# Patient Record
Sex: FEMALE | NOT HISPANIC OR LATINO | URBAN - METROPOLITAN AREA
[De-identification: names, ages, dates, MRNs, and addresses within clinical notes are randomized per-mention and may not be internally consistent; named-entity substitution may affect disease eponyms.]

---

## 2024-12-26 ENCOUNTER — TELEPHONE (OUTPATIENT)
Dept: CARDIOLOGY | Facility: HOSPITAL | Age: 79
End: 2024-12-26
Payer: MEDICARE

## 2024-12-26 NOTE — TELEPHONE ENCOUNTER
Dee Dee called from Dr. Platt's ofice reporting that Ms. Jansen in fact does have a trialysis catheter and needs Tunneled catheter placement. (The dialysis Nurse told me today that it was a tunneled catheter). Anyways, this is a patient of Dr. Pacheco's and Dee Dee will call their office for patient care. She will call us back if we need to schedule this patient.

## 2024-12-26 NOTE — TELEPHONE ENCOUNTER
Referral received from Lc's office requesting Catheter placement. S/p AVG Excision 12/4/24, so unsure of current, access for HD. Unable to reach Lc's nurse on 12/23 for more information. [facility] have not seen patient for about a month so unable to give more information. Contacted nursing home, spoke to nurse states she has catheter but unsure if its temp or tunneled catheter. Called back 12/26 and was able to reach dialysis nurse who confirms Ms. Jansen has a tunneled HD cath. There was some dysfunction last week (AP/ issues) but since starting Heparin and reversing lines she is running at 350 BFR today. They do not feel like a Catheter Exchange is needed at this time. Dr. Martinez office made aware.

## 2025-01-05 ENCOUNTER — HOSPITAL ENCOUNTER (INPATIENT)
Facility: HOSPITAL | Age: 80
LOS: 29 days | Discharge: OTHER INSTITUTION | DRG: 004 | End: 2025-02-03
Attending: EMERGENCY MEDICINE | Admitting: INTERNAL MEDICINE
Payer: MEDICARE

## 2025-01-05 DIAGNOSIS — G40.901 STATUS EPILEPTICUS: ICD-10-CM

## 2025-01-05 DIAGNOSIS — N18.6 END STAGE RENAL DISEASE: ICD-10-CM

## 2025-01-05 DIAGNOSIS — I63.9 CVA (CEREBRAL VASCULAR ACCIDENT): ICD-10-CM

## 2025-01-05 DIAGNOSIS — I50.9 CHF (CONGESTIVE HEART FAILURE): ICD-10-CM

## 2025-01-05 DIAGNOSIS — R56.9 SEIZURE-LIKE ACTIVITY: ICD-10-CM

## 2025-01-05 DIAGNOSIS — R41.82 AMS (ALTERED MENTAL STATUS): ICD-10-CM

## 2025-01-05 DIAGNOSIS — N39.0 URINARY TRACT INFECTION WITHOUT HEMATURIA, SITE UNSPECIFIED: ICD-10-CM

## 2025-01-05 DIAGNOSIS — R29.818 ACUTE FOCAL NEUROLOGICAL DEFICIT: ICD-10-CM

## 2025-01-05 DIAGNOSIS — R41.82 ALTERED MENTAL STATUS, UNSPECIFIED ALTERED MENTAL STATUS TYPE: Primary | ICD-10-CM

## 2025-01-05 DIAGNOSIS — I49.9 CARDIAC RHYTHM DISORDER OR DISTURBANCE OR CHANGE: ICD-10-CM

## 2025-01-05 DIAGNOSIS — D63.8 ANEMIA OF CHRONIC DISEASE: ICD-10-CM

## 2025-01-05 LAB
ACCEPTIBLE SP GR UR QL: 1.02 (ref 1–1.03)
ALBUMIN SERPL-MCNC: 2.3 G/DL (ref 3.4–4.8)
ALBUMIN/GLOB SERPL: 0.4 RATIO (ref 1.1–2)
ALLENS TEST BLOOD GAS (OHS): ABNORMAL
ALP SERPL-CCNC: 112 UNIT/L (ref 40–150)
ALT SERPL-CCNC: 23 UNIT/L (ref 0–55)
AMPHET UR QL SCN: NEGATIVE
ANION GAP SERPL CALC-SCNC: 11 MEQ/L
AST SERPL-CCNC: 25 UNIT/L (ref 5–34)
BACTERIA #/AREA URNS AUTO: ABNORMAL /HPF
BARBITURATE SCN PRESENT UR: NEGATIVE
BASE EXCESS BLD CALC-SCNC: 0.7 MMOL/L
BASOPHILS # BLD AUTO: 0.1 X10(3)/MCL
BASOPHILS NFR BLD AUTO: 1.1 %
BENZODIAZ UR QL SCN: NEGATIVE
BILIRUB SERPL-MCNC: 0.3 MG/DL
BILIRUB UR QL STRIP.AUTO: ABNORMAL
BLOOD GAS SAMPLE TYPE (OHS): ABNORMAL
BUN SERPL-MCNC: 16 MG/DL (ref 9.8–20.1)
CA-I BLD-SCNC: 0.93 MMOL/L (ref 1.12–1.23)
CALCIUM SERPL-MCNC: 8.4 MG/DL (ref 8.4–10.2)
CANNABINOIDS UR QL SCN: NEGATIVE
CHLORIDE SERPL-SCNC: 96 MMOL/L (ref 98–107)
CHOLEST SERPL-MCNC: 102 MG/DL
CHOLEST/HDLC SERPL: 3 {RATIO} (ref 0–5)
CLARITY UR: ABNORMAL
CO2 BLDA-SCNC: 25 MMOL/L
CO2 SERPL-SCNC: 22 MMOL/L (ref 23–31)
COCAINE UR QL SCN: NEGATIVE
COHGB MFR BLDA: 2.9 %
COLOR UR AUTO: ABNORMAL
CREAT SERPL-MCNC: 4.13 MG/DL (ref 0.55–1.02)
CREAT/UREA NIT SERPL: 4
DRAWN BY BLOOD GAS (OHS): ABNORMAL
EOSINOPHIL # BLD AUTO: 0.1 X10(3)/MCL (ref 0–0.9)
EOSINOPHIL NFR BLD AUTO: 1.1 %
ERYTHROCYTE [DISTWIDTH] IN BLOOD BY AUTOMATED COUNT: 16.2 % (ref 11.5–17)
FENTANYL UR QL SCN: NEGATIVE
GFR SERPLBLD CREATININE-BSD FMLA CKD-EPI: 10 ML/MIN/1.73/M2
GLOBULIN SER-MCNC: 5.4 GM/DL (ref 2.4–3.5)
GLUCOSE SERPL-MCNC: 209 MG/DL (ref 82–115)
GLUCOSE UR QL STRIP: ABNORMAL
HCO3 BLDA-SCNC: 24 MMOL/L
HCT VFR BLD AUTO: 29.6 % (ref 37–47)
HDLC SERPL-MCNC: 38 MG/DL (ref 35–60)
HGB BLD-MCNC: 8.9 G/DL (ref 12–16)
HGB UR QL STRIP: ABNORMAL
IMM GRANULOCYTES # BLD AUTO: 0.03 X10(3)/MCL (ref 0–0.04)
IMM GRANULOCYTES NFR BLD AUTO: 0.3 %
INHALED O2 CONCENTRATION: 21 %
INR PPP: 1.1
KETONES UR QL STRIP: ABNORMAL
LDLC SERPL CALC-MCNC: 49 MG/DL (ref 50–140)
LEUKOCYTE ESTERASE UR QL STRIP: ABNORMAL
LYMPHOCYTES # BLD AUTO: 0.8 X10(3)/MCL (ref 0.6–4.6)
LYMPHOCYTES NFR BLD AUTO: 9.2 %
MCH RBC QN AUTO: 30.7 PG (ref 27–31)
MCHC RBC AUTO-ENTMCNC: 30.1 G/DL (ref 33–36)
MCV RBC AUTO: 102.1 FL (ref 80–94)
MDMA UR QL SCN: NEGATIVE
METHGB MFR BLDA: 1 %
MONOCYTES # BLD AUTO: 0.68 X10(3)/MCL (ref 0.1–1.3)
MONOCYTES NFR BLD AUTO: 7.8 %
NEUTROPHILS # BLD AUTO: 7.01 X10(3)/MCL (ref 2.1–9.2)
NEUTROPHILS NFR BLD AUTO: 80.5 %
NITRITE UR QL STRIP: ABNORMAL
NRBC BLD AUTO-RTO: 0 %
O2 HB BLOOD GAS (OHS): 94.5 %
OPIATES UR QL SCN: NEGATIVE
OXYHGB MFR BLDA: 10.8 G/DL
PCO2 BLDA: 33 MMHG (ref 20–50)
PCP UR QL: NEGATIVE
PH BLDA: 7.47 [PH] (ref 7.3–7.6)
PH UR STRIP: 6.5 [PH]
PH UR: 6.5 [PH] (ref 3–11)
PHOSPHATE SERPL-MCNC: 2.9 MG/DL (ref 2.3–4.7)
PLATELET # BLD AUTO: 369 X10(3)/MCL (ref 130–400)
PMV BLD AUTO: 8.5 FL (ref 7.4–10.4)
PO2 BLDA: 58 MMHG
POCT GLUCOSE: 137 MG/DL (ref 70–110)
POCT GLUCOSE: 149 MG/DL (ref 70–110)
POCT GLUCOSE: 217 MG/DL (ref 70–110)
POTASSIUM BLOOD GAS (OHS): 3.2 MMOL/L (ref 3.5–5)
POTASSIUM SERPL-SCNC: 3.3 MMOL/L (ref 3.5–5.1)
PROT SERPL-MCNC: 7.7 GM/DL (ref 5.8–7.6)
PROT UR QL STRIP: ABNORMAL
PROTHROMBIN TIME: 14.4 SECONDS (ref 12.5–14.5)
RBC # BLD AUTO: 2.9 X10(6)/MCL (ref 4.2–5.4)
RBC #/AREA URNS AUTO: ABNORMAL /HPF
SAO2 % BLDA: 91.5 %
SODIUM BLOOD GAS (OHS): 125 MMOL/L (ref 137–145)
SODIUM SERPL-SCNC: 129 MMOL/L (ref 136–145)
SP GR UR STRIP.AUTO: 1.02 (ref 1–1.03)
SQUAMOUS #/AREA URNS LPF: ABNORMAL /HPF
TRIGL SERPL-MCNC: 76 MG/DL (ref 37–140)
TROPONIN I SERPL-MCNC: 0.02 NG/ML (ref 0–0.04)
TSH SERPL-ACNC: 2.03 UIU/ML (ref 0.35–4.94)
UROBILINOGEN UR STRIP-ACNC: ABNORMAL
VLDLC SERPL CALC-MCNC: 15 MG/DL
WBC # BLD AUTO: 8.72 X10(3)/MCL (ref 4.5–11.5)
WBC #/AREA URNS AUTO: >100 /HPF
WBC CLUMPS UR QL AUTO: ABNORMAL

## 2025-01-05 PROCEDURE — 93005 ELECTROCARDIOGRAM TRACING: CPT

## 2025-01-05 PROCEDURE — 63600175 PHARM REV CODE 636 W HCPCS: Performed by: EMERGENCY MEDICINE

## 2025-01-05 PROCEDURE — 87040 BLOOD CULTURE FOR BACTERIA: CPT | Performed by: EMERGENCY MEDICINE

## 2025-01-05 PROCEDURE — 81001 URINALYSIS AUTO W/SCOPE: CPT | Mod: XB | Performed by: EMERGENCY MEDICINE

## 2025-01-05 PROCEDURE — 25500020 PHARM REV CODE 255: Performed by: EMERGENCY MEDICINE

## 2025-01-05 PROCEDURE — 82803 BLOOD GASES ANY COMBINATION: CPT

## 2025-01-05 PROCEDURE — 99285 EMERGENCY DEPT VISIT HI MDM: CPT | Mod: 25

## 2025-01-05 PROCEDURE — 99900035 HC TECH TIME PER 15 MIN (STAT)

## 2025-01-05 PROCEDURE — 87086 URINE CULTURE/COLONY COUNT: CPT | Performed by: EMERGENCY MEDICINE

## 2025-01-05 PROCEDURE — 80061 LIPID PANEL: CPT | Performed by: EMERGENCY MEDICINE

## 2025-01-05 PROCEDURE — 80053 COMPREHEN METABOLIC PANEL: CPT | Performed by: EMERGENCY MEDICINE

## 2025-01-05 PROCEDURE — 84484 ASSAY OF TROPONIN QUANT: CPT | Performed by: EMERGENCY MEDICINE

## 2025-01-05 PROCEDURE — 84100 ASSAY OF PHOSPHORUS: CPT | Performed by: NURSE PRACTITIONER

## 2025-01-05 PROCEDURE — 25000003 PHARM REV CODE 250: Performed by: INTERNAL MEDICINE

## 2025-01-05 PROCEDURE — 84443 ASSAY THYROID STIM HORMONE: CPT | Performed by: EMERGENCY MEDICINE

## 2025-01-05 PROCEDURE — 85025 COMPLETE CBC W/AUTO DIFF WBC: CPT | Performed by: EMERGENCY MEDICINE

## 2025-01-05 PROCEDURE — 85610 PROTHROMBIN TIME: CPT | Performed by: EMERGENCY MEDICINE

## 2025-01-05 PROCEDURE — 80307 DRUG TEST PRSMV CHEM ANLYZR: CPT | Performed by: EMERGENCY MEDICINE

## 2025-01-05 PROCEDURE — 82962 GLUCOSE BLOOD TEST: CPT

## 2025-01-05 PROCEDURE — 93010 ELECTROCARDIOGRAM REPORT: CPT | Mod: ,,, | Performed by: STUDENT IN AN ORGANIZED HEALTH CARE EDUCATION/TRAINING PROGRAM

## 2025-01-05 PROCEDURE — 5A1D70Z PERFORMANCE OF URINARY FILTRATION, INTERMITTENT, LESS THAN 6 HOURS PER DAY: ICD-10-PCS | Performed by: STUDENT IN AN ORGANIZED HEALTH CARE EDUCATION/TRAINING PROGRAM

## 2025-01-05 PROCEDURE — 11000001 HC ACUTE MED/SURG PRIVATE ROOM

## 2025-01-05 PROCEDURE — 96374 THER/PROPH/DIAG INJ IV PUSH: CPT

## 2025-01-05 RX ORDER — CEFTRIAXONE 1 G/1
1 INJECTION, POWDER, FOR SOLUTION INTRAMUSCULAR; INTRAVENOUS
Status: DISCONTINUED | OUTPATIENT
Start: 2025-01-06 | End: 2025-01-07

## 2025-01-05 RX ORDER — SODIUM CHLORIDE 9 MG/ML
INJECTION, SOLUTION INTRAVENOUS ONCE
Status: CANCELLED | OUTPATIENT
Start: 2025-01-05 | End: 2025-01-05

## 2025-01-05 RX ORDER — INSULIN GLARGINE 100 [IU]/ML
30 INJECTION, SOLUTION SUBCUTANEOUS NIGHTLY
Status: ON HOLD | COMMUNITY
Start: 2024-12-19 | End: 2025-02-03 | Stop reason: HOSPADM

## 2025-01-05 RX ORDER — SODIUM CHLORIDE 9 MG/ML
INJECTION, SOLUTION INTRAVENOUS
Status: CANCELLED | OUTPATIENT
Start: 2025-01-05

## 2025-01-05 RX ORDER — NALOXONE HCL 0.4 MG/ML
0.8 VIAL (ML) INJECTION
Status: COMPLETED | OUTPATIENT
Start: 2025-01-05 | End: 2025-01-05

## 2025-01-05 RX ORDER — ASPIRIN 300 MG/1
300 SUPPOSITORY RECTAL DAILY
Status: DISCONTINUED | OUTPATIENT
Start: 2025-01-06 | End: 2025-01-07

## 2025-01-05 RX ORDER — VANCOMYCIN HYDROCHLORIDE 500 MG/10ML
500 INJECTION, POWDER, LYOPHILIZED, FOR SOLUTION INTRAVENOUS
Status: ON HOLD | COMMUNITY
Start: 2024-12-20 | End: 2025-02-03 | Stop reason: HOSPADM

## 2025-01-05 RX ORDER — VALSARTAN AND HYDROCHLOROTHIAZIDE 160; 12.5 MG/1; MG/1
1 TABLET, FILM COATED ORAL 2 TIMES DAILY
COMMUNITY
End: 2025-01-05 | Stop reason: CLARIF

## 2025-01-05 RX ORDER — CEFTRIAXONE 2 G/1
2 INJECTION, POWDER, FOR SOLUTION INTRAMUSCULAR; INTRAVENOUS
Status: COMPLETED | OUTPATIENT
Start: 2025-01-05 | End: 2025-01-05

## 2025-01-05 RX ORDER — CLOPIDOGREL BISULFATE 75 MG/1
1 TABLET, FILM COATED ORAL DAILY
Status: ON HOLD | COMMUNITY
Start: 2024-12-19

## 2025-01-05 RX ORDER — BISACODYL 10 MG/1
10 SUPPOSITORY RECTAL DAILY PRN
Status: DISCONTINUED | OUTPATIENT
Start: 2025-01-05 | End: 2025-02-03 | Stop reason: HOSPADM

## 2025-01-05 RX ORDER — INSULIN ASPART 100 [IU]/ML
0-5 INJECTION, SOLUTION INTRAVENOUS; SUBCUTANEOUS EVERY 6 HOURS PRN
Status: DISCONTINUED | OUTPATIENT
Start: 2025-01-05 | End: 2025-01-11

## 2025-01-05 RX ORDER — DILTIAZEM HYDROCHLORIDE 360 MG/1
1 CAPSULE, EXTENDED RELEASE ORAL EVERY MORNING
COMMUNITY
End: 2025-01-05 | Stop reason: CLARIF

## 2025-01-05 RX ORDER — HYDRALAZINE HYDROCHLORIDE 100 MG/1
100 TABLET, FILM COATED ORAL 3 TIMES DAILY
COMMUNITY
Start: 2024-07-23 | End: 2025-01-05 | Stop reason: CLARIF

## 2025-01-05 RX ORDER — SODIUM CHLORIDE 0.9 % (FLUSH) 0.9 %
10 SYRINGE (ML) INJECTION
Status: DISCONTINUED | OUTPATIENT
Start: 2025-01-05 | End: 2025-02-03 | Stop reason: HOSPADM

## 2025-01-05 RX ORDER — LABETALOL HYDROCHLORIDE 5 MG/ML
10 INJECTION, SOLUTION INTRAVENOUS
Status: DISCONTINUED | OUTPATIENT
Start: 2025-01-05 | End: 2025-01-28

## 2025-01-05 RX ORDER — TRAMADOL HYDROCHLORIDE 50 MG/1
1 TABLET ORAL EVERY 6 HOURS PRN
Status: ON HOLD | COMMUNITY
Start: 2024-12-18 | End: 2025-02-03 | Stop reason: HOSPADM

## 2025-01-05 RX ORDER — CITALOPRAM 10 MG/1
10 TABLET, FILM COATED ORAL DAILY
Status: ON HOLD | COMMUNITY
Start: 2025-01-01

## 2025-01-05 RX ORDER — MUPIROCIN 20 MG/G
OINTMENT TOPICAL 2 TIMES DAILY
Status: COMPLETED | OUTPATIENT
Start: 2025-01-05 | End: 2025-01-10

## 2025-01-05 RX ORDER — NAPROXEN SODIUM 220 MG/1
1 TABLET, FILM COATED ORAL DAILY
Status: ON HOLD | COMMUNITY
Start: 2024-12-19

## 2025-01-05 RX ORDER — MUPIROCIN 20 MG/G
OINTMENT TOPICAL 2 TIMES DAILY
Status: CANCELLED | OUTPATIENT
Start: 2025-01-05 | End: 2025-01-10

## 2025-01-05 RX ORDER — ROSUVASTATIN CALCIUM 40 MG/1
1 TABLET, COATED ORAL NIGHTLY
Status: ON HOLD | COMMUNITY
Start: 2024-12-18

## 2025-01-05 RX ORDER — ENOXAPARIN SODIUM 100 MG/ML
30 INJECTION SUBCUTANEOUS EVERY 24 HOURS
Status: DISCONTINUED | OUTPATIENT
Start: 2025-01-06 | End: 2025-02-03 | Stop reason: HOSPADM

## 2025-01-05 RX ORDER — SACUBITRIL AND VALSARTAN 24; 26 MG/1; MG/1
1 TABLET, FILM COATED ORAL 2 TIMES DAILY
Status: ON HOLD | COMMUNITY
Start: 2024-12-18

## 2025-01-05 RX ORDER — GLUCAGON 1 MG
1 KIT INJECTION
Status: DISCONTINUED | OUTPATIENT
Start: 2025-01-05 | End: 2025-01-11

## 2025-01-05 RX ADMIN — NALOXONE HYDROCHLORIDE 0.8 MG: 0.4 INJECTION, SOLUTION INTRAMUSCULAR; INTRAVENOUS; SUBCUTANEOUS at 05:01

## 2025-01-05 RX ADMIN — NALOXONE HYDROCHLORIDE 0.8 MG: 0.4 INJECTION, SOLUTION INTRAMUSCULAR; INTRAVENOUS; SUBCUTANEOUS at 03:01

## 2025-01-05 RX ADMIN — CEFTRIAXONE SODIUM 2 G: 2 INJECTION, POWDER, FOR SOLUTION INTRAMUSCULAR; INTRAVENOUS at 08:01

## 2025-01-05 RX ADMIN — MUPIROCIN: 20 OINTMENT TOPICAL at 10:01

## 2025-01-05 RX ADMIN — IOHEXOL 100 ML: 350 INJECTION, SOLUTION INTRAVENOUS at 03:01

## 2025-01-05 NOTE — CONSULTS
[facility] - Emergency Dept  Neurology  Consult Note      Krysta Jansen is a 79 y.o. female who presented to [facility] on 1/5/2025 with reports of  lethargy, less responsive . Onset of symptoms was sudden, not related to any specific activity and last known normal 13:30, at this time it is unclear what the patient's baseline is . Stroke risk factors include  HTN, DM, CHF, ESRD, CAD . 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     Per EMS, she was last seen normal by son at nursing home at 13:30.  He went back to check on her at 14:15 and found she was lethargic and not talking.  EMS reports she did have surgery recently on her left arm for her HD access, unsure exactly when, she does have a dressing to the left arm.       No past medical history on file.  No past surgical history on file.  No family history on file.      Review of patient's allergies indicates:  No Known Allergies      STROKE DOCUMENTATION   Acute Stroke Times   Last Known Normal Date: 01/05/25  Last Known Normal Time: 1330  Symptom Onset Date: 01/05/25  Symptom Onset Time: 1415  Stroke Team Called Date: 01/05/25  Stroke Team Called Time: 1515  Stroke Team Arrival Date: 01/05/25  Stroke Team Arrival Time: 1518  Thrombolytic Therapy Recommended: No (recent surgery, nonfocal exam)    NIH Scale:  1a. Level of Consciousness: 2-->Not alert, requires repeated stimulation to attend, or is obtunded and requires strong or painful stimulation to make movements (not stereotyped)  1b. LOC Questions: 2-->Answers neither question correctly  1c. LOC Commands: 2-->Performs neither task correctly  2. Best Gaze: 2-->Forced deviation, or total gaze paresis not overcome by the oculocephalic maneuver  3. Visual: 0-->No visual loss  4. Facial Palsy: 0-->Normal symmetrical movements  5a. Motor Arm, Left: 2-->Some effort against gravity, limb  cannot get to or maintain (if cued) 90 (or 45) degrees, drifts down to bed, but has some effort against gravity  5b. Motor Arm, Right: 2-->Some effort against gravity, limb cannot get to or maintain (if cued) 90 (or 45) degrees, drifts down to bed, but has some effort against gravity  6a. Motor Leg, Left: 3-->No effort against gravity, leg falls to bed immediately  6b. Motor Leg, Right: 3-->No effort against gravity, leg falls to bed immediately  7. Limb Ataxia: 0-->Absent  8. Sensory: 0-->Normal, no sensory loss  9. Best Language: 2-->Severe aphasia, all communication is through fragmentary expression, great need for inference, questioning, and guessing by the listener. Range of information that can be exchanged is limited, listener carries burden of. . . (see row details)  10. Dysarthria: 2-->Severe dysarthria, patients speech is so slurred as to be unintelligible in the absence of or out of proportion to any dysphasia, or is mute/anarthric  11. Extinction and Inattention (formerly Neglect): 0-->No abnormality  Total (NIH Stroke Scale): 22     Modified Ruth          Neurological Exam:   LOC: does not follow requests and obtunded  Language: Global aphasia, unsure if it is true aphasia or 2/2 obtundation, does not really participate in exam, she is able to state her name is Vera but it is very dysarthric  Speech: Dysarthria, Aphasic   Visual Fields (CN II): per chart chronic blindness  Facial Movement (CN VII): symmetric facial expression  Motor*: Arm Left:  Paretic:  2/5, Leg Left:   Paretic:  0/5, Arm Right:   Paretic:  2/5, Leg Right:   Paretic:  0/5, increased tone to RUE, RLE  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 along with some focal myoclonus of her left arm and left quad    Laboratory:  BMP:   Lab Results   Component Value Date    GLUCOSE 209 (H) 01/05/2025     (L) 01/05/2025     05/08/2024    K 3.3 (L) 01/05/2025    K 3.8 05/08/2024    CL 96 (L) 01/05/2025      "05/08/2024    CO2 22 (L) 01/05/2025    CO2 22 05/08/2024    BUN 16.0 01/05/2025    BUN 55 (H) 10/02/2024    CREATININE 4.13 (H) 01/05/2025    CREATININE 3.37 (H) 05/08/2024    CALCIUM 8.4 01/05/2025    CALCIUM 9 05/08/2024     CBC:   Lab Results   Component Value Date    WBC 8.72 01/05/2025    RBC 2.90 (L) 01/05/2025    HGB 8.9 (L) 01/05/2025    HGB 10.3 (L) 11/24/2024    HCT 29.6 (L) 01/05/2025     01/05/2025    .1 (H) 01/05/2025    MCH 30.7 01/05/2025    MCHC 30.1 (L) 01/05/2025     Lipid Panel:   Lab Results   Component Value Date    CHOL 102 01/05/2025    HDL 38 01/05/2025    TRIG 76 01/05/2025     Coagulation:   Lab Results   Component Value Date    INR 1.1 01/05/2025     Hgb A1C:   Lab Results   Component Value Date    HGBA1C 5.9 12/19/2024     TSH: No results found for: "TSH"    Diagnostic Results  Brain Imaging:   -CTh: negative for hemorrhage  Cerebrovascular Imaging:   -CTA h/n: no LVO, severe atherosclerotic calcification of the carotid siphons with severe narrowing of the supraclinoid ICAs bilaterally         Discussed with neurologist on call:   Discussed case with Dr. Justin, non-focal exam   Thrombolysis Candidate? No, Recent surgery/trauma (< 14 days) , non-focal exam, large area of encephalomalacia in the left MCA territory, surgery recently on left arm for HD access  -Delays to Thrombolysis?  Not Applicable    Interventional Revascularization Candidate? No; significant pre-stroke disability   -Delays to Thrombectomy? Not Applicable        PLAN:    Presented with AMS??, non-focal exam  Stroke RF: HTN, CAD, HLD, DM    -no recommendations for TNK 2/2 recent surgery, again, exam non-focal    - Admit for stroke workup  - Allow permissive HTN ... prn hydralazine and labetalol for SBP > 220 or DBP > 120     - after 24 hours from symptom onset, ok to normalize blood pressure  - Neuro checks q4hr ... stat CTh if any neuro change   - Begin ASA 325mg daily .... if failed Dominick, then ASA 300mg " MD daily  - DVT ppx with SCD or lovenox 40 s/c daily  - Continuous telemetry monitoring  - NPO until passes Tan or cleared by SLP  - PT/OT/SLP to evaluate   - EEG tomorrow am  - seizure precautions           Further recommendations to follow from MD Kasia Medley, NP  Neurology  [facility] - Emergency Dept

## 2025-01-05 NOTE — ED PROVIDER NOTES
Encounter Date: 1/5/2025    SCRIBE #1 NOTE: I, Bailey Simon, am scribing for, and in the presence of,  Feliciano Pickard MD. I have scribed the following portions of the note - the EKG reading. Other sections scribed: HPI, ROS, PE.       History     Chief Complaint   Patient presents with    Altered Mental Status     Pt began having altered mental status 1 hour pta. Localizes to pain. Having trouble getting words out. Eyes twitching intermittently.     Patient is a 79 year old female that presents to the ED via EMS for altered mental status onset one hour ago. EMS reports the patient had a surgery 2 weeks ago. She was at a rehabilitation center today with a GCS of 15. Approximately one hour ago, the patient became lethargic. EMS reports mumbling speech and difficulty following commands. They report she is favoring the right side but family reports that is her baseline. . No known falls or injuries.     The history is provided by medical records and the EMS personnel.     Review of patient's allergies indicates:  No Known Allergies  History reviewed. No pertinent past medical history.  History reviewed. No pertinent surgical history.  No family history on file.  Social History     Tobacco Use    Smoking status: Unknown   Substance Use Topics    Alcohol use: Not Currently     Review of Systems   Unable to perform ROS: Mental status change       Physical Exam     Initial Vitals [01/05/25 1518]   BP Pulse Resp Temp SpO2   114/65 78 16 98.1 °F (36.7 °C) 95 %      MAP       --         Physical Exam    Nursing note and vitals reviewed.  Constitutional: No distress.   HENT:   Head: Normocephalic and atraumatic.   Right Ear: Tympanic membrane normal.   Left Ear: Tympanic membrane normal. Mouth/Throat: Mucous membranes are dry.   Eyes: Conjunctivae and EOM are normal. Pupils are equal, round, and reactive to light.   Neck: Trachea normal. Neck supple. Carotid bruit is not present. No JVD present.   Normal range of  motion.  Cardiovascular:  Normal rate and regular rhythm.           No murmur heard.  Pulmonary/Chest: Breath sounds normal. No respiratory distress.   Dialysis catheter to left upper chest.   Abdominal: Abdomen is soft. Bowel sounds are normal. She exhibits no distension.   Musculoskeletal:      Cervical back: Normal range of motion and neck supple.      Lumbar back: Normal. Normal range of motion.     Neurological: GCS eye subscore is 3. GCS verbal subscore is 2. GCS motor subscore is 5.   Diffusely weak. Somnolent. Arousable to verbal stimuli. Occasional twitching of left hand.          ED Course   Procedures  Labs Reviewed   COMPREHENSIVE METABOLIC PANEL - Abnormal       Result Value    Sodium 129 (*)     Potassium 3.3 (*)     Chloride 96 (*)     CO2 22 (*)     Glucose 209 (*)     Blood Urea Nitrogen 16.0      Creatinine 4.13 (*)     Calcium 8.4      Protein Total 7.7 (*)     Albumin 2.3 (*)     Globulin 5.4 (*)     Albumin/Globulin Ratio 0.4 (*)     Bilirubin Total 0.3            ALT 23      AST 25      eGFR 10      Anion Gap 11.0      BUN/Creatinine Ratio 4     LIPID PANEL - Abnormal    Cholesterol Total 102      HDL Cholesterol 38      Triglyceride 76      Cholesterol/HDL Ratio 3      Very Low Density Lipoprotein 15      LDL Cholesterol 49.00 (*)    CBC WITH DIFFERENTIAL - Abnormal    WBC 8.72      RBC 2.90 (*)     Hgb 8.9 (*)     Hct 29.6 (*)     .1 (*)     MCH 30.7      MCHC 30.1 (*)     RDW 16.2      Platelet 369      MPV 8.5      Neut % 80.5      Lymph % 9.2      Mono % 7.8      Eos % 1.1      Basophil % 1.1      Lymph # 0.80      Neut # 7.01      Mono # 0.68      Eos # 0.10      Baso # 0.10      IG# 0.03      IG% 0.3      NRBC% 0.0     BLOOD GAS - Abnormal    Sample Type Venous Blood      Drawn by mm,rrt      pH, Blood gas 7.470      pCO2, Blood gas 33.0      pO2, Blood gas 58.0      Sodium, Blood Gas 125 (*)     Potassium, Blood Gas 3.2 (*)     Calcium Level Ionized 0.93 (*)     TOC2,  Blood gas 25.0      Base Excess, Blood gas 0.70      sO2, Blood gas 91.5      HCO3, Blood gas 24.0      THb, Blood gas 10.8      O2 Hb, Blood Gas 94.5      CO Hgb 2.9      Met Hgb 1.0      Allens Test N/A      FIO2, Blood gas 21     URINALYSIS, REFLEX TO URINE CULTURE - Abnormal    Color, UA Other (*)     Appearance, UA Turbid (*)     Specific Gravity, UA 1.025      pH, UA 6.5      Protein, UA Color may interfere with results      Glucose, UA Color may interfere with results      Ketones, UA Color may interfere with results      Blood, UA Color may interfere with results      Bilirubin, UA Color may interfere with results      Urobilinogen, UA Color may interfere with results      Nitrites, UA Color may interfere with results      Leukocyte Esterase, UA Color may interfere with results      RBC, UA 0-5      WBC, UA >100 (*)     WBC Clumps, UA Many (*)     Bacteria, UA Few (*)     Squamous Epithelial Cells, UA Trace     POCT GLUCOSE - Abnormal    POCT Glucose 217 (*)    POCT GLUCOSE - Abnormal    POCT Glucose 149 (*)    PROTIME-INR - Normal    PT 14.4      INR 1.1     TSH - Normal    TSH 2.031     TROPONIN I - Normal    Troponin-I 0.019     DRUG SCREEN, URINE (BEAKER) - Normal    Amphetamines, Urine Negative      Barbiturates, Urine Negative      Benzodiazepine, Urine Negative      Cannabinoids, Urine Negative      Cocaine, Urine Negative      Fentanyl, Urine Negative      MDMA, Urine Negative      Opiates, Urine Negative      Phencyclidine, Urine Negative      pH, Urine 6.5      Specific Gravity, Urine Auto 1.025      Narrative:     Cut off concentrations:    Amphetamines - 1000 ng/ml  Barbiturates - 200 ng/ml  Benzodiazepine - 200 ng/ml  Cannabinoids (THC) - 50 ng/ml  Cocaine - 300 ng/ml  Fentanyl - 1.0 ng/ml  MDMA - 500 ng/ml  Opiates - 300 ng/ml   Phencyclidine (PCP) - 25 ng/ml    Specimen submitted for drug analysis and tested for pH and specific gravity in order to evaluate sample integrity. Suspect tampering  if specific gravity is <1.003 and/or pH is not within the range of 4.5 - 8.0  False negatives may result form substances such as bleach added to urine.  False positives may result for the presence of a substance with similar chemical structure to the drug or its metabolite.    This test provides only a PRELIMINARY analytical test result. A more specific alternate chemical method must be used in order to obtain a confirmed analytical result. Gas chromatography/mass spectrometry (GC/MS) is the preferred confirmatory method. Other chemical confirmation methods are available. Clinical consideration and professional judgement should be applied to any drug of abuse test result, particularly when preliminary positive results are used.    Positive results will be confirmed only at the physicians request. Unconfirmed screening results are to be used only for medical purposes (treatment).        CULTURE, URINE   BLOOD CULTURE [facility]   BLOOD CULTURE [facility]   CBC W/ AUTO DIFFERENTIAL    Narrative:     The following orders were created for panel order CBC W/ AUTO DIFFERENTIAL.  Procedure                               Abnormality         Status                     ---------                               -----------         ------                     CBC with Differential[[id number]]       Abnormal            Final result                 Please view results for these tests on the individual orders.   POCT GLUCOSE, HAND-HELD DEVICE     EKG Readings: (Independently Interpreted)   Initial Reading: No STEMI. Rhythm: Normal Sinus Rhythm. Heart Rate: 64. Ectopy: No Ectopy. Conduction: Normal. ST Segments: Normal ST Segments. T Waves: Normal. Axis: Normal.   1548.       Imaging Results              CTA Head and Neck (xpd) (Final result)  Result time 01/05/25 15:40:27      Final result by Severo Perez MD (01/05/25 15:40:27)                   Impression:      Severe atherosclerotic calcification of the carotid siphons with severe narrowing  of the supraclinoid ICAs bilaterally.      Electronically signed by: Santos Perez MD  Date:    01/05/2025  Time:    15:40               Narrative:    EXAMINATION:  CTA HEAD AND NECK (XPD)    CLINICAL HISTORY:  Neuro deficit, acute, stroke suspected;    TECHNIQUE:  Non contrast low dose axial images were obtained through the head.  CT angiogram was performed from the level of the selene to the top of the head following the IV administration of 100mL of Omnipaque 350.   Sagittal and coronal reconstructions and maximum intensity projection reconstructions were performed. Arterial stenosis percentages are based on NASCET measurement criteria.  DLP 1460.  Automated exposure control used.    COMPARISON:  None    FINDINGS:  Neck: The common carotid arteries, carotid bulbs, internal carotid arteries, and vertebral arteries are patent without significant stenosis or occlusion.  Trace atherosclerotic calcification noted.  No soft tissue mass, fluid collection, hematoma, lymphadenopathy.    Head: Internal carotid artery siphons demonstrate moderate severe atherosclerotic calcification with severe narrowing of the supraclinoid segments of the ICAs.  Anterior cerebral arteries, middle cerebral arteries and branches, posterior cerebral arteries, and vertebrobasilar system are patent without significant stenosis, occlusion, or aneurysm.                                       CT HEAD FOR STROKE (Final result)  Result time 01/05/25 15:45:27      Final result by Alfredo Alejo MD (01/05/25 15:45:27)                   Impression:      No acute intracranial process identified.    Report called to Dr. Pickard at 15:43 hours on 01/05/2025.      Electronically signed by: Alfredo Alejo  Date:    01/05/2025  Time:    15:45               Narrative:    EXAMINATION:  CT HEAD FOR STROKE    CLINICAL HISTORY:  Neuro deficit, acute, stroke suspected;    TECHNIQUE:  CT images of the head without IV contrast. Axial, coronal and sagittal images  reviewed. Dose length product 1029 mGycm. Automatic exposure control, adjustment of mA/kV or iterative reconstruction technique used to limit radiation dose.    COMPARISON:  No relevant comparison studies available at the time of dictation.    FINDINGS:  Extra-axial spaces/ventricular system: Moderate cerebral atrophy.  Ventricular size proportional to the degree of atrophy.    Intracranial hemorrhage: None identified.    Cerebral parenchyma: Encephalomalacia in both occipital lobes.  Moderate chronic small vessel ischemic changes in the supratentorial white matter.  Several scattered small chronic appearing infarcts.  No definitive acute infarct identified.    Vascular system: No hyperdense vessel appreciated.    Cerebellum: Normal.    Sella: Normal.    Included paranasal sinuses and mastoid air cells: Trace bilateral mastoid fluid.    Visualized orbits: Normal.    Scalp/Calvarium: No depressed skull fracture.                                       Medications   cefTRIAXone injection 2 g (has no administration in time range)   iohexoL (OMNIPAQUE 350) injection 100 mL (100 mLs Intravenous Given 1/5/25 1536)   naloxone 0.4 mg/mL injection 0.8 mg (0.8 mg Intravenous Given 1/5/25 1549)   naloxone 0.4 mg/mL injection 0.8 mg (0.8 mg Intravenous Given 1/5/25 1714)     Medical Decision Making  Differential diagnosis includes, but is not limited to, CVA, hemorrhagic CVA, medication reaction, metabolic disorder, and sepsis.     Amount and/or Complexity of Data Reviewed  Independent Historian: EMS     Details: EMS provides the collateral information secondary to patient's altered mental status.   Labs: ordered. Decision-making details documented in ED Course.  Radiology: ordered and independent interpretation performed.  ECG/medicine tests: ordered and independent interpretation performed.     Details: 1548. Initial Reading: No STEMI. Rhythm: Normal Sinus Rhythm. Heart Rate: 64. Ectopy: No Ectopy. Conduction: Normal. ST  Segments: Normal ST Segments. T Waves: Normal. Axis: Normal.   Discussion of management or test interpretation with external provider(s): Discussed with hospitalist who accepts for admission.  We discussed that the patient is still may have had a CVA and will likely need further workup and follow up as well for this.    Nephrology paged to follow as well in light of the patient isn't end-stage renal disease patient on dialysis and received IV contrast for CT as well    Risk  Decision regarding hospitalization.            Scribe Attestation:   Scribe #1: I performed the above scribed service and the documentation accurately describes the services I performed. I attest to the accuracy of the note.    Attending Attestation:           Physician Attestation for Scribe:  Physician Attestation Statement for Scribe #1: I, Feliciano Pickard MD, reviewed documentation, as scribed by Bailey Montes in my presence, and it is both accurate and complete.             ED Course as of 01/05/25 1905   Sun Jan 05, 2025   1544 Discussed with radiologist. No acute findings. Multiple chronic changes.  [ED]   1559 Stroke team evaluated the patient, states with recent surgery, along with her more global altered mental status no focal neurologic findings not a candidate for thrombolytics. [MP]   1559 On arrival back from CT, patient seems to be more arousable, attempting to answer questions, but uses 1 word responses repetitively.  Given Narcan with no significant change [MP]   1600 Stroke team in radiology reports CT of the head shows significant encephalomalacia [MP]   1600 Hemoglobin 8.9 and hematocrit 29.6 which is consistent with labs from 2 weeks ago  Creatinine is 4.13 but patient has a known dialysis patient [MP]   1713 Patient re-evaluated, and seems to be less responsive than prior.  Son arrives and states that the patient is usually very conversant, awake alert.  States that this is definitely a change today.  States he is not aware  of any prior strokes [MP]   1839 Patient with a appears to be a UTI.  No fever, no leukocytosis however.  Will give IV antibiotics and admit. [MP]   1901 Discussed with LINDA Billings [ED]   1903 Discussed with hospitalist and accepts for admission.  We did discuss that the patient is still may have had a CVA will likely need to follow up MRI as well [MP]      ED Course User Index  [ED] Bailey Montes  [MP] Feliciano Pickard MD                             Clinical Impression:  Final diagnoses:  [R29.818] Acute focal neurological deficit  [R41.82] AMS (altered mental status)  [R41.82] Altered mental status, unspecified altered mental status type (Primary)  [N18.6] End stage renal disease  [N39.0] Urinary tract infection without hematuria, site unspecified          ED Disposition Condition    Admit Stable                Feliciano Pickard MD  01/05/25 0745

## 2025-01-06 PROBLEM — R41.82 ALTERED MENTAL STATUS: Status: ACTIVE | Noted: 2025-01-06

## 2025-01-06 PROBLEM — R56.9 SEIZURE-LIKE ACTIVITY: Status: ACTIVE | Noted: 2025-01-06

## 2025-01-06 LAB
ALBUMIN SERPL-MCNC: 2.1 G/DL (ref 3.4–4.8)
ALBUMIN/GLOB SERPL: 0.4 RATIO (ref 1.1–2)
ALLENS TEST BLOOD GAS (OHS): YES
ALLENS TEST BLOOD GAS (OHS): YES
ALP SERPL-CCNC: 102 UNIT/L (ref 40–150)
ALT SERPL-CCNC: 21 UNIT/L (ref 0–55)
AMMONIA PLAS-MSCNC: 22.9 UMOL/L (ref 18–72)
ANION GAP SERPL CALC-SCNC: 10 MEQ/L
APICAL FOUR CHAMBER EJECTION FRACTION: 44 %
APICAL TWO CHAMBER EJECTION FRACTION: 43 %
APTT PPP: 31.3 SECONDS (ref 23.2–33.7)
AST SERPL-CCNC: 25 UNIT/L (ref 5–34)
AV INDEX (PROSTH): 0.77
AV MEAN GRADIENT: 4 MMHG
AV PEAK GRADIENT: 7.8 MMHG
AV VALVE AREA BY VELOCITY RATIO: 2 CM²
AV VALVE AREA: 2.2 CM²
AV VELOCITY RATIO: 0.71
BASE EXCESS BLD CALC-SCNC: 0.4 MMOL/L (ref -2–2)
BASE EXCESS BLD CALC-SCNC: 1.2 MMOL/L (ref -2–2)
BASOPHILS # BLD AUTO: 0.08 X10(3)/MCL
BASOPHILS NFR BLD AUTO: 1.1 %
BILIRUB SERPL-MCNC: 0.2 MG/DL
BLOOD GAS SAMPLE TYPE (OHS): ABNORMAL
BLOOD GAS SAMPLE TYPE (OHS): ABNORMAL
BSA FOR ECHO PROCEDURE: 1.77 M2
BUN SERPL-MCNC: 17.8 MG/DL (ref 9.8–20.1)
CA-I BLD-SCNC: 1.13 MMOL/L (ref 1.12–1.23)
CA-I BLD-SCNC: 1.15 MMOL/L (ref 1.12–1.23)
CALCIUM SERPL-MCNC: 8.1 MG/DL (ref 8.4–10.2)
CHLORIDE SERPL-SCNC: 97 MMOL/L (ref 98–107)
CK SERPL-CCNC: 30 U/L (ref 29–168)
CO2 BLDA-SCNC: 25.2 MMOL/L
CO2 BLDA-SCNC: 27.4 MMOL/L
CO2 SERPL-SCNC: 23 MMOL/L (ref 23–31)
COHGB MFR BLDA: 1.1 % (ref 0.5–1.5)
COHGB MFR BLDA: 1.9 % (ref 0.5–1.5)
CREAT SERPL-MCNC: 4.49 MG/DL (ref 0.55–1.02)
CREAT/UREA NIT SERPL: 4
DOP CALC AO PEAK VEL: 1.4 M/S
DOP CALC AO VTI: 32 CM
DOP CALC LVOT AREA: 2.8 CM2
DOP CALC LVOT DIAMETER: 1.9 CM
DOP CALC LVOT PEAK VEL: 1 M/S
DOP CALC LVOT STROKE VOLUME: 70 CM3
DOP CALC MV VTI: 45 CM
DOP CALCLVOT PEAK VEL VTI: 24.7 CM
DRAWN BY BLOOD GAS (OHS): ABNORMAL
DRAWN BY BLOOD GAS (OHS): ABNORMAL
E WAVE DECELERATION TIME: 261 MSEC
E/A RATIO: 0.68
E/E' RATIO: 10.86 M/S
EOSINOPHIL # BLD AUTO: 0.08 X10(3)/MCL (ref 0–0.9)
EOSINOPHIL NFR BLD AUTO: 1.1 %
ERYTHROCYTE [DISTWIDTH] IN BLOOD BY AUTOMATED COUNT: 15.9 % (ref 11.5–17)
FERRITIN SERPL-MCNC: 2921.45 NG/ML (ref 4.63–204)
GFR SERPLBLD CREATININE-BSD FMLA CKD-EPI: 9 ML/MIN/1.73/M2
GLOBULIN SER-MCNC: 4.9 GM/DL (ref 2.4–3.5)
GLUCOSE SERPL-MCNC: 129 MG/DL (ref 82–115)
HCO3 BLDA-SCNC: 24.2 MMOL/L (ref 22–26)
HCO3 BLDA-SCNC: 26 MMOL/L (ref 22–26)
HCT VFR BLD AUTO: 23.1 % (ref 37–47)
HGB BLD-MCNC: 7.3 G/DL (ref 12–16)
HR MV ECHO: 56 BPM
IMM GRANULOCYTES # BLD AUTO: 0.08 X10(3)/MCL (ref 0–0.04)
IMM GRANULOCYTES NFR BLD AUTO: 1.1 %
INHALED O2 CONCENTRATION: 100 %
INHALED O2 CONCENTRATION: 21 %
INR PPP: 1.1
IRON SATN MFR SERPL: 64 % (ref 20–50)
IRON SERPL-MCNC: 84 UG/DL (ref 50–170)
LACTATE SERPL-SCNC: 0.6 MMOL/L (ref 0.5–2.2)
LEFT ATRIUM AREA SYSTOLIC (APICAL 4 CHAMBER): 14.5 CM2
LEFT ATRIUM SIZE: 3 CM
LEFT VENTRICLE DIASTOLIC VOLUME INDEX: 59.65 ML/M2
LEFT VENTRICLE DIASTOLIC VOLUME: 102 ML
LEFT VENTRICLE END DIASTOLIC VOLUME APICAL 2 CHAMBER: 99.3 ML
LEFT VENTRICLE END DIASTOLIC VOLUME APICAL 4 CHAMBER: 95.9 ML
LEFT VENTRICLE END SYSTOLIC VOLUME APICAL 4 CHAMBER: 31.3 ML
LEFT VENTRICLE SYSTOLIC VOLUME INDEX: 32.7 ML/M2
LEFT VENTRICLE SYSTOLIC VOLUME: 55.9 ML
LV LATERAL E/E' RATIO: 9.5 M/S
LV SEPTAL E/E' RATIO: 12.67 M/S
LVOT MG: 2 MMHG
LVOT MV: 0.65 CM/S
LYMPHOCYTES # BLD AUTO: 1.1 X10(3)/MCL (ref 0.6–4.6)
LYMPHOCYTES NFR BLD AUTO: 15.7 %
MAGNESIUM SERPL-MCNC: 2.3 MG/DL (ref 1.6–2.6)
MCH RBC QN AUTO: 30.8 PG (ref 27–31)
MCHC RBC AUTO-ENTMCNC: 31.6 G/DL (ref 33–36)
MCV RBC AUTO: 97.5 FL (ref 80–94)
MECH RR (OHS): 22 B/MIN
METHGB MFR BLDA: 0.4 % (ref 0.4–1.5)
METHGB MFR BLDA: 1 % (ref 0.4–1.5)
MODE (OHS): AC
MONOCYTES # BLD AUTO: 1.12 X10(3)/MCL (ref 0.1–1.3)
MONOCYTES NFR BLD AUTO: 16 %
MV MEAN GRADIENT: 3 MMHG
MV PEAK A VEL: 1.11 M/S
MV PEAK E VEL: 0.76 M/S
MV PEAK GRADIENT: 7 MMHG
MV STENOSIS PRESSURE HALF TIME: 51 MS
MV VALVE AREA BY CONTINUITY EQUATION: 1.56 CM2
MV VALVE AREA P 1/2 METHOD: 4.31 CM2
NEUTROPHILS # BLD AUTO: 4.55 X10(3)/MCL (ref 2.1–9.2)
NEUTROPHILS NFR BLD AUTO: 65 %
NRBC BLD AUTO-RTO: 0 %
O2 HB BLOOD GAS (OHS): 96.7 % (ref 94–97)
O2 HB BLOOD GAS (OHS): 97.7 % (ref 94–97)
OHS LV EJECTION FRACTION SIMPSONS BIPLANE MOD: 45 %
OHS QRS DURATION: 94 MS
OHS QTC CALCULATION: 455 MS
OXYGEN DEVICE BLOOD GAS (OHS): ABNORMAL
OXYHGB MFR BLDA: 7.1 G/DL (ref 12–16)
OXYHGB MFR BLDA: 8 G/DL (ref 12–16)
PCO2 BLDA: 31 MMHG (ref 35–45)
PCO2 BLDA: 46 MMHG (ref 35–45)
PEEP RESPIRATORY: 5 CMH2O
PH BLDA: 7.36 [PH] (ref 7.35–7.45)
PH BLDA: 7.5 [PH] (ref 7.35–7.45)
PHOSPHATE SERPL-MCNC: 3.1 MG/DL (ref 2.3–4.7)
PISA TR MAX VEL: 2.37 M/S
PLATELET # BLD AUTO: 282 X10(3)/MCL (ref 130–400)
PMV BLD AUTO: 8.2 FL (ref 7.4–10.4)
PO2 BLDA: 356 MMHG (ref 80–100)
PO2 BLDA: 74 MMHG (ref 80–100)
POC PTINR: 1.2 (ref 0.9–1.2)
POCT GLUCOSE: 135 MG/DL (ref 70–110)
POCT GLUCOSE: 95 MG/DL (ref 70–110)
POTASSIUM BLOOD GAS (OHS): 2.8 MMOL/L (ref 3.5–5)
POTASSIUM BLOOD GAS (OHS): 3 MMOL/L (ref 3.5–5)
POTASSIUM SERPL-SCNC: 3.2 MMOL/L (ref 3.5–5.1)
PROT SERPL-MCNC: 7 GM/DL (ref 5.8–7.6)
PROTHROMBIN TIME: 14.7 SECONDS (ref 12.5–14.5)
PV PEAK GRADIENT: 4 MMHG
PV PEAK VELOCITY: 0.96 M/S
RBC # BLD AUTO: 2.37 X10(6)/MCL (ref 4.2–5.4)
SAMPLE SITE BLOOD GAS (OHS): ABNORMAL
SAMPLE SITE BLOOD GAS (OHS): ABNORMAL
SAMPLE: NORMAL
SAO2 % BLDA: 100 %
SAO2 % BLDA: 94.1 %
SODIUM BLOOD GAS (OHS): 128 MMOL/L (ref 137–145)
SODIUM BLOOD GAS (OHS): 129 MMOL/L (ref 137–145)
SODIUM SERPL-SCNC: 130 MMOL/L (ref 136–145)
SPONT+MECH VT ON VENT: 450 ML
TDI LATERAL: 0.08 M/S
TDI SEPTAL: 0.06 M/S
TDI: 0.07 M/S
TIBC SERPL-MCNC: 131 UG/DL (ref 250–450)
TIBC SERPL-MCNC: 47 UG/DL (ref 70–310)
TR MAX PG: 22 MMHG
TRANSFERRIN SERPL-MCNC: 108 MG/DL (ref 173–360)
TRICUSPID ANNULAR PLANE SYSTOLIC EXCURSION: 2.35 CM
TROPONIN I SERPL-MCNC: 0.01 NG/ML (ref 0–0.04)
TROPONIN I SERPL-MCNC: <0.01 NG/ML (ref 0–0.04)
WBC # BLD AUTO: 7.01 X10(3)/MCL (ref 4.5–11.5)

## 2025-01-06 PROCEDURE — 63600175 PHARM REV CODE 636 W HCPCS

## 2025-01-06 PROCEDURE — 25000003 PHARM REV CODE 250

## 2025-01-06 PROCEDURE — 25000003 PHARM REV CODE 250: Performed by: INTERNAL MEDICINE

## 2025-01-06 PROCEDURE — 95819 EEG AWAKE AND ASLEEP: CPT

## 2025-01-06 PROCEDURE — 63600175 PHARM REV CODE 636 W HCPCS: Performed by: INTERNAL MEDICINE

## 2025-01-06 PROCEDURE — 94002 VENT MGMT INPAT INIT DAY: CPT

## 2025-01-06 PROCEDURE — 31500 INSERT EMERGENCY AIRWAY: CPT

## 2025-01-06 PROCEDURE — 27100171 HC OXYGEN HIGH FLOW UP TO 24 HOURS

## 2025-01-06 PROCEDURE — 27200966 HC CLOSED SUCTION SYSTEM

## 2025-01-06 PROCEDURE — 83550 IRON BINDING TEST: CPT | Performed by: INTERNAL MEDICINE

## 2025-01-06 PROCEDURE — 80053 COMPREHEN METABOLIC PANEL: CPT | Performed by: NURSE PRACTITIONER

## 2025-01-06 PROCEDURE — 82550 ASSAY OF CK (CPK): CPT | Performed by: STUDENT IN AN ORGANIZED HEALTH CARE EDUCATION/TRAINING PROGRAM

## 2025-01-06 PROCEDURE — 85025 COMPLETE CBC W/AUTO DIFF WBC: CPT | Performed by: NURSE PRACTITIONER

## 2025-01-06 PROCEDURE — 99900035 HC TECH TIME PER 15 MIN (STAT)

## 2025-01-06 PROCEDURE — 83735 ASSAY OF MAGNESIUM: CPT | Performed by: INTERNAL MEDICINE

## 2025-01-06 PROCEDURE — 85730 THROMBOPLASTIN TIME PARTIAL: CPT | Performed by: INTERNAL MEDICINE

## 2025-01-06 PROCEDURE — 36600 WITHDRAWAL OF ARTERIAL BLOOD: CPT

## 2025-01-06 PROCEDURE — 82728 ASSAY OF FERRITIN: CPT | Performed by: INTERNAL MEDICINE

## 2025-01-06 PROCEDURE — 85610 PROTHROMBIN TIME: CPT | Performed by: INTERNAL MEDICINE

## 2025-01-06 PROCEDURE — 95720 EEG PHY/QHP EA INCR W/VEEG: CPT | Mod: ,,, | Performed by: SPECIALIST

## 2025-01-06 PROCEDURE — 0BH17EZ INSERTION OF ENDOTRACHEAL AIRWAY INTO TRACHEA, VIA NATURAL OR ARTIFICIAL OPENING: ICD-10-PCS | Performed by: INTERNAL MEDICINE

## 2025-01-06 PROCEDURE — 27201247 HC HEMODIALYSIS, SET-UP & CANCEL

## 2025-01-06 PROCEDURE — 99900031 HC PATIENT EDUCATION (STAT)

## 2025-01-06 PROCEDURE — 84484 ASSAY OF TROPONIN QUANT: CPT | Performed by: INTERNAL MEDICINE

## 2025-01-06 PROCEDURE — 82140 ASSAY OF AMMONIA: CPT | Performed by: INTERNAL MEDICINE

## 2025-01-06 PROCEDURE — 82803 BLOOD GASES ANY COMBINATION: CPT

## 2025-01-06 PROCEDURE — 84100 ASSAY OF PHOSPHORUS: CPT | Performed by: INTERNAL MEDICINE

## 2025-01-06 PROCEDURE — 94760 N-INVAS EAR/PLS OXIMETRY 1: CPT | Mod: XB

## 2025-01-06 PROCEDURE — 94761 N-INVAS EAR/PLS OXIMETRY MLT: CPT | Mod: XB

## 2025-01-06 PROCEDURE — 5A1955Z RESPIRATORY VENTILATION, GREATER THAN 96 CONSECUTIVE HOURS: ICD-10-PCS | Performed by: INTERNAL MEDICINE

## 2025-01-06 PROCEDURE — 83605 ASSAY OF LACTIC ACID: CPT | Performed by: STUDENT IN AN ORGANIZED HEALTH CARE EDUCATION/TRAINING PROGRAM

## 2025-01-06 PROCEDURE — 11000001 HC ACUTE MED/SURG PRIVATE ROOM

## 2025-01-06 PROCEDURE — 99900026 HC AIRWAY MAINTENANCE (STAT)

## 2025-01-06 RX ORDER — DEXAMETHASONE SODIUM PHOSPHATE 4 MG/ML
30 INJECTION, SOLUTION INTRA-ARTICULAR; INTRALESIONAL; INTRAMUSCULAR; INTRAVENOUS; SOFT TISSUE EVERY 12 HOURS
Status: DISCONTINUED | OUTPATIENT
Start: 2025-01-06 | End: 2025-01-06

## 2025-01-06 RX ORDER — LORAZEPAM 2 MG/ML
INJECTION INTRAMUSCULAR
Status: COMPLETED
Start: 2025-01-06 | End: 2025-01-06

## 2025-01-06 RX ORDER — NOREPINEPHRINE BITARTRATE/D5W 8 MG/250ML
0-.2 PLASTIC BAG, INJECTION (ML) INTRAVENOUS CONTINUOUS
Status: ACTIVE | OUTPATIENT
Start: 2025-01-06 | End: 2025-01-07

## 2025-01-06 RX ORDER — LORAZEPAM 2 MG/ML
2 INJECTION INTRAMUSCULAR ONCE
Status: COMPLETED | OUTPATIENT
Start: 2025-01-06 | End: 2025-01-06

## 2025-01-06 RX ORDER — PROPOFOL 10 MG/ML
INJECTION, EMULSION INTRAVENOUS
Status: DISPENSED
Start: 2025-01-06 | End: 2025-01-07

## 2025-01-06 RX ORDER — ETOMIDATE 2 MG/ML
INJECTION INTRAVENOUS CODE/TRAUMA/SEDATION MEDICATION
Status: COMPLETED | OUTPATIENT
Start: 2025-01-06 | End: 2025-01-06

## 2025-01-06 RX ORDER — PROPOFOL 10 MG/ML
0-50 INJECTION, EMULSION INTRAVENOUS CONTINUOUS
Status: DISCONTINUED | OUTPATIENT
Start: 2025-01-06 | End: 2025-01-24

## 2025-01-06 RX ORDER — NOREPINEPHRINE BITARTRATE/D5W 8 MG/250ML
PLASTIC BAG, INJECTION (ML) INTRAVENOUS
Status: COMPLETED
Start: 2025-01-06 | End: 2025-01-06

## 2025-01-06 RX ADMIN — MUPIROCIN: 20 OINTMENT TOPICAL at 08:01

## 2025-01-06 RX ADMIN — NOREPINEPHRINE BITARTRATE 0.02 MCG/KG/MIN: 8 INJECTION, SOLUTION INTRAVENOUS at 11:01

## 2025-01-06 RX ADMIN — Medication 0.02 MCG/KG/MIN: at 11:01

## 2025-01-06 RX ADMIN — LORAZEPAM 2 MG: 2 INJECTION INTRAMUSCULAR at 09:01

## 2025-01-06 RX ADMIN — LEVETIRACETAM 500 MG: 100 INJECTION, SOLUTION INTRAVENOUS at 11:01

## 2025-01-06 RX ADMIN — ASPIRIN 300 MG: 300 SUPPOSITORY RECTAL at 09:01

## 2025-01-06 RX ADMIN — MUPIROCIN: 20 OINTMENT TOPICAL at 09:01

## 2025-01-06 RX ADMIN — ENOXAPARIN SODIUM 30 MG: 30 INJECTION SUBCUTANEOUS at 04:01

## 2025-01-06 RX ADMIN — LEVETIRACETAM 500 MG: 100 INJECTION, SOLUTION INTRAVENOUS at 09:01

## 2025-01-06 RX ADMIN — ETOMIDATE 40 MG: 2 INJECTION INTRAVENOUS at 11:01

## 2025-01-06 RX ADMIN — PROPOFOL 5 MCG/KG/MIN: 10 INJECTION, EMULSION INTRAVENOUS at 12:01

## 2025-01-06 RX ADMIN — AMPICILLIN SODIUM 2 G: 2 INJECTION, POWDER, FOR SOLUTION INTRAMUSCULAR; INTRAVENOUS at 11:01

## 2025-01-06 RX ADMIN — DEXTROSE MONOHYDRATE 0.02 MCG/KG/MIN: 50 INJECTION, SOLUTION INTRAVENOUS at 12:01

## 2025-01-06 RX ADMIN — CEFTRIAXONE SODIUM 1 G: 1 INJECTION, POWDER, FOR SOLUTION INTRAMUSCULAR; INTRAVENOUS at 08:01

## 2025-01-06 RX ADMIN — LORAZEPAM 2 MG: 2 INJECTION INTRAMUSCULAR; INTRAVENOUS at 09:01

## 2025-01-06 NOTE — ASSESSMENT & PLAN NOTE
In the presence of bacteriuria, encephalomalacia noted in several areas on cerebral imaging, not on any AEDs o/p, and o/p Rx list significant for tramadol    -recommend discontinuing tramadol, and avoid any other seizure provoking medications, if not already d/c'ed   -await EEG results  -started on keppra 500 mg BID  -if continues to seize, will need to add additional AED d/t poor renal function  -give ativan 2 mg PRN witnessed seizures  -Further recommendations per MD

## 2025-01-06 NOTE — ED NOTES
Pt no longer responding to verbal stimuli or sternal rubbing, now hypotensive. Notified Dr. Ogden, Dr. Almanzar, and called RRT. 1L bolus ordered by Dr. Ogden.

## 2025-01-06 NOTE — SUBJECTIVE & OBJECTIVE
Subjective:     Interval History:   Patient continues to be obtunded, as she is unable to arouse to noxious stimuli.    MRI brain w/o yesterday for completion of CVA w/u, unrevealing for acute infarct, revealed encephalomalacia to b/l occipital lobes.    Current Neurological Medications:     Current Facility-Administered Medications   Medication Dose Route Frequency Provider Last Rate Last Admin    aspirin suppository 300 mg  300 mg Rectal Daily David Yoder MD   300 mg at 01/06/25 0910    bisacodyL suppository 10 mg  10 mg Rectal Daily PRN Davdi Yoder MD        cefTRIAXone injection 1 g  1 g Intravenous Q24H David Yoder MD        dextrose 50% injection 12.5 g  12.5 g Intravenous PRN David Yoder MD        enoxaparin injection 30 mg  30 mg Subcutaneous Q24H (prophylaxis, 1700) David Yoder MD        glucagon (human recombinant) injection 1 mg  1 mg Intramuscular PRN David Yoder MD        insulin aspart U-100 injection 0-5 Units  0-5 Units Subcutaneous Q6H PRN David Yoder MD        labetaloL injection 10 mg  10 mg Intravenous Q15 Min PRN David Yoder MD        levETIRAcetam (Keppra) 500 mg in D5W 100 mL IVPB (MB+)  500 mg Intravenous Q12H Genevieve Cheney, AGACNP-BC        mupirocin 2 % ointment   Nasal BID David Yoder MD   Given at 01/06/25 0910    sodium chloride 0.9% flush 10 mL  10 mL Intravenous PRN David Yoder MD         Current Outpatient Medications   Medication Sig Dispense Refill    aspirin 81 MG Chew Take 1 tablet by mouth once daily.      BASAGLAR KWIKPEN U-100 INSULIN 100 unit/mL (3 mL) InPn pen Inject 30 Units into the skin every evening.      CELEXA 10 mg tablet Take 10 mg by mouth once daily.      ENTRESTO 24-26 mg per tablet Take 1 tablet by mouth 2 (two) times daily.      metoprolol succinate 25 mg CSpX Take 1 capsule by mouth Daily.      PLAVIX 75 mg tablet Take 1 tablet by mouth once daily.       rosuvastatin (CRESTOR) 40 MG Tab Take 1 tablet by mouth every evening.      traMADoL (ULTRAM) 50 mg tablet Take 1 tablet by mouth every 6 (six) hours as needed for Pain.      vancomycin (VANCOCIN) 500 mg injection Inject 500 mg into the vein every Mon, Wed, Fri.         Review of Systems   Unable to perform ROS: Acuity of condition     Objective:     Vital Signs (Most Recent):  Temp: 97.2 °F (36.2 °C) (01/06/25 0304)  Pulse: 79 (01/06/25 0908)  Resp: 12 (01/06/25 0909)  BP: (!) 126/55 (01/06/25 0908)  SpO2: 99 % (01/06/25 0908) Vital Signs (24h Range):  Temp:  [97.2 °F (36.2 °C)-98.1 °F (36.7 °C)] 97.2 °F (36.2 °C)  Pulse:  [] 79  Resp:  [7-29] 12  SpO2:  [95 %-100 %] 99 %  BP: (106-191)/() 126/55     Weight: 74.1 kg (163 lb 5.8 oz)  Body mass index is 31.9 kg/m².     Physical Exam  Vitals reviewed.   HENT:      Head: Normocephalic and atraumatic.      Nose: Nose normal.      Mouth/Throat:      Mouth: Mucous membranes are dry.      Pharynx: Oropharynx is clear.   Eyes:      Pupils: Pupils are equal, round, and reactive to light.   Pulmonary:      Effort: Pulmonary effort is normal.   Skin:     Comments: Old graft site to [facility], new HD access to L chest wall with bandage still intact          NEUROLOGICAL EXAMINATION:     MENTAL STATUS   Follows commands: not following commands.   Speech: (UTO 2/2 obtundation  )  Level of consciousness: unresponsive to painful stimuli    CRANIAL NERVES     CN II   Visual acuity: (does not blink to threat b/l)    CN III, IV, VI   Pupils are equal, round, and reactive to light.  Nystagmus: none   Conjugate gaze: present  Vestibulo-ocular reflex: present    CN V   Right corneal reflex: normal  Left corneal reflex: normal    MOTOR EXAM        flaccid     REFLEXES     Reflexes   Right plantar: normal  Left plantar: normal  Right ankle clonus: absent  Left ankle clonus: absent    SENSORY EXAM        Does not withdrawal to noxious stimuli in all extremities       Significant  Labs:   Recent Lab Results  (Last 5 results in the past 24 hours)        01/06/25  1010   01/06/25  0319   01/05/25  2338   01/05/25  1841   01/05/25  1701        Phencyclidine               Albumin/Globulin Ratio   0.4             Albumin   2.1             ALP   102             Allens Test         N/A       ALT   21             Ammonia   22.9             Amphetamines, Urine               Anion Gap   10.0             Appearance, UA               PTT   31.3  Comment: For Minimal Heparin Infusion, the goal aPTT 64-85 seconds corresponds to an anti-Xa of 0.3-0.5.    For Low Intensity and High Intensity Heparin, the goal aPTT  seconds corresponds to an anti-Xa of 0.3-0.7             AST   25             Bacteria, UA               Barbituates, Urine               Baso #   0.08             Basophil %   1.1             Benzodiazepine, Urine               Bilirubin (UA)               BILIRUBIN TOTAL   0.2             BUN   17.8             BUN/CREAT RATIO   4             Calcium   8.1             Calcium Level Ionized         0.93       Cannabinoids, Urine               Chloride   97             CO2   23             Cocaine, Urine               Color, UA               Creatinine   4.49             Drawn by         mm,rrt       eGFR   9             Eos #   0.08             Eos %   1.1             Fentanyl, Urine               Ferritin   2,921.45             FIO2, Blood gas         21       Globulin, Total   4.9             Glucose   129             Glucose, UA               Hematocrit   23.1             Hemoglobin   7.3             Immature Grans (Abs)   0.08             Immature Granulocytes   1.1             INR   1.1             Iron   84             Iron Saturation   64             Ketones, UA               Leukocyte Esterase, UA               Lymph #   1.10             LYMPH %   15.7             Magnesium    2.30             MCH   30.8             MCHC   31.6             MCV   97.5             MDMA, Urine                Mono #   1.12             Mono %   16.0             MPV   8.2             Neut #   4.55             Neut %   65.0             NITRITE UA               nRBC   0.0             O2 Hb, Blood Gas         94.5       Blood, UA               QRS Duration               OHS QTC Calculation               Opiates, Urine               pH, UA               pH, Urine               Phosphorus Level   3.1             Platelet Count   282             Base Excess, Blood gas         0.70       CO Hgb         2.9       POC HCO3         24.0       Met Hgb         1.0       POC PCO2         33.0       POC PH         7.470       POC PO2         58.0       POCT Glucose 135     137   149         Potassium   3.2             Potassium, Blood Gas         3.2       PROTEIN TOTAL   7.0             Protein, UA               PT   14.7             RBC   2.37             RBC, UA               RDW   15.9             Sample Type         Venous Blood       sO2, Blood gas         91.5       Sodium   130             Sodium, Blood Gas         125       Specific Gravity,UA               Specific Gravity, Urine Auto               Squamous Epithelial Cells, UA               TOC2, Blood gas         25.0       THb, Blood gas         10.8       TIBC   131             Transferrin   108             Troponin I   0.013                <0.010             UIBC   47             Urine Culture               Urobilinogen, UA               WBC, UA               WBC Clumps, UA               WBC   7.01                                    Significant Imaging:  MRI brain w/o:  FINDINGS:  Bilateral cerebral periventricular and subcortical white matter variable size T2-FLAIR hyperintense signals are consistent with moderate chronic microangiopathic ischemia.  Cystic encephalomalacia involves bilateral occipital lobes.  There also bilateral basal ganglia old lacunar infarcts.  There there is generalized cerebral and to a lesser degree cerebellar cortical volume loss.  Gradient  echo sequences demonstrate no evidence of de phasing artifact to suggest hemorrhagic byproducts. No evidence of diffusion restriction or ADC map signal drop out to suggest acute infarct. The sella and suprasellar areas are unremarkable.     The cerebellar tonsils are normally positioned. There is no acute intracranial hemorrhage, hydrocephalus, midline shift or mass effect. No acute extra axial fluid collections identified. The mastoid air cells are clear.     Impression:     1.  No acute intracranial findings identified.     2.  Old infarcts, chronic microangiopathic ischemia and atrophy.     I have reviewed all pertinent imaging results/findings within the past 24 hours.

## 2025-01-06 NOTE — PROGRESS NOTES
[facility] Hospital Medicine Progress Note      Chief Complaint: changes in speech        HPI: (personally reviewed by me and is documented from initial H&P)     Krysta Jansen is a 79 y.o. female who  has no past medical history on file..    brought into the ER after her son reported seeing her normal an hour prior, briefly left in when came back patient was nonverbal and lethargic.       She was emergently brought to the ER and a code fast was called, she was assessed by Neurology in the ER where she was found to have an NIH of 22 based on dysarthria and level of consciousness, but was deemed not a candidate for TNK secondary to overall nonfocal exam and recent left arm HD access surgery reported.  Vitals and laboratory work were overall unremarkable except for changes as expected in any age renal disease, and a potential urinary tract infection.  She is admitted under typical stroke protocol.    Interval History:        01/06/2025 Patient was seen in the ED, witnessed to have had a seizure and was given Ativan later became unconscious and unresponsive.  Awaiting for image results. And abg results  Will reach out to   Patient was intubated and transferred to the ICU.    She was noted to be hypotensive during my evaluation and was obtaining bolus of IV fluids.    Objective Assessment:  Physical Exam      Vital signs have been personally reviewed by me   General: Appears comfortable, no acute distress.  Very Minimal response to painful stimuli       Integumentary: Warm, dry, intact.  Musculoskeletal: Purposeful movement noted.     Respiratory: No accessory muscle use. Breath sounds are equal.  Cardiovascular: Regular rate.       VITAL SIGNS: 24 HRS MIN & MAX LAST   Temp  Min: 97.2 °F (36.2 °C)  Max: 97.7 °F (36.5 °C) 97.7 °F (36.5 °C)   BP  Min: 72/40  Max: 190/69 (!) 103/52   Pulse  Min: 44  Max: 84  61   Resp  Min: 10  Max: 22 (!) 22   SpO2  Min: 94 %  Max: 100 % 100 %     X-Ray  Chest 1 View  Narrative: EXAMINATION:  XR CHEST 1 VIEW    CPT 34262    CLINICAL HISTORY:  placement;    COMPARISON:  January 6, 2025    FINDINGS:  Cardiomediastinal silhouette improved parenchymal changes to be essentially unchanged as compared with the previous exam.    There is an endotracheal tube with the tip above the selene nasogastric tube is seen with the tip below the diaphragm  Impression: No significant change as compared with the previous exam.    Interval insertion of endotracheal tube and nasogastric tube    Electronically signed by: Juan Clemons  Date:    01/06/2025  Time:    12:16  X-Ray Chest 1 View  Narrative: EXAMINATION:  XR CHEST 1 VIEW    CLINICAL HISTORY:  CHF;    COMPARISON:  No priors    FINDINGS:  Frontal view of the chest was obtained. Left-sided dialysis catheter tip overlies the superior cavoatrial junction.  Heart is mildly enlarged.  There is some interstitial prominence.  There is no pneumothorax.  Impression: Suspect some pulmonary vascular congestion.    Electronically signed by: Primitivo Salguero  Date:    01/06/2025  Time:    11:11    Recent Labs   Lab 01/05/25  1409 01/05/25  1524 01/06/25  0319   WBC 7.84 8.72 7.01   RBC 2.82* 2.90* 2.37*   HGB 8.6* 8.9* 7.3*   HCT 27.1* 29.6* 23.1*   MCV 96.1* 102.1* 97.5*   MCH 30.5 30.7 30.8   MCHC 31.7* 30.1* 31.6*   RDW 15.6 16.2 15.9   * 369 282   MPV 8.4 8.5 8.2       Recent Labs   Lab 01/05/25  1409 01/05/25  1524 01/05/25  1701 01/06/25  0319 01/06/25  1045 01/06/25  1147   * 129*  --  130*  --   --    K 3.5 3.3*  --  3.2*  --   --    CL 95* 96*  --  97*  --   --    CO2 23 22*  --  23  --   --    BUN 12.8 16.0  --  17.8  --   --    CREATININE 4.06* 4.13*  --  4.49*  --   --    CALCIUM 8.0* 8.4  --  8.1*  --   --    PH  --   --  7.470  --  7.360 7.500*   MG  --   --   --  2.30  --   --    ALBUMIN 2.3* 2.3*  --  2.1*  --   --    ALKPHOS 112 112  --  102  --   --    ALT 22 23  --  21  --   --    AST 23 25  --  25  --   --     BILITOT 0.2 0.3  --  0.2  --   --      Microbiology Results (last 7 days)       Procedure Component Value Units Date/Time    Urine culture [[id number]]  (Abnormal) Collected: 01/05/25 1633    Order Status: Completed Specimen: Urine Updated: 01/06/25 0644     Urine Culture >/= 100,000 colonies/ml Gram-negative Rods    Blood Culture #1 **CANNOT BE ORDERED STAT** [[id number]] Collected: 01/05/25 2010    Order Status: Resulted Specimen: Blood Updated: 01/05/25 2025    Blood Culture #2 **CANNOT BE ORDERED STAT** [[id number]] Collected: 01/05/25 2010    Order Status: Resulted Specimen: Blood Updated: 01/05/25 2025               Medications for Hospital Course     Scheduled Med:   aspirin  300 mg Rectal Daily    cefTRIAXone (Rocephin) IV (PEDS and ADULTS)  1 g Intravenous Q24H    enoxparin  30 mg Subcutaneous Q24H (prophylaxis, 1700)    levETIRAcetam (Keppra) IV (PEDS and ADULTS)  500 mg Intravenous Q12H    mupirocin   Nasal BID    propofoL          Continuous Infusions:   EPINEPHrine  0-2 mcg/kg/min (Dosing Weight) Intravenous Continuous 4.4 mL/hr at 01/06/25 1232 0.02 mcg/kg/min at 01/06/25 1232    NORepinephrine 8 mg  0-0.2 mcg/kg/min (Dosing Weight) Intravenous Continuous 2.8 mL/hr at 01/06/25 1155 0.02 mcg/kg/min at 01/06/25 1155    propofoL  0-50 mcg/kg/min (Dosing Weight) Intravenous Continuous 2.2 mL/hr at 01/06/25 1209 5 mcg/kg/min at 01/06/25 1209        PRN Meds:    Current Facility-Administered Medications:     bisacodyL, 10 mg, Rectal, Daily PRN    dextrose 50%, 12.5 g, Intravenous, PRN    glucagon (human recombinant), 1 mg, Intramuscular, PRN    insulin aspart U-100, 0-5 Units, Subcutaneous, Q6H PRN    labetalol, 10 mg, Intravenous, Q15 Min PRN    propofoL, , ,     sodium chloride 0.9%, 10 mL, Intravenous, PRN     Assessment and Plan        Acute global aphasia, rule out CVA vs seizure/postictal state  UTI, present on arrival     Hx: ESRD on HD MWF, HTN, CAD, CHF unknown EF, DM2, previous left MCA territory  stroke     Patient transferred to the ICU.    _________________________________________________________________________________________________________________      I have spent 40 minutes on the day of the visit; time spent includes face to face time and non-face to face time preparing to see the patient (eg, review of tests), independently reviewing and interpreting medical records, both past and current; documenting clinical information in the electronic or other health record, and communicating results to the patient/family/caregiver and care coordinator and nursing team.      All diagnosis and differential diagnosis have been reviewed,  interpreted and communicated appropriately to care team. assessment and plan has been documented; I have personally reviewed the labs and test results that are presently available and pertinent to this hospital course; I have reviewed medical records based upon their availability.    I will continue to monitor closely and make adjustments to medical management as needed.    Lily Almanzar,      01/06/2025     This note was created with the assistance of Dragon voice recognition software. There may be transcription errors as a result of using this technology however minimal. Effort has been made to assure accuracy of transcription but any obvious errors or omissions should be clarified with the author of the document.

## 2025-01-06 NOTE — PROGRESS NOTES
[facility]  Neurology  Progress Note    Patient Name: Krysta Jansen  MRN: [id number]  Admission Date: 1/5/2025  Hospital Length of Stay: 1 days  Code Status: Full Code   Attending Provider: David Yoder MD  Primary Care Physician: No primary care provider on file.   Principal Problem:<principal problem not specified>    HPI:   79 y.o. female who presented to [facility] on 1/5/2025 with reports of  lethargy, less responsive . Onset of symptoms was sudden, not related to any specific activity and last known normal 13:30, at this time it is unclear what the patient's baseline is . Stroke risk factors include  HTN, DM, CHF, ESRD, CAD . 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      Per EMS, she was last seen normal by son at nursing home at 13:30.  He went back to check on her at 14:15 and found she was lethargic and not talking.  EMS reports she did have surgery recently on her left arm for her HD access, unsure exactly when, she does have a dressing to the left arm.     Overview/Hospital Course:  No notes on file        Subjective:     Interval History:   Patient continues to be obtunded, as she is unable to arouse to noxious stimuli.    MRI brain w/o yesterday for completion of CVA w/u, unrevealing for acute infarct, revealed encephalomalacia to b/l occipital lobes.    Current Neurological Medications:     Current Facility-Administered Medications   Medication Dose Route Frequency Provider Last Rate Last Admin    aspirin suppository 300 mg  300 mg Rectal Daily David Yoder MD   300 mg at 01/06/25 0910    bisacodyL suppository 10 mg  10 mg Rectal Daily PRN David Yoder MD        cefTRIAXone injection 1 g  1 g Intravenous Q24H David Yoder MD        dextrose 50% injection 12.5 g  12.5 g Intravenous PRN David Yoder MD         enoxaparin injection 30 mg  30 mg Subcutaneous Q24H (prophylaxis, 1700) David Yoder MD        glucagon (human recombinant) injection 1 mg  1 mg Intramuscular PRN David Yoder MD        insulin aspart U-100 injection 0-5 Units  0-5 Units Subcutaneous Q6H PRN David Yoder MD        labetaloL injection 10 mg  10 mg Intravenous Q15 Min PRN David Yoder MD        levETIRAcetam (Keppra) 500 mg in D5W 100 mL IVPB (MB+)  500 mg Intravenous Q12H Genevieve Cheney, AGACNP-BC        mupirocin 2 % ointment   Nasal BID David Yoder MD   Given at 01/06/25 0910    sodium chloride 0.9% flush 10 mL  10 mL Intravenous PRN David Yoder MD         Current Outpatient Medications   Medication Sig Dispense Refill    aspirin 81 MG Chew Take 1 tablet by mouth once daily.      BASAGLAR KWIKPEN U-100 INSULIN 100 unit/mL (3 mL) InPn pen Inject 30 Units into the skin every evening.      CELEXA 10 mg tablet Take 10 mg by mouth once daily.      ENTRESTO 24-26 mg per tablet Take 1 tablet by mouth 2 (two) times daily.      metoprolol succinate 25 mg CSpX Take 1 capsule by mouth Daily.      PLAVIX 75 mg tablet Take 1 tablet by mouth once daily.      rosuvastatin (CRESTOR) 40 MG Tab Take 1 tablet by mouth every evening.      traMADoL (ULTRAM) 50 mg tablet Take 1 tablet by mouth every 6 (six) hours as needed for Pain.      vancomycin (VANCOCIN) 500 mg injection Inject 500 mg into the vein every Mon, Wed, Fri.         Review of Systems   Unable to perform ROS: Acuity of condition     Objective:     Vital Signs (Most Recent):  Temp: 97.2 °F (36.2 °C) (01/06/25 0304)  Pulse: 79 (01/06/25 0908)  Resp: 12 (01/06/25 0909)  BP: (!) 126/55 (01/06/25 0908)  SpO2: 99 % (01/06/25 0908) Vital Signs (24h Range):  Temp:  [97.2 °F (36.2 °C)-98.1 °F (36.7 °C)] 97.2 °F (36.2 °C)  Pulse:  [] 79  Resp:  [7-29] 12  SpO2:  [95 %-100 %] 99 %  BP: (106-191)/() 126/55     Weight: 74.1 kg (163 lb 5.8  oz)  Body mass index is 31.9 kg/m².     Physical Exam  Vitals reviewed.   HENT:      Head: Normocephalic and atraumatic.      Nose: Nose normal.      Mouth/Throat:      Mouth: Mucous membranes are dry.      Pharynx: Oropharynx is clear.   Eyes:      Pupils: Pupils are equal, round, and reactive to light.   Pulmonary:      Effort: Pulmonary effort is normal.   Skin:     Comments: Old graft site to LUE, new HD access to L chest wall with bandage still intact          NEUROLOGICAL EXAMINATION:     MENTAL STATUS   Follows commands: not following commands.   Speech: (UTO 2/2 obtundation  )  Level of consciousness: unresponsive to painful stimuli    CRANIAL NERVES     CN II   Visual acuity: (does not blink to threat b/l)    CN III, IV, VI   Pupils are equal, round, and reactive to light.  Nystagmus: none   Conjugate gaze: present  Vestibulo-ocular reflex: present    CN V   Right corneal reflex: normal  Left corneal reflex: normal    MOTOR EXAM        flaccid     REFLEXES     Reflexes   Right plantar: normal  Left plantar: normal  Right ankle clonus: absent  Left ankle clonus: absent    SENSORY EXAM        Does not withdrawal to noxious stimuli in all extremities       Significant Labs:   Recent Lab Results  (Last 5 results in the past 24 hours)        01/06/25  1010   01/06/25  0319   01/05/25  2338   01/05/25  1841   01/05/25  1701        Phencyclidine               Albumin/Globulin Ratio   0.4             Albumin   2.1             ALP   102             Allens Test         N/A       ALT   21             Ammonia   22.9             Amphetamines, Urine               Anion Gap   10.0             Appearance, UA               PTT   31.3  Comment: For Minimal Heparin Infusion, the goal aPTT 64-85 seconds corresponds to an anti-Xa of 0.3-0.5.    For Low Intensity and High Intensity Heparin, the goal aPTT  seconds corresponds to an anti-Xa of 0.3-0.7             AST   25             Bacteria, UA               Barbituates,  Urine               Baso #   0.08             Basophil %   1.1             Benzodiazepine, Urine               Bilirubin (UA)               BILIRUBIN TOTAL   0.2             BUN   17.8             BUN/CREAT RATIO   4             Calcium   8.1             Calcium Level Ionized         0.93       Cannabinoids, Urine               Chloride   97             CO2   23             Cocaine, Urine               Color, UA               Creatinine   4.49             Drawn by         mm,rrt       eGFR   9             Eos #   0.08             Eos %   1.1             Fentanyl, Urine               Ferritin   2,921.45             FIO2, Blood gas         21       Globulin, Total   4.9             Glucose   129             Glucose, UA               Hematocrit   23.1             Hemoglobin   7.3             Immature Grans (Abs)   0.08             Immature Granulocytes   1.1             INR   1.1             Iron   84             Iron Saturation   64             Ketones, UA               Leukocyte Esterase, UA               Lymph #   1.10             LYMPH %   15.7             Magnesium    2.30             MCH   30.8             MCHC   31.6             MCV   97.5             MDMA, Urine               Mono #   1.12             Mono %   16.0             MPV   8.2             Neut #   4.55             Neut %   65.0             NITRITE UA               nRBC   0.0             O2 Hb, Blood Gas         94.5       Blood, UA               QRS Duration               OHS QTC Calculation               Opiates, Urine               pH, UA               pH, Urine               Phosphorus Level   3.1             Platelet Count   282             Base Excess, Blood gas         0.70       CO Hgb         2.9       POC HCO3         24.0       Met Hgb         1.0       POC PCO2         33.0       POC PH         7.470       POC PO2         58.0       POCT Glucose 135     137   149         Potassium   3.2             Potassium, Blood Gas         3.2        PROTEIN TOTAL   7.0             Protein, UA               PT   14.7             RBC   2.37             RBC, UA               RDW   15.9             Sample Type         Venous Blood       sO2, Blood gas         91.5       Sodium   130             Sodium, Blood Gas         125       Specific Gravity,UA               Specific Gravity, Urine Auto               Squamous Epithelial Cells, UA               TOC2, Blood gas         25.0       THb, Blood gas         10.8       TIBC   131             Transferrin   108             Troponin I   0.013                <0.010             UIBC   47             Urine Culture               Urobilinogen, UA               WBC, UA               WBC Clumps, UA               WBC   7.01                                    Significant Imaging:  MRI brain w/o:  FINDINGS:  Bilateral cerebral periventricular and subcortical white matter variable size T2-FLAIR hyperintense signals are consistent with moderate chronic microangiopathic ischemia.  Cystic encephalomalacia involves bilateral occipital lobes.  There also bilateral basal ganglia old lacunar infarcts.  There there is generalized cerebral and to a lesser degree cerebellar cortical volume loss.  Gradient echo sequences demonstrate no evidence of de phasing artifact to suggest hemorrhagic byproducts. No evidence of diffusion restriction or ADC map signal drop out to suggest acute infarct. The sella and suprasellar areas are unremarkable.     The cerebellar tonsils are normally positioned. There is no acute intracranial hemorrhage, hydrocephalus, midline shift or mass effect. No acute extra axial fluid collections identified. The mastoid air cells are clear.     Impression:     1.  No acute intracranial findings identified.     2.  Old infarcts, chronic microangiopathic ischemia and atrophy.     I have reviewed all pertinent imaging results/findings within the past 24 hours.  Assessment and Plan:     Seizure-like activity  In the presence of  bacteriuria, encephalomalacia noted in several areas on cerebral imaging, not on any AEDs o/p, and o/p Rx list significant for tramadol    -recommend discontinuing tramadol, and avoid any other seizure provoking medications, if not already d/c'ed   -await EEG results  -started on keppra 500 mg BID  -if continues to seize, will need to add additional AED d/t poor renal function  -give ativan 2 mg PRN witnessed seizures  -Further recommendations per MD          VTE Risk Mitigation (From admission, onward)           Ordered     enoxaparin injection 30 mg  Every 24 hours         01/05/25 2143     IP VTE HIGH RISK PATIENT  Once         01/05/25 2137     Place sequential compression device  Until discontinued         01/05/25 2137                    FREDDIE Marin-BC  Neurology  [facility] - Emergency Dept

## 2025-01-06 NOTE — PROCEDURES
Krysta Jansen is a 79 y.o. female patient.    Temp: 97.2 °F (36.2 °C) (01/06/25 0304)  Pulse: 79 (01/06/25 0908)  Resp: 12 (01/06/25 0909)  BP: (!) 126/55 (01/06/25 0908)  SpO2: 99 % (01/06/25 0908)  Weight: 74.1 kg (163 lb 5.8 oz) (01/05/25 1518)  Height: 5' (152.4 cm) (01/05/25 1518)       Intubation    Date/Time: 1/6/2025 11:11 AM  Location procedure was performed: [facility] EMERGENCY DEPARTMENT    Performed by: Buddy Ortiz MD  Authorized by: Buddy Ortiz MD  Assisting provider: Kevin Navarro Jr., MD, [facility]  Indications: airway protection  Intubation method: video-assisted  Patient status: paralyzed (RSI)  Preoxygenation: bag valve mask  Laryngoscope size: Glide 3  Tube size: 7.5 mm  Tube type: cuffed  Number of attempts: 1  Ventilation between attempts: BVM  Cords visualized: yes  Post-procedure assessment: chest rise and CO2 detector  Breath sounds: rales/crackles  Cuff inflated: yes  ETT to teeth: 21 cm  Tube secured with: ETT chavarria  Chest x-ray interpreted by me.  Chest x-ray findings: endotracheal tube in appropriate position  Complications: No  Estimated blood loss (mL): 0  Specimens: No  Implants: No          1/6/2025

## 2025-01-06 NOTE — PROCEDURES
EEG    Date/Time: 2025 3:24 PM    Performed by: Michi Justin MD  Authorized by: Feliciano Pickard MD      EEG REPORT    PATIENT: Krysta Jansen  : 1945  Self, Aaareferral  No address on file   @DX@  DATE:   INTERPRETING PHYSICIAN: Michi Justin     Reason for EEG: twitching/seizure       Digital EEG reviewed.  Electrodes placed in customary 10-20 fashion.    Video recorded:  yes   Duration of recordin  minutes   Patient Awake Asleep.  Posterior dominant waking resting background absent   Epileptiform features were present, bihemispherically and asymmetric at times.  Difficult to appreciate at times underneath muscle artifact.  A challenging recording in my view.   Activations not performed.   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     Comments:   The lack of focal or epileptiform features does not negate or exclude a diagnosis of seizure or epilepsy,   as the sensitivity of interictal EEG for pathognomonic findings may be less than or equal to 50%.    Michi Justin MD JOSE ELIAS FAAN FAASM

## 2025-01-06 NOTE — NURSING
We will not be dialyzing this pt today, per Dr. Lynn's note and Dr. Shine's recommendation. Charge labor and supplies.

## 2025-01-06 NOTE — CONSULTS
Nephrology Initial Consult Note    Patient Name: Krysta Jansen  Age: 79 y.o.  : 1945  MRN: [id number]  Admission Date: 2025    Reason for Consult:      ESRD    HPI:     Krysta Jansen is a 79 y.o. female is a nursing home resident and has ESRD and is on dialysis on .  Brought to this hospital for altered mental status.  She was found to have some grand mal seizure.  She received some Ativan.  She had EEG done.  Her respiratory status and mental status deteriorated and required intubation to protect her airways.  Could not get any history from the patient.  Records revealed she was found to have some dysarthria and decreased level of consciousness Friday through being brought to this hospital.  Significant past medical history is positive for diabetes mellitus type 2 previous stroke hypertension coronary artery disease and congestive heart failure.      Current Facility-Administered Medications   Medication Dose Route Frequency Provider Last Rate Last Admin    aspirin suppository 300 mg  300 mg Rectal Daily David Yoder MD   300 mg at 25 0910    bisacodyL suppository 10 mg  10 mg Rectal Daily PRN David Yoder MD        cefTRIAXone injection 1 g  1 g Intravenous Q24H David Yoder MD        dextrose 50% injection 12.5 g  12.5 g Intravenous PRN David Yoder MD        enoxaparin injection 30 mg  30 mg Subcutaneous Q24H (prophylaxis, 1700) David Yoder MD        glucagon (human recombinant) injection 1 mg  1 mg Intramuscular PRN David Yoder MD        insulin aspart U-100 injection 0-5 Units  0-5 Units Subcutaneous Q6H PRN David Yoder MD        labetaloL injection 10 mg  10 mg Intravenous Q15 Min PRN David Yoder MD        levETIRAcetam (Keppra) 500 mg in D5W 100 mL IVPB (MB+)  500 mg Intravenous Q12H Genevieve Cheney AGACNP-BC        mupirocin 2 % ointment   Nasal BID David Yoder MD    Given at 01/06/25 0910    NORepinephrine 8 mg (LEVOPHED) 8 mg/250 mL (32 mcg/mL) in dextrose 5% 250 mL infusion             NORepinephrine 8 mg in dextrose 5% 250 mL infusion  0-0.2 mcg/kg/min (Dosing Weight) Intravenous Continuous Kevin Navarro Jr., [facility], [facility]        sodium chloride 0.9% flush 10 mL  10 mL Intravenous PRN David Yoder MD         Current Outpatient Medications   Medication Sig Dispense Refill    aspirin 81 MG Chew Take 1 tablet by mouth once daily.      BASAGLAR KWIKPEN U-100 INSULIN 100 unit/mL (3 mL) InPn pen Inject 30 Units into the skin every evening.      CELEXA 10 mg tablet Take 10 mg by mouth once daily.      ENTRESTO 24-26 mg per tablet Take 1 tablet by mouth 2 (two) times daily.      metoprolol succinate 25 mg CSpX Take 1 capsule by mouth Daily.      PLAVIX 75 mg tablet Take 1 tablet by mouth once daily.      rosuvastatin (CRESTOR) 40 MG Tab Take 1 tablet by mouth every evening.      traMADoL (ULTRAM) 50 mg tablet Take 1 tablet by mouth every 6 (six) hours as needed for Pain.      vancomycin (VANCOCIN) 500 mg injection Inject 500 mg into the vein every Mon, Wed, Fri.         No primary care provider on file.    History reviewed. No pertinent past medical history.  Diabetes mellitus type 2, hypertension, previous stroke, coronary artery disease and history of congestive heart failure.      History reviewed. No pertinent surgical history.   No family history on file.  Social History     Tobacco Use    Smoking status: Unknown    Smokeless tobacco: Not on file   Substance Use Topics    Alcohol use: Not Currently     (Not in a hospital admission)    Review of patient's allergies indicates:  No Known Allergies         Review of Systems:     Unable to obtain as patient is already intubated.    Objective:       VITAL SIGNS: 24 HR MIN & MAX LAST    Temp  Min: 97.2 °F (36.2 °C)  Max: 98.1 °F (36.7 °C)  97.2 °F (36.2 °C)        BP  Min: 106/58  Max: 191/101  (!) 126/55     Pulse  Min: 53   Max: 101  65     Resp  Min: 7  Max: 29  (!) 22    SpO2  Min: 95 %  Max: 100 %  100 %      GEN moderately developed and nourished.  Pupils are about 2 mm and reactive.  HEENT:  Atraumatic normocephalic.  Orally intubated.  CV: RRR without rub.  PULM: CTAB, unlabored  ABD: Soft, NT/ND abdomen with NABS  EXT: No cyanosis or edema  SKIN: Warm and dry  PSYCH: Awake, alert, and appropriately conversant  Vascular access:  Left IJ dialysis catheter noted          Component Value Date/Time     (L) 01/06/2025 0319     (L) 01/05/2025 1524     05/08/2024 0739     03/15/2024 1101    K 3.2 (L) 01/06/2025 0319    K 3.3 (L) 01/05/2025 1524    K 3.8 05/08/2024 0739    K 3.9 03/15/2024 1101    CO2 23 01/06/2025 0319    CO2 22 (L) 01/05/2025 1524    CO2 22 05/08/2024 0739    CO2 21 (L) 03/15/2024 1101    BUN 17.8 01/06/2025 0319    BUN 16.0 01/05/2025 1524    BUN 55 (H) 10/02/2024 0000    BUN 23 (H) 07/12/2024 0000    CREATININE 4.49 (H) 01/06/2025 0319    CREATININE 4.13 (H) 01/05/2025 1524    CREATININE 3.37 (H) 05/08/2024 0739    CREATININE 3.2 (H) 03/15/2024 1101    CALCIUM 8.1 (L) 01/06/2025 0319    CALCIUM 8.4 01/05/2025 1524    CALCIUM 9 05/08/2024 0739    CALCIUM 9.9 03/15/2024 1101    PHOS 3.1 01/06/2025 0319            Component Value Date/Time    WBC 7.01 01/06/2025 0319    WBC 8.72 01/05/2025 1524    HGB 7.3 (L) 01/06/2025 0319    HGB 8.9 (L) 01/05/2025 1524    HGB 10.3 (L) 11/24/2024 0000    HGB 10 (L) 11/18/2024 0000    HCT 23.1 (L) 01/06/2025 0319    HCT 29.6 (L) 01/05/2025 1524     01/06/2025 0319     01/05/2025 1524         X-Ray Chest 1 View   Final Result      Suspect some pulmonary vascular congestion.         Electronically signed by: Primitivo Salguero   Date:    01/06/2025   Time:    11:11      MRI Brain Without Contrast   Final Result      1.  No acute intracranial findings identified.      2.  Old infarcts, chronic microangiopathic ischemia and atrophy.         Electronically  signed by: Kwasi Morin   Date:    01/05/2025   Time:    23:10      CTA Head and Neck (xpd)   Final Result      Severe atherosclerotic calcification of the carotid siphons with severe narrowing of the supraclinoid ICAs bilaterally.         Electronically signed by: Santos Perez MD   Date:    01/05/2025   Time:    15:40      CT HEAD FOR STROKE   Final Result      No acute intracranial process identified.      Report called to Dr. Pickard at 15:43 hours on 01/05/2025.         Electronically signed by: Alfredo Alejo   Date:    01/05/2025   Time:    15:45      X-Ray Chest 1 View    (Results Pending)       Assessment / Plan:   ESRD.  Dialysis Monday Wednesday Friday   Admitted to the hospital for altered mental status and seizure disorder  Now with  acute respiratory failure and intubated.  Hypertension, anemia  Diabetes mellitus type 2   History of coronary artery disease   History of previous stroke    Recommendations  No acute indication for dialysis therapy at present time today  Her regular dialysis days are Monday Wednesday Friday.  We will not dialyze today and will continue to monitor patient condition electrolytes and renal status.  May need nutrition support.    Thank you for this consultation

## 2025-01-06 NOTE — HPI
79 y.o. female who presented to [facility] on 1/5/2025 with reports of  lethargy, less responsive . Onset of symptoms was sudden, not related to any specific activity and last known normal 13:30, at this time it is unclear what the patient's baseline is . Stroke risk factors include  HTN, DM, CHF, ESRD, CAD . 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      Per EMS, she was last seen normal by son at nursing home at 13:30.  He went back to check on her at 14:15 and found she was lethargic and not talking.  EMS reports she did have surgery recently on her left arm for her HD access, unsure exactly when, she does have a dressing to the left arm.

## 2025-01-06 NOTE — LOPA/MORA/SWTA/AOC/AEB
[facility] ([facility])  Notification of Referral  [facility] Contact # [phone]        Thank you for the referral of this patient to determine suitability for organ, tissue, and eye donation.  A chart review has been conducted (date):2025 at (time) 15:00 PM.    ? Potential candidate for organ donation - [facility] following patient. Any changes in patients condition, discussion of withdrawing the vent or brain death exams, or family mention of donation immediately call [phone]. Refer all organ referrals within 1 hour of meeting the clinical triger of a patient with a neurological, anoxic, or life threatening injury and ONE of the following:    * GCS </= 8    * Loss of 2 or more brain stem reflexes    * Hypothermic Protocol Initiated    * Withdrawal of support discussion regardless of GCS    * Family mention of Donation    ? Potential for candidate for tissue and eye donation- call [facility] at [phone] within 2 hours of death for screening as a potential tissue and/or eye donor.      ? Potential candidate for eye donation - call [facility] at [phone] within 2 hours of death for screening as a potential eye donor.    ? NOT a candidate for organ/tissue/eye donation- call [facility] at [phone] within 2 hours of death to report the time of death.    ? Potential candidate for donation/ Referral Closed- Any changes in patients condition, GCS of 5 or less, discussion of withdrawing the vent, brain death exams, or family mention of donation immediately call [phone].      Screened by: ELISA Torre    [facility] Referral Number:  [id number]     Suitable for:  [x]Organ  [x] Tissue  [x] Eye  []Not suitable for Donation    Rule out Reason: N/A    Patient Name: Krysta Jansen                   79 y.o. female  Patient MRN: [id number]  : 1945  DOD:  TOD:  Cause of Death: N/A    [x]Vented Patient  [facility] representative to approach family if appropriate  []DNR status obtained Referral of critical  care patients when family initiates Do Not Resuscitate  []Cardiac Death   Clinical Support Center to approach family via telephone if appropriate  []Donor Registry  Patient is listed in the Donor Registry    Completed by: Sadaf Torre

## 2025-01-06 NOTE — H&P
[facility] - Emergency Dept  Pulmonary Critical Care Note    Patient Name: Krysta Jansen  MRN: [id number]  Admission Date: 1/5/2025  Hospital Length of Stay: 1 days  Code Status: Full Code  Attending Provider: David Yoder MD  Primary Care Provider: No primary care provider on file.     Subjective:     HPI:   Patient is a 79-year-old female with a past medical history of ESRD on HD, HTN, CAD, CHF unknown EF, DM2, previous left MCA territory stroke who was brought into the ER after her son reported seeing her normal an hour prior, briefly left in when came back patient was nonverbal and lethargic.      She was emergently brought to the ER and a code fast was called, she was assessed by Neurology in the ER where she was found to have an NIH of 22 based on dysarthria and level of consciousness, but was deemed not a candidate for TNK secondary to overall nonfocal exam and recent left arm HD access surgery reported.  Vitals and laboratory work were overall unremarkable except for changes as expected in any age renal disease, and a potential urinary tract infection.  She is admitted under typical stroke protocol.     1/6/2025, EEG at 0800 revealed seizure-like activity. She was given Lorazepam 2 mg IV. Patient later became unconscious, unresponsive to pain stimuli, and hypotensive. She was intubated for airway protection (see procedure note).     Hospital Course/Significant events:  1/6/2025 - Admitted to ICU     24 Hour Interval History:  Pending    History reviewed. No pertinent past medical history.    History reviewed. No pertinent surgical history.    Social History     Socioeconomic History    Marital status: Single   Tobacco Use    Smoking status: Unknown   Substance and Sexual Activity    Alcohol use: Not Currently       Current Outpatient Medications   Medication Instructions    aspirin 81 MG Chew 1 tablet, Daily    BASAGLAR KWIKPEN U-100 INSULIN 30 Units, Nightly    CeleXA 10 mg, Daily     ENTRESTO 24-26 mg per tablet 1 tablet, 2 times daily    metoprolol succinate 25 mg CSpX 1 capsule, Daily    PLAVIX 75 mg tablet 1 tablet, Daily    rosuvastatin (CRESTOR) 40 MG Tab 1 tablet, Nightly    traMADoL (ULTRAM) 50 mg tablet 1 tablet, Every 6 hours PRN    vancomycin (VANCOCIN) 500 mg, Every Mon, Wed, Fri     Current Inpatient Medications   aspirin  300 mg Rectal Daily    cefTRIAXone (Rocephin) IV (PEDS and ADULTS)  1 g Intravenous Q24H    enoxparin  30 mg Subcutaneous Q24H (prophylaxis, 1700)    levETIRAcetam (Keppra) IV (PEDS and ADULTS)  500 mg Intravenous Q12H    mupirocin   Nasal BID    NORepinephrine 8 mg         Current Intravenous Infusions   NORepinephrine bitartrate-D5W  0-0.2 mcg/kg/min (Dosing Weight) Intravenous Continuous         ROS: Unable to perform ROS: Acuity of condition        Objective:   No intake or output data in the 24 hours ending 01/06/25 1113  Vital Signs (Most Recent):  Temp: 97.2 °F (36.2 °C) (01/06/25 0304)  Pulse: 79 (01/06/25 0908)  Resp: 12 (01/06/25 0909)  BP: (!) 126/55 (01/06/25 0908)  SpO2: 99 % (01/06/25 0908)  Body mass index is 31.9 kg/m².  Weight: 74.1 kg (163 lb 5.8 oz) Vital Signs (24h Range):  Temp:  [97.2 °F (36.2 °C)-98.1 °F (36.7 °C)] 97.2 °F (36.2 °C)  Pulse:  [] 79  Resp:  [7-29] 12  SpO2:  [95 %-100 %] 99 %  BP: (106-191)/() 126/55     Physical Exam:  General: Unresponsive to painful stimuli   HEENT: NC/AT; PERRL  Pulm: CTA bilaterally via intubation   CV: S1, S2 w/o murmurs or gallops  GI: Bowel sound present  MSK: Unable to assess  Derm: No rashes, abnormal bruising, or skin lesions  Neuro: Unable to assess  Psych: Unable to assess    Lines/Drains/Airways       Drain  Duration                  NG/OG Tube 01/06/25 1109 Center mouth <1 day              Peripheral Intravenous Line  Duration                  Peripheral IV - Single Lumen 01/06/25 Right Antecubital <1 day                  Significant Labs:  Lab Results   Component Value Date    WBC  7.01 01/06/2025    HGB 7.3 (L) 01/06/2025    HCT 23.1 (L) 01/06/2025    MCV 97.5 (H) 01/06/2025     01/06/2025       BMP  Lab Results   Component Value Date     (L) 01/06/2025    K 3.2 (L) 01/06/2025    CO2 23 01/06/2025    BUN 17.8 01/06/2025    CREATININE 4.49 (H) 01/06/2025    CALCIUM 8.1 (L) 01/06/2025    AGAP 10.0 01/06/2025    ESTGFRAFRICA 13 05/08/2024     ABG  Recent Labs   Lab 01/06/25  1045   PH 7.360   PO2 74.0*   PCO2 46.0*   HCO3 26.0     Mechanical Ventilation Support:  Ventilator Initiated: Yes (01/06/25 1102)    Significant Imaging:  I have reviewed the pertinent imaging within the past 24 hours.    Assessment/Plan:     Assessment  Acute global aphasia, seizure/postictal state  Obtunded s/p ativan for seizures  UTI, present on arrival  Hx: ESRD on HD MWF, HTN, CAD, CHF unknown EF, DM2, previous left MCA territory stroke   Hypotension  bradycardia    Plan  - Admitted to ICU level of care for ongoing monitoring and medical management   - start Epinephrine for resistant hypotension and bradycardia  - MRI brain with no acute infarcts  - aspirin, statin at discharge  - neurology consultation  - nephrology consulted for hemodialysis  - Ceftriaxone for UTI      Code Status: Full  Abx: Ceftriaxone   Fluids:   DVT Prophylaxis: Lovenox  GI Prophylaxis: None      32 minutes of critical care was time spent personally by me on the following activities: development of treatment plan with patient or surrogate and bedside caregivers, discussions with consultants, evaluation of patient's response to treatment, examination of patient, ordering and performing treatments and interventions, ordering and review of laboratory studies, ordering and review of radiographic studies, pulse oximetry, re-evaluation of patient's condition.  This critical care time did not overlap with that of any other provider or involve time for any procedures.     Jean Pierre Friedman MD  \[facility]\"" Family Medicine Resident, -I  Pulmonary  Critical Care Medicine  [facility] - 7th Floor ICU

## 2025-01-06 NOTE — H&P
[facility] Hospital Medicine History & Physical Examination       Patient Name: Krysta Jansen  MRN: [id number]  Patient Class: IP- Inpatient   Admission Date: 1/5/2025  3:24 PM  Length of Stay: 0  Admitting Service: Hospital Medicine   Attending Physician: David Yoder MD   Primary Care Provider: No primary care provider on file.  History source: EMR, patient and/or patient's family    CHIEF COMPLAINT   Altered Mental Status (Pt began having altered mental status 1 hour pta. Localizes to pain. Having trouble getting words out. Eyes twitching intermittently.)    HISTORY OF PRESENT ILLNESS:   Patient is a 79-year-old female with a past medical history of ESRD on HD, HTN, CAD, CHF unknown EF, DM2, previous left MCA territory stroke who was brought into the ER after her son reported seeing her normal an hour prior, briefly left in when came back patient was nonverbal and lethargic.      She was emergently brought to the ER and a code fast was called, she was assessed by Neurology in the ER where she was found to have an NIH of 22 based on dysarthria and level of consciousness, but was deemed not a candidate for TNK secondary to overall nonfocal exam and recent left arm HD access surgery reported.  Vitals and laboratory work were overall unremarkable except for changes as expected in any age renal disease, and a potential urinary tract infection.  She is admitted under typical stroke protocol.    PAST MEDICAL HISTORY:   ESRD on HD MWF  CHF unknown EF   CAD   DM2   Previous CVA   HTN    PAST SURGICAL HISTORY:   Reviewed and noncontributory    ALLERGIES:   Patient has no known allergies.    FAMILY HISTORY:   Reviewed and non-contributory     SOCIAL HISTORY:   Screening for Social Drivers for health: Patient screened for food insecurity, housing instability, transportation needs, utility   difficulties, and interpersonal safety (select all that apply as identified as  "concern)  []Housing or Food  []Transportation Needs  []Utility Difficulties  []Interpersonal safety  [x]None  Social History     Tobacco Use    Smoking status: Unknown    Smokeless tobacco: Not on file   Substance Use Topics    Alcohol use: Not Currently      HOME MEDICATIONS:     Prior to Admission medications    Medication Sig Start Date End Date Taking? Authorizing Provider   aspirin 81 MG Chew Take 1 tablet by mouth once daily. 12/19/24  Yes Provider, Historical   BASAGLAR KWIKPEN U-100 INSULIN 100 unit/mL (3 mL) InPn pen Inject 30 Units into the skin every evening. 12/19/24  Yes Provider, Historical   CELEXA 10 mg tablet Take 10 mg by mouth once daily. 1/1/25  Yes Provider, Historical   ENTRESTO 24-26 mg per tablet Take 1 tablet by mouth 2 (two) times daily. 12/18/24  Yes Provider, Historical   hydrALAZINE (APRESOLINE) 100 MG tablet Take 100 mg by mouth 3 (three) times daily. 7/23/24  Yes Provider, Historical   metoprolol succinate 25 mg CSpX Take 1 capsule by mouth Daily. 12/19/24  Yes Provider, Historical   PLAVIX 75 mg tablet Take 1 tablet by mouth once daily. 12/19/24  Yes Provider, Historical   rosuvastatin (CRESTOR) 40 MG Tab Take 1 tablet by mouth every evening. 12/18/24  Yes Provider, Historical   traMADoL (ULTRAM) 50 mg tablet Take 1 tablet by mouth every 6 (six) hours as needed for Pain. 12/18/24  Yes Provider, Historical   vancomycin (VANCOCIN) 500 mg injection Inject 500 mg into the vein every Mon, Wed, Fri. 12/20/24  Yes Provider, Historical   diltiaZEM (TIAZAC) 360 MG Cs24 Take 1 capsule by mouth every morning.    Provider, Historical   DIOVAN -12.5 mg per tablet Take 1 tablet by mouth 2 (two) times daily.    Provider, Historical       REVIEW OF SYSTEMS:   Except as documented, all other systems reviewed and negative     PHYSICAL EXAM:   T 98.1 °F (36.7 °C)   /60   P 60   RR 12   O2 100 %  GENERAL: awake, fixed right gaze, groans to painful stimuli, did say hey"  HEENT: normocephalic " "atraumatic   NECK: supple   LUNGS:  Moaning inhibits exam but no accessory muscle use, moving air bilaterally CVS: Regular rate and rhythm, normal peripheral perfusion  ABD: Soft, non-tender, non-distended, bowel sounds present  EXTREMITIES: no clubbing or cyanosis, LUE more swollen than right  SKIN: Warm, dry.   NEURO:  Uncooperative with exam, groans to painful stimuli, groans to pain but will not withdraw in any extremity fixed right gaze but occasionally will tract  PSYCHIATRIC: uncooperative    LABS AND IMAGING:     Recent Labs     01/05/25  1409 01/05/25  1524   WBC 7.84 8.72   RBC 2.82* 2.90*   HGB 8.6* 8.9*   HCT 27.1* 29.6*   MCV 96.1* 102.1*   MCH 30.5 30.7   MCHC 31.7* 30.1*   RDW 15.6 16.2   * 369     No results for input(s): "LACTIC" in the last 72 hours.  Recent Labs     01/05/25  1524   INR 1.1     Recent Labs     01/05/25  1524   CHOL 102   TRIG 76   LDL 49.00*   VLDL 15   HDL 38      Recent Labs     01/05/25  1409 01/05/25  1524   * 129*   K 3.5 3.3*   CO2 23 22*   BUN 12.8 16.0   CREATININE 4.06* 4.13*   GLUCOSE 211* 209*   CALCIUM 8.0* 8.4   ALBUMIN 2.3* 2.3*   GLOBULIN 4.8* 5.4*   ALKPHOS 112 112   ALT 22 23   AST 23 25   BILITOT 0.2 0.3   TSH  --  2.031     Recent Labs     01/05/25  1524   TROPONINI 0.019          CT HEAD FOR STROKE  Narrative: EXAMINATION:  CT HEAD FOR STROKE    CLINICAL HISTORY:  Neuro deficit, acute, stroke suspected;    TECHNIQUE:  CT images of the head without IV contrast. Axial, coronal and sagittal images reviewed. Dose length product 1029 mGycm. Automatic exposure control, adjustment of mA/kV or iterative reconstruction technique used to limit radiation dose.    COMPARISON:  No relevant comparison studies available at the time of dictation.    FINDINGS:  Extra-axial spaces/ventricular system: Moderate cerebral atrophy.  Ventricular size proportional to the degree of atrophy.    Intracranial hemorrhage: None identified.    Cerebral parenchyma: " Encephalomalacia in both occipital lobes.  Moderate chronic small vessel ischemic changes in the supratentorial white matter.  Several scattered small chronic appearing infarcts.  No definitive acute infarct identified.    Vascular system: No hyperdense vessel appreciated.    Cerebellum: Normal.    Sella: Normal.    Included paranasal sinuses and mastoid air cells: Trace bilateral mastoid fluid.    Visualized orbits: Normal.    Scalp/Calvarium: No depressed skull fracture.  Impression: No acute intracranial process identified.    Report called to Dr. Pickard at 15:43 hours on 01/05/2025.    Electronically signed by: Alfredo Alejo  Date:    01/05/2025  Time:    15:45  CTA Head and Neck (xpd)  Narrative: EXAMINATION:  CTA HEAD AND NECK (XPD)    CLINICAL HISTORY:  Neuro deficit, acute, stroke suspected;    TECHNIQUE:  Non contrast low dose axial images were obtained through the head.  CT angiogram was performed from the level of the selene to the top of the head following the IV administration of 100mL of Omnipaque 350.   Sagittal and coronal reconstructions and maximum intensity projection reconstructions were performed. Arterial stenosis percentages are based on NASCET measurement criteria.  DLP 1460.  Automated exposure control used.    COMPARISON:  None    FINDINGS:  Neck: The common carotid arteries, carotid bulbs, internal carotid arteries, and vertebral arteries are patent without significant stenosis or occlusion.  Trace atherosclerotic calcification noted.  No soft tissue mass, fluid collection, hematoma, lymphadenopathy.    Head: Internal carotid artery siphons demonstrate moderate severe atherosclerotic calcification with severe narrowing of the supraclinoid segments of the ICAs.  Anterior cerebral arteries, middle cerebral arteries and branches, posterior cerebral arteries, and vertebrobasilar system are patent without significant stenosis, occlusion, or aneurysm.  Impression: Severe atherosclerotic  calcification of the carotid siphons with severe narrowing of the supraclinoid ICAs bilaterally.    Electronically signed by: Santos Perez MD  Date:    01/05/2025  Time:    15:40      ASSESSMENT & PLAN:   Acute global aphasia, rule out CVA vs seizure/postictal state  UTI, present on arrival    Hx: ESRD on HD MWF, HTN, CAD, CHF unknown EF, DM2, previous left MCA territory stroke     -place on stroke protocol  -neuro checks q4 hours, telemetry monitoring  -MRI brain, carotid ultrasound an echocardiogram  -permissive hypertension with parameters  -PT/OT/ST  -aspirin, statin at discharge  -EEG in a.m.  -neurology consultation  -nephrology consulted for hemodialysis  -ceftriaxone for UTI    DVT prophylaxis:  Lovenox  Code status:  Full code    If patient was admitted under observational status it is with my approval/permission.     At least 55 min was spent on this history and physical.  Time seen:  11:00 p.m. 1/5  David Yoder MD

## 2025-01-07 PROBLEM — R56.9 SEIZURES: Status: ACTIVE | Noted: 2025-01-07

## 2025-01-07 PROBLEM — G40.901 STATUS EPILEPTICUS: Status: ACTIVE | Noted: 2025-01-07

## 2025-01-07 LAB
ALBUMIN SERPL-MCNC: 1.8 G/DL (ref 3.4–4.8)
ALBUMIN/GLOB SERPL: 0.4 RATIO (ref 1.1–2)
ALP SERPL-CCNC: 91 UNIT/L (ref 40–150)
ALT SERPL-CCNC: 16 UNIT/L (ref 0–55)
ANION GAP SERPL CALC-SCNC: 11 MEQ/L
ANION GAP SERPL CALC-SCNC: 13 MEQ/L
AST SERPL-CCNC: 19 UNIT/L (ref 5–34)
BACTERIA UR CULT: ABNORMAL
BASOPHILS # BLD AUTO: 0.09 X10(3)/MCL
BASOPHILS NFR BLD AUTO: 1.3 %
BILIRUB SERPL-MCNC: 0.2 MG/DL
BUN SERPL-MCNC: 21.3 MG/DL (ref 9.8–20.1)
BUN SERPL-MCNC: 22.7 MG/DL (ref 9.8–20.1)
CALCIUM SERPL-MCNC: 7.8 MG/DL (ref 8.4–10.2)
CALCIUM SERPL-MCNC: 7.8 MG/DL (ref 8.4–10.2)
CHLORIDE SERPL-SCNC: 100 MMOL/L (ref 98–107)
CHLORIDE SERPL-SCNC: 99 MMOL/L (ref 98–107)
CO2 SERPL-SCNC: 18 MMOL/L (ref 23–31)
CO2 SERPL-SCNC: 20 MMOL/L (ref 23–31)
CREAT SERPL-MCNC: 5.04 MG/DL (ref 0.55–1.02)
CREAT SERPL-MCNC: 5.29 MG/DL (ref 0.55–1.02)
CREAT/UREA NIT SERPL: 4
CREAT/UREA NIT SERPL: 4
EOSINOPHIL # BLD AUTO: 0.25 X10(3)/MCL (ref 0–0.9)
EOSINOPHIL NFR BLD AUTO: 3.6 %
ERYTHROCYTE [DISTWIDTH] IN BLOOD BY AUTOMATED COUNT: 15.9 % (ref 11.5–17)
GFR SERPLBLD CREATININE-BSD FMLA CKD-EPI: 8 ML/MIN/1.73/M2
GFR SERPLBLD CREATININE-BSD FMLA CKD-EPI: 8 ML/MIN/1.73/M2
GLOBULIN SER-MCNC: 4.1 GM/DL (ref 2.4–3.5)
GLUCOSE SERPL-MCNC: 176 MG/DL (ref 82–115)
GLUCOSE SERPL-MCNC: 88 MG/DL (ref 82–115)
HCT VFR BLD AUTO: 21.7 % (ref 37–47)
HGB BLD-MCNC: 7.2 G/DL (ref 12–16)
IMM GRANULOCYTES # BLD AUTO: 0.06 X10(3)/MCL (ref 0–0.04)
IMM GRANULOCYTES NFR BLD AUTO: 0.9 %
LYMPHOCYTES # BLD AUTO: 1.41 X10(3)/MCL (ref 0.6–4.6)
LYMPHOCYTES NFR BLD AUTO: 20.3 %
MCH RBC QN AUTO: 30 PG (ref 27–31)
MCHC RBC AUTO-ENTMCNC: 33.2 G/DL (ref 33–36)
MCV RBC AUTO: 90.4 FL (ref 80–94)
MONOCYTES # BLD AUTO: 0.91 X10(3)/MCL (ref 0.1–1.3)
MONOCYTES NFR BLD AUTO: 13.1 %
NEUTROPHILS # BLD AUTO: 4.23 X10(3)/MCL (ref 2.1–9.2)
NEUTROPHILS NFR BLD AUTO: 60.8 %
NRBC BLD AUTO-RTO: 0 %
PLATELET # BLD AUTO: 297 X10(3)/MCL (ref 130–400)
PMV BLD AUTO: 8.2 FL (ref 7.4–10.4)
POTASSIUM SERPL-SCNC: 2.4 MMOL/L (ref 3.5–5.1)
POTASSIUM SERPL-SCNC: 3.7 MMOL/L (ref 3.5–5.1)
PROT SERPL-MCNC: 5.9 GM/DL (ref 5.8–7.6)
RBC # BLD AUTO: 2.4 X10(6)/MCL (ref 4.2–5.4)
SODIUM SERPL-SCNC: 130 MMOL/L (ref 136–145)
SODIUM SERPL-SCNC: 131 MMOL/L (ref 136–145)
WBC # BLD AUTO: 6.95 X10(3)/MCL (ref 4.5–11.5)

## 2025-01-07 PROCEDURE — 99900035 HC TECH TIME PER 15 MIN (STAT)

## 2025-01-07 PROCEDURE — 99900031 HC PATIENT EDUCATION (STAT)

## 2025-01-07 PROCEDURE — 63600175 PHARM REV CODE 636 W HCPCS

## 2025-01-07 PROCEDURE — 11000001 HC ACUTE MED/SURG PRIVATE ROOM

## 2025-01-07 PROCEDURE — 25000003 PHARM REV CODE 250

## 2025-01-07 PROCEDURE — 36415 COLL VENOUS BLD VENIPUNCTURE: CPT | Performed by: INTERNAL MEDICINE

## 2025-01-07 PROCEDURE — 94760 N-INVAS EAR/PLS OXIMETRY 1: CPT

## 2025-01-07 PROCEDURE — 25000003 PHARM REV CODE 250: Performed by: INTERNAL MEDICINE

## 2025-01-07 PROCEDURE — 94761 N-INVAS EAR/PLS OXIMETRY MLT: CPT

## 2025-01-07 PROCEDURE — 94003 VENT MGMT INPAT SUBQ DAY: CPT

## 2025-01-07 PROCEDURE — 95720 EEG PHY/QHP EA INCR W/VEEG: CPT | Mod: ,,, | Performed by: SPECIALIST

## 2025-01-07 PROCEDURE — 27100171 HC OXYGEN HIGH FLOW UP TO 24 HOURS

## 2025-01-07 PROCEDURE — 99233 SBSQ HOSP IP/OBS HIGH 50: CPT | Mod: ,,, | Performed by: SPECIALIST

## 2025-01-07 PROCEDURE — 80100014 HC HEMODIALYSIS 1:1

## 2025-01-07 PROCEDURE — 27200966 HC CLOSED SUCTION SYSTEM

## 2025-01-07 PROCEDURE — 99900026 HC AIRWAY MAINTENANCE (STAT)

## 2025-01-07 PROCEDURE — 63600175 PHARM REV CODE 636 W HCPCS: Performed by: INTERNAL MEDICINE

## 2025-01-07 PROCEDURE — 80053 COMPREHEN METABOLIC PANEL: CPT | Performed by: INTERNAL MEDICINE

## 2025-01-07 PROCEDURE — 85025 COMPLETE CBC W/AUTO DIFF WBC: CPT | Performed by: INTERNAL MEDICINE

## 2025-01-07 RX ORDER — POTASSIUM CHLORIDE 7.45 MG/ML
10 INJECTION INTRAVENOUS
Status: COMPLETED | OUTPATIENT
Start: 2025-01-07 | End: 2025-01-07

## 2025-01-07 RX ORDER — CEFEPIME HYDROCHLORIDE 1 G/1
1 INJECTION, POWDER, FOR SOLUTION INTRAMUSCULAR; INTRAVENOUS
Status: DISCONTINUED | OUTPATIENT
Start: 2025-01-07 | End: 2025-01-07

## 2025-01-07 RX ORDER — DEXAMETHASONE SODIUM PHOSPHATE 10 MG/ML
10 INJECTION, SOLUTION INTRA-ARTICULAR; INTRALESIONAL; INTRAMUSCULAR; INTRAVENOUS; SOFT TISSUE EVERY 6 HOURS
Status: DISCONTINUED | OUTPATIENT
Start: 2025-01-07 | End: 2025-01-07

## 2025-01-07 RX ADMIN — VANCOMYCIN HYDROCHLORIDE 1500 MG: 1.5 INJECTION, POWDER, LYOPHILIZED, FOR SOLUTION INTRAVENOUS at 01:01

## 2025-01-07 RX ADMIN — POTASSIUM CHLORIDE 10 MEQ: 7.46 INJECTION, SOLUTION INTRAVENOUS at 04:01

## 2025-01-07 RX ADMIN — PROPOFOL 15 MCG/KG/MIN: 10 INJECTION, EMULSION INTRAVENOUS at 03:01

## 2025-01-07 RX ADMIN — POTASSIUM CHLORIDE 10 MEQ: 7.46 INJECTION, SOLUTION INTRAVENOUS at 07:01

## 2025-01-07 RX ADMIN — VALPROATE SODIUM 500 MG: 100 INJECTION, SOLUTION INTRAVENOUS at 10:01

## 2025-01-07 RX ADMIN — PROPOFOL 35 MCG/KG/MIN: 10 INJECTION, EMULSION INTRAVENOUS at 07:01

## 2025-01-07 RX ADMIN — LEVETIRACETAM 500 MG: 100 INJECTION, SOLUTION INTRAVENOUS at 10:01

## 2025-01-07 RX ADMIN — ENOXAPARIN SODIUM 30 MG: 30 INJECTION SUBCUTANEOUS at 04:01

## 2025-01-07 RX ADMIN — POTASSIUM CHLORIDE 10 MEQ: 7.46 INJECTION, SOLUTION INTRAVENOUS at 05:01

## 2025-01-07 RX ADMIN — SODIUM CHLORIDE 1000 MG: 9 INJECTION, SOLUTION INTRAVENOUS at 02:01

## 2025-01-07 RX ADMIN — MUPIROCIN: 20 OINTMENT TOPICAL at 09:01

## 2025-01-07 RX ADMIN — ASPIRIN 300 MG: 300 SUPPOSITORY RECTAL at 10:01

## 2025-01-07 RX ADMIN — DEXAMETHASONE SODIUM PHOSPHATE 10 MG: 10 INJECTION INTRAMUSCULAR; INTRAVENOUS at 09:01

## 2025-01-07 RX ADMIN — PROPOFOL 35 MCG/KG/MIN: 10 INJECTION, EMULSION INTRAVENOUS at 04:01

## 2025-01-07 RX ADMIN — DEXAMETHASONE SODIUM PHOSPHATE 10 MG: 10 INJECTION INTRAMUSCULAR; INTRAVENOUS at 02:01

## 2025-01-07 RX ADMIN — MUPIROCIN: 20 OINTMENT TOPICAL at 10:01

## 2025-01-07 RX ADMIN — LEVETIRACETAM 500 MG: 100 INJECTION, SOLUTION INTRAVENOUS at 09:01

## 2025-01-07 RX ADMIN — PIPERACILLIN SODIUM AND TAZOBACTAM SODIUM 4.5 G: 4; .5 INJECTION, POWDER, LYOPHILIZED, FOR SOLUTION INTRAVENOUS at 04:01

## 2025-01-07 NOTE — PROGRESS NOTES
01/07/25 1430   Vital Signs   Pulse 61   Resp (!) 25   SpO2 100 %   /62   MAP (mmHg) 80        Hemodialysis Catheter 01/06/25 1400 left internal jugular   Placement Date/Time: (c) 01/06/25 1400   Present Prior to Hospital Arrival?: Yes  Location: left internal jugular   Dressing Type CHG impregnated dressing/sponge   Dressing Status Clean;Dry;Intact   Dressing Intervention Sterile dressing change   Date on Dressing 01/07/25   Dressing Due to be Changed 01/09/25   Venous Patency/Care flushed w/o difficulty;normal saline locked   Arterial Patency/Care flushed w/o difficulty;normal saline locked   Post-Hemodialysis Assessment   Blood Volume Processed (Liters) 53 L   Dialyzer Clearance Clear   Duration of Treatment 180 minutes   Total UF (mL) 500 mL   Patient Response to Treatment tolerated well   Post-Hemodialysis Comments completed HD tx. removed 500ml fluid with HD today.

## 2025-01-07 NOTE — PROCEDURES
Krysta Jansen is a 79 y.o. female patient.    Temp: 98.2 °F (36.8 °C) (25 1015)  Pulse: 65 (25 0900)  Resp: (!) 22 (25 0900)  BP: (!) 92/44 (25 0846)  SpO2: 100 % (25 09)  Weight: 74.1 kg (163 lb 5.8 oz) (25 1518)  Height: 5' (152.4 cm) (25 1339)       24 hr. Video EEG Monitoring    Date/Time: 2025 3:14 PM    Performed by: Michi Justin MD  Authorized by: Genevieve Cheney AGACNP-BC      Prolonged inpatient EEG REPORT    PATIENT: Krysta Jansen  : 1945    DATEs: 1.6 afternoon to 1.7 mid day     INTERPRETING PHYSICIAN: Michi Justin     Reason for EEG:  (prior) seizure / status epilepticus        Duration of recordin hours  34 minutes     This EEG is performed with the patient described as Unresponsive intubated    Video Recorded     Epileptiform discharges were present throughout the recording to some extent.   Electrographic seizure present only near the 10:00am time frame, but persisted for approximately an hour.      Spontaneous awakenings occurred intermittently with increase in muscle activity and sometimes with head movements.      Drug effect more or less manifested over duration of recording as well with increased spindles.        Technical character: good      EKG:  Regular, NSR    IMPRESSION:   Continued epileptiform stigmata overnight and electrographic seizure as recently as 11 am on 25.      Michi Justin MD          2025

## 2025-01-07 NOTE — PROGRESS NOTES
[facility] - [facility]  Neurology  Progress Note    Patient Name: Krysta Jansen  MRN: [id number]  Admission Date: 1/5/2025  Hospital Length of Stay: 2 days  Code Status: Full Code   Attending Provider: DAMARIS Jaime MD  Primary Care Physician: No primary care provider on file.   Principal Problem:<principal problem not specified>    HPI:   79 y.o. female who presented to [facility] on 1/5/2025 with reports of  lethargy, less responsive . Onset of symptoms was sudden, not related to any specific activity and last known normal 13:30, at this time it is unclear what the patient's baseline is . Stroke risk factors include  HTN, DM, CHF, ESRD, CAD . 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      Per EMS, she was last seen normal by son at nursing home at 13:30.  He went back to check on her at 14:15 and found she was lethargic and not talking.  EMS reports she did have surgery recently on her left arm for her HD access, unsure exactly when, she does have a dressing to the left arm.     Overview/Hospital Course:  No notes on file        Subjective:     Interval History:   Minimally responsive and no signs of seizure activity while on sedation. Sedation paused, pt noted to have twitching to BUE and L eye lid. No nystagmus needed.   Remains on continuous EEG monitoring.    Current Neurological Medications:     Current Facility-Administered Medications   Medication Dose Route Frequency Provider Last Rate Last Admin    ampicillin (OMNIPEN) 2 g in 0.9% NaCl 100 mL IVPB (MB+)  2 g Intravenous Q12H Ernesto Saunders DO   Stopped at 01/07/25 0046    aspirin suppository 300 mg  300 mg Rectal Daily David Yoder MD   300 mg at 01/07/25 1013    bisacodyL suppository 10 mg  10 mg Rectal Daily PRN David Yoder MD        ceFEPIme injection 1 g  1 g Intravenous Q24H DAMARIS Jaime MD         dexAMETHasone sodium phos (PF) injection 10 mg  10 mg Intravenous Q6H Ernesto Saunders, DO   10 mg at 01/07/25 0900    dextrose 50% injection 12.5 g  12.5 g Intravenous PRN David Yoder MD        enoxaparin injection 30 mg  30 mg Subcutaneous Q24H (prophylaxis, 1700) David Yoder MD   30 mg at 01/06/25 1647    EPINEPHrine (ADRENALIN) 5 mg in D5W 250 mL infusion  0-2 mcg/kg/min (Dosing Weight) Intravenous Continuous Jean Pierre Friedman MD 4.4 mL/hr at 01/07/25 0500 0.02 mcg/kg/min at 01/07/25 0500    glucagon (human recombinant) injection 1 mg  1 mg Intramuscular PRN David Yoder MD        insulin aspart U-100 injection 0-5 Units  0-5 Units Subcutaneous Q6H PRN David Yoder MD        labetaloL injection 10 mg  10 mg Intravenous Q15 Min PRN David Yoder MD        levETIRAcetam (Keppra) 500 mg in D5W 100 mL IVPB (MB+)  500 mg Intravenous Q12H Genevieve Cheney, AGACNP-BC   Stopped at 01/07/25 1037    mupirocin 2 % ointment   Nasal BID David Yoder MD   Given at 01/07/25 1009    NORepinephrine 8 mg in dextrose 5% 250 mL infusion  0-0.2 mcg/kg/min (Dosing Weight) Intravenous Continuous Kevin Navarro Jr., MD, FCCP 2.8 mL/hr at 01/06/25 1155 0.02 mcg/kg/min at 01/06/25 1155    propofol (DIPRIVAN) 10 mg/mL infusion  0-50 mcg/kg/min (Dosing Weight) Intravenous Continuous Jean Pierre Friedman MD 15.6 mL/hr at 01/07/25 0732 35 mcg/kg/min at 01/07/25 0732    sodium chloride 0.9% flush 10 mL  10 mL Intravenous PRN David Yoder MD        vancomycin - pharmacy to dose   Intravenous pharmacy to manage frequency Apolinar Roblero MD           Review of Systems   Unable to perform ROS: Intubated     Objective:     Vital Signs (Most Recent):  Temp: 98.2 °F (36.8 °C) (01/07/25 1015)  Pulse: 65 (01/07/25 0900)  Resp: (!) 22 (01/07/25 0900)  BP: (!) 92/44 (01/07/25 0846)  SpO2: 100 % (01/07/25 0900) Vital Signs (24h Range):  Temp:  [94 °F (34.4 °C)-98.2 °F (36.8 °C)] 98.2 °F  (36.8 °C)  Pulse:  [44-84] 65  Resp:  [13-22] 22  SpO2:  [91 %-100 %] 100 %  BP: ()/(42-88) 92/44     Weight: 74.1 kg (163 lb 5.8 oz)  Body mass index is 31.9 kg/m².     Physical Exam   GENERAL: intubated, sedated, comatose, on continuous EEG monitoring  MENTAL STATUS: not following commands  CN:  gaze conjugate  PERRLA  No nystagmus noted  No movement to command or spontaneous movement of extremities  Sensory: withdraws to painful stimuli in all extremities  Gait: not observed         Significant Labs:   Recent Lab Results  (Last 5 results in the past 24 hours)        01/07/25  0935   01/07/25  0253   01/06/25  1740   01/06/25  1738   01/06/25  1147        Albumin/Globulin Ratio   0.4             A2C EF       43         A4C EF       44         Albumin   1.8             ALP   91             Allens Test         Yes       ALT   16             Anion Gap 13.0   11.0             Ao peak rufina       1.4         Ao VTI       32.0         AST   19             AV valve area       2.2         ASHLEE by Velocity Ratio       2.0         AV mean gradient       4.0         AV index (prosthetic)       0.77         AV peak gradient       7.8         AV Velocity Ratio       0.71         Baso #   0.09             Basophil %   1.3             BILIRUBIN TOTAL   0.2             BSA       1.77         BUN 22.7   21.3             BUN/CREAT RATIO 4   4             Calcium 7.8   7.8             Calcium Level Ionized         1.13       Chloride 99   100             CO2 18   20             Creatinine 5.29   5.04             Drawn by         sd rrt       E/A ratio       0.68         E/E' ratio       10.86         eGFR 8   8             Eos #   0.25             Eos %   3.6             E wave deceleration time       261.00         FIO2, Blood gas         100       Globulin, Total   4.1             Glucose 176   88             Hematocrit   21.7             Hemoglobin   7.2             Mitral Valve Heart Rate       56         Immature Grans (Abs)    0.06             Immature Granulocytes   0.9             LA area A4C       14.50         LA size       3.00         LVOT area       2.8         LV LATERAL E/E' RATIO       9.50         LV SEPTAL E/E' RATIO       12.67         LV EDV BP       102.00         LV Diastolic Volume Index       59.65         LV EDV A2C       [id number]         LV EDV A4C       95.90         LV ESV A4C       31.30         Left Ventricular Outflow Tract Mean Gradient       2.00         Left Ventricular Outflow Tract Mean Velocity       0.65         LVOT diameter       1.9         LVOT peak john       1.0         LVOT stroke volume       70.0         LVOT peak VTI       24.7         LV ESV BP       55.90         LV Systolic Volume Index       32.7         Lymph #   1.41             LYMPH %   20.3             MCH   30.0             MCHC   33.2             MCV   90.4             Mean e'       0.07         Mech Vt         450       MODE         AC       Mono #   0.91             Mono %   13.1             MPV   8.2             MV valve area p 1/2 method       4.31         MV valve area by continuity eq       1.56         MV mean gradient       3         MV peak gradient       7         MV Peak A John       1.11         MV Peak E John       0.76         MV stenosis pressure 1/2 time       51.00         MV VTI       45.0         Neut #   4.23             Neut %   60.8             nRBC   0.0             O2 Hb, Blood Gas         97.7       Iverson's Biplane MOD Ejection Fraction       45         Oxygen Device, Blood gas         Ventilator       PEEP         5.0       Platelet Count   297             Base Excess, Blood gas         1.20       CO Hgb         1.1       POC HCO3         24.2       Met Hgb         1.0       POC PCO2         31.0       POC PH         7.500       POC PO2         356.0       POCT Glucose     95           Potassium 3.7   2.4  Comment: Critical Result called and verified by verbal readback to: Sherry Myles RN on  01/07/2025 at 03:45. Reported by [id number].             Potassium, Blood Gas         3.0       PROTEIN TOTAL   5.9             PV peak gradient       4         PV PEAK VELOCITY       0.96         RBC   2.40             RDW   15.9             [facility]         22       Sample site         Right Radial Artery       Sample Type         Arterial Blood       sO2, Blood gas         100.0       Sodium 130   131             Sodium, Blood Gas         128       TAPSE       2.35         TOC2, Blood gas         25.2       TDI SEPTAL       0.06         TDI LATERAL       0.08         THb, Blood gas         8.0       Triscuspid Valve Regurgitation Peak Gradient       22         TR Max John       2.37         WBC   6.95                                    Significant Imaging: I have reviewed all pertinent imaging results/findings within the past 24 hours.  Assessment and Plan:     Seizures  -continue keppra 500 mg BID  -creatinine level up to 5.29 today, depakote 500 mg BID added to AED regimen  -give ativan 2 mg PRN witnessed seizures  -on continuous EEG monitoring  -Further recommendations per MD      VTE Risk Mitigation (From admission, onward)           Ordered     enoxaparin injection 30 mg  Every 24 hours         01/05/25 2143     IP VTE HIGH RISK PATIENT  Once         01/05/25 2137     Place sequential compression device  Until discontinued         01/05/25 2137                    FREDDIE Marin-BC  Neurology  [facility] - [facility] ICU

## 2025-01-07 NOTE — CARE UPDATE
Adding vanc, ampicillin, and decadron 10 mg every 6 hours for 4 days  to cover for possible meningitis. Will attempt an LP either tonight or tomorrow.     DO BOB MartinezU IM PGY-2

## 2025-01-07 NOTE — SUBJECTIVE & OBJECTIVE
Subjective:     Interval History:   Minimally responsive and no signs of seizure activity while on sedation. Sedation paused, pt noted to have twitching to BUE and L eye lid. No nystagmus needed.   Remains on continuous EEG monitoring.    Current Neurological Medications:     Current Facility-Administered Medications   Medication Dose Route Frequency Provider Last Rate Last Admin    ampicillin (OMNIPEN) 2 g in 0.9% NaCl 100 mL IVPB (MB+)  2 g Intravenous Q12H Ernesto Saunders DO   Stopped at 01/07/25 0046    aspirin suppository 300 mg  300 mg Rectal Daily David Yoder MD   300 mg at 01/07/25 1013    bisacodyL suppository 10 mg  10 mg Rectal Daily PRN David Yoder MD        ceFEPIme injection 1 g  1 g Intravenous Q24H DAMARIS Jaime MD        dexAMETHasone sodium phos (PF) injection 10 mg  10 mg Intravenous Q6H Ernesto Suanders DO   10 mg at 01/07/25 0900    dextrose 50% injection 12.5 g  12.5 g Intravenous PRN David Yoder MD        enoxaparin injection 30 mg  30 mg Subcutaneous Q24H (prophylaxis, 1700) David Yoder MD   30 mg at 01/06/25 1647    EPINEPHrine (ADRENALIN) 5 mg in D5W 250 mL infusion  0-2 mcg/kg/min (Dosing Weight) Intravenous Continuous Jean Pierre Friedman MD 4.4 mL/hr at 01/07/25 0500 0.02 mcg/kg/min at 01/07/25 0500    glucagon (human recombinant) injection 1 mg  1 mg Intramuscular PRN David Yoder MD        insulin aspart U-100 injection 0-5 Units  0-5 Units Subcutaneous Q6H PRN David Yoder MD        labetaloL injection 10 mg  10 mg Intravenous Q15 Min PRN David Yoder MD        levETIRAcetam (Keppra) 500 mg in D5W 100 mL IVPB (MB+)  500 mg Intravenous Q12H Genevieve Cheney AGACNP-BC   Stopped at 01/07/25 1037    mupirocin 2 % ointment   Nasal BID David Yoder MD   Given at 01/07/25 1009    NORepinephrine 8 mg in dextrose 5% 250 mL infusion  0-0.2 mcg/kg/min (Dosing Weight) Intravenous Continuous Kevin Navarro Jr., MD,  FCCP 2.8 mL/hr at 01/06/25 1155 0.02 mcg/kg/min at 01/06/25 1155    propofol (DIPRIVAN) 10 mg/mL infusion  0-50 mcg/kg/min (Dosing Weight) Intravenous Continuous Jean Pierre Friedman MD 15.6 mL/hr at 01/07/25 0732 35 mcg/kg/min at 01/07/25 0732    sodium chloride 0.9% flush 10 mL  10 mL Intravenous PRN aDvid Yoder MD        vancomycin - pharmacy to dose   Intravenous pharmacy to manage frequency Apolinar Roblero MD           Review of Systems   Unable to perform ROS: Intubated     Objective:     Vital Signs (Most Recent):  Temp: 98.2 °F (36.8 °C) (01/07/25 1015)  Pulse: 65 (01/07/25 0900)  Resp: (!) 22 (01/07/25 0900)  BP: (!) 92/44 (01/07/25 0846)  SpO2: 100 % (01/07/25 0900) Vital Signs (24h Range):  Temp:  [94 °F (34.4 °C)-98.2 °F (36.8 °C)] 98.2 °F (36.8 °C)  Pulse:  [44-84] 65  Resp:  [13-22] 22  SpO2:  [91 %-100 %] 100 %  BP: ()/(42-88) 92/44     Weight: 74.1 kg (163 lb 5.8 oz)  Body mass index is 31.9 kg/m².     Physical Exam   GENERAL: intubated, sedated, comatose, on continuous EEG monitoring  MENTAL STATUS: not following commands  CN:  gaze conjugate  PERRLA  No nystagmus noted  No movement to command or spontaneous movement of extremities  Sensory: withdraws to painful stimuli in all extremities  Gait: not observed         Significant Labs:   Recent Lab Results  (Last 5 results in the past 24 hours)        01/07/25  0935   01/07/25  0253   01/06/25  1740   01/06/25  1738   01/06/25  1147        Albumin/Globulin Ratio   0.4             A2C EF       43         A4C EF       44         Albumin   1.8             ALP   91             Allens Test         Yes       ALT   16             Anion Gap 13.0   11.0             Ao peak rufina       1.4         Ao VTI       32.0         AST   19             AV valve area       2.2         ASHLEE by Velocity Ratio       2.0         AV mean gradient       4.0         AV index (prosthetic)       0.77         AV peak gradient       7.8         AV Velocity Ratio        0.71         Baso #   0.09             Basophil %   1.3             BILIRUBIN TOTAL   0.2             BSA       1.77         BUN 22.7   21.3             BUN/CREAT RATIO 4   4             Calcium 7.8   7.8             Calcium Level Ionized         1.13       Chloride 99   100             CO2 18   20             Creatinine 5.29   5.04             Drawn by         sd rrt       E/A ratio       0.68         E/E' ratio       10.86         eGFR 8   8             Eos #   0.25             Eos %   3.6             E wave deceleration time       261.00         FIO2, Blood gas         100       Globulin, Total   4.1             Glucose 176   88             Hematocrit   21.7             Hemoglobin   7.2             Mitral Valve Heart Rate       56         Immature Grans (Abs)   0.06             Immature Granulocytes   0.9             LA area A4C       14.50         LA size       3.00         LVOT area       2.8         LV LATERAL E/E' RATIO       9.50         LV SEPTAL E/E' RATIO       12.67         LV EDV BP       102.00         LV Diastolic Volume Index       59.65         LV EDV A2C       [id number]         LV EDV A4C       95.90         LV ESV A4C       31.30         Left Ventricular Outflow Tract Mean Gradient       2.00         Left Ventricular Outflow Tract Mean Velocity       0.65         LVOT diameter       1.9         LVOT peak rufina       1.0         LVOT stroke volume       70.0         LVOT peak VTI       24.7         LV ESV BP       55.90         LV Systolic Volume Index       32.7         Lymph #   1.41             LYMPH %   20.3             MCH   30.0             MCHC   33.2             MCV   90.4             Mean e'       0.07         [facility] Vt         450       MODE         AC       Mono #   0.91             Mono %   13.1             MPV   8.2             MV valve area p 1/2 method       4.31         MV valve area by continuity eq       1.56         MV mean gradient       3         MV peak gradient       7          MV Peak A John       1.11         MV Peak E John       0.76         MV stenosis pressure 1/2 time       51.00         MV VTI       45.0         Neut #   4.23             Neut %   60.8             nRBC   0.0             O2 Hb, Blood Gas         97.7       Iverson's Biplane MOD Ejection Fraction       45         Oxygen Device, Blood gas         Ventilator       PEEP         5.0       Platelet Count   297             Base Excess, Blood gas         1.20       CO Hgb         1.1       POC HCO3         24.2       Met Hgb         1.0       POC PCO2         31.0       POC PH         7.500       POC PO2         356.0       POCT Glucose     95           Potassium 3.7   2.4  Comment: Critical Result called and verified by verbal readback to: Sherry Myles RN on 01/07/2025 at 03:45. Reported by [id number].             Potassium, Blood Gas         3.0       PROTEIN TOTAL   5.9             PV peak gradient       4         PV PEAK VELOCITY       0.96         RBC   2.40             RDW   15.9             Mech RR         22       Sample site         Right Radial Artery       Sample Type         Arterial Blood       sO2, Blood gas         100.0       Sodium 130   131             Sodium, Blood Gas         128       TAPSE       2.35         TOC2, Blood gas         25.2       TDI SEPTAL       0.06         TDI LATERAL       0.08         THb, Blood gas         8.0       Triscuspid Valve Regurgitation Peak Gradient       22         TR Max John       2.37         WBC   6.95                                    Significant Imaging: I have reviewed all pertinent imaging results/findings within the past 24 hours.

## 2025-01-07 NOTE — PROGRESS NOTES
Inpatient Nutrition Assessment    Admit Date: 1/5/2025   Total duration of encounter: 2 days   Patient Age: 79 y.o.    Nutrition Recommendation/Prescription     Start tube feeding when appropriate.  Tube feeding recommendation:     Peptamen Intense VHP goal rate 50 ml/hr to provide  1000 kcal/d  (67% est needs, 94% with meds)  93 g protein/d  (100% est needs)  840 ml free water/d   (calculations based on estimated 20 hr/d run time)     Humble (provides 90 kcal, 2.5 g protein per serving) BID.     If no IV fluids running, can give 50ml q 4hr water flushes. Total water provided: 1140ml. Otherwise, FWF per MD.      Start @ 20ml/hr, increase as tolerated 20ml/hr q4hr until goal rate reached.      Communication of Recommendations: reviewed with nurse    Nutrition Assessment     Malnutrition Assessment/Nutrition-Focused Physical Exam       Malnutrition Level: other (see comments) (Does not meet criteria) (01/07/25 1339)  Energy Intake (Malnutrition): other (see comments) (Unable to assess) (01/07/25 1339)  Weight Loss (Malnutrition): other (see comments) (Unable to assess) (01/07/25 1339)                                         Fluid Accumulation (Malnutrition): other (see comments) (Not present) (01/07/25 1339)        A minimum of two characteristics is recommended for diagnosis of either severe or non-severe malnutrition.    Chart Review    Reason Seen: continuous nutrition monitoring    Malnutrition Screening Tool Results   Have you recently lost weight without trying?: No  Have you been eating poorly because of a decreased appetite?: No   MST Score: 0   Diagnosis:  Acute global aphasia, seizure/postictal state  Obtunded  UTI, present on arrival    Relevant Medical History: ESRD on HD, HTN, CAD, CHF, DM2, MCA territory stroke     Scheduled Medications:  ampicillin IV (PEDS and ADULTS), 2 g, Q12H  aspirin, 300 mg, Daily  ceFEPime IV (PEDS and ADULTS), 1 g, Q24H  dexAMETHasone sodium phos (PF), 10 mg, Q6H  enoxparin, 30  mg, Q24H (prophylaxis, 1700)  levETIRAcetam (Keppra) IV (PEDS and ADULTS), 500 mg, Q12H  mupirocin, , BID  valproate sodium (DEPACON) IVPB, 1,000 mg, Once  valproate sodium (DEPACON) IVPB, 500 mg, Q12H    Continuous Infusions:  EPINEPHrine, Last Rate: 0.02 mcg/kg/min (01/07/25 0500)  propofoL, Last Rate: 35 mcg/kg/min (01/07/25 0732)    PRN Medications:  bisacodyL, 10 mg, Daily PRN  dextrose 50%, 12.5 g, PRN  glucagon (human recombinant), 1 mg, PRN  insulin aspart U-100, 0-5 Units, Q6H PRN  labetalol, 10 mg, Q15 Min PRN  sodium chloride 0.9%, 10 mL, PRN  vancomycin - pharmacy to dose, , pharmacy to manage frequency    Calorie Containing IV Medications: Diprivan @ 15.6 ml/hr (provides 400 kcal/d)    Recent Labs   Lab 01/05/25  1409 01/05/25  1524 01/06/25  0319 01/07/25  0253 01/07/25  0935   * 129* 130* 131* 130*   K 3.5 3.3* 3.2* 2.4* 3.7   CALCIUM 8.0* 8.4 8.1* 7.8* 7.8*   PHOS  --  2.9 3.1  --   --    MG  --   --  2.30  --   --    CL 95* 96* 97* 100 99   CO2 23 22* 23 20* 18*   BUN 12.8 16.0 17.8 21.3* 22.7*   CREATININE 4.06* 4.13* 4.49* 5.04* 5.29*   EGFRNORACEVR 11 10 9 8 8   GLUCOSE 211* 209* 129* 88 176*   BILITOT 0.2 0.3 0.2 0.2  --    ALKPHOS 112 112 102 91  --    ALT 22 23 21 16  --    AST 23 25 25 19  --    ALBUMIN 2.3* 2.3* 2.1* 1.8*  --    TRIG  --  76  --   --   --    AMMONIA  --   --  22.9  --   --    WBC 7.84 8.72 7.01 6.95  --    HGB 8.6* 8.9* 7.3* 7.2*  --    HCT 27.1* 29.6* 23.1* 21.7*  --      Nutrition Orders:  Diet NPO      Appetite/Oral Intake: not applicable/not applicable  Factors Affecting Nutritional Intake: on mechanical ventilation  Social Needs Impacting Access to Food: unable to assess at this time; will attempt on follow-up  Food/Gnosticist/Cultural Preferences: unable to obtain  Food Allergies: no known food allergies  Last Bowel Movement: 01/05/25  Wound(s):  coccyx wound noted, not staged    Comments    1/7/25: Discussed with RN. Will provide tube feeding recommendations for  when appropriate to start tube feeding. Receiving kcal from meds.      Anthropometrics    Height: 5' (152.4 cm),    Last Weight: 74.1 kg (163 lb 5.8 oz) (01/05/25 1518), Weight Method: Standard Scale  BMI (Calculated): 31.9  BMI Classification: obese grade I (BMI 30-34.9)     Ideal Body Weight (IBW), Female: 100 lb     % Ideal Body Weight, Female (lb): 163.36 %                             Usual Weight Provided By: unable to obtain usual weight    Wt Readings from Last 5 Encounters:   01/05/25 74.1 kg (163 lb 5.8 oz)     Weight Change(s) Since Admission:   Wt Readings from Last 1 Encounters:   01/05/25 1518 74.1 kg (163 lb 5.8 oz)   Admit Weight: 74.1 kg (163 lb 5.8 oz) (01/05/25 1518), Weight Method: Standard Scale    Estimated Needs    Weight Used For Calorie Calculations: 74.1 kg (163 lb 5.8 oz)  Energy Calorie Requirements (kcal): 1495kcal  Energy Need Method: [facility] (modified)  Weight Used For Protein Calculations: 45.5 kg (100 lb 3.2 oz) (IBW)  Protein Requirements: 91gm (2g/kg IBW)  Fluid Requirements (mL): 1000ml + urinary output  CHO Requirement: 170gm (45% est kcal needs)     Enteral Nutrition     Patient not receiving enteral nutrition at this time.    Parenteral Nutrition     Patient not receiving parenteral nutrition support at this time.    Evaluation of Received Nutrient Intake    Calories: not meeting estimated needs  Protein: not meeting estimated needs    Patient Education     Not applicable.    Nutrition Diagnosis     PES: Inadequate oral intake related to acute illness as evidenced by intubation since admit. (new)     PES:            Nutrition Interventions     Intervention(s): modified composition of enteral nutrition, modified rate of enteral nutrition, and collaboration with other providers  Intervention(s):      Goal: Meet greater than 80% of nutritional needs by follow-up. (new)  Goal: Tolerate enteral feeding at goal rate by follow-up. (new)    Nutrition Goals & Monitoring     Dietitian  will monitor: energy intake and enteral nutrition intake  Discharge planning: too early to determine; pending clinical course  Nutrition Risk/Follow-Up: high (follow-up in 1-4 days)   Please consult if re-assessment needed sooner.

## 2025-01-07 NOTE — PROGRESS NOTES
Pharmacokinetic Initial Assessment: IV Vancomycin    Assessment/Plan:    Initiate intravenous vancomycin with loading dose of 1500 mg once with subsequent doses when random concentrations are less than 20 mcg/mL  Desired empiric serum trough concentration is 15 to 20 mcg/mL  Draw vancomycin level with am labs on 1/8/25.  Pharmacy will continue to follow and monitor vancomycin.         Patient brief summary:  Krysta Jansen is a 79 y.o. female initiated on antimicrobial therapy with IV Vancomycin for treatment of suspected meningitis    Drug Allergies:   Review of patient's allergies indicates:  No Known Allergies    Actual Body Weight:   74.1 kg    Renal Function:   Estimated Creatinine Clearance: 9.1 mL/min (A) (based on SCr of 4.49 mg/dL (H)).,     Dialysis Method (if applicable):  intermittent HD  Monday, Wednesday, Friday    CBC (last 72 hours):  Recent Labs   Lab Result Units 01/05/25  1409 01/05/25  1524 01/06/25  0319   WBC x10(3)/mcL 7.84 8.72 7.01   Hgb g/dL 8.6* 8.9* 7.3*   Hct % 27.1* 29.6* 23.1*   Platelet x10(3)/mcL 419* 369 282   Mono % % 9.7 7.8 16.0   Eos % % 1.1 1.1 1.1   Basophil % % 0.8 1.1 1.1       Metabolic Panel (last 72 hours):  Recent Labs   Lab Result Units 01/05/25  1409 01/05/25  1524 01/05/25  1633 01/05/25  1701 01/06/25  0319 01/06/25  1045 01/06/25  1147   Sodium mmol/L 131* 129*  --   --  130*  --   --    Sodium, Blood Gas mmol/L  --   --   --  125*  --  129* 128*   Potassium mmol/L 3.5 3.3*  --   --  3.2*  --   --    Potassium, Blood Gas mmol/L  --   --   --  3.2*  --  2.8* 3.0*   Chloride mmol/L 95* 96*  --   --  97*  --   --    CO2 mmol/L 23 22*  --   --  23  --   --    Glucose mg/dL 211* 209*  --   --  129*  --   --    Glucose, UA   --   --  Color may interfere with results  --   --   --   --    Blood Urea Nitrogen mg/dL 12.8 16.0  --   --  17.8  --   --    Creatinine mg/dL 4.06* 4.13*  --   --  4.49*  --   --    Albumin g/dL 2.3* 2.3*  --   --  2.1*  --   --    Bilirubin  "Total mg/dL 0.2 0.3  --   --  0.2  --   --    ALP unit/L 112 112  --   --  102  --   --    AST unit/L 23 25  --   --  25  --   --    ALT unit/L 22 23  --   --  21  --   --    Magnesium Level mg/dL  --   --   --   --  2.30  --   --    Phosphorus Level mg/dL  --  2.9  --   --  3.1  --   --        Drug levels (last 3 results):  No results for input(s): "VANCOMYCINRA", "VANCORANDOM", "VANCOMYCINPE", "VANCOPEAK", "VANCOMYCINTR", "VANCOTROUGH" in the last 72 hours.    Microbiologic Results:  Microbiology Results (last 7 days)       Procedure Component Value Units Date/Time    Blood Culture #1 **CANNOT BE ORDERED STAT** [[id number]]  (Normal) Collected: 01/05/25 2010    Order Status: Completed Specimen: Blood Updated: 01/06/25 2201     Blood Culture No Growth At 24 Hours    Blood Culture #2 **CANNOT BE ORDERED STAT** [[id number]]  (Normal) Collected: 01/05/25 2010    Order Status: Completed Specimen: Blood Updated: 01/06/25 2201     Blood Culture No Growth At 24 Hours    Urine culture [[id number]]  (Abnormal) Collected: 01/05/25 1633    Order Status: Completed Specimen: Urine Updated: 01/06/25 0644     Urine Culture >/= 100,000 colonies/ml Gram-negative Rods            "

## 2025-01-07 NOTE — PLAN OF CARE
Per family pt is a resident of [facility] [place] [facility] she is SNF and was there for rehab.  Will return when discharged

## 2025-01-07 NOTE — PROGRESS NOTES
Nephrology consult follow up note    HPI:      Krysta Jansen is a 79 y.o. female is a nursing home resident and has ESRD and is on dialysis on Monday Wednesday Friday.  Brought to this hospital for altered mental status.  She was found to have some grand mal seizure.  She received some Ativan.  She had EEG done.  Her respiratory status and mental status deteriorated and required intubation to protect her airways.  Could not get any history from the patient.  Records revealed she was found to have some dysarthria and decreased level of consciousness Friday through being brought to this hospital.  Significant past medical history is positive for diabetes mellitus type 2 previous stroke hypertension coronary artery disease and congestive heart failure.    Interval history:     01/07/2024  Patient remains intubated and sedated.  On epinephrine for hypotension this morning.     Review of Systems:     Comprehensive 10pt ROS negative except as noted per history.    Past medical, family, surgical, and social history reviewed and unchanged from initial consult note.     Objective:       VITAL SIGNS: 24 HR MIN & MAX LAST    Temp  Min: 94 °F (34.4 °C)  Max: 98.2 °F (36.8 °C)  98.2 °F (36.8 °C)        BP  Min: 83/47  Max: 190/69  (!) 92/44     Pulse  Min: 44  Max: 84  65     Resp  Min: 13  Max: 22  (!) 22    SpO2  Min: 91 %  Max: 100 %  100 %      GEN:  Ill-appearing female  CV: RRR +S1,S2 without murmur  PULM: CTAB, on ventilator  ABD: Soft, NT/ND abdomen with NABS  EXT: No cyanosis or edema  SKIN: Warm and dry  PSYCH:  Sedated and unresponsive  Dialysis access:  Left IJ PermCath            Component Value Date/Time     (L) 01/07/2025 0935     (L) 01/07/2025 0253     05/08/2024 0739     03/15/2024 1101    K 3.7 01/07/2025 0935    K 2.4 (LL) 01/07/2025 0253    K 3.8 05/08/2024 0739    K 3.9 03/15/2024 1101    CO2 18 (L) 01/07/2025 0935    CO2 20 (L) 01/07/2025 0253    CO2 22 05/08/2024 0739    CO2  21 (L) 03/15/2024 1101    BUN 22.7 (H) 01/07/2025 0935    BUN 21.3 (H) 01/07/2025 0253    BUN 55 (H) 10/02/2024 0000    BUN 23 (H) 07/12/2024 0000    CREATININE 5.29 (H) 01/07/2025 0935    CREATININE 5.04 (H) 01/07/2025 0253    CREATININE 3.37 (H) 05/08/2024 0739    CREATININE 3.2 (H) 03/15/2024 1101    CALCIUM 7.8 (L) 01/07/2025 0935    CALCIUM 7.8 (L) 01/07/2025 0253    CALCIUM 9 05/08/2024 0739    CALCIUM 9.9 03/15/2024 1101    PHOS 3.1 01/06/2025 0319            Component Value Date/Time    WBC 6.95 01/07/2025 0253    WBC 7.01 01/06/2025 0319    HGB 7.2 (L) 01/07/2025 0253    HGB 7.3 (L) 01/06/2025 0319    HGB 10.3 (L) 11/24/2024 0000    HGB 10 (L) 11/18/2024 0000    HCT 21.7 (L) 01/07/2025 0253    HCT 23.1 (L) 01/06/2025 0319     01/07/2025 0253     01/06/2025 0319         Imaging reviewed      Assessment / Plan:       Active Hospital Problems    Diagnosis  POA    Altered mental status [R41.82]  Unknown    Seizure-like activity [R56.9]  Unknown      Resolved Hospital Problems   No resolved problems to display.       ESRD.  Normally MWF hemodialysis.  We will dialyze TTS while inpatient  New onset seizure  Acute hypoxic respiratory failure  Hypertension, anemia  Diabetes mellitus type 2   History of coronary artery disease   History of previous stroke  Urinary tract infection    Plan:  Plan for hemodialysis today.  Patient's blood pressure has stabilized.  She remains on low-dose epinephrine, but has been decreasing pressor requirement over the last day.  Continue antibiotics for UTI.  We will plan to dialyze her on a TTS schedule for now.    Jean Shine DO  Nephrology  [facility]  Clinic number: [phone]      Note was created with the assistance of electronic Dictation Services.  Note was reviewed to decrease errors, however, these may still be present.  Please contact me about questions or concerns with the dictation.

## 2025-01-07 NOTE — PT/OT/SLP PROGRESS
Occupational Therapy      Patient Name:  Krysta Jansen   MRN:  [id number]    OT evaluation orders received. Chart reviewed and spoke to RN. Patient remains intubated and sedated and not appropriate for ADLs and mobility at this time. OT will d/c orders; please re-consult as appropriate.     1/7/2025

## 2025-01-07 NOTE — PROGRESS NOTES
Pharmacist Renal Dose Adjustment Note    Krysta Jansen is a 79 y.o. female being treated with the medication Ampicillin IVPB.    Patient Data:    Vital Signs (Most Recent):  Temp: 97.6 °F (36.4 °C) (01/06/25 1600)  Pulse: 61 (01/06/25 1600)  Resp: (!) 22 (01/06/25 1600)  BP: (!) 103/52 (01/06/25 1600)  SpO2: 100 % (01/06/25 1600) Vital Signs (72h Range):  Temp:  [97.2 °F (36.2 °C)-98.1 °F (36.7 °C)]   Pulse:  []   Resp:  [7-29]   BP: ()/()   SpO2:  [94 %-100 %]      Recent Labs   Lab 01/05/25  1409 01/05/25  1524 01/06/25  0319   CREATININE 4.06* 4.13* 4.49*     Serum creatinine: 4.49 mg/dL (H) 01/06/25 0319  Estimated creatinine clearance: 9.1 mL/min (A)    Ampicillin 2g Q4H will be changed to Ampicillin 2g Q12H for CrCl <15 mL/min and on HD per [facility] protocol.    Pharmacist's Name: aTmiko Bobo  Pharmacist's Extension: [phone]

## 2025-01-07 NOTE — PROGRESS NOTES
Pharmacist Renal Dose Adjustment Note    Krysta Jansen is a 79 y.o. female being treated with the medication cefepime    Patient Data:    Vital Signs (Most Recent):  Temp: 98.2 °F (36.8 °C) (01/07/25 1015)  Pulse: 65 (01/07/25 0900)  Resp: (!) 22 (01/07/25 0900)  BP: (!) 92/44 (01/07/25 0846)  SpO2: 100 % (01/07/25 0900) Vital Signs (72h Range):  Temp:  [94 °F (34.4 °C)-98.2 °F (36.8 °C)]   Pulse:  []   Resp:  [7-29]   BP: ()/()   SpO2:  [91 %-100 %]      Recent Labs   Lab 01/05/25  1524 01/06/25  0319 01/07/25  0253   CREATININE 4.13* 4.49* 5.04*     Serum creatinine: 5.04 mg/dL (H) 01/07/25 0253  Estimated creatinine clearance: 8.1 mL/min (A)    Medication:cefepime dose: 1gm frequency q6h will be changed to medication:cefepime dose:1gm frequency:q24h AD    Pharmacist's Name: Antonina Mccloud  Pharmacist's Extension: [phone]

## 2025-01-07 NOTE — PROGRESS NOTES
[facility] - Emergency Dept  Pulmonary Critical Care Note    Patient Name: Krysta Jansen  MRN: [id number]  Admission Date: 1/5/2025  Hospital Length of Stay: 2 days  Code Status: Full Code  Attending Provider: Apolinar Roblero MD  Primary Care Provider: No primary care provider on file.     Subjective:     HPI:   Patient is a 79-year-old female with a past medical history of ESRD on HD, HTN, CAD, CHF unknown EF, DM2, previous left MCA territory stroke who was brought into the ER after her son reported seeing her normal an hour prior, briefly left in when came back patient was nonverbal and lethargic.      She was emergently brought to the ER and a code fast was called, she was assessed by Neurology in the ER where she was found to have an NIH of 22 based on dysarthria and level of consciousness, but was deemed not a candidate for TNK secondary to overall nonfocal exam and recent left arm HD access surgery reported.  Vitals and laboratory work were overall unremarkable except for changes as expected in any age renal disease, and a potential urinary tract infection.  She is admitted under typical stroke protocol.     1/6/2025, EEG at 0800 revealed seizure-like activity. She was given Lorazepam 2 mg IV. Patient later became unconscious, unresponsive to pain stimuli, and hypotensive. She was intubated for airway protection (see procedure note).     Hospital Course/Significant events:  1/6/2025 - Admitted to ICU     24 Hour Interval History:  Pending    History reviewed. No pertinent past medical history.    History reviewed. No pertinent surgical history.    Social History     Socioeconomic History    Marital status: Single   Tobacco Use    Smoking status: Unknown   Substance and Sexual Activity    Alcohol use: Not Currently       Current Outpatient Medications   Medication Instructions    aspirin 81 MG Chew 1 tablet, Daily    BASAGLAR KWIKPEN U-100 INSULIN 30 Units, Nightly    CeleXA 10 mg, Daily     ENTRESTO 24-26 mg per tablet 1 tablet, 2 times daily    metoprolol succinate 25 mg CSpX 1 capsule, Daily    PLAVIX 75 mg tablet 1 tablet, Daily    rosuvastatin (CRESTOR) 40 MG Tab 1 tablet, Nightly    traMADoL (ULTRAM) 50 mg tablet 1 tablet, Every 6 hours PRN    vancomycin (VANCOCIN) 500 mg, Every Mon, Wed, Fri     Current Inpatient Medications   ampicillin IV (PEDS and ADULTS)  2 g Intravenous Q12H    aspirin  300 mg Rectal Daily    cefTRIAXone (Rocephin) IV (PEDS and ADULTS)  1 g Intravenous Q24H    dexAMETHasone sodium phos (PF)  10 mg Intravenous Q6H    enoxparin  30 mg Subcutaneous Q24H (prophylaxis, 1700)    levETIRAcetam (Keppra) IV (PEDS and ADULTS)  500 mg Intravenous Q12H    mupirocin   Nasal BID     Current Intravenous Infusions   EPINEPHrine  0-2 mcg/kg/min (Dosing Weight) Intravenous Continuous 4.4 mL/hr at 01/07/25 0500 0.02 mcg/kg/min at 01/07/25 0500    NORepinephrine 8 mg  0-0.2 mcg/kg/min (Dosing Weight) Intravenous Continuous 2.8 mL/hr at 01/06/25 1155 0.02 mcg/kg/min at 01/06/25 1155    propofoL  0-50 mcg/kg/min (Dosing Weight) Intravenous Continuous 15.6 mL/hr at 01/07/25 0732 35 mcg/kg/min at 01/07/25 0732     ROS: Unable to perform ROS: Acuity of condition        Objective:     Intake/Output Summary (Last 24 hours) at 1/7/2025 0827  Last data filed at 1/7/2025 0609  Gross per 24 hour   Intake 756.41 ml   Output --   Net 756.41 ml     Vital Signs (Most Recent):  Temp: (!) 94.7 °F (34.8 °C) (bear hugger applied) (01/07/25 0500)  Pulse: (!) 49 (01/07/25 0600)  Resp: (!) 22 (01/07/25 0600)  BP: (!) 89/44 (01/07/25 0600)  SpO2: 100 % (01/07/25 0600)  Body mass index is 31.9 kg/m².  Weight: 74.1 kg (163 lb 5.8 oz) Vital Signs (24h Range):  Temp:  [94.7 °F (34.8 °C)-97.7 °F (36.5 °C)] 94.7 °F (34.8 °C)  Pulse:  [44-84] 49  Resp:  [12-22] 22  SpO2:  [91 %-100 %] 100 %  BP: ()/(40-88) 89/44     Physical Exam:  General: Unresponsive to painful stimuli   HEENT: NC/AT; PERRL  Pulm: CTA bilaterally  via intubation   CV: S1, S2 w/o murmurs or gallops  GI: Bowel sound present  MSK: Unable to assess  Derm: No rashes, abnormal bruising, or skin lesions  Neuro: Unable to assess  Psych: Unable to assess    Lines/Drains/Airways       Central Venous Catheter Line  Duration                  Hemodialysis Catheter 01/06/25 1400 left internal jugular <1 day              Drain  Duration                  NG/OG Tube 01/06/25 1109 Center mouth <1 day              Airway  Duration                  Airway - Non-Surgical 01/06/25 1104 Endotracheal Tube <1 day              Peripheral Intravenous Line  Duration                  Peripheral IV - Single Lumen 01/06/25 Right Antecubital 1 day         Peripheral IV - Single Lumen 01/06/25 1055 18 G Anterior;Right Upper Arm <1 day                  Significant Labs:  Lab Results   Component Value Date    WBC 6.95 01/07/2025    HGB 7.2 (L) 01/07/2025    HCT 21.7 (L) 01/07/2025    MCV 90.4 01/07/2025     01/07/2025       BMP  Lab Results   Component Value Date     (L) 01/07/2025    K 2.4 (LL) 01/07/2025    CO2 20 (L) 01/07/2025    BUN 21.3 (H) 01/07/2025    CREATININE 5.04 (H) 01/07/2025    CALCIUM 7.8 (L) 01/07/2025    AGAP 11.0 01/07/2025    ESTGFRAFRICA 13 05/08/2024     ABG  Recent Labs   Lab 01/06/25  1147   PH 7.500*   PO2 356.0*   PCO2 31.0*   HCO3 24.2     Mechanical Ventilation Support:  Vent Mode: A/C (01/07/25 0523)  Ventilator Initiated: Yes (01/06/25 1104)  Set Rate: 22 BPM (01/07/25 0523)  Vt Set: 450 mL (01/07/25 0523)  PEEP/CPAP: 5 cmH20 (01/07/25 0523)  Oxygen Concentration (%): 30 (01/07/25 0523)  Peak Airway Pressure: 22 cmH20 (01/07/25 0523)  Total Ve: 9.9 L/m (01/07/25 0523)  F/VT Ratio<105 (RSBI): (!) 49.55 (01/06/25 1600)    Significant Imaging:  I have reviewed the pertinent imaging within the past 24 hours.    Assessment/Plan:     Assessment  Acute global aphasia, seizure/postictal state  Obtunded s/p ativan for seizures  UTI, present on arrival  Hx:  ESRD on HD MWF, HTN, CAD, CHF unknown EF, DM2, previous left MCA territory stroke   Hypotension-probably secondary to above.  6.   Severe hypokalemia currently replacing.  We will notify Nephrology for recommendations.    Plan  Continue present mechanical ventilation.  Continuous EEG ongoing per Neurology.  Await further recommendations from them in regards to the seizures.  Likely UTI possibly the impetus for the seizures.  No evidence additional stroke by imaging.  I see no reason for an LP at this point unless recommended by Neurology.  Patient on broad-spectrum antibiotic therapy.       DVT Prophylaxis: Lovenox  GI Prophylaxis: None      42 minutes of critical care was time spent personally by me on the following activities: development of treatment plan with patient or surrogate and bedside caregivers, discussions with consultants, evaluation of patient's response to treatment, examination of patient, ordering and performing treatments and interventions, ordering and review of laboratory studies, ordering and review of radiographic studies, pulse oximetry, re-evaluation of patient's condition.  This critical care time did not overlap with that of any other provider or involve time for any procedures.

## 2025-01-07 NOTE — ASSESSMENT & PLAN NOTE
-continue keppra 500 mg BID  -creatinine level up to 5.29 today, depakote 500 mg BID added to AED regimen  -give ativan 2 mg PRN witnessed seizures  -on continuous EEG monitoring  -Further recommendations per MD

## 2025-01-08 PROBLEM — R56.9 SEIZURE-LIKE ACTIVITY: Status: RESOLVED | Noted: 2025-01-06 | Resolved: 2025-01-08

## 2025-01-08 LAB
ALBUMIN SERPL-MCNC: 2.1 G/DL (ref 3.4–4.8)
BASOPHILS # BLD AUTO: 0.04 X10(3)/MCL
BASOPHILS NFR BLD AUTO: 0.3 %
BUN SERPL-MCNC: 16.5 MG/DL (ref 9.8–20.1)
CALCIUM SERPL-MCNC: 7.8 MG/DL (ref 8.4–10.2)
CHLORIDE SERPL-SCNC: 99 MMOL/L (ref 98–107)
CO2 SERPL-SCNC: 18 MMOL/L (ref 23–31)
CREAT SERPL-MCNC: 3.59 MG/DL (ref 0.55–1.02)
EOSINOPHIL # BLD AUTO: 0 X10(3)/MCL (ref 0–0.9)
EOSINOPHIL NFR BLD AUTO: 0 %
ERYTHROCYTE [DISTWIDTH] IN BLOOD BY AUTOMATED COUNT: 16.1 % (ref 11.5–17)
GFR SERPLBLD CREATININE-BSD FMLA CKD-EPI: 12 ML/MIN/1.73/M2
GLUCOSE SERPL-MCNC: 277 MG/DL (ref 82–115)
HCT VFR BLD AUTO: 23.5 % (ref 37–47)
HGB BLD-MCNC: 7.6 G/DL (ref 12–16)
IMM GRANULOCYTES # BLD AUTO: 0.27 X10(3)/MCL (ref 0–0.04)
IMM GRANULOCYTES NFR BLD AUTO: 1.8 %
LYMPHOCYTES # BLD AUTO: 1.56 X10(3)/MCL (ref 0.6–4.6)
LYMPHOCYTES NFR BLD AUTO: 10.7 %
MCH RBC QN AUTO: 29.8 PG (ref 27–31)
MCHC RBC AUTO-ENTMCNC: 32.3 G/DL (ref 33–36)
MCV RBC AUTO: 92.2 FL (ref 80–94)
MONOCYTES # BLD AUTO: 0.83 X10(3)/MCL (ref 0.1–1.3)
MONOCYTES NFR BLD AUTO: 5.7 %
NEUTROPHILS # BLD AUTO: 11.91 X10(3)/MCL (ref 2.1–9.2)
NEUTROPHILS NFR BLD AUTO: 81.5 %
NRBC BLD AUTO-RTO: 0 %
PHOSPHATE SERPL-MCNC: 2.6 MG/DL (ref 2.3–4.7)
PLATELET # BLD AUTO: 365 X10(3)/MCL (ref 130–400)
PMV BLD AUTO: 8.3 FL (ref 7.4–10.4)
POTASSIUM SERPL-SCNC: 4 MMOL/L (ref 3.5–5.1)
RBC # BLD AUTO: 2.55 X10(6)/MCL (ref 4.2–5.4)
SODIUM SERPL-SCNC: 128 MMOL/L (ref 136–145)
VANCOMYCIN SERPL-MCNC: 27.5 UG/ML (ref 15–20)
WBC # BLD AUTO: 14.61 X10(3)/MCL (ref 4.5–11.5)

## 2025-01-08 PROCEDURE — 94760 N-INVAS EAR/PLS OXIMETRY 1: CPT

## 2025-01-08 PROCEDURE — 85025 COMPLETE CBC W/AUTO DIFF WBC: CPT | Performed by: STUDENT IN AN ORGANIZED HEALTH CARE EDUCATION/TRAINING PROGRAM

## 2025-01-08 PROCEDURE — 99232 SBSQ HOSP IP/OBS MODERATE 35: CPT | Mod: ,,, | Performed by: SPECIALIST

## 2025-01-08 PROCEDURE — 25000003 PHARM REV CODE 250

## 2025-01-08 PROCEDURE — 63600175 PHARM REV CODE 636 W HCPCS: Performed by: INTERNAL MEDICINE

## 2025-01-08 PROCEDURE — 25000003 PHARM REV CODE 250: Performed by: INTERNAL MEDICINE

## 2025-01-08 PROCEDURE — 94003 VENT MGMT INPAT SUBQ DAY: CPT

## 2025-01-08 PROCEDURE — 80202 ASSAY OF VANCOMYCIN: CPT

## 2025-01-08 PROCEDURE — 95714 VEEG EA 12-26 HR UNMNTR: CPT

## 2025-01-08 PROCEDURE — 63600175 PHARM REV CODE 636 W HCPCS

## 2025-01-08 PROCEDURE — 94761 N-INVAS EAR/PLS OXIMETRY MLT: CPT

## 2025-01-08 PROCEDURE — 99900035 HC TECH TIME PER 15 MIN (STAT)

## 2025-01-08 PROCEDURE — 36415 COLL VENOUS BLD VENIPUNCTURE: CPT | Performed by: STUDENT IN AN ORGANIZED HEALTH CARE EDUCATION/TRAINING PROGRAM

## 2025-01-08 PROCEDURE — 99900031 HC PATIENT EDUCATION (STAT)

## 2025-01-08 PROCEDURE — 99900026 HC AIRWAY MAINTENANCE (STAT)

## 2025-01-08 PROCEDURE — 11000001 HC ACUTE MED/SURG PRIVATE ROOM

## 2025-01-08 PROCEDURE — 80069 RENAL FUNCTION PANEL: CPT | Performed by: STUDENT IN AN ORGANIZED HEALTH CARE EDUCATION/TRAINING PROGRAM

## 2025-01-08 PROCEDURE — 27100171 HC OXYGEN HIGH FLOW UP TO 24 HOURS

## 2025-01-08 RX ADMIN — VALPROATE SODIUM 500 MG: 100 INJECTION, SOLUTION INTRAVENOUS at 08:01

## 2025-01-08 RX ADMIN — LEVETIRACETAM 500 MG: 100 INJECTION, SOLUTION INTRAVENOUS at 08:01

## 2025-01-08 RX ADMIN — PIPERACILLIN SODIUM AND TAZOBACTAM SODIUM 4.5 G: 4; .5 INJECTION, POWDER, LYOPHILIZED, FOR SOLUTION INTRAVENOUS at 05:01

## 2025-01-08 RX ADMIN — PROPOFOL 35 MCG/KG/MIN: 10 INJECTION, EMULSION INTRAVENOUS at 01:01

## 2025-01-08 RX ADMIN — PROPOFOL 35 MCG/KG/MIN: 10 INJECTION, EMULSION INTRAVENOUS at 06:01

## 2025-01-08 RX ADMIN — PIPERACILLIN SODIUM AND TAZOBACTAM SODIUM 4.5 G: 4; .5 INJECTION, POWDER, LYOPHILIZED, FOR SOLUTION INTRAVENOUS at 04:01

## 2025-01-08 RX ADMIN — MUPIROCIN: 20 OINTMENT TOPICAL at 08:01

## 2025-01-08 RX ADMIN — LEVETIRACETAM 500 MG: 100 INJECTION, SOLUTION INTRAVENOUS at 09:01

## 2025-01-08 RX ADMIN — ENOXAPARIN SODIUM 30 MG: 30 INJECTION SUBCUTANEOUS at 05:01

## 2025-01-08 RX ADMIN — MUPIROCIN: 20 OINTMENT TOPICAL at 09:01

## 2025-01-08 RX ADMIN — VALPROATE SODIUM 500 MG: 100 INJECTION, SOLUTION INTRAVENOUS at 09:01

## 2025-01-08 NOTE — PROGRESS NOTES
Nephrology consult follow up note    HPI:      Krysta Jansen is a 79 y.o. female is a nursing home resident and has ESRD and is on dialysis on Monday Wednesday Friday.  Brought to this hospital for altered mental status.  She was found to have some grand mal seizure.  She received some Ativan.  She had EEG done.  Her respiratory status and mental status deteriorated and required intubation to protect her airways.  Could not get any history from the patient.  Records revealed she was found to have some dysarthria and decreased level of consciousness Friday through being brought to this hospital.  Significant past medical history is positive for diabetes mellitus type 2 previous stroke hypertension coronary artery disease and congestive heart failure.    Interval history:     01/07/2024  Patient remains intubated and sedated.  On epinephrine for hypotension this morning.    01/08/25  Remains intubated and sedated on vent. Off pressors with BP stable at 130/60 range. Noted leucocytosis on am lab. Blood cultures pending.  Hgb stable at 7.6. Dialyzed yesterday and tolerated 500mL UF.      Review of Systems:     Comprehensive 10pt ROS negative except as noted per history.    Past medical, family, surgical, and social history reviewed and unchanged from initial consult note.     Objective:       VITAL SIGNS: 24 HR MIN & MAX LAST    Temp  Min: 94 °F (34.4 °C)  Max: 98.3 °F (36.8 °C)  97.7 °F (36.5 °C)        BP  Min: 82/37  Max: 152/54  (!) 132/48     Pulse  Min: 56  Max: 90  86     Resp  Min: 14  Max: 29  (!) 22    SpO2  Min: 98 %  Max: 100 %  100 %      GEN:  Ill-appearing female  CV: RRR +S1,S2 without murmur  PULM: CTAB, on ventilator  ABD: Soft, NT/ND abdomen with NABS  EXT: No cyanosis or edema  SKIN: Warm and dry  PSYCH:  Sedated and unresponsive  Dialysis access:  Left IJ PermCath            Component Value Date/Time     (L) 01/08/2025 0410     (L) 01/07/2025 0935     05/08/2024 0739      03/15/2024 1101    K 4.0 01/08/2025 0410    K 3.7 01/07/2025 0935    K 3.8 05/08/2024 0739    K 3.9 03/15/2024 1101    CO2 18 (L) 01/08/2025 0410    CO2 18 (L) 01/07/2025 0935    CO2 22 05/08/2024 0739    CO2 21 (L) 03/15/2024 1101    BUN 16.5 01/08/2025 0410    BUN 22.7 (H) 01/07/2025 0935    BUN 55 (H) 10/02/2024 0000    BUN 23 (H) 07/12/2024 0000    CREATININE 3.59 (H) 01/08/2025 0410    CREATININE 5.29 (H) 01/07/2025 0935    CREATININE 3.37 (H) 05/08/2024 0739    CREATININE 3.2 (H) 03/15/2024 1101    CALCIUM 7.8 (L) 01/08/2025 0410    CALCIUM 7.8 (L) 01/07/2025 0935    CALCIUM 9 05/08/2024 0739    CALCIUM 9.9 03/15/2024 1101    PHOS 2.6 01/08/2025 0410            Component Value Date/Time    WBC 14.61 (H) 01/08/2025 0410    WBC 6.95 01/07/2025 0253    HGB 7.6 (L) 01/08/2025 0410    HGB 7.2 (L) 01/07/2025 0253    HGB 10.3 (L) 11/24/2024 0000    HGB 10 (L) 11/18/2024 0000    HCT 23.5 (L) 01/08/2025 0410    HCT 21.7 (L) 01/07/2025 0253     01/08/2025 0410     01/07/2025 0253         Imaging reviewed      Assessment / Plan:       Active Hospital Problems    Diagnosis  POA    Seizures [R56.9]  Unknown    Status epilepticus [G40.901]  Yes    Altered mental status [R41.82]  Unknown    Seizure-like activity [R56.9]  Unknown      Resolved Hospital Problems   No resolved problems to display.       ESRD.  Normally MWF hemodialysis.  We will dialyze TTS while inpatient  New onset seizure  Acute hypoxic respiratory failure  Hypertension, anemia  Diabetes mellitus type 2   History of coronary artery disease   History of previous stroke  Urinary tract infection    Plan:  No indication for acute HD today. We will plan to dialyze her on a TTS schedule for now.    Shira Payne NP

## 2025-01-08 NOTE — PROGRESS NOTES
[facility] - Emergency Dept  Pulmonary Critical Care Note    Patient Name: Krysta Jansen  MRN: [id number]  Admission Date: 1/5/2025  Hospital Length of Stay: 3 days  Code Status: Full Code  Attending Provider: DAMARIS Jaime MD  Primary Care Provider: No primary care provider on file.     Subjective:     HPI:   Patient is a 79-year-old female with a past medical history of ESRD on HD, HTN, CAD, CHF unknown EF, DM2, previous left MCA territory stroke who was brought into the ER after her son reported seeing her normal an hour prior, briefly left in when came back patient was nonverbal and lethargic.      She was emergently brought to the ER and a code fast was called, she was assessed by Neurology in the ER where she was found to have an NIH of 22 based on dysarthria and level of consciousness, but was deemed not a candidate for TNK secondary to overall nonfocal exam and recent left arm HD access surgery reported.  Vitals and laboratory work were overall unremarkable except for changes as expected in any age renal disease, and a potential urinary tract infection.  She is admitted under typical stroke protocol.     1/6/2025, EEG at 0800 revealed seizure-like activity. She was given Lorazepam 2 mg IV. Patient later became unconscious, unresponsive to pain stimuli, and hypotensive. She was intubated for airway protection (see procedure note).     Hospital Course/Significant events:  1/6/2025 - Admitted to ICU     24 Hour Interval History:  Seizures reported on continuous EEG.  Patient remains on propofol.  Depakote added by Neurology.  Appreciate their input.  Patient remains on mechanical ventilation with adequate oxygenation on present settings:  AC 22 peep 530% FiO2.    History reviewed. No pertinent past medical history.    History reviewed. No pertinent surgical history.    Social History     Socioeconomic History    Marital status: Single   Tobacco Use    Smoking status: Unknown   Substance and  Sexual Activity    Alcohol use: Not Currently       Current Outpatient Medications   Medication Instructions    aspirin 81 MG Chew 1 tablet, Daily    BASAGLAR KWIKPEN U-100 INSULIN 30 Units, Nightly    CeleXA 10 mg, Daily    ENTRESTO 24-26 mg per tablet 1 tablet, 2 times daily    metoprolol succinate 25 mg CSpX 1 capsule, Daily    PLAVIX 75 mg tablet 1 tablet, Daily    rosuvastatin (CRESTOR) 40 MG Tab 1 tablet, Nightly    traMADoL (ULTRAM) 50 mg tablet 1 tablet, Every 6 hours PRN    vancomycin (VANCOCIN) 500 mg, Every Mon, Wed, Fri     Current Inpatient Medications   enoxparin  30 mg Subcutaneous Q24H (prophylaxis, 1700)    levETIRAcetam (Keppra) IV (PEDS and ADULTS)  500 mg Intravenous Q12H    mupirocin   Nasal BID    piperacillin-tazobactam (Zosyn) IV (PEDS and ADULTS) (extended infusion is not appropriate)  4.5 g Intravenous Q12H    valproate sodium (DEPACON) IVPB  500 mg Intravenous Q12H     Current Intravenous Infusions   EPINEPHrine  0-2 mcg/kg/min (Dosing Weight) Intravenous Continuous 4.4 mL/hr at 01/08/25 0612 0.02 mcg/kg/min at 01/08/25 0612    propofoL  0-50 mcg/kg/min (Dosing Weight) Intravenous Continuous 15.6 mL/hr at 01/08/25 0639 35 mcg/kg/min at 01/08/25 0639     ROS: Unable to perform ROS: Acuity of condition        Objective:     Intake/Output Summary (Last 24 hours) at 1/8/2025 0903  Last data filed at 1/8/2025 0612  Gross per 24 hour   Intake 1060.79 ml   Output 500 ml   Net 560.79 ml     Vital Signs (Most Recent):  Temp: 97.7 °F (36.5 °C) (01/08/25 0400)  Pulse: 86 (01/08/25 0600)  Resp: (!) 22 (01/08/25 0600)  BP: (!) 132/48 (01/08/25 0600)  SpO2: 100 % (01/08/25 0600)  Body mass index is 31.9 kg/m².  Weight: 74.1 kg (163 lb 5.8 oz) Vital Signs (24h Range):  Temp:  [97.7 °F (36.5 °C)-98.3 °F (36.8 °C)] 97.7 °F (36.5 °C)  Pulse:  [60-90] 86  Resp:  [14-29] 22  SpO2:  [98 %-100 %] 100 %  BP: ()/(37-76) 132/48     Physical Exam:  General: Unresponsive to painful stimuli   HEENT: NC/AT;  PERRL  Pulm: CTA bilaterally via intubation   CV: S1, S2 w/o murmurs or gallops  GI: Bowel sound present  MSK: Unable to assess  Derm: No rashes, abnormal bruising, or skin lesions  Neuro: Unable to assess  Psych: Unable to assess    Lines/Drains/Airways       Central Venous Catheter Line  Duration                  Hemodialysis Catheter 01/06/25 1400 left internal jugular 1 day              Drain  Duration                  NG/OG Tube 01/06/25 1109 Center mouth 1 day              Airway  Duration                  Airway - Non-Surgical 01/06/25 1104 Endotracheal Tube 1 day              Peripheral Intravenous Line  Duration                  Peripheral IV - Single Lumen 01/06/25 Right Antecubital 2 days         Peripheral IV - Single Lumen 01/06/25 1055 18 G Anterior;Right Upper Arm 1 day                  Significant Labs:  Lab Results   Component Value Date    WBC 14.61 (H) 01/08/2025    HGB 7.6 (L) 01/08/2025    HCT 23.5 (L) 01/08/2025    MCV 92.2 01/08/2025     01/08/2025       BMP  Lab Results   Component Value Date     (L) 01/08/2025    K 4.0 01/08/2025    CO2 18 (L) 01/08/2025    BUN 16.5 01/08/2025    CREATININE 3.59 (H) 01/08/2025    CALCIUM 7.8 (L) 01/08/2025    AGAP 13.0 01/07/2025    ESTGFRAFRICA 13 05/08/2024     ABG  Recent Labs   Lab 01/06/25  1147   PH 7.500*   PO2 356.0*   PCO2 31.0*   HCO3 24.2     Mechanical Ventilation Support:  Vent Mode: VOLUME A/C (01/08/25 0518)  Ventilator Initiated: Yes (01/06/25 1104)  Set Rate: 22 BPM (01/08/25 0518)  Vt Set: 450 mL (01/08/25 0518)  PEEP/CPAP: 5 cmH20 (01/08/25 0518)  Oxygen Concentration (%): 30 (01/08/25 0518)  Peak Airway Pressure: 22 cmH20 (01/08/25 0518)  Total Ve: 10.1 L/m (01/08/25 0518)  F/VT Ratio<105 (RSBI): (!) 48.03 (01/08/25 0518)    Significant Imaging:  I have reviewed the pertinent imaging within the past 24 hours.    Assessment/Plan:     Assessment  Acute global aphasia, seizure/postictal state  UTI:  Pseudomonas.   Pansensitive  Hx: ESRD on HD MWF, HTN, CAD, CHF unknown EF, DM2, previous left MCA territory stroke   Hypotension-probably secondary to above, has resolved.  6.   Severe hypokalemia currently replacing.  We will notify Nephrology for recommendations.    Plan  Continue present mechanical ventilation.  Continuous EEG ongoing per Neurology.  Await further recommendations from them in regards to the seizures.  Continue Zosyn.  Begin tube feedings for nutritional support.  [place] is guarded.       DVT Prophylaxis: Lovenox  GI Prophylaxis: None      42 minutes of critical care was time spent personally by me on the following activities: development of treatment plan with patient or surrogate and bedside caregivers, discussions with consultants, evaluation of patient's response to treatment, examination of patient, ordering and performing treatments and interventions, ordering and review of laboratory studies, ordering and review of radiographic studies, pulse oximetry, re-evaluation of patient's condition.  This critical care time did not overlap with that of any other provider or involve time for any procedures.

## 2025-01-08 NOTE — PROGRESS NOTES
Inpatient Nutrition Assessment    Admit Date: 1/5/2025   Total duration of encounter: 3 days   Patient Age: 79 y.o.    Nutrition Recommendation/Prescription     Start tube feeding when appropriate.  Tube feeding recommendation:     Peptamen Intense VHP goal rate 50 ml/hr to provide  1000 kcal/d  (67% est needs, 94% with meds)  93 g protein/d  (100% est needs)  840 ml free water/d   (calculations based on estimated 20 hr/d run time)     Humble (provides 90 kcal, 2.5 g protein per serving) BID.     If no IV fluids running, can give 50ml q 4hr water flushes. Total water provided: 1140ml. Otherwise, FWF per MD.      Start @ 20ml/hr, increase as tolerated 20ml/hr q4hr until goal rate reached.      Communication of Recommendations: reviewed with nurse    Nutrition Assessment     Malnutrition Assessment/Nutrition-Focused Physical Exam       Malnutrition Level: other (see comments) (Does not meet criteria) (01/07/25 1339)  Energy Intake (Malnutrition): other (see comments) (Unable to assess) (01/07/25 1339)  Weight Loss (Malnutrition): other (see comments) (Unable to assess) (01/07/25 1339)                                         Fluid Accumulation (Malnutrition): other (see comments) (Not present) (01/07/25 1339)        A minimum of two characteristics is recommended for diagnosis of either severe or non-severe malnutrition.    Chart Review    Reason Seen: continuous nutrition monitoring    Malnutrition Screening Tool Results   Have you recently lost weight without trying?: No  Have you been eating poorly because of a decreased appetite?: No   MST Score: 0   Diagnosis:  Acute global aphasia, seizure/postictal state  Obtunded  UTI, present on arrival    Relevant Medical History: ESRD on HD, HTN, CAD, CHF, DM2, MCA territory stroke     Scheduled Medications:  enoxparin, 30 mg, Q24H (prophylaxis, 1700)  levETIRAcetam (Keppra) IV (PEDS and ADULTS), 500 mg, Q12H  mupirocin, , BID  piperacillin-tazobactam (Zosyn) IV (PEDS and  ADULTS) (extended infusion is not appropriate), 4.5 g, Q12H  valproate sodium (DEPACON) IVPB, 500 mg, Q12H    Continuous Infusions:  EPINEPHrine, Last Rate: 0.02 mcg/kg/min (01/08/25 0612)  propofoL, Last Rate: 35 mcg/kg/min (01/08/25 0639)    PRN Medications:  bisacodyL, 10 mg, Daily PRN  dextrose 50%, 12.5 g, PRN  glucagon (human recombinant), 1 mg, PRN  insulin aspart U-100, 0-5 Units, Q6H PRN  labetalol, 10 mg, Q15 Min PRN  sodium chloride 0.9%, 10 mL, PRN  vancomycin - pharmacy to dose, , pharmacy to manage frequency    Calorie Containing IV Medications: Diprivan @ 15.6 ml/hr (provides 400 kcal/d)    Recent Labs   Lab 01/05/25  1409 01/05/25  1524 01/06/25  0319 01/07/25  0253 01/07/25  0935 01/08/25  0410   * 129* 130* 131* 130* 128*   K 3.5 3.3* 3.2* 2.4* 3.7 4.0   CALCIUM 8.0* 8.4 8.1* 7.8* 7.8* 7.8*   PHOS  --  2.9 3.1  --   --  2.6   MG  --   --  2.30  --   --   --    CL 95* 96* 97* 100 99 99   CO2 23 22* 23 20* 18* 18*   BUN 12.8 16.0 17.8 21.3* 22.7* 16.5   CREATININE 4.06* 4.13* 4.49* 5.04* 5.29* 3.59*   EGFRNORACEVR 11 10 9 8 8 12   GLUCOSE 211* 209* 129* 88 176* 277*   BILITOT 0.2 0.3 0.2 0.2  --   --    ALKPHOS 112 112 102 91  --   --    ALT 22 23 21 16  --   --    AST 23 25 25 19  --   --    ALBUMIN 2.3* 2.3* 2.1* 1.8*  --  2.1*   TRIG  --  76  --   --   --   --    AMMONIA  --   --  22.9  --   --   --    WBC 7.84 8.72 7.01 6.95  --  14.61*   HGB 8.6* 8.9* 7.3* 7.2*  --  7.6*   HCT 27.1* 29.6* 23.1* 21.7*  --  23.5*     Nutrition Orders:  Diet NPO      Appetite/Oral Intake: not applicable/not applicable  Factors Affecting Nutritional Intake: on mechanical ventilation  Social Needs Impacting Access to Food: unable to assess at this time; will attempt on follow-up  Food/Religion/Cultural Preferences: unable to obtain  Food Allergies: no known food allergies  Last Bowel Movement: 01/05/25  Wound(s):  coccyx wound noted, not staged  I/O: Past 24 Hours: +560, Since Admit: +1311      Comments    1/7/25: Discussed with RN. Will provide tube feeding recommendations for when appropriate to start tube feeding. Receiving kcal from meds.      1/8/25: Plans for starting TF today. Still receiving kcal from meds.     Anthropometrics    Height: 5' (152.4 cm),    Last Weight: 74.1 kg (163 lb 5.8 oz) (01/05/25 1518), Weight Method: Standard Scale  BMI (Calculated): 31.9  BMI Classification: obese grade I (BMI 30-34.9)     Ideal Body Weight (IBW), Female: 100 lb     % Ideal Body Weight, Female (lb): 163.36 %                             Usual Weight Provided By: unable to obtain usual weight    Wt Readings from Last 5 Encounters:   01/05/25 74.1 kg (163 lb 5.8 oz)     Weight Change(s) Since Admission:   Wt Readings from Last 1 Encounters:   01/05/25 1518 74.1 kg (163 lb 5.8 oz)   Admit Weight: 74.1 kg (163 lb 5.8 oz) (01/05/25 1518), Weight Method: Standard Scale    Estimated Needs    Weight Used For Calorie Calculations: 74.1 kg (163 lb 5.8 oz)  Energy Calorie Requirements (kcal): 1495kcal  Energy Need Method: [facility] (modified)  Weight Used For Protein Calculations: 45.5 kg (100 lb 3.2 oz) (IBW)  Protein Requirements: 91gm (2g/kg IBW)  Fluid Requirements (mL): 1000ml + urinary output  CHO Requirement: 170gm (45% est kcal needs)     Enteral Nutrition     Patient not receiving enteral nutrition at this time.    Parenteral Nutrition     Patient not receiving parenteral nutrition support at this time.    Evaluation of Received Nutrient Intake    Calories: not meeting estimated needs  Protein: not meeting estimated needs    Patient Education     Not applicable.    Nutrition Diagnosis     PES: Inadequate oral intake related to acute illness as evidenced by intubation since admit. (active)     PES:            Nutrition Interventions     Intervention(s): modified composition of enteral nutrition, modified rate of enteral nutrition, and collaboration with other providers  Intervention(s):      Goal: Meet  greater than 80% of nutritional needs by follow-up. (goal progressing)  Goal: Tolerate enteral feeding at goal rate by follow-up. (goal progressing)    Nutrition Goals & Monitoring     Dietitian will monitor: energy intake and enteral nutrition intake  Discharge planning: too early to determine; pending clinical course  Nutrition Risk/Follow-Up: high (follow-up in 1-4 days)   Please consult if re-assessment needed sooner.

## 2025-01-08 NOTE — ASSESSMENT & PLAN NOTE
-continue keppra 500 mg BID Depakote 500 mg b.i.d.  -continuous EEG monitoring discontinued  -continued to leave sedation off as tolerated  -Further recommendations per MD

## 2025-01-08 NOTE — SUBJECTIVE & OBJECTIVE
Subjective:     Interval History:   EEG reading yesterday significant for continued seizure activity.  Seizure activity aborted with resuming propofol. Sedation paused again today followed by signs of seizure activity on EEG shortly after.   Plan of care and prognosis d/w patient's daughter and son extensively per .     Current Neurological Medications:     Current Facility-Administered Medications   Medication Dose Route Frequency Provider Last Rate Last Admin    bisacodyL suppository 10 mg  10 mg Rectal Daily PRN David Yoder MD        dextrose 50% injection 12.5 g  12.5 g Intravenous PRN David Yoder MD        enoxaparin injection 30 mg  30 mg Subcutaneous Q24H (prophylaxis, 1700) David Yoder MD   30 mg at 01/07/25 1636    EPINEPHrine (ADRENALIN) 5 mg in D5W 250 mL infusion  0-2 mcg/kg/min (Dosing Weight) Intravenous Continuous Jean Pierre Friedman MD 4.4 mL/hr at 01/08/25 0612 0.02 mcg/kg/min at 01/08/25 0612    glucagon (human recombinant) injection 1 mg  1 mg Intramuscular PRN David Yoder MD        insulin aspart U-100 injection 0-5 Units  0-5 Units Subcutaneous Q6H PRN David Yoder MD        labetaloL injection 10 mg  10 mg Intravenous Q15 Min PRN David Yoder MD        levETIRAcetam (Keppra) 500 mg in D5W 100 mL IVPB (MB+)  500 mg Intravenous Q12H Genevieve Cheney AGACNP-BC   Stopped at 01/08/25 0840    mupirocin 2 % ointment   Nasal BID David Yoder MD   Given at 01/08/25 0809    piperacillin-tazobactam (ZOSYN) 4.5 g in D5W 100 mL IVPB (MB+)  4.5 g Intravenous Q12H Buddy Ortiz MD   Stopped at 01/08/25 0522    propofol (DIPRIVAN) 10 mg/mL infusion  0-50 mcg/kg/min (Dosing Weight) Intravenous Continuous Jean Pierre Friedman MD 15.6 mL/hr at 01/08/25 0639 35 mcg/kg/min at 01/08/25 0639    sodium chloride 0.9% flush 10 mL  10 mL Intravenous PRN David Yoder MD        valproate (DEPACON) 500 mg in D5W 50 mL IVPB  500 mg  Intravenous Q12H DAMARIS Jaime MD   Stopped at 01/08/25 0956    vancomycin - pharmacy to dose   Intravenous pharmacy to manage frequency Apolinar Roblero MD           Review of Systems   Unable to perform ROS: Intubated     Objective:     Vital Signs (Most Recent):  Temp: 97.7 °F (36.5 °C) (01/08/25 0400)  Pulse: 86 (01/08/25 0600)  Resp: (!) 22 (01/08/25 0600)  BP: (!) 132/48 (01/08/25 0600)  SpO2: 100 % (01/08/25 0600) Vital Signs (24h Range):  Temp:  [97.7 °F (36.5 °C)-98.3 °F (36.8 °C)] 97.7 °F (36.5 °C)  Pulse:  [60-90] 86  Resp:  [14-25] 22  SpO2:  [98 %-100 %] 100 %  BP: ()/(37-67) 132/48     Weight: 74.1 kg (163 lb 5.8 oz)  Body mass index is 31.9 kg/m².     Physical Exam    GENERAL: intubated, sedated, comatose, on continuous EEG monitoring  MENTAL STATUS: not following commands  CN:  Dysconjugate gaze  PERRLA  No nystagmus noted  No movement to command or spontaneous movement of extremities  Sensory:  Not withdrawing to painful stimuli in all extremities       Significant Labs:   Recent Lab Results         01/08/25  0410        Albumin 2.1       Baso # 0.04       Basophil % 0.3       BUN 16.5       Calcium 7.8       Chloride 99       CO2 18       Creatinine 3.59       eGFR 12       Eos # 0.00       Eos % 0.0       Glucose 277       Hematocrit 23.5       Hemoglobin 7.6       Immature Grans (Abs) 0.27       Immature Granulocytes 1.8       Lymph # 1.56       LYMPH % 10.7       MCH 29.8       MCHC 32.3       MCV 92.2       Mono # 0.83       Mono % 5.7       MPV 8.3       Neut # 11.91       Neut % 81.5       nRBC 0.0       Phosphorus Level 2.6       Platelet Count 365       Potassium 4.0       RBC 2.55       RDW 16.1       Sodium 128       Vancomycin, Random 27.5       WBC 14.61               Significant Imaging: I have reviewed all pertinent imaging results/findings within the past 24 hours.

## 2025-01-08 NOTE — PROGRESS NOTES
[facility]  Neurology  Progress Note    Patient Name: Krysta Jansen  MRN: [id number]  Admission Date: 1/5/2025  Hospital Length of Stay: 3 days  Code Status: Full Code   Attending Provider: DAMARIS Jaime MD  Primary Care Physician: No primary care provider on file.   Principal Problem:<principal problem not specified>    HPI:   79 y.o. female who presented to [facility] on 1/5/2025 with reports of  lethargy, less responsive . Onset of symptoms was sudden, not related to any specific activity and last known normal 13:30, at this time it is unclear what the patient's baseline is . Stroke risk factors include  HTN, DM, CHF, ESRD, CAD . 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      Per EMS, she was last seen normal by son at nursing home at 13:30.  He went back to check on her at 14:15 and found she was lethargic and not talking.  EMS reports she did have surgery recently on her left arm for her HD access, unsure exactly when, she does have a dressing to the left arm.     Overview/Hospital Course:  No notes on file        Subjective:     Interval History:   EEG reading yesterday significant for continued seizure activity.  Seizure activity aborted with resuming propofol. Sedation paused again today followed by signs of seizure activity on EEG shortly after.   Plan of care and prognosis d/w patient's daughter and son extensively per .     Current Neurological Medications:     Current Facility-Administered Medications   Medication Dose Route Frequency Provider Last Rate Last Admin    bisacodyL suppository 10 mg  10 mg Rectal Daily PRN David Yoder MD        dextrose 50% injection 12.5 g  12.5 g Intravenous PRN David Yoder MD        enoxaparin injection 30 mg  30 mg Subcutaneous Q24H (prophylaxis, 1700) David Yoder MD   30 mg at 01/07/25  1636    EPINEPHrine (ADRENALIN) 5 mg in D5W 250 mL infusion  0-2 mcg/kg/min (Dosing Weight) Intravenous Continuous Jean Pierre Friedman MD 4.4 mL/hr at 01/08/25 0612 0.02 mcg/kg/min at 01/08/25 0612    glucagon (human recombinant) injection 1 mg  1 mg Intramuscular PRN David Yoder MD        insulin aspart U-100 injection 0-5 Units  0-5 Units Subcutaneous Q6H PRN David Yoder MD        labetaloL injection 10 mg  10 mg Intravenous Q15 Min PRN David Yoder MD        levETIRAcetam (Keppra) 500 mg in D5W 100 mL IVPB (MB+)  500 mg Intravenous Q12H Genevieve Cheney AGACNP-BC   Stopped at 01/08/25 0840    mupirocin 2 % ointment   Nasal BID David Yoder MD   Given at 01/08/25 0809    piperacillin-tazobactam (ZOSYN) 4.5 g in D5W 100 mL IVPB (MB+)  4.5 g Intravenous Q12H Buddy Ortiz MD   Stopped at 01/08/25 0522    propofol (DIPRIVAN) 10 mg/mL infusion  0-50 mcg/kg/min (Dosing Weight) Intravenous Continuous Jean Pierre Friedman MD 15.6 mL/hr at 01/08/25 0639 35 mcg/kg/min at 01/08/25 0639    sodium chloride 0.9% flush 10 mL  10 mL Intravenous PRN David Yoder MD        valproate (DEPACON) 500 mg in D5W 50 mL IVPB  500 mg Intravenous Q12H DAMARIS Jaime MD   Stopped at 01/08/25 0956    vancomycin - pharmacy to dose   Intravenous pharmacy to manage frequency Apolinar Roblero MD           Review of Systems   Unable to perform ROS: Intubated     Objective:     Vital Signs (Most Recent):  Temp: 97.7 °F (36.5 °C) (01/08/25 0400)  Pulse: 86 (01/08/25 0600)  Resp: (!) 22 (01/08/25 0600)  BP: (!) 132/48 (01/08/25 0600)  SpO2: 100 % (01/08/25 0600) Vital Signs (24h Range):  Temp:  [97.7 °F (36.5 °C)-98.3 °F (36.8 °C)] 97.7 °F (36.5 °C)  Pulse:  [60-90] 86  Resp:  [14-25] 22  SpO2:  [98 %-100 %] 100 %  BP: ()/(37-67) 132/48     Weight: 74.1 kg (163 lb 5.8 oz)  Body mass index is 31.9 kg/m².     Physical Exam    GENERAL: intubated, sedated, comatose, on continuous EEG  monitoring  MENTAL STATUS: not following commands  CN:  Dysconjugate gaze  PERRLA  No nystagmus noted  No movement to command or spontaneous movement of extremities  Sensory:  Not withdrawing to painful stimuli in all extremities       Significant Labs:   Recent Lab Results         01/08/25  0410        Albumin 2.1       Baso # 0.04       Basophil % 0.3       BUN 16.5       Calcium 7.8       Chloride 99       CO2 18       Creatinine 3.59       eGFR 12       Eos # 0.00       Eos % 0.0       Glucose 277       Hematocrit 23.5       Hemoglobin 7.6       Immature Grans (Abs) 0.27       Immature Granulocytes 1.8       Lymph # 1.56       LYMPH % 10.7       MCH 29.8       MCHC 32.3       MCV 92.2       Mono # 0.83       Mono % 5.7       MPV 8.3       Neut # 11.91       Neut % 81.5       nRBC 0.0       Phosphorus Level 2.6       Platelet Count 365       Potassium 4.0       RBC 2.55       RDW 16.1       Sodium 128       Vancomycin, Random 27.5       WBC 14.61               Significant Imaging: I have reviewed all pertinent imaging results/findings within the past 24 hours.  Assessment and Plan:     Seizures  -continue keppra 500 mg BID Depakote 500 mg b.i.d.  -continuous EEG monitoring discontinued  -continued to leave sedation off as tolerated  -Further recommendations per MD        VTE Risk Mitigation (From admission, onward)           Ordered     enoxaparin injection 30 mg  Every 24 hours         01/05/25 2143     IP VTE HIGH RISK PATIENT  Once         01/05/25 2137     Place sequential compression device  Until discontinued         01/05/25 2137                    FREDDIE Marin-BC  Neurology  [facility] - [facility] ICU

## 2025-01-08 NOTE — PROCEDURES
Krysta Jansen is a 79 y.o. female patient.    Temp: 97.7 °F (36.5 °C) (25 0400)  Pulse: 86 (25 0600)  Resp: (!) 22 (25 06)  BP: (!) 132/48 (25 0600)  SpO2: 100 % (25)  Weight: 74.1 kg (163 lb 5.8 oz) (25 1518)  Height: 5' (152.4 cm) (25 1339)       24 hr. Video EEG Monitoring    Date/Time: 2025 12:51 PM    Performed by: Michi Justin MD  Authorized by: Genevieve Cheney AGACNP-BC      Prolonged inpatient EEG REPORT    PATIENT: Krysta Jansen  : 1945    DATE:  12:51 to 330 1.8.25    INTERPRETING PHYSICIAN: Michi Justin     Reason for EEG:  nonconvulsive status / seizure        Duration of recordin hours 27 minutes     This EEG is performed with the patient described as Unresponsive intubated    Video Recorded     Regrettably diffuse epileptiform features present, to some degree, throughout this duration of recording.    Technical character: good      EKG:  Regular, NSR      IMPRESSION: Periodic discharges present for duration of recording.   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     Michi Justin MD          2025

## 2025-01-09 LAB
ALBUMIN SERPL-MCNC: 2 G/DL (ref 3.4–4.8)
ALBUMIN/GLOB SERPL: 0.4 RATIO (ref 1.1–2)
ALP SERPL-CCNC: 100 UNIT/L (ref 40–150)
ALT SERPL-CCNC: 17 UNIT/L (ref 0–55)
ANION GAP SERPL CALC-SCNC: 14 MEQ/L
AST SERPL-CCNC: 19 UNIT/L (ref 5–34)
BASOPHILS # BLD AUTO: 0.08 X10(3)/MCL
BASOPHILS NFR BLD AUTO: 0.4 %
BILIRUB SERPL-MCNC: 0.2 MG/DL
BUN SERPL-MCNC: 26.4 MG/DL (ref 9.8–20.1)
CALCIUM SERPL-MCNC: 7.8 MG/DL (ref 8.4–10.2)
CHLORIDE SERPL-SCNC: 99 MMOL/L (ref 98–107)
CO2 SERPL-SCNC: 17 MMOL/L (ref 23–31)
CREAT SERPL-MCNC: 4.63 MG/DL (ref 0.55–1.02)
CREAT/UREA NIT SERPL: 6
EOSINOPHIL # BLD AUTO: 0.04 X10(3)/MCL (ref 0–0.9)
EOSINOPHIL NFR BLD AUTO: 0.2 %
ERYTHROCYTE [DISTWIDTH] IN BLOOD BY AUTOMATED COUNT: 16.1 % (ref 11.5–17)
GFR SERPLBLD CREATININE-BSD FMLA CKD-EPI: 9 ML/MIN/1.73/M2
GLOBULIN SER-MCNC: 4.6 GM/DL (ref 2.4–3.5)
GLUCOSE SERPL-MCNC: 245 MG/DL (ref 82–115)
HCT VFR BLD AUTO: 26.2 % (ref 37–47)
HGB BLD-MCNC: 8.6 G/DL (ref 12–16)
IMM GRANULOCYTES # BLD AUTO: 0.43 X10(3)/MCL (ref 0–0.04)
IMM GRANULOCYTES NFR BLD AUTO: 2.1 %
LYMPHOCYTES # BLD AUTO: 1.29 X10(3)/MCL (ref 0.6–4.6)
LYMPHOCYTES NFR BLD AUTO: 6.2 %
MCH RBC QN AUTO: 30.4 PG (ref 27–31)
MCHC RBC AUTO-ENTMCNC: 32.8 G/DL (ref 33–36)
MCV RBC AUTO: 92.6 FL (ref 80–94)
MONOCYTES # BLD AUTO: 1.33 X10(3)/MCL (ref 0.1–1.3)
MONOCYTES NFR BLD AUTO: 6.4 %
NEUTROPHILS # BLD AUTO: 17.66 X10(3)/MCL (ref 2.1–9.2)
NEUTROPHILS NFR BLD AUTO: 84.7 %
NRBC BLD AUTO-RTO: 0 %
PHOSPHATE SERPL-MCNC: 2.7 MG/DL (ref 2.3–4.7)
PLATELET # BLD AUTO: 266 X10(3)/MCL (ref 130–400)
PMV BLD AUTO: 8.3 FL (ref 7.4–10.4)
POTASSIUM SERPL-SCNC: 3.9 MMOL/L (ref 3.5–5.1)
PROT SERPL-MCNC: 6.6 GM/DL (ref 5.8–7.6)
RBC # BLD AUTO: 2.83 X10(6)/MCL (ref 4.2–5.4)
SODIUM SERPL-SCNC: 130 MMOL/L (ref 136–145)
VALPROATE SERPL-MCNC: 42.2 UG/ML (ref 50–100)
VANCOMYCIN SERPL-MCNC: 23.7 UG/ML (ref 15–20)
WBC # BLD AUTO: 20.83 X10(3)/MCL (ref 4.5–11.5)

## 2025-01-09 PROCEDURE — 63600175 PHARM REV CODE 636 W HCPCS: Performed by: INTERNAL MEDICINE

## 2025-01-09 PROCEDURE — 25000003 PHARM REV CODE 250

## 2025-01-09 PROCEDURE — 25000003 PHARM REV CODE 250: Performed by: INTERNAL MEDICINE

## 2025-01-09 PROCEDURE — 63600175 PHARM REV CODE 636 W HCPCS

## 2025-01-09 PROCEDURE — 94003 VENT MGMT INPAT SUBQ DAY: CPT

## 2025-01-09 PROCEDURE — 99900026 HC AIRWAY MAINTENANCE (STAT)

## 2025-01-09 PROCEDURE — 85025 COMPLETE CBC W/AUTO DIFF WBC: CPT | Performed by: NURSE PRACTITIONER

## 2025-01-09 PROCEDURE — 94760 N-INVAS EAR/PLS OXIMETRY 1: CPT

## 2025-01-09 PROCEDURE — 80053 COMPREHEN METABOLIC PANEL: CPT | Performed by: NURSE PRACTITIONER

## 2025-01-09 PROCEDURE — 36415 COLL VENOUS BLD VENIPUNCTURE: CPT | Performed by: NURSE PRACTITIONER

## 2025-01-09 PROCEDURE — 99900035 HC TECH TIME PER 15 MIN (STAT)

## 2025-01-09 PROCEDURE — 80202 ASSAY OF VANCOMYCIN: CPT | Performed by: INTERNAL MEDICINE

## 2025-01-09 PROCEDURE — 99900031 HC PATIENT EDUCATION (STAT)

## 2025-01-09 PROCEDURE — 27200966 HC CLOSED SUCTION SYSTEM

## 2025-01-09 PROCEDURE — 11000001 HC ACUTE MED/SURG PRIVATE ROOM

## 2025-01-09 PROCEDURE — 84100 ASSAY OF PHOSPHORUS: CPT | Performed by: NURSE PRACTITIONER

## 2025-01-09 PROCEDURE — 99232 SBSQ HOSP IP/OBS MODERATE 35: CPT | Mod: ,,, | Performed by: SPECIALIST

## 2025-01-09 PROCEDURE — 27100171 HC OXYGEN HIGH FLOW UP TO 24 HOURS

## 2025-01-09 PROCEDURE — 99223 1ST HOSP IP/OBS HIGH 75: CPT | Mod: ,,,

## 2025-01-09 PROCEDURE — 94761 N-INVAS EAR/PLS OXIMETRY MLT: CPT

## 2025-01-09 PROCEDURE — 80164 ASSAY DIPROPYLACETIC ACD TOT: CPT | Performed by: SPECIALIST

## 2025-01-09 RX ORDER — CIPROFLOXACIN 2 MG/ML
400 INJECTION, SOLUTION INTRAVENOUS
Status: COMPLETED | OUTPATIENT
Start: 2025-01-09 | End: 2025-01-11

## 2025-01-09 RX ADMIN — VALPROATE SODIUM 500 MG: 100 INJECTION, SOLUTION INTRAVENOUS at 09:01

## 2025-01-09 RX ADMIN — CIPROFLOXACIN 400 MG: 2 INJECTION, SOLUTION INTRAVENOUS at 11:01

## 2025-01-09 RX ADMIN — PIPERACILLIN SODIUM AND TAZOBACTAM SODIUM 4.5 G: 4; .5 INJECTION, POWDER, LYOPHILIZED, FOR SOLUTION INTRAVENOUS at 04:01

## 2025-01-09 RX ADMIN — ENOXAPARIN SODIUM 30 MG: 30 INJECTION SUBCUTANEOUS at 05:01

## 2025-01-09 RX ADMIN — MUPIROCIN: 20 OINTMENT TOPICAL at 08:01

## 2025-01-09 RX ADMIN — LEVETIRACETAM 500 MG: 100 INJECTION, SOLUTION INTRAVENOUS at 09:01

## 2025-01-09 RX ADMIN — LEVETIRACETAM 500 MG: 100 INJECTION, SOLUTION INTRAVENOUS at 08:01

## 2025-01-09 RX ADMIN — MUPIROCIN: 20 OINTMENT TOPICAL at 09:01

## 2025-01-09 NOTE — PROGRESS NOTES
Nephrology consult follow up note    HPI:      Krysta Jansen is a 79 y.o. female is a nursing home resident and has ESRD and is on dialysis on Monday Wednesday Friday.  Brought to this hospital for altered mental status.  She was found to have some grand mal seizure.  She received some Ativan.  She had EEG done.  Her respiratory status and mental status deteriorated and required intubation to protect her airways.  Could not get any history from the patient.  Records revealed she was found to have some dysarthria and decreased level of consciousness Friday through being brought to this hospital.  Significant past medical history is positive for diabetes mellitus type 2 previous stroke hypertension coronary artery disease and congestive heart failure.    Interval history:     01/07/2024  Patient remains intubated and sedated.  On epinephrine for hypotension this morning.    01/08/25  Remains intubated and sedated on vent. Off pressors with BP stable at 130/60 range. Noted leucocytosis on am lab. Blood cultures pending.  Hgb stable at 7.6. Dialyzed yesterday and tolerated 500mL UF.     01/09/2025  No acute events overnight.  Patient remains intubated.  Not currently requiring vasopressors, but blood pressure does remain borderline low.  ICU team is requesting to hold hemodialysis today so they can have goals of care conversation with family.     Review of Systems:     Unable to obtain ROS due to mental status/intubation.     Past medical, family, surgical, and social history reviewed and unchanged from initial consult note.     Objective:       VITAL SIGNS: 24 HR MIN & MAX LAST    Temp  Min: 98.5 °F (36.9 °C)  Max: 99 °F (37.2 °C)  98.6 °F (37 °C)        BP  Min: 99/56  Max: 140/57  111/61     Pulse  Min: 75  Max: 90  89     Resp  Min: 22  Max: 22  (!) 22    SpO2  Min: 100 %  Max: 100 %  100 %      GEN:  Ill-appearing female  CV: RRR +S1,S2 without murmur  PULM: CTAB, on ventilator  ABD: Soft, NT/ND abdomen with  NABS  EXT: No cyanosis or edema  SKIN: Warm and dry  PSYCH:  Sedated and unresponsive  Dialysis access:  Left IJ PermCath            Component Value Date/Time     (L) 01/09/2025 0432     (L) 01/08/2025 0410     05/08/2024 0739     03/15/2024 1101    K 3.9 01/09/2025 0432    K 4.0 01/08/2025 0410    K 3.8 05/08/2024 0739    K 3.9 03/15/2024 1101    CO2 17 (L) 01/09/2025 0432    CO2 18 (L) 01/08/2025 0410    CO2 22 05/08/2024 0739    CO2 21 (L) 03/15/2024 1101    BUN 26.4 (H) 01/09/2025 0432    BUN 16.5 01/08/2025 0410    BUN 55 (H) 10/02/2024 0000    BUN 23 (H) 07/12/2024 0000    CREATININE 4.63 (H) 01/09/2025 0432    CREATININE 3.59 (H) 01/08/2025 0410    CREATININE 3.37 (H) 05/08/2024 0739    CREATININE 3.2 (H) 03/15/2024 1101    CALCIUM 7.8 (L) 01/09/2025 0432    CALCIUM 7.8 (L) 01/08/2025 0410    CALCIUM 9 05/08/2024 0739    CALCIUM 9.9 03/15/2024 1101    PHOS 2.7 01/09/2025 0432            Component Value Date/Time    WBC 20.83 (H) 01/09/2025 0631    WBC 14.61 (H) 01/08/2025 0410    HGB 8.6 (L) 01/09/2025 0631    HGB 7.6 (L) 01/08/2025 0410    HGB 10.3 (L) 11/24/2024 0000    HGB 10 (L) 11/18/2024 0000    HCT 26.2 (L) 01/09/2025 0631    HCT 23.5 (L) 01/08/2025 0410     01/09/2025 0631     01/08/2025 0410         Imaging reviewed      Assessment / Plan:       Active Hospital Problems    Diagnosis  POA    Seizures [R56.9]  Unknown    Status epilepticus [G40.901]  Yes    Altered mental status [R41.82]  Unknown      Resolved Hospital Problems    Diagnosis Date Resolved POA    Seizure-like activity [R56.9] 01/08/2025 Unknown       ESRD.  Normally MWF hemodialysis.  We will dialyze TTS while inpatient  New onset seizure  Acute hypoxic respiratory failure  Hypertension, anemia  Diabetes mellitus type 2   History of coronary artery disease   History of previous stroke  Urinary tract infection    Plan:  We will hold hemodialysis today per ICU wishes so they can have goals of care  conversation.  Tentatively we will plan to dialyze tomorrow on regular MWF schedule.

## 2025-01-09 NOTE — PROGRESS NOTES
[facility] - Emergency Dept  Pulmonary Critical Care Note    Patient Name: Krysta Jansen  MRN: [id number]  Admission Date: 1/5/2025  Hospital Length of Stay: 4 days  Code Status: Full Code  Attending Provider: DAMARIS Jaime MD  Primary Care Provider: No primary care provider on file.     Subjective:     HPI:   Patient is a 79-year-old female with a past medical history of ESRD on HD, HTN, CAD, CHF unknown EF, DM2, previous left MCA territory stroke who was brought into the ER after her son reported seeing her normal an hour prior, briefly left in when came back patient was nonverbal and lethargic.      She was emergently brought to the ER and a code fast was called, she was assessed by Neurology in the ER where she was found to have an NIH of 22 based on dysarthria and level of consciousness, but was deemed not a candidate for TNK secondary to overall nonfocal exam and recent left arm HD access surgery reported.  Vitals and laboratory work were overall unremarkable except for changes as expected in any age renal disease, and a potential urinary tract infection.  She is admitted under typical stroke protocol.     1/6/2025, EEG at 0800 revealed seizure-like activity. She was given Lorazepam 2 mg IV. Patient later became unconscious, unresponsive to pain stimuli, and hypotensive. She was intubated for airway protection (see procedure note).     Hospital Course/Significant events:  1/6/2025 - Admitted to ICU     24 Hour Interval History:  Seizures reported on continuous EEG.  Off propofol since yesterday.  Depakote added by Neurology.  Appreciate their input.  Patient remains on mechanical ventilation with adequate oxygenation on present settings:  AC 22 peep 5 FiO2 30%.    History reviewed. No pertinent past medical history.    History reviewed. No pertinent surgical history.    Social History     Socioeconomic History    Marital status: Single   Tobacco Use    Smoking status: Unknown   Substance  and Sexual Activity    Alcohol use: Not Currently       Current Outpatient Medications   Medication Instructions    aspirin 81 MG Chew 1 tablet, Daily    BASAGLAR KWIKPEN U-100 INSULIN 30 Units, Nightly    CeleXA 10 mg, Daily    ENTRESTO 24-26 mg per tablet 1 tablet, 2 times daily    metoprolol succinate 25 mg CSpX 1 capsule, Daily    PLAVIX 75 mg tablet 1 tablet, Daily    rosuvastatin (CRESTOR) 40 MG Tab 1 tablet, Nightly    traMADoL (ULTRAM) 50 mg tablet 1 tablet, Every 6 hours PRN    vancomycin (VANCOCIN) 500 mg, Every Mon, Wed, Fri     Current Inpatient Medications   ciprofloxacin  400 mg Intravenous Q24H    enoxparin  30 mg Subcutaneous Q24H (prophylaxis, 1700)    levETIRAcetam (Keppra) IV (PEDS and ADULTS)  500 mg Intravenous Q12H    mupirocin   Nasal BID    valproate sodium (DEPACON) IVPB  500 mg Intravenous Q12H     Current Intravenous Infusions   EPINEPHrine  0-2 mcg/kg/min (Dosing Weight) Intravenous Continuous 4.4 mL/hr at 01/08/25 1801 0.02 mcg/kg/min at 01/08/25 1801    propofoL  0-50 mcg/kg/min (Dosing Weight) Intravenous Continuous   Stopped at 01/08/25 0947     ROS: Unable to perform ROS: Acuity of condition        Objective:     Intake/Output Summary (Last 24 hours) at 1/9/2025 0741  Last data filed at 1/8/2025 1801  Gross per 24 hour   Intake 269.74 ml   Output --   Net 269.74 ml     Vital Signs (Most Recent):  Temp: 98.6 °F (37 °C) (01/09/25 0400)  Pulse: 89 (01/09/25 0500)  Resp: (!) 22 (01/09/25 0500)  BP: 111/61 (01/09/25 0500)  SpO2: 100 % (01/09/25 0500)  Body mass index is 31.9 kg/m².  Weight: 74.1 kg (163 lb 5.8 oz) Vital Signs (24h Range):  Temp:  [98.6 °F (37 °C)-99 °F (37.2 °C)] 98.6 °F (37 °C)  Pulse:  [80-90] 89  Resp:  [22] 22  SpO2:  [100 %] 100 %  BP: ()/(51-74) 111/61     Physical Exam:  General: Unresponsive to painful stimuli   HEENT: NC/AT; PERRL  Pulm: CTA bilaterally via intubation   CV: S1, S2 w/o murmurs or gallops  GI: Bowel sound present  MSK: Unable to assess  Derm:  No rashes, abnormal bruising, or skin lesions  Neuro: Unable to assess  Psych: Unable to assess    Lines/Drains/Airways       Central Venous Catheter Line  Duration                  Hemodialysis Catheter 01/06/25 1400 left internal jugular 2 days              Drain  Duration                  NG/OG Tube 01/06/25 1109 Center mouth 2 days              Airway  Duration                  Airway - Non-Surgical 01/06/25 1104 Endotracheal Tube 2 days              Peripheral Intravenous Line  Duration                  Peripheral IV - Single Lumen 01/06/25 Right Antecubital 3 days         Peripheral IV - Single Lumen 01/06/25 1055 18 G Anterior;Right Upper Arm 2 days                  Significant Labs:  Lab Results   Component Value Date    WBC 20.83 (H) 01/09/2025    HGB 8.6 (L) 01/09/2025    HCT 26.2 (L) 01/09/2025    MCV 92.6 01/09/2025     01/09/2025       BMP  Lab Results   Component Value Date     (L) 01/09/2025    K 3.9 01/09/2025    CO2 17 (L) 01/09/2025    BUN 26.4 (H) 01/09/2025    CREATININE 4.63 (H) 01/09/2025    CALCIUM 7.8 (L) 01/09/2025    AGAP 14.0 01/09/2025    ESTGFRAFRICA 13 05/08/2024     ABG  Recent Labs   Lab 01/06/25  1147   PH 7.500*   PO2 356.0*   PCO2 31.0*   HCO3 24.2     Mechanical Ventilation Support:  Vent Mode: A/C (01/09/25 0522)  Ventilator Initiated: Yes (01/06/25 1104)  Set Rate: 22 BPM (01/09/25 0522)  Vt Set: 450 mL (01/09/25 0522)  PEEP/CPAP: 5 cmH20 (01/09/25 0522)  Oxygen Concentration (%): 30 (01/09/25 0522)  Peak Airway Pressure: 30 cmH20 (01/09/25 0522)  Total Ve: 7.6 L/m (01/09/25 0522)  F/VT Ratio<105 (RSBI): (!) 48.78 (01/08/25 2000)    Significant Imaging:  I have reviewed the pertinent imaging within the past 24 hours.    Assessment/Plan:     Assessment  Acute global aphasia, seizure/postictal state  UTI:  Pseudomonas.  Pansensitive  Hx: ESRD on HD MWF, HTN, CAD, CHF unknown EF, DM2, previous left MCA territory stroke   Hypotension-probably secondary to above, has  resolved.      Plan  Continue present mechanical ventilation.  Continuous EEG ongoing per Neurology.  Await further recommendations from them in regards to the seizures.  We will switch to Cipro to treat the Pseudomonas.  Begin tube feedings for nutritional support.  [place] is guarded.       DVT Prophylaxis: Lovenox  GI Prophylaxis: None      42 minutes of critical care was time spent personally by me on the following activities: development of treatment plan with patient or surrogate and bedside caregivers, discussions with consultants, evaluation of patient's response to treatment, examination of patient, ordering and performing treatments and interventions, ordering and review of laboratory studies, ordering and review of radiographic studies, pulse oximetry, re-evaluation of patient's condition.  This critical care time did not overlap with that of any other provider or involve time for any procedures.

## 2025-01-09 NOTE — PLAN OF CARE
Problem: Adult Inpatient Plan of Care  Goal: Plan of Care Review  Outcome: Progressing  Goal: Patient-Specific Goal (Individualized)  Outcome: Progressing  Goal: Absence of Hospital-Acquired Illness or Injury  Outcome: Progressing  Goal: Optimal Comfort and Wellbeing  Outcome: Progressing     Problem: Hemodialysis  Goal: Safe, Effective Therapy Delivery  Outcome: Progressing  Goal: Effective Tissue Perfusion  Outcome: Progressing  Goal: Absence of Infection Signs and Symptoms  Outcome: Progressing     Problem: Stroke, Ischemic (Includes Transient Ischemic Attack)  Goal: Optimal Coping  Outcome: Progressing  Goal: Optimal Cerebral Tissue Perfusion  Outcome: Progressing  Goal: Optimal Nutrition Intake  Outcome: Progressing  Goal: Effective Urinary Elimination  Outcome: Progressing     Problem: Skin Injury Risk Increased  Goal: Skin Health and Integrity  Outcome: Progressing     Problem: Wound  Goal: Optimal Coping  Outcome: Progressing  Goal: Absence of Infection Signs and Symptoms  Outcome: Progressing  Goal: Optimal Pain Control and Function  Outcome: Progressing  Goal: Skin Health and Integrity  Outcome: Progressing  Goal: Optimal Wound Healing  Outcome: Progressing     Problem: Infection  Goal: Absence of Infection Signs and Symptoms  Outcome: Progressing     Problem: Fall Injury Risk  Goal: Absence of Fall and Fall-Related Injury  Outcome: Progressing     Problem: Adult Inpatient Plan of Care  Goal: Readiness for Transition of Care  Outcome: Not Progressing     Problem: Stroke, Ischemic (Includes Transient Ischemic Attack)  Goal: Effective Bowel Elimination  Outcome: Not Progressing  Goal: Optimal Cognitive Function  Outcome: Not Progressing  Goal: Improved Communication Skills  Outcome: Not Progressing  Goal: Optimal Functional Ability  Outcome: Not Progressing  Goal: Effective Oxygenation and Ventilation  Outcome: Not Progressing  Goal: Improved Sensorimotor Function  Outcome: Not Progressing  Goal: Safe and  Effective Swallow  Outcome: Not Progressing     Problem: Wound  Goal: Optimal Functional Ability  Outcome: Not Progressing  Goal: Improved Oral Intake  Outcome: Not Progressing     Problem: Coping Ineffective  Goal: Effective Coping  Reactivated

## 2025-01-09 NOTE — CONSULTS
"Inpatient consult to Palliative Care  Consult performed by: Dania Alejo NP  Consult ordered by: Buddy Ortiz MD      Patient Name: Krysta Jansen   MRN: [id number]   Admission Date: 1/5/2025   Hospital Length of Stay: 4   Attending Provider: DAMARIS Jaime MD   Consulting Provider: EZEQUIEL Stark  Reason for Consult: Goals of Care  Primary Care Physician: No primary care provider on file.     Principal Problem: <principal problem not specified>     Patient information was obtained from relative(s) and ER records.      Final diagnoses:  [R29.818] Acute focal neurological deficit  [R41.82] AMS (altered mental status)  [R41.82] Altered mental status, unspecified altered mental status type (Primary)  [N18.6] End stage renal disease  [N39.0] Urinary tract infection without hematuria, site unspecified     Assessment/Plan:     I reviewed the family's understanding of the seriousness of the illness and its expected prognosis. We discussed the patient's goals of care and treatment preferences. I clarified current code status. I identified the surrogate decision maker to be the two children, Migue and Sherri. I answered all questions and we formulated a plan including recommendations for symptom management and how to best achieve goals of care.       Patient is resting in bed intubated on mechanical ventilation.  Per nursing she has been off sedation for over 24 hours.  Tube feeding infusing.  Met with patient's son, Migue, at bedside.  Introduced Service. He explains that the patient recently had hospitalization at [facility] requiring transfer to [place] for blood infection," then was discharged to [facility] for rehabilitation services.  He felt she was making some improvement with therapy services. Prior to that hospitalization the patient was living with her daughter, required assistance with most ADLs, and was able to transfer from wheelchair but not walk independently.  Reviewed current clinical situation and " his understanding of prognosis.  He explains that physicians yesterday spoke with him regarding do not resuscitate status and potential for withdrawal of life-sustaining treatment with overall poor prognosis.  States him and his sister had conversations last night regarding this.  States his sister is in route and asks that I returned to speak to both of them together.    Returned to bedside when patient's daughter Sherri and son Migue were present.  Reintroduced Service.  Reviewed clinical case and medical recommendations from Neurology, Nephrology, and intensivist per notes.  Reviewed overall prognosis remains poor.  Reviewed resuscitation status, along with risks and benefits associated.  Both Sherri and Migue feel she would not want resuscitation and would elect for a natural peaceful death.  Explained that do not resuscitate order would be placed in patient's chart to reflect this.    Discussed that patient continued to have nonconvulsive seizures on EEG per Neurology.  Explained that she did not initiate any breaths this morning when placed on CPAP, but there was some concern for continued sedation effects due to renal status.  Dialysis was held this morning due to borderline hypotension.  Discussed that if patient is unable to wean from mechanical ventilation she may require long-term tracheostomy and PEG tube placement.  The family does not feel she would elect for these procedures.  The son reports discussion of potential withdrawal of life-sustaining treatment yesterday.  He asks if ventilator is removed if they would replace ventilator if patient was unable to breathe on her own.  Therefore I explained process for withdrawal of life-sustaining treatment with comfort focused care and medical management.  They verbalized understanding.  Explained that we will reassess neurologic function and respiratory status tomorrow, then will reassess goals of care. Offered spiritual support, patient is Pentecostal. They  verbalize understanding and express gratitude. Offered support. Encouraged to call with questions or concerns. Palliative Medicine will continue to follow.    Advance Care Planning     Date: 01/09/2025    Code Status  In light of the patients advanced and life limiting illness,I engaged the the family in a voluntary conversation about the patient's preferences for care  at the very end of life. The patient wishes to have a natural, peaceful death.  Along those lines, the patient does not wish to have CPR or other invasive treatments performed when her heart and/or breathing stops. I communicated to the family that a DNR order would be placed in her medical record to reflect this preference.    [facility]  I engaged the family in a voluntary conversation about advance care planning and we specifically addressed what the goals of care would be moving forward, in light of the patient's change in clinical status, specifically current condition.  We did specifically address the patient's likely prognosis, which is poor.  We explored the patient's values and preferences for future care.  The family endorses that what is most important right now is to focus on improvement in condition but with limits to invasive therapies    Accordingly, we have decided that the best plan to meet the patient's goals includes continuing with treatment       Recommendations:  DO NOT RESUSCITATE order placed  Consult to Spiritual Care placed-- patient is Roman Catholic    History of Present Illness:     79-year-old female with PMH of ESRD on HD, hypertension, CAD, CHF unknown EF, diabetes mellitus type 2, previous left MCA territory stroke.  She was brought into the ER after her son found her nonverbal and lethargic with last known normal 1 hour prior.  Upon arrival to ER code fast was called and Neurology deemed not a candidate for TNK secondary to overall nonfocal recent left arm HD access surgery.  EEG on 01/06 revealed seizure-like activity for which  she received lorazepam.  Patient later became unconscious, unresponsive to painful stimuli, and hypotensive.  She was intubated for airway protection.  Continuous EEG reveals continued seizures with nonconvulsive status epilepticus in ladder half of the recording.  She has been off propofol since yesterday.  Medication adjustments per neurology, with recommendations for do not resuscitate status and overall poor prognosis.  UTI with culture positive for Pseudomonas, antibiotic therapy changed to Cipro per intensivist.  Palliative medicine is consulted for goals of care.      Active Ambulatory Problems     Diagnosis Date Noted    No Active Ambulatory Problems     Resolved Ambulatory Problems     Diagnosis Date Noted    No Resolved Ambulatory Problems     No Additional Past Medical History        History reviewed. No pertinent surgical history.     Review of patient's allergies indicates:  No Known Allergies       Current Facility-Administered Medications:     bisacodyL suppository 10 mg, 10 mg, Rectal, Daily PRN, David Yoder MD    ciprofloxacin (CIPRO)400mg/200ml D5W IVPB 400 mg, 400 mg, Intravenous, Q24H, DAMARIS Jaime MD    dextrose 50% injection 12.5 g, 12.5 g, Intravenous, PRN, David Yoder MD    enoxaparin injection 30 mg, 30 mg, Subcutaneous, Q24H (prophylaxis, 1700), David Yoder MD, 30 mg at 01/08/25 1732    EPINEPHrine (ADRENALIN) 5 mg in D5W 250 mL infusion, 0-2 mcg/kg/min (Dosing Weight), Intravenous, Continuous, Jean Pierre Friedman MD, Last Rate: 4.4 mL/hr at 01/08/25 1801, 0.02 mcg/kg/min at 01/08/25 1801    glucagon (human recombinant) injection 1 mg, 1 mg, Intramuscular, PRN, David Yoder MD    insulin aspart U-100 injection 0-5 Units, 0-5 Units, Subcutaneous, Q6H PRN, David Yoder MD    labetaloL injection 10 mg, 10 mg, Intravenous, Q15 Min PRN, David Yoder MD    levETIRAcetam (Keppra) 500 mg in D5W 100 mL IVPB (MB+), 500 mg, Intravenous,  Q12H, Shravan Genevieve, AGACNP-BC, Last Rate: 200 mL/hr at 01/09/25 0909, 500 mg at 01/09/25 0909    mupirocin 2 % ointment, , Nasal, BID, David Yoder MD, Given at 01/09/25 0910    propofol (DIPRIVAN) 10 mg/mL infusion, 0-50 mcg/kg/min (Dosing Weight), Intravenous, Continuous, Jean Pierre Friedman MD, Stopped at 01/08/25 0947    sodium chloride 0.9% flush 10 mL, 10 mL, Intravenous, PRN, David Yoder MD    valproate (DEPACON) 500 mg in D5W 50 mL IVPB, 500 mg, Intravenous, Q12H, DAMARIS Jaime MD, Stopped at 01/08/25 2255    vancomycin - pharmacy to dose, , Intravenous, pharmacy to manage frequency, Apolinar Roblero MD       Current Facility-Administered Medications:     bisacodyL, 10 mg, Rectal, Daily PRN    dextrose 50%, 12.5 g, Intravenous, PRN    glucagon (human recombinant), 1 mg, Intramuscular, PRN    insulin aspart U-100, 0-5 Units, Subcutaneous, Q6H PRN    labetalol, 10 mg, Intravenous, Q15 Min PRN    sodium chloride 0.9%, 10 mL, Intravenous, PRN    vancomycin - pharmacy to dose, , Intravenous, pharmacy to manage frequency     History reviewed. No pertinent family history.     Review of Systems   Unable to perform ROS: Intubated            Objective:   /61   Pulse 89   Temp 98.6 °F (37 °C) (Oral)   Resp (!) 22   Ht 5' (1.524 m)   Wt 74.1 kg (163 lb 5.8 oz)   SpO2 100%   BMI 31.90 kg/m²      Physical Exam  Constitutional:       General: She is not in acute distress.     Appearance: She is ill-appearing.   HENT:      Mouth/Throat:      Mouth: Mucous membranes are moist.   Cardiovascular:      Rate and Rhythm: Normal rate.   Pulmonary:      Effort: Pulmonary effort is normal. No respiratory distress.      Comments: Intubated on mechanical ventilation  Abdominal:      General: There is no distension.   Musculoskeletal:      Right lower leg: No edema.      Left lower leg: No edema.   Skin:     General: Skin is warm and dry.   Neurological:      Comments: Does not arouse to verbal  stimuli  Not sedated            Review of Symptoms  Review of Symptoms      Symptom Assessment (ESAS 0-10 Scale)  Pain:  0  Dyspnea:  0  Anxiety:  0  Nausea:  0  Depression:  0  Anorexia:  0  Fatigue:  0  Insomnia:  0  Restlessness:  0  Agitation:  0         Living Arrangements:  Lives with family    Psychosocial/Cultural:   See Palliative Psychosocial Note: Yes  Lives with daughter. Never . Two adult children. Required assistance with most ADLs and wheelchair with transfer assist prior to hospitalization.  **Primary  to Follow**  Palliative Care  Consult: No    Spiritual:  F - Amarilys and Belief:  Gnosticist  I - Importance:  Yes  C - Community:  Yes  A - Address in Care:  Yes      Advance Care Planning   Advance Directives:     Decision Making:  Family answered questions  Goals of Care: The family endorses that what is most important right now is to focus on improvement in condition but with limits to invasive therapies    Accordingly, we have decided that the best plan to meet the patient's goals includes continuing with treatment          PAINAD: NA    Caregiver burden formerly assessed: Yes    > 50% of 70 min of encounter was spent in chart review, face to face discussion of goals of care, symptom assessment, coordination of care and emotional support.    EZEQUIEL Stark-BC  Palliative Medicine  [facility]

## 2025-01-09 NOTE — PROGRESS NOTES
Pt motionless on vent   When vent turned to cpap she pulled no breaths   Discussed with ICU MD Jaime   ...  Exam:   /61   Pulse 89   Temp 98.6 °F (37 °C) (Oral)   Resp (!) 22   Ht 5' (1.524 m)   Wt 74.1 kg (163 lb 5.8 oz)   SpO2 100%   BMI 31.90 kg/m²   As above   Vpa level 42 this am      ciprofloxacin  400 mg Intravenous Q24H    levETIRAcetam   500 mg Intravenous Q12H    valproate sodium   500 mg Intravenous Q12H        Prob list:   Prior electrographic status epil in setting of chronic brain insults and declining level of functioning over last 60d or so with multiple medical comorbidities incl dialysis requiring    Plan/Rec's  Sad case   Sorry I could not have been more helpful   I am signing off

## 2025-01-10 LAB
ABS NEUT (OLG): 43.02 X10(3)/MCL (ref 2.1–9.2)
ALBUMIN SERPL-MCNC: 1.9 G/DL (ref 3.4–4.8)
ALLENS TEST BLOOD GAS (OHS): YES
ANISOCYTOSIS BLD QL SMEAR: ABNORMAL
BACTERIA BLD CULT: NORMAL
BACTERIA BLD CULT: NORMAL
BASE EXCESS BLD CALC-SCNC: -2.8 MMOL/L
BLOOD GAS SAMPLE TYPE (OHS): ABNORMAL
BUN SERPL-MCNC: 38.2 MG/DL (ref 9.8–20.1)
BURR CELLS (OLG): ABNORMAL
CA-I BLD-SCNC: 1.1 MMOL/L (ref 1.12–1.23)
CALCIUM SERPL-MCNC: 7.7 MG/DL (ref 8.4–10.2)
CHLORIDE SERPL-SCNC: 98 MMOL/L (ref 98–107)
CO2 BLDA-SCNC: 20.2 MMOL/L
CO2 SERPL-SCNC: 15 MMOL/L (ref 23–31)
CREAT SERPL-MCNC: 5.16 MG/DL (ref 0.55–1.02)
DRAWN BY BLOOD GAS (OHS): ABNORMAL
ERYTHROCYTE [DISTWIDTH] IN BLOOD BY AUTOMATED COUNT: 16.5 % (ref 11.5–17)
GFR SERPLBLD CREATININE-BSD FMLA CKD-EPI: 8 ML/MIN/1.73/M2
GLUCOSE SERPL-MCNC: 299 MG/DL (ref 82–115)
HCO3 BLDA-SCNC: 19.4 MMOL/L (ref 22–26)
HCT VFR BLD AUTO: 25.4 % (ref 37–47)
HGB BLD-MCNC: 8.3 G/DL (ref 12–16)
INHALED O2 CONCENTRATION: 30 %
INSTRUMENT WBC (OLG): 46.76 X10(3)/MCL
LYMPHOCYTES NFR BLD MANUAL: 0.94 X10(3)/MCL
LYMPHOCYTES NFR BLD MANUAL: 2 %
MACROCYTES BLD QL SMEAR: ABNORMAL
MCH RBC QN AUTO: 30.7 PG (ref 27–31)
MCHC RBC AUTO-ENTMCNC: 32.7 G/DL (ref 33–36)
MCV RBC AUTO: 94.1 FL (ref 80–94)
MECH RR (OHS): 22 B/MIN
MODE (OHS): AC
MONOCYTES NFR BLD MANUAL: 2.81 X10(3)/MCL (ref 0.1–1.3)
MONOCYTES NFR BLD MANUAL: 6 %
NEUTROPHILS NFR BLD MANUAL: 92 %
NRBC BLD AUTO-RTO: 0 %
OXYGEN DEVICE BLOOD GAS (OHS): ABNORMAL
PCO2 BLDA: 26 MMHG (ref 35–45)
PEEP RESPIRATORY: 5 CMH2O
PH BLDA: 7.48 [PH] (ref 7.35–7.45)
PHOSPHATE SERPL-MCNC: 2.8 MG/DL (ref 2.3–4.7)
PLATELET # BLD AUTO: 215 X10(3)/MCL (ref 130–400)
PLATELET # BLD EST: NORMAL 10*3/UL
PMV BLD AUTO: 8.8 FL (ref 7.4–10.4)
PO2 BLDA: 126 MMHG (ref 80–100)
POCT GLUCOSE: 208 MG/DL (ref 70–110)
POCT GLUCOSE: 303 MG/DL (ref 70–110)
POCT GLUCOSE: 354 MG/DL (ref 70–110)
POIKILOCYTOSIS BLD QL SMEAR: ABNORMAL
POTASSIUM BLOOD GAS (OHS): 3.4 MMOL/L (ref 3.5–5)
POTASSIUM SERPL-SCNC: 4.4 MMOL/L (ref 3.5–5.1)
RBC # BLD AUTO: 2.7 X10(6)/MCL (ref 4.2–5.4)
RBC MORPH BLD: ABNORMAL
SAMPLE SITE BLOOD GAS (OHS): ABNORMAL
SAO2 % BLDA: 99 %
SODIUM BLOOD GAS (OHS): 128 MMOL/L (ref 137–145)
SODIUM SERPL-SCNC: 127 MMOL/L (ref 136–145)
SPONT+MECH VT ON VENT: 450 ML
WBC # BLD AUTO: 48.03 X10(3)/MCL (ref 4.5–11.5)

## 2025-01-10 PROCEDURE — 94760 N-INVAS EAR/PLS OXIMETRY 1: CPT

## 2025-01-10 PROCEDURE — 99900026 HC AIRWAY MAINTENANCE (STAT)

## 2025-01-10 PROCEDURE — 11000001 HC ACUTE MED/SURG PRIVATE ROOM

## 2025-01-10 PROCEDURE — 36415 COLL VENOUS BLD VENIPUNCTURE: CPT | Performed by: STUDENT IN AN ORGANIZED HEALTH CARE EDUCATION/TRAINING PROGRAM

## 2025-01-10 PROCEDURE — 27100171 HC OXYGEN HIGH FLOW UP TO 24 HOURS

## 2025-01-10 PROCEDURE — 94761 N-INVAS EAR/PLS OXIMETRY MLT: CPT

## 2025-01-10 PROCEDURE — 27200966 HC CLOSED SUCTION SYSTEM

## 2025-01-10 PROCEDURE — 63600175 PHARM REV CODE 636 W HCPCS

## 2025-01-10 PROCEDURE — 99900031 HC PATIENT EDUCATION (STAT)

## 2025-01-10 PROCEDURE — 94003 VENT MGMT INPAT SUBQ DAY: CPT

## 2025-01-10 PROCEDURE — 63600175 PHARM REV CODE 636 W HCPCS: Performed by: INTERNAL MEDICINE

## 2025-01-10 PROCEDURE — 25000003 PHARM REV CODE 250: Performed by: INTERNAL MEDICINE

## 2025-01-10 PROCEDURE — 99900035 HC TECH TIME PER 15 MIN (STAT)

## 2025-01-10 PROCEDURE — 80100014 HC HEMODIALYSIS 1:1

## 2025-01-10 PROCEDURE — 25000003 PHARM REV CODE 250

## 2025-01-10 PROCEDURE — 87070 CULTURE OTHR SPECIMN AEROBIC: CPT | Performed by: INTERNAL MEDICINE

## 2025-01-10 PROCEDURE — 85025 COMPLETE CBC W/AUTO DIFF WBC: CPT | Performed by: STUDENT IN AN ORGANIZED HEALTH CARE EDUCATION/TRAINING PROGRAM

## 2025-01-10 PROCEDURE — 80069 RENAL FUNCTION PANEL: CPT | Performed by: STUDENT IN AN ORGANIZED HEALTH CARE EDUCATION/TRAINING PROGRAM

## 2025-01-10 RX ORDER — HEPARIN SODIUM 1000 [USP'U]/ML
5000 INJECTION, SOLUTION INTRAVENOUS; SUBCUTANEOUS
Status: DISCONTINUED | OUTPATIENT
Start: 2025-01-10 | End: 2025-02-03 | Stop reason: HOSPADM

## 2025-01-10 RX ADMIN — ENOXAPARIN SODIUM 30 MG: 30 INJECTION SUBCUTANEOUS at 04:01

## 2025-01-10 RX ADMIN — MUPIROCIN: 20 OINTMENT TOPICAL at 08:01

## 2025-01-10 RX ADMIN — LEVETIRACETAM 500 MG: 100 INJECTION, SOLUTION INTRAVENOUS at 08:01

## 2025-01-10 RX ADMIN — Medication: at 09:01

## 2025-01-10 RX ADMIN — LEVETIRACETAM 500 MG: 100 INJECTION, SOLUTION INTRAVENOUS at 09:01

## 2025-01-10 RX ADMIN — CIPROFLOXACIN 400 MG: 2 INJECTION, SOLUTION INTRAVENOUS at 02:01

## 2025-01-10 RX ADMIN — INSULIN ASPART 2 UNITS: 100 INJECTION, SOLUTION INTRAVENOUS; SUBCUTANEOUS at 12:01

## 2025-01-10 RX ADMIN — VALPROATE SODIUM 500 MG: 100 INJECTION, SOLUTION INTRAVENOUS at 12:01

## 2025-01-10 RX ADMIN — INSULIN ASPART 4 UNITS: 100 INJECTION, SOLUTION INTRAVENOUS; SUBCUTANEOUS at 05:01

## 2025-01-10 NOTE — PROGRESS NOTES
[facility] - Emergency Dept  Pulmonary Critical Care Note    Patient Name: Krysta Jansen  MRN: [id number]  Admission Date: 1/5/2025  Hospital Length of Stay: 5 days  Code Status: DNR  Attending Provider: DAMARIS Jaime MD  Primary Care Provider: No primary care provider on file.     Subjective:     HPI:   Patient is a 79-year-old female with a past medical history of ESRD on HD, HTN, CAD, CHF unknown EF, DM2, previous left MCA territory stroke who was brought into the ER after her son reported seeing her normal an hour prior, briefly left in when came back patient was nonverbal and lethargic.      She was emergently brought to the ER and a code fast was called, she was assessed by Neurology in the ER where she was found to have an NIH of 22 based on dysarthria and level of consciousness, but was deemed not a candidate for TNK secondary to overall nonfocal exam and recent left arm HD access surgery reported.  Vitals and laboratory work were overall unremarkable except for changes as expected in any age renal disease, and a potential urinary tract infection.  She is admitted under typical stroke protocol.     1/6/2025, EEG at 0800 revealed seizure-like activity. She was given Lorazepam 2 mg IV. Patient later became unconscious, unresponsive to pain stimuli, and hypotensive. She was intubated for airway protection (see procedure note).       Hospital Course/Significant events:  1/6/2025 - Admitted to ICU       24 Hour Interval History:  No acute events overnight.  No significant change in neurologic status.  Remains afebrile.  Oxygenating well on low ventilator settings.  No longer on EEG, Neurology with palliative Care recommendations given persistent nonconvulsive status epilepticus.  Remains off IV sedatives.      Review of systems unobtainable due to intubation, altered mental status.      Social History     Socioeconomic History    Marital status: Single   Tobacco Use    Smoking status: Unknown    Substance and Sexual Activity    Alcohol use: Not Currently       Current Outpatient Medications   Medication Instructions    aspirin 81 MG Chew 1 tablet, Daily    BASAGLAR KWIKPEN U-100 INSULIN 30 Units, Nightly    CeleXA 10 mg, Daily    ENTRESTO 24-26 mg per tablet 1 tablet, 2 times daily    metoprolol succinate 25 mg CSpX 1 capsule, Daily    PLAVIX 75 mg tablet 1 tablet, Daily    rosuvastatin (CRESTOR) 40 MG Tab 1 tablet, Nightly    traMADoL (ULTRAM) 50 mg tablet 1 tablet, Every 6 hours PRN    vancomycin (VANCOCIN) 500 mg, Every Mon, Wed, Fri     Current Inpatient Medications   ciprofloxacin  400 mg Intravenous Q24H    enoxparin  30 mg Subcutaneous Q24H (prophylaxis, 1700)    levETIRAcetam (Keppra) IV (PEDS and ADULTS)  500 mg Intravenous Q12H    mupirocin   Nasal BID    valproate sodium (DEPACON) IVPB  500 mg Intravenous Q12H     Current Intravenous Infusions   EPINEPHrine  0-2 mcg/kg/min (Dosing Weight) Intravenous Continuous 4.4 mL/hr at 01/08/25 1801 0.02 mcg/kg/min at 01/08/25 1801    propofoL  0-50 mcg/kg/min (Dosing Weight) Intravenous Continuous   Stopped at 01/08/25 0947       Objective:     Intake/Output Summary (Last 24 hours) at 1/10/2025 0626  Last data filed at 1/10/2025 0542  Gross per 24 hour   Intake 2346.88 ml   Output --   Net 2346.88 ml     Vital Signs (Most Recent):  Temp: 98.6 °F (37 °C) (01/10/25 0400)  Pulse: 97 (01/10/25 0600)  Resp: (!) 22 (01/10/25 0600)  BP: (!) 105/56 (01/10/25 0600)  SpO2: 100 % (01/10/25 0600)  Body mass index is 31.9 kg/m².  Weight: 74.1 kg (163 lb 5.8 oz) Vital Signs (24h Range):  Temp:  [98.3 °F (36.8 °C)-99.1 °F (37.3 °C)] 98.6 °F (37 °C)  Pulse:  [] 97  Resp:  [18-26] 22  SpO2:  [100 %] 100 %  BP: ()/(48-61) 105/56         Physical Exam:  Gen- intubated, unresponsive on no sedation  HENT- ATNC, MMM, ETT in place  CV- RRR  Resp- CTAB, compliant with mechanical ventilation  MSK- WWP, no edema  Neuro- unresponsive to painful stimulation in bilateral  upper and lower extremities; weak cough with deep suctioning; eyes with fixed upward deviation noted  Psych- unable to assess 2/2 neurologic status         Lines/Drains/Airways       Central Venous Catheter Line  Duration                  Hemodialysis Catheter 01/06/25 1400 left internal jugular 3 days              Drain  Duration                  NG/OG Tube 01/06/25 1109 Center mouth 3 days         Fecal Incontinence  01/10/25 0200 <1 day              Airway  Duration                  Airway - Non-Surgical 01/06/25 1104 Endotracheal Tube 3 days              Peripheral Intravenous Line  Duration                  Peripheral IV - Single Lumen 01/06/25 Right Antecubital 4 days         Peripheral IV - Single Lumen 01/06/25 1055 18 G Anterior;Right Upper Arm 3 days                  Significant Labs:  Lab Results   Component Value Date    WBC 20.83 (H) 01/09/2025    HGB 8.6 (L) 01/09/2025    HCT 26.2 (L) 01/09/2025    MCV 92.6 01/09/2025     01/09/2025       BMP  Lab Results   Component Value Date     (L) 01/10/2025    K 4.4 01/10/2025    CO2 15 (L) 01/10/2025    BUN 38.2 (H) 01/10/2025    CREATININE 5.16 (H) 01/10/2025    CALCIUM 7.7 (L) 01/10/2025    AGAP 14.0 01/09/2025    ESTGFRAFRICA 13 05/08/2024     ABG  Recent Labs   Lab 01/10/25  0357   PH 7.480*   PO2 126.0*   PCO2 26.0*   HCO3 19.4*     Mechanical Ventilation Support:  Vent Mode: A/C (01/10/25 0517)  Ventilator Initiated: Yes (01/06/25 1104)  Set Rate: 22 BPM (01/10/25 0517)  Vt Set: 450 mL (01/10/25 0517)  PEEP/CPAP: 5 cmH20 (01/10/25 0517)  Oxygen Concentration (%): 30 (01/10/25 0517)  Peak Airway Pressure: 23 cmH20 (01/10/25 0517)  Total Ve: 9.2 L/m (01/10/25 0517)  F/VT Ratio<105 (RSBI): (!) 52.26 (01/10/25 0400)      Assessment/Plan:     Assessment  Persistent nonconvulsive status epilepticus   Pseudomonas UTI  Prior left MCA territory stroke   Hypotension (resolved)   ESRD   Hx of HTN, CAD, DM2       Plan  -persists or mental status  with evidence of ongoing seizure activity on EEG refractory to antiepileptic administration; neurology previously following, recommends palliative care  -continue antiepileptic regimen for now  -continue ciprofloxacin for pansensitive Pseudomonas UTI  -continue mechanical ventilation, inappropriate for trial of extubation given poor mental status  -palliative care following, code status now DO NOT RESUSCITATE  -extremely poor long-term neurologic outlook, with minimal likelihood for functional recovery  -continue enteral feeds         DVT Prophylaxis: LMWH  GI Prophylaxis: None         I spent 32 minutes providing critical care services to this patient.  This does not include time spent for separately billed procedures.        Robert Frances MD  Pulmonary Disease and Critical Care Medicine

## 2025-01-10 NOTE — PROGRESS NOTES
Inpatient Nutrition Assessment    Admit Date: 1/5/2025   Total duration of encounter: 5 days   Patient Age: 79 y.o.    Nutrition Recommendation/Prescription     Modify TF formula.   Tube feeding recommendation:     Impact Peptide 1.5 @ 50 ml/hr to provide  1500 kcal/d  (100% est needs)  93 g protein/d  (103% est needs)  770 ml free water/d   (calculations based on estimated 20 hr/d run time)     Humble (provides 90 kcal, 2.5 g protein per serving) BID.   Banatrol TF (provides 45 kcal, 2 g protein per serving) as needed, up to TID    If no IV fluids running, can give 50ml q 4hr water flushes. Total water provided: 1070 ml. Otherwise, FWF per MD.      Communication of Recommendations: reviewed with nurse    Nutrition Assessment     Malnutrition Assessment/Nutrition-Focused Physical Exam       Malnutrition Level: other (see comments) (Does not meet criteria) (01/07/25 1339)  Energy Intake (Malnutrition): other (see comments) (Unable to assess) (01/07/25 1339)  Weight Loss (Malnutrition): other (see comments) (Unable to assess) (01/07/25 1339)                                         Fluid Accumulation (Malnutrition): other (see comments) (Not present) (01/07/25 1339)        A minimum of two characteristics is recommended for diagnosis of either severe or non-severe malnutrition.    Chart Review    Reason Seen: continuous nutrition monitoring    Malnutrition Screening Tool Results   Have you recently lost weight without trying?: No  Have you been eating poorly because of a decreased appetite?: No   MST Score: 0   Diagnosis:  Acute global aphasia, seizure/postictal state  Obtunded  UTI, present on arrival    Relevant Medical History: ESRD on HD, HTN, CAD, CHF, DM2, MCA territory stroke     Scheduled Medications:  ciprofloxacin, 400 mg, Q24H  enoxparin, 30 mg, Q24H (prophylaxis, 1700)  levETIRAcetam (Keppra) IV (PEDS and ADULTS), 500 mg, Q12H  valproate sodium (DEPACON) IVPB, 500 mg, Q12H    Continuous  Infusions:  EPINEPHrine, Last Rate: 0.02 mcg/kg/min (01/08/25 1801)  propofoL, Last Rate: Stopped (01/08/25 0947)    PRN Medications:  bisacodyL, 10 mg, Daily PRN  dextrose 50%, 12.5 g, PRN  glucagon (human recombinant), 1 mg, PRN  heparin (porcine), 5,000 Units, PRN  insulin aspart U-100, 0-5 Units, Q6H PRN  labetalol, 10 mg, Q15 Min PRN  sodium chloride 0.9%, 10 mL, PRN    Calorie Containing IV Medications: no significant kcals from medications at this time    Recent Labs   Lab 01/05/25  1409 01/05/25  1524 01/06/25  0319 01/07/25  0253 01/07/25  0935 01/08/25  0410 01/09/25  0432 01/09/25  0631 01/10/25  0321 01/10/25  0702   * 129* 130* 131* 130* 128* 130*  --  127*  --    K 3.5 3.3* 3.2* 2.4* 3.7 4.0 3.9  --  4.4  --    CALCIUM 8.0* 8.4 8.1* 7.8* 7.8* 7.8* 7.8*  --  7.7*  --    PHOS  --  2.9 3.1  --   --  2.6 2.7  --  2.8  --    MG  --   --  2.30  --   --   --   --   --   --   --    CL 95* 96* 97* 100 99 99 99  --  98  --    CO2 23 22* 23 20* 18* 18* 17*  --  15*  --    BUN 12.8 16.0 17.8 21.3* 22.7* 16.5 26.4*  --  38.2*  --    CREATININE 4.06* 4.13* 4.49* 5.04* 5.29* 3.59* 4.63*  --  5.16*  --    EGFRNORACEVR 11 10 9 8 8 12 9  --  8  --    GLUCOSE 211* 209* 129* 88 176* 277* 245*  --  299*  --    BILITOT 0.2 0.3 0.2 0.2  --   --  0.2  --   --   --    ALKPHOS 112 112 102 91  --   --  100  --   --   --    ALT 22 23 21 16  --   --  17  --   --   --    AST 23 25 25 19  --   --  19  --   --   --    ALBUMIN 2.3* 2.3* 2.1* 1.8*  --  2.1* 2.0*  --  1.9*  --    TRIG  --  76  --   --   --   --   --   --   --   --    AMMONIA  --   --  22.9  --   --   --   --   --   --   --    WBC 7.84 8.72 7.01 6.95  --  14.61*  --  20.83*  --  46.76  48.03*   HGB 8.6* 8.9* 7.3* 7.2*  --  7.6*  --  8.6*  --  8.3*   HCT 27.1* 29.6* 23.1* 21.7*  --  23.5*  --  26.2*  --  25.4*     Nutrition Orders:  Diet NPO  Tube Feedings/Formulas Other (see comments); OG; Humble - Fruit Punch,Tube Feedings/Formulas 50; 1,000; Peptamen Intense VHP;  OG; 50; Every 4 hours    Appetite/Oral Intake: not applicable/not applicable  Factors Affecting Nutritional Intake: on mechanical ventilation  Social Needs Impacting Access to Food: unable to assess at this time; will attempt on follow-up  Food/[place]/Cultural Preferences: unable to obtain  Food Allergies: no known food allergies  Last Bowel Movement: 01/10/25  Wound(s):  coccyx wound noted, not staged  I/O: Past 24 Hours: +[phone]    Comments    1/7/25: Discussed with RN. Will provide tube feeding recommendations for when appropriate to start tube feeding. Receiving kcal from meds.      1/8/25: Plans for starting TF today. Still receiving kcal from meds.     1/10/25: TF seen running at goal rate. Pt remains intubated, no longer receiving kcals from meds. Significant amount of diarrhea per RN. EN regimen updated to meet kcal needs.     Anthropometrics    Height: 5' (152.4 cm),    Last Weight: 74.1 kg (163 lb 5.8 oz) (01/05/25 1518), Weight Method: Standard Scale  BMI (Calculated): 31.9  BMI Classification: obese grade I (BMI 30-34.9)     Ideal Body Weight (IBW), Female: 100 lb     % Ideal Body Weight, Female (lb): 163.36 %                             Usual Weight Provided By: unable to obtain usual weight    Wt Readings from Last 5 Encounters:   01/05/25 74.1 kg (163 lb 5.8 oz)     Weight Change(s) Since Admission:   Wt Readings from Last 1 Encounters:   01/05/25 1518 74.1 kg (163 lb 5.8 oz)   Admit Weight: 74.1 kg (163 lb 5.8 oz) (01/05/25 1518), Weight Method: Standard Scale    Estimated Needs    Weight Used For Calorie Calculations: 74.1 kg (163 lb 5.8 oz)  Energy Calorie Requirements (kcal): 1495kcal  Energy Need Method: [facility] (modified)  Weight Used For Protein Calculations: 45.5 kg (100 lb 3.2 oz) (IBW)  Protein Requirements: 91gm (2g/kg IBW)  Fluid Requirements (mL): 1000ml + urinary output  CHO Requirement: 170gm (45% est kcal needs)     Enteral Nutrition     Patient not receiving enteral nutrition at  this time.    Parenteral Nutrition     Patient not receiving parenteral nutrition support at this time.    Evaluation of Received Nutrient Intake    Calories: not meeting estimated needs  Protein: meeting estimated needs    Patient Education     Not applicable.    Nutrition Diagnosis     PES: Inadequate oral intake related to acute illness as evidenced by intubation since admit. (active)     PES:            Nutrition Interventions     Intervention(s): modified composition of enteral nutrition, modified rate of enteral nutrition, and collaboration with other providers  Intervention(s):      Goal: Meet greater than 80% of nutritional needs by follow-up. (goal progressing)  Goal: Tolerate enteral feeding at goal rate by follow-up. (goal progressing)    Nutrition Goals & Monitoring     Dietitian will monitor: energy intake and enteral nutrition intake  Discharge planning: too early to determine; pending clinical course  Nutrition Risk/Follow-Up: high (follow-up in 1-4 days)   Please consult if re-assessment needed sooner.

## 2025-01-10 NOTE — NURSING
01/10/25 1206   Post-Hemodialysis Assessment   Duration of Treatment 180 minutes   Total UF (mL) 830 mL   Post-Hemodialysis Comments 3 hrs of dialysis.  Unable to UF due to low bp.  500ml Bolus given to continue HD per Leah.  UF off remainder of tx. +830ml positive for tx. vss post tx.

## 2025-01-10 NOTE — PROGRESS NOTES
Advance Care Planning         Date: 01/10/2025  Follow up palliative care assessment completed. Met with patient's daughter, Sherri, and son, Migue at bedside. I reviewed the family's understanding of the seriousness of the illness and its expected prognosis, as discussed by palliative care NP day prior. We discussed the patient's goals of care and treatment preferences.     Re-reviewed clinical case and medical recommendations from Neurology, nephrology, and intensivist per notes. Patient's daughter inquired about if medical team had attempted CPAP trial today, discussed with nurse who reported the last CPAP trail was yesterday and at that time the patient did not initiate any breaths and that a trial was not completed this morning. Discussed that patient did received dialysis this morning and despite treatment patient continues with decline neurological status.     Reviewed goals of care with patient's son and daughter. Discussed potential of withdrawal of life-sustaining treatments to which both the son and daughter state if patient is unable to wean from mechanical ventilation the family continue to feel they would not tracheostomy and PEG tube placement. At this time, family goals include to continuing with treatment at this time, states would like to give patient a few more days. We did discuss should family elect tracheostomy and PEG that usually conversation regarding placement occurs around 10 days. Re-iterated that no decisions are to be made at this time, that these are all scenarios or decisions that may have to be made in the near future.    Informed that palliative care team services, unfortunately are not here over the weekend, however would return on Monday and would reassess with family at that time goals of care.     [facility]  I engaged the family in a voluntary conversation about advance care planning and we specifically addressed what the goals of care would be moving forward, in light of the patient's  change in clinical status, specifically current condition and goals of care.  We did specifically address the patient's likely prognosis, which is poor.  We explored the patient's values and preferences for future care.  The family endorses that what is most important right now is to focus on symptom/pain control, improvement in condition but with limits to invasive therapies, and comfort and QOL     Accordingly, we have decided that the best plan to meet the patient's goals includes continuing with treatment    Palliative Care RN visit was spent on advance care planning, goals of care discussion, emotional support, formulating and communicating prognosis and exploring burden/benefit of various approaches of treatment. This discussion occurred on a fully voluntary basis with the verbal consent of the patient and/or family.

## 2025-01-10 NOTE — PLAN OF CARE
Problem: Adult Inpatient Plan of Care  Goal: Plan of Care Review  Outcome: Progressing  Goal: Patient-Specific Goal (Individualized)  Outcome: Progressing  Goal: Absence of Hospital-Acquired Illness or Injury  Outcome: Progressing  Goal: Optimal Comfort and Wellbeing  Outcome: Progressing     Problem: Hemodialysis  Goal: Safe, Effective Therapy Delivery  Outcome: Progressing  Goal: Effective Tissue Perfusion  Outcome: Progressing  Goal: Absence of Infection Signs and Symptoms  Outcome: Progressing     Problem: Stroke, Ischemic (Includes Transient Ischemic Attack)  Goal: Optimal Coping  Outcome: Progressing  Goal: Effective Bowel Elimination  Outcome: Progressing  Goal: Optimal Cerebral Tissue Perfusion  Outcome: Progressing     Problem: Skin Injury Risk Increased  Goal: Skin Health and Integrity  Outcome: Progressing     Problem: Wound  Goal: Optimal Coping  Outcome: Progressing  Goal: Optimal Functional Ability  Outcome: Progressing  Goal: Absence of Infection Signs and Symptoms  Outcome: Progressing  Goal: Optimal Pain Control and Function  Outcome: Progressing  Goal: Skin Health and Integrity  Outcome: Progressing  Goal: Optimal Wound Healing  Outcome: Progressing     Problem: Infection  Goal: Absence of Infection Signs and Symptoms  Outcome: Progressing     Problem: Fall Injury Risk  Goal: Absence of Fall and Fall-Related Injury  Outcome: Progressing     Problem: Coping Ineffective  Goal: Effective Coping  Outcome: Progressing     Problem: Adult Inpatient Plan of Care  Goal: Readiness for Transition of Care  Outcome: Not Progressing     Problem: Stroke, Ischemic (Includes Transient Ischemic Attack)  Goal: Optimal Cognitive Function  Outcome: Not Progressing  Goal: Improved Communication Skills  Outcome: Not Progressing  Goal: Optimal Functional Ability  Outcome: Not Progressing  Goal: Optimal Nutrition Intake  Outcome: Not Progressing  Goal: Effective Oxygenation and Ventilation  Outcome: Not  Progressing  Goal: Improved Sensorimotor Function  Outcome: Not Progressing  Goal: Safe and Effective Swallow  Outcome: Not Progressing  Goal: Effective Urinary Elimination  Outcome: Not Progressing     Problem: Wound  Goal: Improved Oral Intake  Outcome: Not Progressing

## 2025-01-10 NOTE — CONSULTS
Addended by: TYLER RUSHING on: 11/16/2023 09:16 AM     Modules accepted: Orders     [facility] - [facility]  Wound Care    Patient Name:  Krysta Jansen   MRN:  [id number]  Date: 1/10/2025  Diagnosis: <principal problem not specified>    History:     History reviewed. No pertinent past medical history.    Social History     Socioeconomic History    Marital status: Single   Tobacco Use    Smoking status: Unknown   Substance and Sexual Activity    Alcohol use: Not Currently       Precautions:     Allergies as of 01/05/2025    (No Known Allergies)       WO Assessment Details/Treatment        01/10/25 1446   WOCN Assessment   Visit Date 01/10/25   Visit Time 1446   Consult Type New   WOCN Speciality Wound   Wound skin tear   Intervention chart review;assessed;applied;orders   Teaching on-going        Wound 01/05/25 1535 Skin Tear Coccyx   Date First Assessed/Time First Assessed: 01/05/25 1535   Primary Wound Type: Skin Tear  Location: Coccyx   Wound Image    Dressing Appearance Intact;Dried drainage   Drainage Amount Small   Drainage Characteristics/Odor Serosanguineous   Appearance Pink;Red;Blistered   Tissue loss description Partial thickness   Black (%), Wound Tissue Color 0 %   Red (%), Wound Tissue Color 100 %   Yellow (%), Wound Tissue Color 0 %   Periwound Area Dry;Scar tissue;Pink   Wound Edges Irregular   Wound Length (cm) 12 cm   Wound Width (cm) 5 cm   Wound Depth (cm) 0.1 cm   Wound Volume (cm^3) 6 cm^3   Wound Surface Area (cm^2) 60 cm^2   Care Cleansed with:;Soap and water;Applied:;Skin Barrier     WOCN consulted for coccyx. Discussed POC w/nurse Noris who assisted with turning at bedside along with wound care Heide tripp. Family at bedside. Explained reason for visit. Pt. Has a rectal tube in place for fecal management. Pt. Intubated and on an ICU SIDDHARTHA mattress. Treatment recommendations: Sacrum: clean w/ soap/water, dry well, apply zinc oxide (orange top cream) or desitin to area, abd pad, secure with minimal tape. BID/Prn if soilage. Nursing to cont. With tx recs and  preventative measures. Will follow up.     01/10/2025

## 2025-01-10 NOTE — PROGRESS NOTES
Nephrology consult follow up note    HPI:      Krysta Jansen is a 79 y.o. female is a nursing home resident and has ESRD and is on dialysis on Monday Wednesday Friday.  Brought to this hospital for altered mental status.  She was found to have some grand mal seizure.  She received some Ativan.  She had EEG done.  Her respiratory status and mental status deteriorated and required intubation to protect her airways.  Could not get any history from the patient.  Records revealed she was found to have some dysarthria and decreased level of consciousness Friday through being brought to this hospital.  Significant past medical history is positive for diabetes mellitus type 2 previous stroke hypertension coronary artery disease and congestive heart failure.    Interval history:     01/07/2024  Patient remains intubated and sedated.  On epinephrine for hypotension this morning.    01/08/25  Remains intubated and sedated on vent. Off pressors with BP stable at 130/60 range. Noted leucocytosis on am lab. Blood cultures pending.  Hgb stable at 7.6. Dialyzed yesterday and tolerated 500mL UF.     01/09/2025  No acute events overnight.  Patient remains intubated.  Not currently requiring vasopressors, but blood pressure does remain borderline low.  ICU team is requesting to hold hemodialysis today so they can have goals of care conversation with family.    01/10/2025   Currently tolerating dialysis.  Will remove only 500 cc of fluid.  Remains intubated.  Tolerating tube feedings.  Nurses reported that she is not on any sedation for about 48 hours.     Review of Systems:     Unable to obtain ROS due to mental status/intubation.     Past medical, family, surgical, and social history reviewed and unchanged from initial consult note.     Objective:       VITAL SIGNS: 24 HR MIN & MAX LAST    Temp  Min: 98.3 °F (36.8 °C)  Max: 99.1 °F (37.3 °C)  98.6 °F (37 °C)        BP  Min: 97/49  Max: 122/57  (!) 105/56     Pulse  Min: 95  Max:  121  97     Resp  Min: 18  Max: 26  (!) 22    SpO2  Min: 100 %  Max: 100 %  100 %      GEN:  Ill-appearing female, unresponsive.  CV: RRR +S1,S2 without murmur  PULM: CTAB, on ventilator  ABD: Soft, NT/ND abdomen with NABS  EXT: No cyanosis or edema  SKIN: Warm and dry  PSYCH:  Sedated and unresponsive  Dialysis access:  Left IJ PermCath            Component Value Date/Time     (L) 01/10/2025 0321     (L) 01/09/2025 0432     05/08/2024 0739     03/15/2024 1101    K 4.4 01/10/2025 0321    K 3.9 01/09/2025 0432    K 3.8 05/08/2024 0739    K 3.9 03/15/2024 1101    CO2 15 (L) 01/10/2025 0321    CO2 17 (L) 01/09/2025 0432    CO2 22 05/08/2024 0739    CO2 21 (L) 03/15/2024 1101    BUN 38.2 (H) 01/10/2025 0321    BUN 26.4 (H) 01/09/2025 0432    BUN 55 (H) 10/02/2024 0000    BUN 23 (H) 07/12/2024 0000    CREATININE 5.16 (H) 01/10/2025 0321    CREATININE 4.63 (H) 01/09/2025 0432    CREATININE 3.37 (H) 05/08/2024 0739    CREATININE 3.2 (H) 03/15/2024 1101    CALCIUM 7.7 (L) 01/10/2025 0321    CALCIUM 7.8 (L) 01/09/2025 0432    CALCIUM 9 05/08/2024 0739    CALCIUM 9.9 03/15/2024 1101    PHOS 2.8 01/10/2025 0321            Component Value Date/Time    WBC 48.03 (H) 01/10/2025 0702    WBC 20.83 (H) 01/09/2025 0631    HGB 8.3 (L) 01/10/2025 0702    HGB 8.6 (L) 01/09/2025 0631    HGB 10.3 (L) 11/24/2024 0000    HGB 10 (L) 11/18/2024 0000    HCT 25.4 (L) 01/10/2025 0702    HCT 26.2 (L) 01/09/2025 0631     01/10/2025 0702     01/09/2025 0631         Imaging reviewed      Assessment / Plan:         ESRD.  Normally MWF hemodialysis.    New onset seizure  Acute hypoxic respiratory failure  Hypertension, anemia  Diabetes mellitus type 2   History of coronary artery disease   History of previous stroke  Urinary tract infection    Plan:  Will continue dialysis on her on Monday Wednesday Friday and in between if needed  Poor overall prognosis.

## 2025-01-10 NOTE — PLAN OF CARE
Problem: Adult Inpatient Plan of Care  Goal: Plan of Care Review  1/9/2025 1819 by Noris Collins RN  Outcome: Progressing  1/9/2025 0947 by Noris Collins RN  Outcome: Progressing  Goal: Patient-Specific Goal (Individualized)  1/9/2025 1819 by Noris Collins RN  Outcome: Progressing  1/9/2025 0947 by Noris Collins RN  Outcome: Progressing  Goal: Absence of Hospital-Acquired Illness or Injury  1/9/2025 1819 by Noris Collins RN  Outcome: Progressing  1/9/2025 0947 by Noris Collins RN  Outcome: Progressing  Goal: Optimal Comfort and Wellbeing  1/9/2025 1819 by Noris Collins RN  Outcome: Progressing  1/9/2025 0947 by Noris Collins RN  Outcome: Progressing     Problem: Hemodialysis  Goal: Safe, Effective Therapy Delivery  1/9/2025 1819 by Noris Collins RN  Outcome: Progressing  1/9/2025 0947 by Noris Collins RN  Outcome: Progressing  Goal: Effective Tissue Perfusion  1/9/2025 1819 by Noris Collins RN  Outcome: Progressing  1/9/2025 0947 by Noris Collins RN  Outcome: Progressing  Goal: Absence of Infection Signs and Symptoms  1/9/2025 1819 by Noris Collins RN  Outcome: Progressing  1/9/2025 0947 by Noris Collins RN  Outcome: Progressing     Problem: Stroke, Ischemic (Includes Transient Ischemic Attack)  Goal: Optimal Coping  1/9/2025 1819 by Noris Collins RN  Outcome: Progressing  1/9/2025 0947 by Noris Collins RN  Outcome: Progressing  Goal: Effective Bowel Elimination  1/9/2025 1819 by Noris Collins RN  Outcome: Progressing  1/9/2025 0947 by Noris Collins RN  Outcome: Not Progressing  Goal: Optimal Cerebral Tissue Perfusion  1/9/2025 1819 by Noris Collins RN  Outcome: Progressing  1/9/2025 0947 by Noris Collins RN  Outcome: Progressing  Goal: Optimal Nutrition Intake  1/9/2025 1819 by Noris Collins RN  Outcome: Progressing  1/9/2025 0947 by Noris Collins RN  Outcome: Progressing  Goal: Effective Oxygenation and Ventilation  1/9/2025 1819 by Noris Collins RN  Outcome: Progressing  1/9/2025 0947 by  Dennis, Noris, RN  Outcome: Not Progressing  Goal: Effective Urinary Elimination  1/9/2025 1819 by Noris Collins RN  Outcome: Progressing  1/9/2025 0947 by Noris Collins RN  Outcome: Progressing     Problem: Skin Injury Risk Increased  Goal: Skin Health and Integrity  1/9/2025 1819 by Noris Collins RN  Outcome: Progressing  1/9/2025 0947 by Noris Collins RN  Outcome: Progressing     Problem: Wound  Goal: Optimal Coping  1/9/2025 1819 by Noris Collins RN  Outcome: Progressing  1/9/2025 0947 by Noris Collins RN  Outcome: Progressing  Goal: Optimal Functional Ability  1/9/2025 1819 by Noris Collins RN  Outcome: Progressing  1/9/2025 0947 by Noris Collins RN  Outcome: Not Progressing  Goal: Absence of Infection Signs and Symptoms  1/9/2025 1819 by Noris Collins RN  Outcome: Progressing  1/9/2025 0947 by Noris Collins RN  Outcome: Progressing  Goal: Optimal Pain Control and Function  1/9/2025 1819 by Noris Collins RN  Outcome: Progressing  1/9/2025 0947 by Noris Collins RN  Outcome: Progressing  Goal: Skin Health and Integrity  1/9/2025 1819 by Noris Collins RN  Outcome: Progressing  1/9/2025 0947 by Noris Collins RN  Outcome: Progressing  Goal: Optimal Wound Healing  1/9/2025 1819 by Noris Collins RN  Outcome: Progressing  1/9/2025 0947 by Noris Collins RN  Outcome: Progressing     Problem: Infection  Goal: Absence of Infection Signs and Symptoms  1/9/2025 1819 by Noris Collins RN  Outcome: Progressing  1/9/2025 0947 by Noris Collins RN  Outcome: Progressing     Problem: Fall Injury Risk  Goal: Absence of Fall and Fall-Related Injury  1/9/2025 1819 by Noris Collins RN  Outcome: Progressing  1/9/2025 0947 by Noris Collins RN  Outcome: Progressing     Problem: Coping Ineffective  Goal: Effective Coping  1/9/2025 1819 by Noris Collins, RN  Outcome: Progressing  1/9/2025 0947 by Noris Collins, RN  Reactivated     Problem: Adult Inpatient Plan of Care  Goal: Readiness for Transition of Care  1/9/2025 1811  by Dennis, Noris, RN  Outcome: Not Progressing  1/9/2025 0947 by Noris Collins RN  Outcome: Not Progressing     Problem: Stroke, Ischemic (Includes Transient Ischemic Attack)  Goal: Optimal Cognitive Function  1/9/2025 1819 by Noris Collins RN  Outcome: Not Progressing  1/9/2025 0947 by Noris Collins RN  Outcome: Not Progressing  Goal: Improved Communication Skills  1/9/2025 1819 by Noris Collins RN  Outcome: Not Progressing  1/9/2025 0947 by Noris Collins RN  Outcome: Not Progressing  Goal: Optimal Functional Ability  1/9/2025 1819 by Noris Collins RN  Outcome: Not Progressing  1/9/2025 0947 by Noris Collins RN  Outcome: Not Progressing  Goal: Improved Sensorimotor Function  1/9/2025 1819 by Noris Collins RN  Outcome: Not Progressing  1/9/2025 0947 by Noris Collins RN  Outcome: Not Progressing  Goal: Safe and Effective Swallow  1/9/2025 1819 by Noris Collins RN  Outcome: Not Progressing  1/9/2025 0947 by Noris Collins RN  Outcome: Not Progressing     Problem: Wound  Goal: Improved Oral Intake  1/9/2025 1819 by Noris Collins RN  Outcome: Not Progressing  1/9/2025 0947 by Noris Collins RN  Outcome: Not Progressing

## 2025-01-11 LAB
POCT GLUCOSE: 478 MG/DL (ref 70–110)
POCT GLUCOSE: >500 MG/DL (ref 70–110)
POCT GLUCOSE: >500 MG/DL (ref 70–110)

## 2025-01-11 PROCEDURE — 94003 VENT MGMT INPAT SUBQ DAY: CPT

## 2025-01-11 PROCEDURE — 94760 N-INVAS EAR/PLS OXIMETRY 1: CPT

## 2025-01-11 PROCEDURE — 63600175 PHARM REV CODE 636 W HCPCS: Performed by: INTERNAL MEDICINE

## 2025-01-11 PROCEDURE — 94761 N-INVAS EAR/PLS OXIMETRY MLT: CPT

## 2025-01-11 PROCEDURE — 99900031 HC PATIENT EDUCATION (STAT)

## 2025-01-11 PROCEDURE — 11000001 HC ACUTE MED/SURG PRIVATE ROOM

## 2025-01-11 PROCEDURE — 25000003 PHARM REV CODE 250

## 2025-01-11 PROCEDURE — 25000003 PHARM REV CODE 250: Performed by: INTERNAL MEDICINE

## 2025-01-11 PROCEDURE — 99900026 HC AIRWAY MAINTENANCE (STAT)

## 2025-01-11 PROCEDURE — 27200966 HC CLOSED SUCTION SYSTEM

## 2025-01-11 PROCEDURE — 99900035 HC TECH TIME PER 15 MIN (STAT)

## 2025-01-11 PROCEDURE — 27100171 HC OXYGEN HIGH FLOW UP TO 24 HOURS

## 2025-01-11 PROCEDURE — 63600175 PHARM REV CODE 636 W HCPCS

## 2025-01-11 RX ORDER — PANTOPRAZOLE SODIUM 40 MG/10ML
40 INJECTION, POWDER, LYOPHILIZED, FOR SOLUTION INTRAVENOUS DAILY
Status: DISCONTINUED | OUTPATIENT
Start: 2025-01-11 | End: 2025-02-03

## 2025-01-11 RX ORDER — INSULIN ASPART 100 [IU]/ML
0-5 INJECTION, SOLUTION INTRAVENOUS; SUBCUTANEOUS EVERY 6 HOURS PRN
Status: CANCELLED | OUTPATIENT
Start: 2025-01-11

## 2025-01-11 RX ADMIN — INSULIN ASPART 5 UNITS: 100 INJECTION, SOLUTION INTRAVENOUS; SUBCUTANEOUS at 05:01

## 2025-01-11 RX ADMIN — ENOXAPARIN SODIUM 30 MG: 30 INJECTION SUBCUTANEOUS at 05:01

## 2025-01-11 RX ADMIN — LEVETIRACETAM 500 MG: 100 INJECTION, SOLUTION INTRAVENOUS at 09:01

## 2025-01-11 RX ADMIN — PANTOPRAZOLE SODIUM 40 MG: 40 INJECTION, POWDER, FOR SOLUTION INTRAVENOUS at 01:01

## 2025-01-11 RX ADMIN — Medication: at 09:01

## 2025-01-11 RX ADMIN — INSULIN ASPART 5 UNITS: 100 INJECTION, SOLUTION INTRAVENOUS; SUBCUTANEOUS at 12:01

## 2025-01-11 RX ADMIN — VALPROATE SODIUM 500 MG: 100 INJECTION, SOLUTION INTRAVENOUS at 12:01

## 2025-01-11 RX ADMIN — CIPROFLOXACIN 400 MG: 2 INJECTION, SOLUTION INTRAVENOUS at 01:01

## 2025-01-11 NOTE — PROGRESS NOTES
NEPHROLOGY PROGRESS NOTE      Patient Demographics:  Name:  Krysta Jansen  Age: 79 y.o.  MRN:  [id number]  Admission Date: 1/5/2025      Subjective:      Remains on vent  No sedation    Family considering a palliative course    HD done yesterday    Current Facility-Administered Medications   Medication Dose Route Frequency Provider Last Rate Last Admin    bisacodyL suppository 10 mg  10 mg Rectal Daily PRN David Yoder MD        ciprofloxacin (CIPRO)400mg/200ml D5W IVPB 400 mg  400 mg Intravenous Q24H DAMARIS Jaime MD   Stopped at 01/10/25 1533    dextrose 50% injection 12.5 g  12.5 g Intravenous PRN David Yoder MD        enoxaparin injection 30 mg  30 mg Subcutaneous Q24H (prophylaxis, 1700) David Yoder MD   30 mg at 01/10/25 1649    EPINEPHrine (ADRENALIN) 5 mg in D5W 250 mL infusion  0-2 mcg/kg/min (Dosing Weight) Intravenous Continuous Jean Pierre Friedman MD 4.4 mL/hr at 01/08/25 1801 0.02 mcg/kg/min at 01/08/25 1801    glucagon (human recombinant) injection 1 mg  1 mg Intramuscular PRN David Yoder MD        heparin (porcine) injection 5,000 Units  5,000 Units Intra-Catheter PRN Jean Shine DO        insulin aspart U-100 injection 0-5 Units  0-5 Units Subcutaneous Q6H PRN David Yoder MD   4 Units at 01/10/25 1753    labetaloL injection 10 mg  10 mg Intravenous Q15 Min PRN David Yoder MD        levETIRAcetam (Keppra) 500 mg in D5W 100 mL IVPB (MB+)  500 mg Intravenous Q12H Genevieve Cheney AGACNP- mL/hr at 01/11/25 0903 500 mg at 01/11/25 0903    propofol (DIPRIVAN) 10 mg/mL infusion  0-50 mcg/kg/min (Dosing Weight) Intravenous Continuous Jean Pierre Friedman MD   Stopped at 01/08/25 0947    sodium chloride 0.9% flush 10 mL  10 mL Intravenous PRN David Yoder MD        valproate (DEPACON) 500 mg in D5W 50 mL IVPB  500 mg Intravenous Q12H DAMARIS Jaime MD   Stopped at 01/11/25 0158    zinc oxide-cod liver oil 40 % paste    Topical (Top) BID DAMARIS Jaime MD   Given at 01/11/25 0902           Review of Systems   Unable to perform ROS: Intubated         Objective:    BP (!) 109/52 (BP Location: Left arm, Patient Position: Lying)   Pulse 110   Temp 98.7 °F (37.1 °C) (Oral)   Resp (!) 22   Ht 5' (1.524 m)   Wt 74.1 kg (163 lb 5.8 oz)   SpO2 100%   BMI 31.90 kg/m²       Intake/Output Summary (Last 24 hours) at 1/11/2025 0910  Last data filed at 1/11/2025 0700  Gross per 24 hour   Intake 1340.78 ml   Output 1130 ml   Net 210.78 ml             Physical Exam  Vitals reviewed.   Constitutional:       Appearance: Normal appearance.   HENT:      Head: Normocephalic and atraumatic.      Nose: Nose normal.   Cardiovascular:      Rate and Rhythm: Normal rate and regular rhythm.      Pulses: Normal pulses.      Heart sounds: Normal heart sounds.   Pulmonary:      Comments: On vent  Diminished bases  Abdominal:      General: Bowel sounds are normal.      Palpations: Abdomen is soft.   Musculoskeletal:         General: Normal range of motion.      Cervical back: Normal range of motion.      Comments: Sig deconditioning   Skin:     General: Skin is warm and dry.            Inpatient Diagnostics:  Recent Results (from the past 24 hours)   POCT glucose    Collection Time: 01/10/25 12:12 PM   Result Value Ref Range    POCT Glucose 208 (H) 70 - 110 mg/dL   POCT glucose    Collection Time: 01/10/25  5:46 PM   Result Value Ref Range    POCT Glucose 354 (H) 70 - 110 mg/dL   POCT glucose    Collection Time: 01/10/25  5:48 PM   Result Value Ref Range    POCT Glucose 303 (H) 70 - 110 mg/dL       A/P--NE 01/11    1---ESRD on HD--Cont MWF schedule while inpatient  2---Seizure---Cont Keppra/ Valproate---per Neurology  3---Resp Failure--per Pulm  4---Anemia secondary to CKD--Follow H&H---Fe studies reviewed    IV--SL    TF--In Progress  Free H20 50cc q 4 hours    ORDERS:  CBC/CMP in am                Agree with above.  Patient examined.  Orders  reviewed.  Intubated/sedated  CTAB  No edema    HD as scheduled.  Continue supportive care.  Will follow.

## 2025-01-11 NOTE — PLAN OF CARE
Problem: Adult Inpatient Plan of Care  Goal: Plan of Care Review  Outcome: Progressing  Goal: Patient-Specific Goal (Individualized)  Outcome: Progressing  Goal: Absence of Hospital-Acquired Illness or Injury  Outcome: Progressing  Goal: Optimal Comfort and Wellbeing  Outcome: Progressing  Goal: Readiness for Transition of Care  Outcome: Progressing     Problem: Hemodialysis  Goal: Safe, Effective Therapy Delivery  Outcome: Progressing  Goal: Effective Tissue Perfusion  Outcome: Progressing  Goal: Absence of Infection Signs and Symptoms  Outcome: Progressing     Problem: Stroke, Ischemic (Includes Transient Ischemic Attack)  Goal: Optimal Coping  Outcome: Progressing  Goal: Effective Bowel Elimination  Outcome: Progressing  Goal: Optimal Cerebral Tissue Perfusion  Outcome: Progressing  Goal: Optimal Cognitive Function  Outcome: Progressing  Goal: Improved Communication Skills  Outcome: Progressing  Goal: Optimal Functional Ability  Outcome: Progressing  Goal: Optimal Nutrition Intake  Outcome: Progressing  Goal: Effective Oxygenation and Ventilation  Outcome: Progressing  Goal: Improved Sensorimotor Function  Outcome: Progressing  Goal: Safe and Effective Swallow  Outcome: Progressing  Goal: Effective Urinary Elimination  Outcome: Progressing     Problem: Skin Injury Risk Increased  Goal: Skin Health and Integrity  Outcome: Progressing     Problem: Wound  Goal: Optimal Coping  Outcome: Progressing  Goal: Optimal Functional Ability  Outcome: Progressing  Goal: Absence of Infection Signs and Symptoms  Outcome: Progressing  Goal: Improved Oral Intake  Outcome: Progressing  Goal: Optimal Pain Control and Function  Outcome: Progressing  Goal: Skin Health and Integrity  Outcome: Progressing  Goal: Optimal Wound Healing  Outcome: Progressing     Problem: Infection  Goal: Absence of Infection Signs and Symptoms  Outcome: Progressing     Problem: Fall Injury Risk  Goal: Absence of Fall and Fall-Related Injury  Outcome:  Progressing     Problem: Coping Ineffective  Goal: Effective Coping  Outcome: Progressing

## 2025-01-11 NOTE — PROGRESS NOTES
Pulmonary & Critical Care Medicine   Progress Note      Presenting History/HPI:    Patient is a 79-year-old female with a past medical history of ESRD on HD, HTN, CAD, CHF unknown EF, DM2, previous left MCA territory stroke who was brought into the ER after her son reported seeing her normal an hour prior, briefly left in when came back patient was nonverbal and lethargic.       She was emergently brought to the ER and a code fast was called, she was assessed by Neurology in the ER where she was found to have an NIH of 22 based on dysarthria and level of consciousness, but was deemed not a candidate for TNK secondary to overall nonfocal exam and recent left arm HD access surgery reported.  Vitals and laboratory work were overall unremarkable except for changes as expected in any age renal disease, and a potential urinary tract infection.  She is admitted under typical stroke protocol.      1/6/2025, EEG at 0800 revealed seizure-like activity. She was given Lorazepam 2 mg IV. Patient later became unconscious, unresponsive to pain stimuli, and hypotensive. She was intubated for airway protection and admitted to the ICU on 01/06/2025.      Interval History:  -no major issues or changes overnight   -no labs available for evaluation this morning   -white blood cell count up to 48.0 K on 01/10  -nursing staff noted significant voluminous liquid stool output which is greenish in color now with fecal management device in  -urine culture from 01/05 positive on 01/07 for 100 K CFU Pseudomonas aeruginosa  -the patient remains intubated off all sedation and unresponsive    Scheduled Medications:    ciprofloxacin  400 mg Intravenous Q24H    enoxparin  30 mg Subcutaneous Q24H (prophylaxis, 1700)    levETIRAcetam (Keppra) IV (PEDS and ADULTS)  500 mg Intravenous Q12H    valproate sodium (DEPACON) IVPB  500 mg Intravenous Q12H    zinc oxide-cod liver oil   Topical (Top) BID       PRN Medications:     Current  Facility-Administered Medications:     bisacodyL, 10 mg, Rectal, Daily PRN    dextrose 50%, 12.5 g, Intravenous, PRN    glucagon (human recombinant), 1 mg, Intramuscular, PRN    heparin (porcine), 5,000 Units, Intra-Catheter, PRN    insulin aspart U-100, 0-5 Units, Subcutaneous, Q6H PRN    labetalol, 10 mg, Intravenous, Q15 Min PRN    sodium chloride 0.9%, 10 mL, Intravenous, PRN      Infusions:     EPINEPHrine  0-2 mcg/kg/min (Dosing Weight) Intravenous Continuous 4.4 mL/hr at 01/08/25 1801 0.02 mcg/kg/min at 01/08/25 1801    propofoL  0-50 mcg/kg/min (Dosing Weight) Intravenous Continuous   Stopped at 01/08/25 0947         Fluid Balance:     Intake/Output Summary (Last 24 hours) at 1/11/2025 1244  Last data filed at 1/11/2025 1000  Gross per 24 hour   Intake 1438.39 ml   Output 300 ml   Net 1138.39 ml         Vital Signs:   Vitals:    01/11/25 1230   BP:    Pulse: 110   Resp: (!) 22   Temp:          Physical Exam  Vitals and nursing note reviewed.   Constitutional:       General: She is not in acute distress.     Appearance: Normal appearance. She is ill-appearing. She is not toxic-appearing.   HENT:      Head: Normocephalic and atraumatic.      Right Ear: External ear normal.      Left Ear: External ear normal.      Nose: Nose normal.      Mouth/Throat:      Pharynx: No posterior oropharyngeal erythema.      Comments: Unable to visualize posterior oropharynx secondary to endotracheal tube  Eyes:      General: No scleral icterus.     Conjunctiva/sclera: Conjunctivae normal.      Pupils: Pupils are equal, round, and reactive to light.   Neck:      Vascular: No carotid bruit.   Cardiovascular:      Rate and Rhythm: Normal rate and regular rhythm.      Pulses: Normal pulses.      Heart sounds: Normal heart sounds. No murmur heard.     No friction rub. No gallop.   Pulmonary:      Effort: Pulmonary effort is normal. No respiratory distress.      Breath sounds: Normal breath sounds. No wheezing, rhonchi or rales.       "Comments: Intubated, on mechanical ventilation  Abdominal:      General: Abdomen is flat. Bowel sounds are normal. There is no distension.      Palpations: Abdomen is soft.      Tenderness: There is no abdominal tenderness. There is no guarding or rebound.   Musculoskeletal:         General: No swelling or deformity.      Cervical back: Neck supple. No rigidity or tenderness.   Skin:     General: Skin is warm and dry.      Capillary Refill: Capillary refill takes less than 2 seconds.      Findings: No erythema or rash.   Neurological:      Comments: Unable to fully assess neurologic status and orientation secondary to patient being intubated and nonverbal, eyes are deviated upwards with sluggish pupils, absent corneal reflex, cough reflex present but weak, absent oculocephalic reflex, no withdrawal to painful stimulus in all 4 extremities, no response to name call or tactile stimulus   Psychiatric:      Comments: Unable to fully assess as patient is intubated and nonverbal           Ventilator Settings  Vent Mode: A/C (01/11/25 1125)  Ventilator Initiated: Yes (01/06/25 1104)  Set Rate: 22 BPM (01/11/25 1125)  Vt Set: 450 mL (01/11/25 1125)  PEEP/CPAP: 5 cmH20 (01/11/25 1125)  Oxygen Concentration (%): 30 (01/11/25 1200)  Peak Airway Pressure: 24 cmH20 (01/11/25 1125)  Total Ve: 9.7 L/m (01/11/25 1125)  F/VT Ratio<105 (RSBI): (!) 50.11 (01/11/25 1125)      Laboratory Studies:   No results for input(s): "PH", "PCO2", "PO2", "HCO3", "POCSATURATED", "BE" in the last 24 hours.  No results for input(s): "WBC", "RBC", "HGB", "HCT", "PLT", "MCV", "MCH", "MCHC" in the last 24 hours.  No results for input(s): "GLUCOSE", "NA", "K", "CL", "CO2", "BUN", "CREATININE", "CALCIUM", "MG" in the last 24 hours.      Microbiology Data:   Microbiology Results (last 7 days)       Procedure Component Value Units Date/Time    Respiratory Culture [[id number]] Collected: 01/10/25 0822    Order Status: Completed Specimen: Sputum, Induced " Updated: 01/11/25 0743     Respiratory Culture No Growth At 24 Hours     GRAM STAIN Quality 2+      No bacteria seen    Blood Culture #1 **CANNOT BE ORDERED STAT** [[id number]]  (Normal) Collected: 01/05/25 2010    Order Status: Completed Specimen: Blood Updated: 01/10/25 2201     Blood Culture No Growth at 5 days    Blood Culture #2 **CANNOT BE ORDERED STAT** [[id number]]  (Normal) Collected: 01/05/25 2010    Order Status: Completed Specimen: Blood Updated: 01/10/25 2201     Blood Culture No Growth at 5 days    Urine culture [[id number]]  (Abnormal)  (Susceptibility) Collected: 01/05/25 1633    Order Status: Completed Specimen: Urine Updated: 01/07/25 0738     Urine Culture >/= 100,000 colonies/ml Pseudomonas aeruginosa              Imaging:   X-Ray Chest 1 View  Narrative: EXAMINATION:  XR CHEST 1 VIEW    CLINICAL HISTORY:  respiratory failure;    TECHNIQUE:  AP chest    COMPARISON:  Chest x-ray dated 01/06/2025    FINDINGS:  Endotracheal tube, enteric tube and left-sided central line remain in place.  The heart is stable in size.  There is no focal airspace consolidation.  There is no pleural effusion or visible pneumothorax.  Impression: Stable exam without significant interval change.    Electronically signed by: Joana Bobo  Date:    01/10/2025  Time:    06:07          Assessment and Plan    Assessment:  Persistent nonconvulsive status epilepticus   Acute respiratory failure secondary to the above   Mechanical ventilation  Pseudomonas UTI  Prior left MCA territory stroke   Hypotension (resolved)   ESRD   Hx of HTN, CAD, DM2   Loose/liquid stool/bowel movements  Leukocytosis    Plan:  -titrate mechanical ventilation for ARDS net protocol   -supplement oxygen to maintain saturation 94-96%  -continue current antiepileptic regimen   -finish a total of 3 days of ciprofloxacin for Pseudomonas UTI   -now with worsening leukocytosis and liquid stools suspicion for C diff his very high, will check stool to rule  out C diff infection  -underlying neurologic status is very poor at this time, she has been off all sedation for several days now with no meaningful neurologic response or activity on exam, the patient is a DNR will need to have further discussions about overall goals of care as her overall prognosis is very poor-appreciate assistance from palliative care with family discussions, the family plans to return on 1/13 2 discuss goals of care      DVT ppx/tx with lovenox  GI ppx with protonix  Keep HOB elevated > 30*      The patient remains at high risk of decompensation and death and will remain in ICU level care     38 min of critical care time was spent reviewing the patient's chart including medications, radiographs, labs, pertinent cultures and pathology data, other consultant notes/recomendations as well as titration of vasopressors, adjustment of mechanical ventilatory or NIPPV support, as well as discussion of goals of care with nursing staff, respiratory therapy at the bedside and with family at the bedside/via phone.        Feliciano Gray MD  1/11/2025  Pulmonology/Critical Care

## 2025-01-12 LAB
ABORH RETYPE: NORMAL
ABS NEUT (OLG): 25.91 X10(3)/MCL (ref 2.1–9.2)
ALBUMIN SERPL-MCNC: 1.6 G/DL (ref 3.4–4.8)
ALBUMIN/GLOB SERPL: 0.4 RATIO (ref 1.1–2)
ALP SERPL-CCNC: 133 UNIT/L (ref 40–150)
ALT SERPL-CCNC: 14 UNIT/L (ref 0–55)
ANION GAP SERPL CALC-SCNC: 10 MEQ/L
ANISOCYTOSIS BLD QL SMEAR: ABNORMAL
AST SERPL-CCNC: 15 UNIT/L (ref 5–34)
BACTERIA SPEC CULT: NORMAL
BASOPHILS NFR BLD MANUAL: 0.29 X10(3)/MCL (ref 0–0.2)
BASOPHILS NFR BLD MANUAL: 1 %
BILIRUB SERPL-MCNC: 0.2 MG/DL
BUN SERPL-MCNC: 58.6 MG/DL (ref 9.8–20.1)
CALCIUM SERPL-MCNC: 7.9 MG/DL (ref 8.4–10.2)
CHLORIDE SERPL-SCNC: 97 MMOL/L (ref 98–107)
CO2 SERPL-SCNC: 21 MMOL/L (ref 23–31)
CREAT SERPL-MCNC: 4.27 MG/DL (ref 0.55–1.02)
CREAT/UREA NIT SERPL: 14
EOSINOPHIL NFR BLD MANUAL: 0.29 X10(3)/MCL (ref 0–0.9)
EOSINOPHIL NFR BLD MANUAL: 1 %
ERYTHROCYTE [DISTWIDTH] IN BLOOD BY AUTOMATED COUNT: 16.5 % (ref 11.5–17)
GFR SERPLBLD CREATININE-BSD FMLA CKD-EPI: 10 ML/MIN/1.73/M2
GLOBULIN SER-MCNC: 3.9 GM/DL (ref 2.4–3.5)
GLUCOSE SERPL-MCNC: 499 MG/DL (ref 82–115)
GRAM STN SPEC: NORMAL
GRAM STN SPEC: NORMAL
GROUP & RH: NORMAL
HCT VFR BLD AUTO: 20.8 % (ref 37–47)
HGB BLD-MCNC: 6.9 G/DL (ref 12–16)
INDIRECT COOMBS: NORMAL
INSTRUMENT WBC (OLG): 29.44 X10(3)/MCL
LYMPHOCYTES NFR BLD MANUAL: 0.88 X10(3)/MCL
LYMPHOCYTES NFR BLD MANUAL: 3 %
MACROCYTES BLD QL SMEAR: ABNORMAL
MCH RBC QN AUTO: 30.3 PG (ref 27–31)
MCHC RBC AUTO-ENTMCNC: 33.2 G/DL (ref 33–36)
MCV RBC AUTO: 91.2 FL (ref 80–94)
MONOCYTES NFR BLD MANUAL: 1.77 X10(3)/MCL (ref 0.1–1.3)
MONOCYTES NFR BLD MANUAL: 6 %
MYELOCYTES NFR BLD MANUAL: 1 %
NEUTROPHILS NFR BLD MANUAL: 88 %
NRBC BLD AUTO-RTO: 0 %
PLATELET # BLD AUTO: 186 X10(3)/MCL (ref 130–400)
PLATELET # BLD EST: NORMAL 10*3/UL
PMV BLD AUTO: 10 FL (ref 7.4–10.4)
POCT GLUCOSE: 453 MG/DL (ref 70–110)
POCT GLUCOSE: >500 MG/DL (ref 70–110)
POIKILOCYTOSIS BLD QL SMEAR: ABNORMAL
POTASSIUM SERPL-SCNC: 4 MMOL/L (ref 3.5–5.1)
PROT SERPL-MCNC: 5.5 GM/DL (ref 5.8–7.6)
RBC # BLD AUTO: 2.28 X10(6)/MCL (ref 4.2–5.4)
RBC MORPH BLD: ABNORMAL
SODIUM SERPL-SCNC: 128 MMOL/L (ref 136–145)
SPECIMEN OUTDATE: NORMAL
WBC # BLD AUTO: 29.44 X10(3)/MCL (ref 4.5–11.5)

## 2025-01-12 PROCEDURE — 63600175 PHARM REV CODE 636 W HCPCS: Performed by: INTERNAL MEDICINE

## 2025-01-12 PROCEDURE — 36415 COLL VENOUS BLD VENIPUNCTURE: CPT | Performed by: INTERNAL MEDICINE

## 2025-01-12 PROCEDURE — 99900026 HC AIRWAY MAINTENANCE (STAT)

## 2025-01-12 PROCEDURE — 63600175 PHARM REV CODE 636 W HCPCS

## 2025-01-12 PROCEDURE — 11000001 HC ACUTE MED/SURG PRIVATE ROOM

## 2025-01-12 PROCEDURE — 86901 BLOOD TYPING SEROLOGIC RH(D): CPT

## 2025-01-12 PROCEDURE — 94003 VENT MGMT INPAT SUBQ DAY: CPT

## 2025-01-12 PROCEDURE — 94761 N-INVAS EAR/PLS OXIMETRY MLT: CPT

## 2025-01-12 PROCEDURE — 27100171 HC OXYGEN HIGH FLOW UP TO 24 HOURS

## 2025-01-12 PROCEDURE — 36415 COLL VENOUS BLD VENIPUNCTURE: CPT

## 2025-01-12 PROCEDURE — 99900035 HC TECH TIME PER 15 MIN (STAT)

## 2025-01-12 PROCEDURE — 25000003 PHARM REV CODE 250

## 2025-01-12 PROCEDURE — 80053 COMPREHEN METABOLIC PANEL: CPT | Performed by: NURSE PRACTITIONER

## 2025-01-12 PROCEDURE — 85025 COMPLETE CBC W/AUTO DIFF WBC: CPT | Performed by: NURSE PRACTITIONER

## 2025-01-12 PROCEDURE — 94760 N-INVAS EAR/PLS OXIMETRY 1: CPT

## 2025-01-12 PROCEDURE — 99900031 HC PATIENT EDUCATION (STAT)

## 2025-01-12 PROCEDURE — 25000003 PHARM REV CODE 250: Performed by: INTERNAL MEDICINE

## 2025-01-12 PROCEDURE — 36415 COLL VENOUS BLD VENIPUNCTURE: CPT | Performed by: NURSE PRACTITIONER

## 2025-01-12 RX ORDER — GLUCAGON 1 MG
1 KIT INJECTION
Status: DISCONTINUED | OUTPATIENT
Start: 2025-01-12 | End: 2025-02-03 | Stop reason: HOSPADM

## 2025-01-12 RX ORDER — INSULIN GLARGINE 100 [IU]/ML
10 INJECTION, SOLUTION SUBCUTANEOUS 2 TIMES DAILY
Status: DISCONTINUED | OUTPATIENT
Start: 2025-01-12 | End: 2025-01-15

## 2025-01-12 RX ORDER — INSULIN ASPART 100 [IU]/ML
0-15 INJECTION, SOLUTION INTRAVENOUS; SUBCUTANEOUS EVERY 6 HOURS PRN
Status: DISCONTINUED | OUTPATIENT
Start: 2025-01-12 | End: 2025-02-03 | Stop reason: HOSPADM

## 2025-01-12 RX ADMIN — LEVETIRACETAM 500 MG: 100 INJECTION, SOLUTION INTRAVENOUS at 09:01

## 2025-01-12 RX ADMIN — VALPROATE SODIUM 500 MG: 100 INJECTION, SOLUTION INTRAVENOUS at 12:01

## 2025-01-12 RX ADMIN — PANTOPRAZOLE SODIUM 40 MG: 40 INJECTION, POWDER, FOR SOLUTION INTRAVENOUS at 09:01

## 2025-01-12 RX ADMIN — INSULIN GLARGINE 10 UNITS: 100 INJECTION, SOLUTION SUBCUTANEOUS at 08:01

## 2025-01-12 RX ADMIN — Medication: at 08:01

## 2025-01-12 RX ADMIN — ENOXAPARIN SODIUM 30 MG: 30 INJECTION SUBCUTANEOUS at 05:01

## 2025-01-12 RX ADMIN — LEVETIRACETAM 500 MG: 100 INJECTION, SOLUTION INTRAVENOUS at 08:01

## 2025-01-12 RX ADMIN — INSULIN GLARGINE 10 UNITS: 100 INJECTION, SOLUTION SUBCUTANEOUS at 01:01

## 2025-01-12 RX ADMIN — INSULIN ASPART 15 UNITS: 100 INJECTION, SOLUTION INTRAVENOUS; SUBCUTANEOUS at 06:01

## 2025-01-12 RX ADMIN — INSULIN ASPART 10 UNITS: 100 INJECTION, SOLUTION INTRAVENOUS; SUBCUTANEOUS at 02:01

## 2025-01-12 RX ADMIN — Medication: at 09:01

## 2025-01-12 RX ADMIN — INSULIN ASPART 15 UNITS: 100 INJECTION, SOLUTION INTRAVENOUS; SUBCUTANEOUS at 01:01

## 2025-01-12 NOTE — PROGRESS NOTES
Pulmonary & Critical Care Medicine   Progress Note      Presenting History/HPI:    Patient is a 79-year-old female with a past medical history of ESRD on HD, HTN, CAD, CHF unknown EF, DM2, previous left MCA territory stroke who was brought into the ER after her son reported seeing her normal an hour prior, briefly left in when came back patient was nonverbal and lethargic.       She was emergently brought to the ER and a code fast was called, she was assessed by Neurology in the ER where she was found to have an NIH of 22 based on dysarthria and level of consciousness, but was deemed not a candidate for TNK secondary to overall nonfocal exam and recent left arm HD access surgery reported.  Vitals and laboratory work were overall unremarkable except for changes as expected in any age renal disease, and a potential urinary tract infection.  She is admitted under typical stroke protocol.      1/6/2025, EEG at 0800 revealed seizure-like activity. She was given Lorazepam 2 mg IV. Patient later became unconscious, unresponsive to pain stimuli, and hypotensive. She was intubated for airway protection and admitted to the ICU on 01/06/2025.      Interval History:  -no major issues or changes overnight   -no labs available for evaluation this morning   -white blood cell count down to 29.4 K  -hemoglobin down to 6.9 this morning  -the patient remains intubated off all sedation and still unresponsive  -the family's planning to withdraw care on 01/13 per nursing report    Scheduled Medications:    enoxparin  30 mg Subcutaneous Q24H (prophylaxis, 1700)    insulin glargine U-100  10 Units Subcutaneous BID    levETIRAcetam (Keppra) IV (PEDS and ADULTS)  500 mg Intravenous Q12H    pantoprazole  40 mg Intravenous Daily    valproate sodium (DEPACON) IVPB  500 mg Intravenous Q12H    zinc oxide-cod liver oil   Topical (Top) BID       PRN Medications:     Current Facility-Administered Medications:     bisacodyL, 10 mg, Rectal, Daily  PRN    dextrose 50%, 12.5 g, Intravenous, PRN    glucagon (human recombinant), 1 mg, Intramuscular, PRN    heparin (porcine), 5,000 Units, Intra-Catheter, PRN    insulin aspart U-100, 0-15 Units, Subcutaneous, Q6H PRN    labetalol, 10 mg, Intravenous, Q15 Min PRN    sodium chloride 0.9%, 10 mL, Intravenous, PRN      Infusions:     EPINEPHrine  0-2 mcg/kg/min (Dosing Weight) Intravenous Continuous 4.4 mL/hr at 01/08/25 1801 0.02 mcg/kg/min at 01/08/25 1801    propofoL  0-50 mcg/kg/min (Dosing Weight) Intravenous Continuous   Stopped at 01/08/25 0947         Fluid Balance:     Intake/Output Summary (Last 24 hours) at 1/12/2025 1307  Last data filed at 1/12/2025 0700  Gross per 24 hour   Intake 952.38 ml   Output 1200 ml   Net -247.62 ml         Vital Signs:   Vitals:    01/12/25 1135   BP:    Pulse: 101   Resp: (!) 22   Temp:          Physical Exam  Vitals and nursing note reviewed.   Constitutional:       General: She is not in acute distress.     Appearance: Normal appearance. She is ill-appearing. She is not toxic-appearing.   HENT:      Head: Normocephalic and atraumatic.      Right Ear: External ear normal.      Left Ear: External ear normal.      Nose: Nose normal.      Mouth/Throat:      Pharynx: No posterior oropharyngeal erythema.      Comments: Unable to visualize posterior oropharynx secondary to endotracheal tube  Eyes:      General: No scleral icterus.     Conjunctiva/sclera: Conjunctivae normal.      Pupils: Pupils are equal, round, and reactive to light.   Neck:      Vascular: No carotid bruit.   Cardiovascular:      Rate and Rhythm: Normal rate and regular rhythm.      Pulses: Normal pulses.      Heart sounds: Normal heart sounds. No murmur heard.     No friction rub. No gallop.   Pulmonary:      Effort: Pulmonary effort is normal. No respiratory distress.      Breath sounds: Normal breath sounds. No wheezing, rhonchi or rales.      Comments: Intubated, on mechanical ventilation  Abdominal:       "General: Abdomen is flat. Bowel sounds are normal. There is no distension.      Palpations: Abdomen is soft.      Tenderness: There is no abdominal tenderness. There is no guarding or rebound.   Musculoskeletal:         General: No swelling or deformity.      Cervical back: Neck supple. No rigidity or tenderness.   Skin:     General: Skin is warm and dry.      Capillary Refill: Capillary refill takes less than 2 seconds.      Findings: No erythema or rash.   Neurological:      Comments: Unable to fully assess neurologic status and orientation secondary to patient being intubated and nonverbal, eyes are deviated upwards with sluggish pupils, absent corneal reflex, cough reflex present but weak, absent oculocephalic reflex, no withdrawal to painful stimulus in all 4 extremities, no response to name call or tactile stimulus   Psychiatric:      Comments: Unable to fully assess as patient is intubated and nonverbal           Ventilator Settings  Vent Mode: A/C (01/12/25 1135)  Ventilator Initiated: Yes (01/06/25 1104)  Set Rate: 22 BPM (01/12/25 1135)  Vt Set: 450 mL (01/12/25 1135)  PEEP/CPAP: 5 cmH20 (01/12/25 1135)  Oxygen Concentration (%): 30 (01/12/25 1135)  Peak Airway Pressure: 21 cmH20 (01/12/25 1135)  Total Ve: 9.6 L/m (01/12/25 1135)  F/VT Ratio<105 (RSBI): (!) 50.46 (01/12/25 1135)      Laboratory Studies:   No results for input(s): "PH", "PCO2", "PO2", "HCO3", "POCSATURATED", "BE" in the last 24 hours.  Recent Labs   Lab 01/12/25  0256   WBC 29.44  29.44*   RBC 2.28*   HGB 6.9*   HCT 20.8*      MCV 91.2   MCH 30.3   MCHC 33.2     Recent Labs   Lab 01/12/25 0256   GLUCOSE 499*   *   K 4.0   CL 97*   CO2 21*   BUN 58.6*   CREATININE 4.27*   CALCIUM 7.9*         Microbiology Data:   Microbiology Results (last 7 days)       Procedure Component Value Units Date/Time    Respiratory Culture [[id number]] Collected: 01/10/25 0822    Order Status: Completed Specimen: Sputum, Induced Updated: 01/12/25 " 0721     Respiratory Culture Rare normal respiratory margaret     GRAM STAIN Quality 2+      No bacteria seen    Blood Culture #1 **CANNOT BE ORDERED STAT** [[id number]]  (Normal) Collected: 01/05/25 2010    Order Status: Completed Specimen: Blood Updated: 01/10/25 2201     Blood Culture No Growth at 5 days    Blood Culture #2 **CANNOT BE ORDERED STAT** [[id number]]  (Normal) Collected: 01/05/25 2010    Order Status: Completed Specimen: Blood Updated: 01/10/25 2201     Blood Culture No Growth at 5 days    Urine culture [[id number]]  (Abnormal)  (Susceptibility) Collected: 01/05/25 1633    Order Status: Completed Specimen: Urine Updated: 01/07/25 0738     Urine Culture >/= 100,000 colonies/ml Pseudomonas aeruginosa              Imaging:   X-Ray Chest 1 View  Narrative: EXAMINATION:  XR CHEST 1 VIEW    CLINICAL HISTORY:  respiratory failure;    TECHNIQUE:  AP chest    COMPARISON:  Chest x-ray dated 01/06/2025    FINDINGS:  Endotracheal tube, enteric tube and left-sided central line remain in place.  The heart is stable in size.  There is no focal airspace consolidation.  There is no pleural effusion or visible pneumothorax.  Impression: Stable exam without significant interval change.    Electronically signed by: Joana Bobo  Date:    01/10/2025  Time:    06:07          Assessment and Plan    Assessment:  Persistent nonconvulsive status epilepticus   Acute respiratory failure secondary to the above   Mechanical ventilation  Pseudomonas UTI  Prior left MCA territory stroke   Hypotension (resolved)   ESRD   Hx of HTN, CAD, DM2   Loose/liquid stool/bowel movements  Leukocytosis    Plan:  -titrate mechanical ventilation for ARDS net protocol   -supplement oxygen to maintain saturation 94-96%  -continue current antiepileptic regimen   -finished a total of 3 days of ciprofloxacin for Pseudomonas UTI   -leukocytosis improving without fever, no other clear source of infection besides UTI  -will add Lantus 10u  b.i.d.  -underlying neurologic status is very poor at this time, she has been off all sedation for several days now with no meaningful neurologic response or activity on exam, the patient is a DNR will need to have further discussions about overall goals of care as her overall prognosis is very poor-appreciate assistance from palliative care with family discussions, the family plans to return on 1/13 2 discuss goals of care versus transitioning to comfort care measures and withdrawal of care      DVT ppx/tx with lovenox  GI ppx with protonix  Keep HOB elevated > 30*      The patient remains at high risk of decompensation and death and will remain in ICU level care    32 min of critical care time was spent reviewing the patient's chart including medications, radiographs, labs, pertinent cultures and pathology data, other consultant notes/recomendations as well as titration of vasopressors, adjustment of mechanical ventilatory or NIPPV support, as well as discussion of goals of care with nursing staff, respiratory therapy at the bedside and with family at the bedside/via phone.        Feliciano Gray MD  1/12/2025  Pulmonology/Critical Care

## 2025-01-12 NOTE — PROGRESS NOTES
NEPHROLOGY PROGRESS NOTE      Patient Demographics:  Name:  Krysta Jansen  Age: 79 y.o.  MRN:  [id number]  Admission Date: 1/5/2025      Subjective:      Remains on vent  No sedation    Very poor neuro response per nursing    HD in am    Family to discuss goals of care tomorrow    Current Facility-Administered Medications   Medication Dose Route Frequency Provider Last Rate Last Admin    bisacodyL suppository 10 mg  10 mg Rectal Daily PRN David Yoder MD        dextrose 50% injection 12.5 g  12.5 g Intravenous PRN Trav Chavis MD        enoxaparin injection 30 mg  30 mg Subcutaneous Q24H (prophylaxis, 1700) David Yoder MD   30 mg at 01/11/25 1702    EPINEPHrine (ADRENALIN) 5 mg in D5W 250 mL infusion  0-2 mcg/kg/min (Dosing Weight) Intravenous Continuous Jean Pierre Friedman MD 4.4 mL/hr at 01/08/25 1801 0.02 mcg/kg/min at 01/08/25 1801    glucagon (human recombinant) injection 1 mg  1 mg Intramuscular PRN Trav Chavis MD        heparin (porcine) injection 5,000 Units  5,000 Units Intra-Catheter PRN Jean Shine DO        insulin aspart U-100 injection 0-15 Units  0-15 Units Subcutaneous Q6H PRN Tarv Chavis MD   10 Units at 01/12/25 0247    labetaloL injection 10 mg  10 mg Intravenous Q15 Min PRN David Yoder MD        levETIRAcetam (Keppra) 500 mg in D5W 100 mL IVPB (MB+)  500 mg Intravenous Q12H Genevieve Chenye AGACNP-BC   Stopped at 01/11/25 2147    pantoprazole injection 40 mg  40 mg Intravenous Daily Feliciano Gray MD   40 mg at 01/11/25 1351    propofol (DIPRIVAN) 10 mg/mL infusion  0-50 mcg/kg/min (Dosing Weight) Intravenous Continuous Jean Pierre Friedman MD   Stopped at 01/08/25 0947    sodium chloride 0.9% flush 10 mL  10 mL Intravenous PRN David Yoder MD        valproate (DEPACON) 500 mg in D5W 50 mL IVPB  500 mg Intravenous Q12H DAMARIS Jaime MD   Stopped at 01/11/25 9444    zinc oxide-cod liver oil 40 % paste   Topical (Top)  DAMARIS Ramirez MD   Given at 01/11/25 0902           Review of Systems   Unable to perform ROS: Intubated         Objective:    BP (!) 107/50   Pulse 99   Temp 98.9 °F (37.2 °C) (Oral)   Resp (!) 22   Ht 5' (1.524 m)   Wt 74.1 kg (163 lb 5.8 oz)   SpO2 100%   BMI 31.90 kg/m²       Intake/Output Summary (Last 24 hours) at 1/12/2025 0839  Last data filed at 1/12/2025 0700  Gross per 24 hour   Intake 1049.99 ml   Output 1200 ml   Net -150.01 ml             Physical Exam  Vitals reviewed.   Constitutional:       Appearance: Normal appearance.   HENT:      Head: Normocephalic and atraumatic.      Nose: Nose normal.   Cardiovascular:      Rate and Rhythm: Normal rate and regular rhythm.      Pulses: Normal pulses.      Heart sounds: Normal heart sounds.   Pulmonary:      Comments: On vent  Diminished bases  Abdominal:      General: Bowel sounds are normal.      Palpations: Abdomen is soft.   Musculoskeletal:         General: Normal range of motion.      Cervical back: Normal range of motion.      Comments: Some deconditioning   Skin:     General: Skin is warm and dry.            Inpatient Diagnostics:  Recent Results (from the past 24 hours)   POCT glucose    Collection Time: 01/11/25 12:35 PM   Result Value Ref Range    POCT Glucose >500 (HH) 70 - 110 mg/dL   POCT glucose    Collection Time: 01/11/25 12:36 PM   Result Value Ref Range    POCT Glucose 478 (HH) 70 - 110 mg/dL   POCT glucose    Collection Time: 01/11/25  4:53 PM   Result Value Ref Range    POCT Glucose >500 (HH) 70 - 110 mg/dL   POCT glucose    Collection Time: 01/12/25  2:29 AM   Result Value Ref Range    POCT Glucose >500 (HH) 70 - 110 mg/dL   Comprehensive Metabolic Panel    Collection Time: 01/12/25  2:56 AM   Result Value Ref Range    Sodium 128 (L) 136 - 145 mmol/L    Potassium 4.0 3.5 - 5.1 mmol/L    Chloride 97 (L) 98 - 107 mmol/L    CO2 21 (L) 23 - 31 mmol/L    Glucose 499 (HH) 82 - 115 mg/dL    Blood Urea Nitrogen 58.6 (H) 9.8 - 20.1  mg/dL    Creatinine 4.27 (H) 0.55 - 1.02 mg/dL    Calcium 7.9 (L) 8.4 - 10.2 mg/dL    Protein Total 5.5 (L) 5.8 - 7.6 gm/dL    Albumin 1.6 (L) 3.4 - 4.8 g/dL    Globulin 3.9 (H) 2.4 - 3.5 gm/dL    Albumin/Globulin Ratio 0.4 (L) 1.1 - 2.0 ratio    Bilirubin Total 0.2 <=1.5 mg/dL     40 - 150 unit/L    ALT 14 0 - 55 unit/L    AST 15 5 - 34 unit/L    eGFR 10 mL/min/1.73/m2    Anion Gap 10.0 mEq/L    BUN/Creatinine Ratio 14    CBC with Differential    Collection Time: 01/12/25  2:56 AM   Result Value Ref Range    WBC 29.44 (H) 4.50 - 11.50 x10(3)/mcL    RBC 2.28 (L) 4.20 - 5.40 x10(6)/mcL    Hgb 6.9 (L) 12.0 - 16.0 g/dL    Hct 20.8 (L) 37.0 - 47.0 %    MCV 91.2 80.0 - 94.0 fL    MCH 30.3 27.0 - 31.0 pg    MCHC 33.2 33.0 - 36.0 g/dL    RDW 16.5 11.5 - 17.0 %    Platelet 186 130 - 400 x10(3)/mcL    MPV 10.0 7.4 - 10.4 fL    NRBC% 0.0 %   Manual Differential    Collection Time: 01/12/25  2:56 AM   Result Value Ref Range    WBC 29.44 x10(3)/mcL    Neutrophils % 88 %    Lymphs % 3 %    Monocytes % 6 %    Eosinophils % 1 %    Basophils % 1 %    Myelocytes % 1 (H) <=0 %    Neutrophils Abs 25.9072 (H) 2.1 - 9.2 x10(3)/mcL    Lymphs Abs 0.8832 0.6 - 4.6 x10(3)/mcL    Monocytes Abs 1.7664 (H) 0.1 - 1.3 x10(3)/mcL    Eosinophils Abs 0.2944 0 - 0.9 x10(3)/mcL    Basophils Abs 0.2944 (H) 0 - 0.2 x10(3)/mcL    Platelets Normal Normal, Adequate    RBC Morph Abnormal (A) Normal    Poikilocytosis 1+ (A) (none)    Anisocytosis 1+ (A) (none)    Macrocytosis 2+ (A) (none)   Type & Screen    Collection Time: 01/12/25  4:25 AM   Result Value Ref Range    Group & Rh O POS     Indirect Mahsa GEL NEG     Specimen Outdate 01/15/2025 23:59    ABORH RETYPE    Collection Time: 01/12/25  7:30 AM   Result Value Ref Range    ABORH Retype O POS        A/P--NE 01/12    1---ESRD on HD--Cont MWF schedule while inpatient  2---Seizure---Cont Keppra/ Valproate---per Neurology  3---Resp Failure--per Pulm  4---Anemia secondary to CKD--Follow H&H---Fe  studies reviewed     IV--SL     TF--In Progress  Free H20 50cc q 4 hours     ORDERS:  CBC/CMP in am  HD in am    Family considering a palliative course                       Agree with above.  Patient examined.  Orders reviewed.  Intubated/sedated  CTAB  No edema    HD as scheduled.  Continue supportive care.  Will follow.

## 2025-01-13 LAB
ABS NEUT (OLG): 16.53 X10(3)/MCL (ref 2.1–9.2)
ALBUMIN SERPL-MCNC: 1.6 G/DL (ref 3.4–4.8)
ALBUMIN/GLOB SERPL: 0.4 RATIO (ref 1.1–2)
ALP SERPL-CCNC: 156 UNIT/L (ref 40–150)
ALT SERPL-CCNC: 16 UNIT/L (ref 0–55)
ANION GAP SERPL CALC-SCNC: 10 MEQ/L
ANISOCYTOSIS BLD QL SMEAR: ABNORMAL
AST SERPL-CCNC: 18 UNIT/L (ref 5–34)
BILIRUB SERPL-MCNC: 0.2 MG/DL
BUN SERPL-MCNC: 87.2 MG/DL (ref 9.8–20.1)
BURR CELLS (OLG): ABNORMAL
CALCIUM SERPL-MCNC: 8.3 MG/DL (ref 8.4–10.2)
CHLORIDE SERPL-SCNC: 98 MMOL/L (ref 98–107)
CO2 SERPL-SCNC: 20 MMOL/L (ref 23–31)
CREAT SERPL-MCNC: 4.9 MG/DL (ref 0.55–1.02)
CREAT/UREA NIT SERPL: 18
EOSINOPHIL NFR BLD MANUAL: 0.8 X10(3)/MCL (ref 0–0.9)
EOSINOPHIL NFR BLD MANUAL: 4 %
ERYTHROCYTE [DISTWIDTH] IN BLOOD BY AUTOMATED COUNT: 17 % (ref 11.5–17)
GFR SERPLBLD CREATININE-BSD FMLA CKD-EPI: 9 ML/MIN/1.73/M2
GLOBULIN SER-MCNC: 4.5 GM/DL (ref 2.4–3.5)
GLUCOSE SERPL-MCNC: 456 MG/DL (ref 82–115)
HCT VFR BLD AUTO: 20.9 % (ref 37–47)
HGB BLD-MCNC: 7 G/DL (ref 12–16)
INSTRUMENT WBC (OLG): 19.91 X10(3)/MCL
LYMPHOCYTES NFR BLD MANUAL: 0.8 X10(3)/MCL
LYMPHOCYTES NFR BLD MANUAL: 4 %
MACROCYTES BLD QL SMEAR: ABNORMAL
MCH RBC QN AUTO: 30.6 PG (ref 27–31)
MCHC RBC AUTO-ENTMCNC: 33.5 G/DL (ref 33–36)
MCV RBC AUTO: 91.3 FL (ref 80–94)
METAMYELOCYTES NFR BLD MANUAL: 2 %
MONOCYTES NFR BLD MANUAL: 0.8 X10(3)/MCL (ref 0.1–1.3)
MONOCYTES NFR BLD MANUAL: 4 %
MYELOCYTES NFR BLD MANUAL: 2 %
NEUTROPHILS NFR BLD MANUAL: 83 %
NRBC BLD AUTO-RTO: 0 %
PLATELET # BLD AUTO: 202 X10(3)/MCL (ref 130–400)
PLATELET # BLD EST: NORMAL 10*3/UL
PMV BLD AUTO: 9.7 FL (ref 7.4–10.4)
POCT GLUCOSE: 170 MG/DL (ref 70–110)
POCT GLUCOSE: 230 MG/DL (ref 70–110)
POCT GLUCOSE: 383 MG/DL (ref 70–110)
POCT GLUCOSE: 436 MG/DL (ref 70–110)
POCT GLUCOSE: 459 MG/DL (ref 70–110)
POIKILOCYTOSIS BLD QL SMEAR: ABNORMAL
POTASSIUM SERPL-SCNC: 4.2 MMOL/L (ref 3.5–5.1)
PROT SERPL-MCNC: 6.1 GM/DL (ref 5.8–7.6)
RBC # BLD AUTO: 2.29 X10(6)/MCL (ref 4.2–5.4)
RBC MORPH BLD: ABNORMAL
SODIUM SERPL-SCNC: 128 MMOL/L (ref 136–145)
TARGETS BLD QL SMEAR: ABNORMAL
WBC # BLD AUTO: 19.91 X10(3)/MCL (ref 4.5–11.5)

## 2025-01-13 PROCEDURE — 36415 COLL VENOUS BLD VENIPUNCTURE: CPT | Performed by: NURSE PRACTITIONER

## 2025-01-13 PROCEDURE — 27100171 HC OXYGEN HIGH FLOW UP TO 24 HOURS

## 2025-01-13 PROCEDURE — 80100014 HC HEMODIALYSIS 1:1

## 2025-01-13 PROCEDURE — 63600175 PHARM REV CODE 636 W HCPCS: Performed by: INTERNAL MEDICINE

## 2025-01-13 PROCEDURE — 94003 VENT MGMT INPAT SUBQ DAY: CPT

## 2025-01-13 PROCEDURE — 94760 N-INVAS EAR/PLS OXIMETRY 1: CPT

## 2025-01-13 PROCEDURE — 25000003 PHARM REV CODE 250

## 2025-01-13 PROCEDURE — 99900026 HC AIRWAY MAINTENANCE (STAT)

## 2025-01-13 PROCEDURE — 63600175 PHARM REV CODE 636 W HCPCS

## 2025-01-13 PROCEDURE — 94761 N-INVAS EAR/PLS OXIMETRY MLT: CPT

## 2025-01-13 PROCEDURE — 27200966 HC CLOSED SUCTION SYSTEM

## 2025-01-13 PROCEDURE — 99900035 HC TECH TIME PER 15 MIN (STAT)

## 2025-01-13 PROCEDURE — 80053 COMPREHEN METABOLIC PANEL: CPT | Performed by: NURSE PRACTITIONER

## 2025-01-13 PROCEDURE — 85025 COMPLETE CBC W/AUTO DIFF WBC: CPT | Performed by: NURSE PRACTITIONER

## 2025-01-13 PROCEDURE — 11000001 HC ACUTE MED/SURG PRIVATE ROOM

## 2025-01-13 PROCEDURE — 25000003 PHARM REV CODE 250: Performed by: INTERNAL MEDICINE

## 2025-01-13 PROCEDURE — 99900031 HC PATIENT EDUCATION (STAT)

## 2025-01-13 PROCEDURE — 99233 SBSQ HOSP IP/OBS HIGH 50: CPT | Mod: ,,,

## 2025-01-13 RX ADMIN — LEVETIRACETAM 500 MG: 100 INJECTION, SOLUTION INTRAVENOUS at 09:01

## 2025-01-13 RX ADMIN — VALPROATE SODIUM 500 MG: 100 INJECTION, SOLUTION INTRAVENOUS at 12:01

## 2025-01-13 RX ADMIN — INSULIN ASPART 15 UNITS: 100 INJECTION, SOLUTION INTRAVENOUS; SUBCUTANEOUS at 11:01

## 2025-01-13 RX ADMIN — INSULIN ASPART 3 UNITS: 100 INJECTION, SOLUTION INTRAVENOUS; SUBCUTANEOUS at 05:01

## 2025-01-13 RX ADMIN — PANTOPRAZOLE SODIUM 40 MG: 40 INJECTION, POWDER, FOR SOLUTION INTRAVENOUS at 09:01

## 2025-01-13 RX ADMIN — INSULIN ASPART 6 UNITS: 100 INJECTION, SOLUTION INTRAVENOUS; SUBCUTANEOUS at 08:01

## 2025-01-13 RX ADMIN — LEVETIRACETAM 500 MG: 100 INJECTION, SOLUTION INTRAVENOUS at 08:01

## 2025-01-13 RX ADMIN — ENOXAPARIN SODIUM 30 MG: 30 INJECTION SUBCUTANEOUS at 04:01

## 2025-01-13 RX ADMIN — Medication: at 08:01

## 2025-01-13 RX ADMIN — Medication: at 09:01

## 2025-01-13 RX ADMIN — INSULIN GLARGINE 10 UNITS: 100 INJECTION, SOLUTION SUBCUTANEOUS at 09:01

## 2025-01-13 RX ADMIN — INSULIN ASPART 10 UNITS: 100 INJECTION, SOLUTION INTRAVENOUS; SUBCUTANEOUS at 12:01

## 2025-01-13 RX ADMIN — VALPROATE SODIUM 500 MG: 100 INJECTION, SOLUTION INTRAVENOUS at 11:01

## 2025-01-13 RX ADMIN — INSULIN GLARGINE 10 UNITS: 100 INJECTION, SOLUTION SUBCUTANEOUS at 08:01

## 2025-01-13 NOTE — NURSING
01/13/25 1555   Post-Hemodialysis Assessment   Rinseback Volume (mL) 500 mL   Blood Volume Processed (Liters) 54 L   Dialyzer Clearance Lightly streaked   Total UF (mL) 1000 mL   Patient Response to Treatment Pt tolerated HD well   Post-Hemodialysis Comments HD x 3 hours, net UF 0.5 L, pt tolerated HD well

## 2025-01-13 NOTE — PROGRESS NOTES
Patient Name: Krysta Jansen   MRN: [id number]   Admission Date: 1/5/2025   Hospital Length of Stay: 8   Attending Provider: DAMARIS Jaime MD   Consulting Provider: EZEQUIEL Stark  Reason for Consult: Goals of Care  Primary Care Physician:  No primary care provider on file.     Principal Problem: <principal problem not specified>     Patient information was obtained from relative(s) and ER records.     Final diagnoses:  [R29.818] Acute focal neurological deficit  [R41.82] AMS (altered mental status)  [R41.82] Altered mental status, unspecified altered mental status type (Primary)  [N18.6] End stage renal disease  [N39.0] Urinary tract infection without hematuria, site unspecified      Assessment/Plan:     I reviewed the family's understanding of the seriousness of the illness and its expected prognosis. We discussed the patient's goals of care and treatment preferences. I identified the surrogate decision maker to be the patient's 2 children, Sherri and Migue. I answered all questions and we formulated a plan including recommendations for symptom management and how to best achieve goals of care.       Patient remains intubated on mechanical ventilation.  No sedation.  She does not initiate breath when ventilator respiratory rate is set at 8.  Met with daughter Sherri at bedside.  Reviewed that there have been no significant changes for improvement or decline, over the weekend.  Reviewed current neurologic status after hemodialysis and remaining off sedation.  Confirmed that her and her brother would not elect for tracheostomy and PEG tube placement if she is unable to wean from mechanical ventilation. Explained that if they would elect for tracheostomy and PEG tube placement she would likely require long-term ventilator dependence, requiring nursing home placement that would likely not be local given her medical comorbidities. She verbalized understanding and confirmed they would not elect to pursue trach and  "PEG.  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 Confirmed that the patient received spiritual support last week. Offered support.  Reiterated that they have time to make decisions and that trach/PEG conversations are usually initiated around 10 days of mechanical ventilation.  Encouraged to call with any questions or concerns.  Palliative Medicine will continue to follow.  Discussed with ICU RN.    Advance Care Planning     Date: 01/13/2025    [facility]  I engaged the family in a voluntary conversation about advance care planning and we specifically addressed what the goals of care would be moving forward, in light of the patient's change in clinical status, specifically current condition.  We did specifically address the patient's likely prognosis, which is poor.  We explored the patient's values and preferences for future care.  The family endorses that what is most important right now is to focus on improvement in condition but with limits to invasive therapies    Accordingly, we have decided that the best plan to meet the patient's goals includes continuing with treatment       Interval History:     No acute events over the weekend.  Tolerated dialysis on Friday.  No improvement in neurologic status.      Active Ambulatory Problems     Diagnosis Date Noted    No Active Ambulatory Problems     Resolved Ambulatory Problems     Diagnosis Date Noted    No Resolved Ambulatory Problems     No Additional Past Medical History        History reviewed. No pertinent surgical history.     Review of patient's allergies indicates:  No Known Allergies       Current Facility-Administered Medications:     bisacodyL suppository 10 mg, 10 mg, Rectal, Daily PRN, David Yoder " MD MAEGAN    dextrose 50% injection 12.5 g, 12.5 g, Intravenous, PRN, Trav Chavis MD    enoxaparin injection 30 mg, 30 mg, Subcutaneous, Q24H (prophylaxis, 1700), David Yoder MD, 30 mg at 01/12/25 1745    EPINEPHrine (ADRENALIN) 5 mg in D5W 250 mL infusion, 0-2 mcg/kg/min (Dosing Weight), Intravenous, Continuous, Jean Pierre Friedman MD, Last Rate: 4.4 mL/hr at 01/08/25 1801, 0.02 mcg/kg/min at 01/08/25 1801    glucagon (human recombinant) injection 1 mg, 1 mg, Intramuscular, PRN, Trav Chavis MD    heparin (porcine) injection 5,000 Units, 5,000 Units, Intra-Catheter, PRN, Jean Shine, DO    insulin aspart U-100 injection 0-15 Units, 0-15 Units, Subcutaneous, Q6H PRN, Trav Chavis MD, 10 Units at 01/13/25 0024    insulin glargine U-100 (Lantus) injection 10 Units, 10 Units, Subcutaneous, BID, Feliciano Gray MD, 10 Units at 01/12/25 2055    labetaloL injection 10 mg, 10 mg, Intravenous, Q15 Min PRN, David Yoder MD    levETIRAcetam (Keppra) 500 mg in D5W 100 mL IVPB (MB+), 500 mg, Intravenous, Q12H, Genevieve Cheney, AGACNP-BC, Stopped at 01/12/25 2125    pantoprazole injection 40 mg, 40 mg, Intravenous, Daily, Feliciano Gray MD, 40 mg at 01/12/25 0943    propofol (DIPRIVAN) 10 mg/mL infusion, 0-50 mcg/kg/min (Dosing Weight), Intravenous, Continuous, Jean Pierre Friedman MD, Stopped at 01/08/25 0947    sodium chloride 0.9% flush 10 mL, 10 mL, Intravenous, PRN, David Yoder MD    valproate (DEPACON) 500 mg in D5W 50 mL IVPB, 500 mg, Intravenous, Q12H, DAMARIS Jaime MD, Stopped at 01/13/25 0123    zinc oxide-cod liver oil 40 % paste, , Topical (Top), BID, DAMARIS Jaime MD, Given at 01/12/25 2059       Current Facility-Administered Medications:     bisacodyL, 10 mg, Rectal, Daily PRN    dextrose 50%, 12.5 g, Intravenous, PRN    glucagon (human recombinant), 1 mg, Intramuscular, PRN    heparin (porcine), 5,000 Units, Intra-Catheter, PRN    insulin aspart  U-100, 0-15 Units, Subcutaneous, Q6H PRN    labetalol, 10 mg, Intravenous, Q15 Min PRN    sodium chloride 0.9%, 10 mL, Intravenous, PRN     History reviewed. No pertinent family history.       Review of Systems   Unable to perform ROS: Intubated            Objective:   BP (!) 123/52   Pulse 99   Temp 98.8 °F (37.1 °C) (Oral)   Resp (!) 22   Ht 5' (1.524 m)   Wt 74.1 kg (163 lb 5.8 oz)   SpO2 100%   BMI 31.90 kg/m²      Physical Exam   Constitutional: No distress. She appears ill.   HENT:   Mouth/Throat: Mucous membranes are moist.   Mouth/Throat: Tongues appears swollen/enlarged  Cardiovascular: Normal rate. Pulmonary:      Effort: Pulmonary effort is normal. No respiratory distress.      Comments: Intubated on mechanical ventilation    Abdominal: She exhibits no distension.   Musculoskeletal:         General: Swelling present.   Neurological:   Does not respond to verbal stimuli   Skin: Skin is warm and dry.          Review of Symptoms  Review of Symptoms      Symptom Assessment (ESAS 0-10 Scale)  Pain:  0  Dyspnea:  0  Anxiety:  0  Nausea:  0  Depression:  0  Anorexia:  0  Fatigue:  0  Insomnia:  0  Restlessness:  0  Agitation:  0         Bowel Management Plan (BMP):  Yes      Psychosocial/Cultural:   See Palliative Psychosocial Note: Yes  **Primary  to Follow**  Palliative Care  Consult: No    Spiritual:  F - Amarilys and Belief:  Religion      Advance Care Planning   Advance Directives:     Decision Making:  Family answered questions  Goals of Care: The family endorses that what is most important right now is to focus on improvement in condition but with limits to invasive therapies    Accordingly, we have decided that the best plan to meet the patient's goals includes continuing with treatment            PAINAD: NA    Caregiver burden formerly assessed: Yes    > 50% of 45 min of encounter was spent in chart review, face to face discussion of goals of care, symptom assessment,  coordination of care and emotional support.    NATTY StarkP-BC  Palliative Medicine  [facility]

## 2025-01-13 NOTE — PLAN OF CARE
[facility]

## 2025-01-13 NOTE — PROGRESS NOTES
Nephrology progress note      Krysta Jansen is a 79 y.o. female is a nursing home resident and has ESRD and is on dialysis on Monday Wednesday Friday.  Brought to this hospital for altered mental status.  She was found to have some grand mal seizure.  She received some Ativan.  She had EEG done.  Her respiratory status and mental status deteriorated and required intubation to protect her airways.  Could not get any history from the patient.  Records revealed she was found to have some dysarthria and decreased level of consciousness Friday through being brought to this hospital.  Significant past medical history is positive for diabetes mellitus type 2 previous stroke hypertension coronary artery disease and congestive heart failure.    Interval history:     01/07/2024  Patient remains intubated and sedated.  On epinephrine for hypotension this morning.    01/08/25  Remains intubated and sedated on vent. Off pressors with BP stable at 130/60 range. Noted leucocytosis on am lab. Blood cultures pending.  Hgb stable at 7.6. Dialyzed yesterday and tolerated 500mL UF.     01/09/2025  No acute events overnight.  Patient remains intubated.  Not currently requiring vasopressors, but blood pressure does remain borderline low.  ICU team is requesting to hold hemodialysis today so they can have goals of care conversation with family.    01/10/2025   Currently tolerating dialysis.  Will remove only 500 cc of fluid.  Remains intubated.  Tolerating tube feedings.  Nurses reported that she is not on any sedation for about 48 hours.    01/13/2025   Weekend events noted.  Patient's condition is unchanged.  Remains on the ventilator and off sedation or pressors.  Unresponsive.     Review of Systems:     Unable to obtain ROS due to mental status/intubation.     Past medical, family, surgical, and social history reviewed and unchanged from initial consult note.     Objective:       VITAL SIGNS: 24 HR MIN & MAX LAST    Temp  Min:  98.4 °F (36.9 °C)  Max: 99 °F (37.2 °C)  98.7 °F (37.1 °C)        BP  Min: 103/51  Max: 135/59  (!) 123/52     Pulse  Min: 97  Max: 114  99     Resp  Min: 10  Max: 24  (!) 22    SpO2  Min: 100 %  Max: 100 %  100 %      GEN:  Ill-appearing female, unresponsive.  HEENT unchanged  CV: RRR +S1,S2 without murmur  PULM: CTAB, on ventilator  ABD: Soft, NT/ND abdomen with NABS  EXT: No cyanosis or edema  SKIN: Warm and dry  PSYCH:   unresponsive  Dialysis access:  Left IJ PermCath            Component Value Date/Time     (L) 01/13/2025 0235     (L) 01/12/2025 0256     05/08/2024 0739     03/15/2024 1101    K 4.2 01/13/2025 0235    K 4.0 01/12/2025 0256    K 3.8 05/08/2024 0739    K 3.9 03/15/2024 1101    CO2 20 (L) 01/13/2025 0235    CO2 21 (L) 01/12/2025 0256    CO2 22 05/08/2024 0739    CO2 21 (L) 03/15/2024 1101    BUN 87.2 (H) 01/13/2025 0235    BUN 58.6 (H) 01/12/2025 0256    BUN 55 (H) 10/02/2024 0000    BUN 23 (H) 07/12/2024 0000    CREATININE 4.90 (H) 01/13/2025 0235    CREATININE 4.27 (H) 01/12/2025 0256    CREATININE 3.37 (H) 05/08/2024 0739    CREATININE 3.2 (H) 03/15/2024 1101    CALCIUM 8.3 (L) 01/13/2025 0235    CALCIUM 7.9 (L) 01/12/2025 0256    CALCIUM 9 05/08/2024 0739    CALCIUM 9.9 03/15/2024 1101    PHOS 2.8 01/10/2025 0321            Component Value Date/Time    WBC 19.91 (H) 01/13/2025 0235    WBC 19.91 01/13/2025 0235    WBC 29.44 (H) 01/12/2025 0256    WBC 29.44 01/12/2025 0256    HGB 7.0 (L) 01/13/2025 0235    HGB 6.9 (L) 01/12/2025 0256    HGB 10.3 (L) 11/24/2024 0000    HGB 10 (L) 11/18/2024 0000    HCT 20.9 (L) 01/13/2025 0235    HCT 20.8 (L) 01/12/2025 0256     01/13/2025 0235     01/12/2025 0256         Imaging reviewed      Assessment / Plan:     ESRD.  Normally MWF hemodialysis.    New onset seizure  Acute hypoxic respiratory failure  Hypertension, anemia  Diabetes mellitus type 2   History of coronary artery disease   History of previous stroke  Urinary  tract infection    Plan:  From the renal standpoint of view we will continue to provide dialysis on Monday Wednesday Friday  Nurses reported that the plan for meeting with the family this afternoon by the palliative

## 2025-01-13 NOTE — PROGRESS NOTES
[facility] - Emergency Dept  Pulmonary Critical Care Note    Patient Name: Krysta Jansen  MRN: [id number]  Admission Date: 1/5/2025  Hospital Length of Stay: 8 days  Code Status: DNR  Attending Provider: DAMARIS Jaime MD  Primary Care Provider: No primary care provider on file.     Subjective:     HPI:   Patient is a 79-year-old female with a past medical history of ESRD on HD, HTN, CAD, CHF unknown EF, DM2, previous left MCA territory stroke who was brought into the ER after her son reported seeing her normal an hour prior, briefly left in when came back patient was nonverbal and lethargic.      She was emergently brought to the ER and a code fast was called, she was assessed by Neurology in the ER where she was found to have an NIH of 22 based on dysarthria and level of consciousness, but was deemed not a candidate for TNK secondary to overall nonfocal exam and recent left arm HD access surgery reported.  Vitals and laboratory work were overall unremarkable except for changes as expected in any age renal disease, and a potential urinary tract infection.  She is admitted under typical stroke protocol.     1/6/2025, EEG at 0800 revealed seizure-like activity. She was given Lorazepam 2 mg IV. Patient later became unconscious, unresponsive to pain stimuli, and hypotensive. She was intubated for airway protection (see procedure note).       Hospital Course/Significant events:  1/6/2025 - Admitted to ICU       24 Hour Interval History:  No acute events overnight.  Remains afebrile, leukocytosis continues to improve.  No significant change in neurologic status, remains obtunded off sedation.  Family reportedly planning for withdrawal of life-sustaining treatment today, palliative care following.        Review of systems unobtainable due to intubation, altered mental status.        Social History     Socioeconomic History    Marital status: Single   Tobacco Use    Smoking status: Unknown   Substance  and Sexual Activity    Alcohol use: Not Currently     Social Drivers of Health     Financial Resource Strain: Patient Unable To Answer (1/11/2025)    Overall Financial Resource Strain (CARDIA)     Difficulty of Paying Living Expenses: Patient unable to answer   Food Insecurity: Patient Unable To Answer (1/11/2025)    Hunger Vital Sign     Worried About Running Out of Food in the Last Year: Patient unable to answer     Ran Out of Food in the Last Year: Patient unable to answer   Transportation Needs: Patient Unable To Answer (1/11/2025)    TRANSPORTATION NEEDS     Transportation : Patient unable to answer   Stress: Patient Unable To Answer (1/11/2025)    [employer] - Occupational Stress Questionnaire     Feeling of Stress : Patient unable to answer   Housing Stability: Patient Unable To Answer (1/11/2025)    Housing Stability Vital Sign     Unable to Pay for Housing in the Last Year: Patient unable to answer     Homeless in the Last Year: Patient unable to answer       Current Outpatient Medications   Medication Instructions    aspirin 81 MG Chew 1 tablet, Daily    BASAGLAR KWIKPEN U-100 INSULIN 30 Units, Nightly    CeleXA 10 mg, Daily    ENTRESTO 24-26 mg per tablet 1 tablet, 2 times daily    metoprolol succinate 25 mg CSpX 1 capsule, Daily    PLAVIX 75 mg tablet 1 tablet, Daily    rosuvastatin (CRESTOR) 40 MG Tab 1 tablet, Nightly    traMADoL (ULTRAM) 50 mg tablet 1 tablet, Every 6 hours PRN    vancomycin (VANCOCIN) 500 mg, Every Mon, Wed, Fri     Current Inpatient Medications   enoxparin  30 mg Subcutaneous Q24H (prophylaxis, 1700)    insulin glargine U-100  10 Units Subcutaneous BID    levETIRAcetam (Keppra) IV (PEDS and ADULTS)  500 mg Intravenous Q12H    pantoprazole  40 mg Intravenous Daily    valproate sodium (DEPACON) IVPB  500 mg Intravenous Q12H    zinc oxide-cod liver oil   Topical (Top) BID     Current Intravenous Infusions   EPINEPHrine  0-2 mcg/kg/min (Dosing Weight)  Intravenous Continuous 4.4 mL/hr at 01/08/25 1801 0.02 mcg/kg/min at 01/08/25 1801    propofoL  0-50 mcg/kg/min (Dosing Weight) Intravenous Continuous   Stopped at 01/08/25 0947       Objective:     Intake/Output Summary (Last 24 hours) at 1/13/2025 0829  Last data filed at 1/13/2025 0600  Gross per 24 hour   Intake 2303.31 ml   Output 900 ml   Net 1403.31 ml     Vital Signs (Most Recent):  Temp: 98.8 °F (37.1 °C) (01/13/25 0000)  Pulse: 99 (01/13/25 0630)  Resp: (!) 22 (01/13/25 0630)  BP: (!) 123/52 (01/13/25 0600)  SpO2: 100 % (01/13/25 0630)  Body mass index is 31.9 kg/m².  Weight: 74.1 kg (163 lb 5.8 oz) Vital Signs (24h Range):  Temp:  [98.4 °F (36.9 °C)-99 °F (37.2 °C)] 98.8 °F (37.1 °C)  Pulse:  [] 99  Resp:  [10-24] 22  SpO2:  [100 %] 100 %  BP: (103-135)/(51-61) 123/52         Physical Exam:  Gen- intubated, unresponsive on no sedation  HENT- ATNC, MMM, ETT in place  CV- RRR  Resp- mild scattered crackles bilaterally, compliant with mechanical ventilation  MSK- WWP, no edema  Neuro- weak cough with deep suctioning; corneal reflex noted  Psych- unable to assess 2/2 neurologic status         Lines/Drains/Airways       Central Venous Catheter Line  Duration                  Hemodialysis Catheter 01/06/25 1400 left internal jugular 6 days              Drain  Duration                  NG/OG Tube 01/06/25 1109 Center mouth 6 days         Fecal Incontinence  01/10/25 0200 3 days              Airway  Duration                  Airway - Non-Surgical 01/06/25 1104 Endotracheal Tube 6 days              Peripheral Intravenous Line  Duration                  Peripheral IV - Single Lumen 01/06/25 Right Antecubital 7 days         Peripheral IV - Single Lumen 01/06/25 1055 18 G Anterior;Right Upper Arm 6 days                  Significant Labs:  Lab Results   Component Value Date    WBC 19.91 (H) 01/13/2025    WBC 19.91 01/13/2025    HGB 7.0 (L) 01/13/2025    HCT 20.9 (L) 01/13/2025    MCV 91.3 01/13/2025      01/13/2025       BMP  Lab Results   Component Value Date     (L) 01/13/2025    K 4.2 01/13/2025    CO2 20 (L) 01/13/2025    BUN 87.2 (H) 01/13/2025    CREATININE 4.90 (H) 01/13/2025    CALCIUM 8.3 (L) 01/13/2025    AGAP 10.0 01/13/2025    ESTGFRAFRICA 13 05/08/2024     ABG  Recent Labs   Lab 01/10/25  0357   PH 7.480*   PO2 126.0*   PCO2 26.0*   HCO3 19.4*     Mechanical Ventilation Support:  Vent Mode: A/C (01/13/25 0518)  Ventilator Initiated: Yes (01/06/25 1104)  Set Rate: 22 BPM (01/13/25 0518)  Vt Set: 450 mL (01/13/25 0518)  PEEP/CPAP: 5 cmH20 (01/13/25 0518)  Oxygen Concentration (%): 30 (01/13/25 0518)  Peak Airway Pressure: 23 cmH20 (01/13/25 0518)  Total Ve: 9.7 L/m (01/13/25 0518)  F/VT Ratio<105 (RSBI): (!) 50.81 (01/13/25 0330)      Assessment/Plan:     Assessment  Persistent nonconvulsive status epilepticus   Pseudomonas UTI  Prior left MCA territory stroke   Hypotension (resolved)   ESRD   Hx of HTN, CAD, DM2       Plan  -persists or mental status with evidence of ongoing seizure activity on EEG refractory to antiepileptic administration; neurology previously following, recommends palliative care  -continue antiepileptic regimen for now  -completed ciprofloxacin for pansensitive Pseudomonas UTI  -continue mechanical ventilation, inappropriate for trial of extubation given poor mental status  -palliative care following, code status now DO NOT RESUSCITATE  -extremely poor long-term neurologic outlook, with minimal likelihood for functional recovery  -continue enteral feeds  -reported withdrawal of life-sustaining treatment today per nursing reports, palliative care following and planning for further discussions today         DVT Prophylaxis: LMWH  GI Prophylaxis: None         I spent 32 minutes providing critical care services to this patient.  This does not include time spent for separately billed procedures.        Robert Frances MD  Pulmonary Disease and Critical Care Medicine

## 2025-01-14 LAB
ABS NEUT (OLG): 10.8 X10(3)/MCL (ref 2.1–9.2)
ALBUMIN SERPL-MCNC: 1.5 G/DL (ref 3.4–4.8)
ALBUMIN/GLOB SERPL: 0.3 RATIO (ref 1.1–2)
ALP SERPL-CCNC: 122 UNIT/L (ref 40–150)
ALT SERPL-CCNC: 18 UNIT/L (ref 0–55)
ANION GAP SERPL CALC-SCNC: 8 MEQ/L
ANISOCYTOSIS BLD QL SMEAR: ABNORMAL
AST SERPL-CCNC: 25 UNIT/L (ref 5–34)
BILIRUB SERPL-MCNC: 0.1 MG/DL
BUN SERPL-MCNC: 63.1 MG/DL (ref 9.8–20.1)
BURR CELLS (OLG): ABNORMAL
CALCIUM SERPL-MCNC: 8.2 MG/DL (ref 8.4–10.2)
CHLORIDE SERPL-SCNC: 100 MMOL/L (ref 98–107)
CO2 SERPL-SCNC: 22 MMOL/L (ref 23–31)
CREAT SERPL-MCNC: 3.22 MG/DL (ref 0.55–1.02)
CREAT/UREA NIT SERPL: 20
EOSINOPHIL NFR BLD MANUAL: 0.44 X10(3)/MCL (ref 0–0.9)
EOSINOPHIL NFR BLD MANUAL: 3 %
ERYTHROCYTE [DISTWIDTH] IN BLOOD BY AUTOMATED COUNT: 17.2 % (ref 11.5–17)
GFR SERPLBLD CREATININE-BSD FMLA CKD-EPI: 14 ML/MIN/1.73/M2
GLOBULIN SER-MCNC: 4.6 GM/DL (ref 2.4–3.5)
GLUCOSE SERPL-MCNC: 170 MG/DL (ref 82–115)
HCT VFR BLD AUTO: 21.9 % (ref 37–47)
HGB BLD-MCNC: 7.3 G/DL (ref 12–16)
INSTRUMENT WBC (OLG): 14.8 X10(3)/MCL
LYMPHOCYTES NFR BLD MANUAL: 1.92 X10(3)/MCL
LYMPHOCYTES NFR BLD MANUAL: 13 %
MCH RBC QN AUTO: 30.7 PG (ref 27–31)
MCHC RBC AUTO-ENTMCNC: 33.3 G/DL (ref 33–36)
MCV RBC AUTO: 92 FL (ref 80–94)
METAMYELOCYTES NFR BLD MANUAL: 3 %
MONOCYTES NFR BLD MANUAL: 1.04 X10(3)/MCL (ref 0.1–1.3)
MONOCYTES NFR BLD MANUAL: 7 %
NEUTROPHILS NFR BLD MANUAL: 73 %
NRBC BLD AUTO-RTO: 0 %
NRBC BLD MANUAL-RTO: 1 %
PLATELET # BLD AUTO: 200 X10(3)/MCL (ref 130–400)
PLATELET # BLD EST: NORMAL 10*3/UL
PMV BLD AUTO: 9.5 FL (ref 7.4–10.4)
POCT GLUCOSE: 186 MG/DL (ref 70–110)
POCT GLUCOSE: 383 MG/DL (ref 70–110)
POCT GLUCOSE: 440 MG/DL (ref 70–110)
POIKILOCYTOSIS BLD QL SMEAR: ABNORMAL
POTASSIUM SERPL-SCNC: 4.6 MMOL/L (ref 3.5–5.1)
PROT SERPL-MCNC: 6.1 GM/DL (ref 5.8–7.6)
RBC # BLD AUTO: 2.38 X10(6)/MCL (ref 4.2–5.4)
RBC MORPH BLD: ABNORMAL
SODIUM SERPL-SCNC: 130 MMOL/L (ref 136–145)
TARGETS BLD QL SMEAR: ABNORMAL
WBC # BLD AUTO: 14.81 X10(3)/MCL (ref 4.5–11.5)

## 2025-01-14 PROCEDURE — 63600175 PHARM REV CODE 636 W HCPCS

## 2025-01-14 PROCEDURE — 63600175 PHARM REV CODE 636 W HCPCS: Performed by: INTERNAL MEDICINE

## 2025-01-14 PROCEDURE — 27100171 HC OXYGEN HIGH FLOW UP TO 24 HOURS

## 2025-01-14 PROCEDURE — 27201109 HC SYSTEM FECAL MANAGEMENT

## 2025-01-14 PROCEDURE — 36415 COLL VENOUS BLD VENIPUNCTURE: CPT | Performed by: INTERNAL MEDICINE

## 2025-01-14 PROCEDURE — 25000003 PHARM REV CODE 250

## 2025-01-14 PROCEDURE — 94760 N-INVAS EAR/PLS OXIMETRY 1: CPT

## 2025-01-14 PROCEDURE — 25000003 PHARM REV CODE 250: Performed by: INTERNAL MEDICINE

## 2025-01-14 PROCEDURE — 80053 COMPREHEN METABOLIC PANEL: CPT | Performed by: INTERNAL MEDICINE

## 2025-01-14 PROCEDURE — 11000001 HC ACUTE MED/SURG PRIVATE ROOM

## 2025-01-14 PROCEDURE — 99900035 HC TECH TIME PER 15 MIN (STAT)

## 2025-01-14 PROCEDURE — 94003 VENT MGMT INPAT SUBQ DAY: CPT

## 2025-01-14 PROCEDURE — 27200966 HC CLOSED SUCTION SYSTEM

## 2025-01-14 PROCEDURE — 85025 COMPLETE CBC W/AUTO DIFF WBC: CPT | Performed by: INTERNAL MEDICINE

## 2025-01-14 PROCEDURE — 94761 N-INVAS EAR/PLS OXIMETRY MLT: CPT

## 2025-01-14 PROCEDURE — 99900031 HC PATIENT EDUCATION (STAT)

## 2025-01-14 PROCEDURE — 99900026 HC AIRWAY MAINTENANCE (STAT)

## 2025-01-14 RX ADMIN — LEVETIRACETAM 500 MG: 100 INJECTION, SOLUTION INTRAVENOUS at 09:01

## 2025-01-14 RX ADMIN — INSULIN GLARGINE 10 UNITS: 100 INJECTION, SOLUTION SUBCUTANEOUS at 09:01

## 2025-01-14 RX ADMIN — INSULIN ASPART 15 UNITS: 100 INJECTION, SOLUTION INTRAVENOUS; SUBCUTANEOUS at 06:01

## 2025-01-14 RX ADMIN — Medication: at 09:01

## 2025-01-14 RX ADMIN — VALPROATE SODIUM 500 MG: 100 INJECTION, SOLUTION INTRAVENOUS at 01:01

## 2025-01-14 RX ADMIN — PANTOPRAZOLE SODIUM 40 MG: 40 INJECTION, POWDER, FOR SOLUTION INTRAVENOUS at 09:01

## 2025-01-14 RX ADMIN — VALPROATE SODIUM 500 MG: 100 INJECTION, SOLUTION INTRAVENOUS at 12:01

## 2025-01-14 RX ADMIN — INSULIN ASPART 3 UNITS: 100 INJECTION, SOLUTION INTRAVENOUS; SUBCUTANEOUS at 12:01

## 2025-01-14 RX ADMIN — ENOXAPARIN SODIUM 30 MG: 30 INJECTION SUBCUTANEOUS at 05:01

## 2025-01-14 NOTE — PROGRESS NOTES
[facility] - Emergency Dept  Pulmonary Critical Care Note    Patient Name: Krysta Jansen  MRN: [id number]  Admission Date: 1/5/2025  Hospital Length of Stay: 9 days  Code Status: DNR  Attending Provider: DAMARIS Jaime MD  Primary Care Provider: No primary care provider on file.     Subjective:     HPI:   Patient is a 79-year-old female with a past medical history of ESRD on HD, HTN, CAD, CHF unknown EF, DM2, previous left MCA territory stroke who was brought into the ER after her son reported seeing her normal an hour prior, briefly left in when came back patient was nonverbal and lethargic.      She was emergently brought to the ER and a code fast was called, she was assessed by Neurology in the ER where she was found to have an NIH of 22 based on dysarthria and level of consciousness, but was deemed not a candidate for TNK secondary to overall nonfocal exam and recent left arm HD access surgery reported.  Vitals and laboratory work were overall unremarkable except for changes as expected in any age renal disease, and a potential urinary tract infection.  She is admitted under typical stroke protocol.     1/6/2025, EEG at 0800 revealed seizure-like activity. She was given Lorazepam 2 mg IV. Patient later became unconscious, unresponsive to pain stimuli, and hypotensive. She was intubated for airway protection (see procedure note).       Hospital Course/Significant events:  1/6/2025 - Admitted to ICU       24 Hour Interval History:  No acute events overnight.  Remains afebrile, leukocytosis continues to improve.  No significant change in neurologic status.  Family remains undecided on further care moving forward.        Review of systems unobtainable due to intubation, altered mental status.        Social History     Socioeconomic History    Marital status: Single   Tobacco Use    Smoking status: Unknown   Substance and Sexual Activity    Alcohol use: Not Currently     Social Drivers of Health      Financial Resource Strain: Patient Unable To Answer (1/11/2025)    Overall Financial Resource Strain (CARDIA)     Difficulty of Paying Living Expenses: Patient unable to answer   Food Insecurity: Patient Unable To Answer (1/11/2025)    Hunger Vital Sign     Worried About Running Out of Food in the Last Year: Patient unable to answer     Ran Out of Food in the Last Year: Patient unable to answer   Transportation Needs: Patient Unable To Answer (1/11/2025)    TRANSPORTATION NEEDS     Transportation : Patient unable to answer   Stress: Patient Unable To Answer (1/11/2025)    [employer] - Occupational Stress Questionnaire     Feeling of Stress : Patient unable to answer   Housing Stability: Patient Unable To Answer (1/11/2025)    Housing Stability Vital Sign     Unable to Pay for Housing in the Last Year: Patient unable to answer     Homeless in the Last Year: Patient unable to answer       Current Outpatient Medications   Medication Instructions    aspirin 81 MG Chew 1 tablet, Daily    BASAGLAR KWIKPEN U-100 INSULIN 30 Units, Nightly    CeleXA 10 mg, Daily    ENTRESTO 24-26 mg per tablet 1 tablet, 2 times daily    metoprolol succinate 25 mg CSpX 1 capsule, Daily    PLAVIX 75 mg tablet 1 tablet, Daily    rosuvastatin (CRESTOR) 40 MG Tab 1 tablet, Nightly    traMADoL (ULTRAM) 50 mg tablet 1 tablet, Every 6 hours PRN    vancomycin (VANCOCIN) 500 mg, Every Mon, Wed, Fri     Current Inpatient Medications   enoxparin  30 mg Subcutaneous Q24H (prophylaxis, 1700)    insulin glargine U-100  10 Units Subcutaneous BID    levETIRAcetam (Keppra) IV (PEDS and ADULTS)  500 mg Intravenous Q12H    pantoprazole  40 mg Intravenous Daily    valproate sodium (DEPACON) IVPB  500 mg Intravenous Q12H    zinc oxide-cod liver oil   Topical (Top) BID     Current Intravenous Infusions   EPINEPHrine  0-2 mcg/kg/min (Dosing Weight) Intravenous Continuous 4.4 mL/hr at 01/13/25 1700 0.02 mcg/kg/min at 01/13/25 1700     propofoL  0-50 mcg/kg/min (Dosing Weight) Intravenous Continuous   Stopped at 01/08/25 0947       Objective:     Intake/Output Summary (Last 24 hours) at 1/14/2025 0857  Last data filed at 1/14/2025 0559  Gross per 24 hour   Intake 1872.54 ml   Output 1800 ml   Net 72.54 ml     Vital Signs (Most Recent):  Temp: 98.8 °F (37.1 °C) (01/14/25 0715)  Pulse: 100 (01/14/25 0735)  Resp: (!) 22 (01/14/25 0735)  BP: (!) 119/51 (01/14/25 0600)  SpO2: 100 % (01/14/25 0735)  Body mass index is 31.9 kg/m².  Weight: 74.1 kg (163 lb 5.8 oz) Vital Signs (24h Range):  Temp:  [98 °F (36.7 °C)-98.9 °F (37.2 °C)] 98.8 °F (37.1 °C)  Pulse:  [] 100  Resp:  [19-26] 22  SpO2:  [99 %-100 %] 100 %  BP: ()/(32-92) 119/51         Physical Exam:  Gen- intubated, unresponsive on no sedation  HENT- ATNC, MMM, ETT in place  CV- RRR  Resp- CTAB, compliant with mechanical ventilation  MSK- WWP, trace bilateral edema  Neuro- weak cough with deep suctioning; corneal reflex noted; upward deviation of eyes   Psych- unable to assess 2/2 neurologic status         Lines/Drains/Airways       Central Venous Catheter Line  Duration                  Hemodialysis Catheter 01/06/25 1400 left internal jugular 7 days              Drain  Duration                  NG/OG Tube 01/06/25 1109 Center mouth 7 days         Fecal Incontinence  01/10/25 0200 4 days              Airway  Duration                  Airway - Non-Surgical 01/06/25 1104 Endotracheal Tube 7 days              Peripheral Intravenous Line  Duration                  Peripheral IV - Single Lumen 01/06/25 1055 18 G Anterior;Right Upper Arm 7 days                  Significant Labs:  Lab Results   Component Value Date    WBC 14.81 (H) 01/14/2025    WBC 14.8 01/14/2025    HGB 7.3 (L) 01/14/2025    HCT 21.9 (L) 01/14/2025    MCV 92.0 01/14/2025     01/14/2025       BMP  Lab Results   Component Value Date     (L) 01/14/2025    K 4.6 01/14/2025    CO2 22 (L) 01/14/2025    BUN  63.1 (H) 01/14/2025    CREATININE 3.22 (H) 01/14/2025    CALCIUM 8.2 (L) 01/14/2025    AGAP 8.0 01/14/2025    ESTGFRAFRICA 13 05/08/2024     ABG  Recent Labs   Lab 01/10/25  0357   PH 7.480*   PO2 126.0*   PCO2 26.0*   HCO3 19.4*     Mechanical Ventilation Support:  Vent Mode: A/C (01/14/25 0735)  Ventilator Initiated: Yes (01/06/25 1104)  Set Rate: 22 BPM (01/14/25 0735)  Vt Set: 450 mL (01/14/25 0735)  PEEP/CPAP: 5 cmH20 (01/14/25 0735)  Oxygen Concentration (%): 30 (01/14/25 0735)  Peak Airway Pressure: 25 cmH20 (01/14/25 0735)  Total Ve: 9.3 L/m (01/14/25 0735)  F/VT Ratio<105 (RSBI): (!) 51.16 (01/14/25 0735)      Assessment/Plan:     Assessment  Persistent nonconvulsive status epilepticus   Pseudomonas UTI  Prior left MCA territory stroke   Hypotension (resolved)   ESRD   Hx of HTN, CAD, DM2       Plan  -persists or mental status with evidence of ongoing seizure activity on EEG refractory to antiepileptic administration; neurology previously following, recommends palliative care  -continue antiepileptic regimen for now, no further outward evidence of seizure activity   -completed ciprofloxacin for pansensitive Pseudomonas UTI  -continue mechanical ventilation, inappropriate for trial of extubation given poor mental status  -palliative care following, code status now DO NOT RESUSCITATE  -extremely poor long-term neurologic outlook, with minimal likelihood for functional recovery  -continue enteral feeds  -family met with palliative care yesterday and remain undecided on next steps of care   -called son and discussed case today, he and his sister are planning on further goals of care conversations regarding next steps today         DVT Prophylaxis: LMWH  GI Prophylaxis: None         I spent 35 minutes providing critical care services to this patient.  This does not include time spent for separately billed procedures.        Robert Frances MD  Pulmonary Disease and Critical Care Medicine

## 2025-01-14 NOTE — PROGRESS NOTES
Inpatient Nutrition Assessment    Admit Date: 1/5/2025   Total duration of encounter: 9 days   Patient Age: 79 y.o.    Nutrition Recommendation/Prescription     Tube feeding recommendation:     Impact Peptide 1.5 @ 50 ml/hr to provide  1500 kcal/d  (100% est needs)  93 g protein/d  (103% est needs)  770 ml free water/d   (calculations based on estimated 20 hr/d run time)     Humble (provides 90 kcal, 2.5 g protein per serving) BID.   Banatrol TF (provides 45 kcal, 2 g protein per serving) as needed, up to TID    If no IV fluids running, can give 50ml q 4hr water flushes. Total water provided: 1070 ml. Otherwise, FWF per MD.      Communication of Recommendations: reviewed with nurse    Nutrition Assessment     Malnutrition Assessment/Nutrition-Focused Physical Exam       Malnutrition Level: other (see comments) (Does not meet criteria) (01/07/25 1339)  Energy Intake (Malnutrition): other (see comments) (Unable to assess) (01/07/25 1339)  Weight Loss (Malnutrition): other (see comments) (Unable to assess) (01/07/25 1339)                                         Fluid Accumulation (Malnutrition): other (see comments) (Not present) (01/07/25 1339)        A minimum of two characteristics is recommended for diagnosis of either severe or non-severe malnutrition.    Chart Review    Reason Seen: continuous nutrition monitoring    Malnutrition Screening Tool Results   Have you recently lost weight without trying?: No  Have you been eating poorly because of a decreased appetite?: No   MST Score: 0   Diagnosis:  Acute global aphasia, seizure/postictal state  Obtunded  UTI, present on arrival    Relevant Medical History: ESRD on HD, HTN, CAD, CHF, DM2, MCA territory stroke     Scheduled Medications:  enoxparin, 30 mg, Q24H (prophylaxis, 1700)  insulin glargine U-100, 10 Units, BID  levETIRAcetam (Keppra) IV (PEDS and ADULTS), 500 mg, Q12H  pantoprazole, 40 mg, Daily  valproate sodium (DEPACON) IVPB, 500 mg, Q12H  zinc oxide-cod  liver oil, , BID    Continuous Infusions:  EPINEPHrine, Last Rate: 0.02 mcg/kg/min (01/13/25 1700)  propofoL, Last Rate: Stopped (01/08/25 0947)    PRN Medications:  bisacodyL, 10 mg, Daily PRN  dextrose 50%, 12.5 g, PRN  glucagon (human recombinant), 1 mg, PRN  heparin (porcine), 5,000 Units, PRN  insulin aspart U-100, 0-15 Units, Q6H PRN  labetalol, 10 mg, Q15 Min PRN  sodium chloride 0.9%, 10 mL, PRN    Calorie Containing IV Medications: no significant kcals from medications at this time    Recent Labs   Lab 01/08/25  0410 01/09/25  0432 01/09/25  0631 01/10/25  0321 01/10/25  0702 01/12/25  0256 01/13/25  0235 01/14/25  0501   * 130*  --  127*  --  128* 128* 130*   K 4.0 3.9  --  4.4  --  4.0 4.2 4.6   CALCIUM 7.8* 7.8*  --  7.7*  --  7.9* 8.3* 8.2*   PHOS 2.6 2.7  --  2.8  --   --   --   --    CL 99 99  --  98  --  97* 98 100   CO2 18* 17*  --  15*  --  21* 20* 22*   BUN 16.5 26.4*  --  38.2*  --  58.6* 87.2* 63.1*   CREATININE 3.59* 4.63*  --  5.16*  --  4.27* 4.90* 3.22*   EGFRNORACEVR 12 9  --  8  --  10 9 14   GLUCOSE 277* 245*  --  299*  --  499* 456* 170*   BILITOT  --  0.2  --   --   --  0.2 0.2 0.1   ALKPHOS  --  100  --   --   --  133 156* 122   ALT  --  17  --   --   --  14 16 18   AST  --  19  --   --   --  15 18 25   ALBUMIN 2.1* 2.0*  --  1.9*  --  1.6* 1.6* 1.5*   WBC 14.61*  --  20.83*  --  46.76  48.03* 29.44  29.44* 19.91  19.91* 14.8  14.81*   HGB 7.6*  --  8.6*  --  8.3* 6.9* 7.0* 7.3*   HCT 23.5*  --  26.2*  --  25.4* 20.8* 20.9* 21.9*     Nutrition Orders:  Diet NPO  Tube Feedings/Formulas Other (see comments); OG; Humble - Fruit Punch,Tube Feedings/Formulas 50; 1,000; Impact Peptide 1.5; OG; Banatrol TF (prn, up to TID); 3 times daily; 50; Every 4 hours    Appetite/Oral Intake: not applicable/not applicable  Factors Affecting Nutritional Intake: on mechanical ventilation  Social Needs Impacting Access to Food: unable to assess at this time; will attempt on  follow-up  Food/Orthodoxy/Cultural Preferences: unable to obtain  Food Allergies: no known food allergies  Last Bowel Movement: 01/14/25  Wound(s):     Wound 01/05/25 1535 Skin Tear Coccyx-Tissue loss description: Partial thickness   I/O: Past 24 Hours: +[phone]    Comments    1/7/25: Discussed with RN. Will provide tube feeding recommendations for when appropriate to start tube feeding. Receiving kcal from meds.      1/8/25: Plans for starting TF today. Still receiving kcal from meds.     1/10/25: TF seen running at goal rate. Pt remains intubated, no longer receiving kcals from meds. Significant amount of diarrhea per RN. EN regimen updated to meet kcal needs.     1/14/25: Pt remains intubated, off all sedation. Tolerating TF at goal rate.     Anthropometrics    Height: 5' (152.4 cm),    Last Weight: 74.1 kg (163 lb 5.8 oz) (01/05/25 1518), Weight Method: Standard Scale  BMI (Calculated): 31.9  BMI Classification: obese grade I (BMI 30-34.9)     Ideal Body Weight (IBW), Female: 100 lb     % Ideal Body Weight, Female (lb): 163.36 %                             Usual Weight Provided By: unable to obtain usual weight    Wt Readings from Last 5 Encounters:   01/05/25 74.1 kg (163 lb 5.8 oz)     Weight Change(s) Since Admission:   Wt Readings from Last 1 Encounters:   01/05/25 1518 74.1 kg (163 lb 5.8 oz)   Admit Weight: 74.1 kg (163 lb 5.8 oz) (01/05/25 1518), Weight Method: Standard Scale    Estimated Needs    Weight Used For Calorie Calculations: 74.1 kg (163 lb 5.8 oz)  Energy Calorie Requirements (kcal): 1495kcal  Energy Need Method: [facility] (modified)  Weight Used For Protein Calculations: 45.5 kg (100 lb 3.2 oz) (IBW)  Protein Requirements: 91gm (2g/kg IBW)  Fluid Requirements (mL): 1000ml + urinary output  CHO Requirement: 170gm (45% est kcal needs)     Enteral Nutrition     Formula: Impact Peptide 1.5 Manny  Rate/Volume: 50 mL/hr  Water Flushes: 50 mL q4hr  Additives/Modulars: Humble  Route: orogastric  tube  Method: continuous  Total Nutrition Provided by Tube Feeding, Additives, and Flushes:  Calories Provided  1500 kcal/d, 100% needs   Protein Provided  93 g/d, 103% needs   Fluid Provided  1070 ml/d, N/A% needs   Continuous feeding calculations based on estimated 20 hr/d run time unless otherwise stated.    Parenteral Nutrition     Patient not receiving parenteral nutrition support at this time.    Evaluation of Received Nutrient Intake    Calories: meeting estimated needs  Protein: meeting estimated needs    Patient Education     Not applicable.    Nutrition Diagnosis     PES: Inadequate oral intake related to acute illness as evidenced by intubation since admit. (active)     PES:            Nutrition Interventions     Intervention(s): modified composition of enteral nutrition, modified rate of enteral nutrition, and collaboration with other providers  Intervention(s):      Goal: Meet greater than 80% of nutritional needs by follow-up. (goal progressing)  Goal: Tolerate enteral feeding at goal rate by follow-up. (goal progressing)    Nutrition Goals & Monitoring     Dietitian will monitor: energy intake and enteral nutrition intake  Discharge planning: too early to determine; pending clinical course  Nutrition Risk/Follow-Up: high (follow-up in 1-4 days)   Please consult if re-assessment needed sooner.

## 2025-01-14 NOTE — PROGRESS NOTES
Nephrology progress note      Krysta Jansen is a 79 y.o. female is a nursing home resident and has ESRD and is on dialysis on Monday Wednesday Friday.  Brought to this hospital for altered mental status.  She was found to have some grand mal seizure.  She received some Ativan.  She had EEG done.  Her respiratory status and mental status deteriorated and required intubation to protect her airways.  Could not get any history from the patient.  Records revealed she was found to have some dysarthria and decreased level of consciousness Friday through being brought to this hospital.  Significant past medical history is positive for diabetes mellitus type 2 previous stroke hypertension coronary artery disease and congestive heart failure.    Interval history:     01/07/2024  Patient remains intubated and sedated.  On epinephrine for hypotension this morning.    01/08/25  Remains intubated and sedated on vent. Off pressors with BP stable at 130/60 range. Noted leucocytosis on am lab. Blood cultures pending.  Hgb stable at 7.6. Dialyzed yesterday and tolerated 500mL UF.     01/09/2025  No acute events overnight.  Patient remains intubated.  Not currently requiring vasopressors, but blood pressure does remain borderline low.  ICU team is requesting to hold hemodialysis today so they can have goals of care conversation with family.    01/10/2025   Currently tolerating dialysis.  Will remove only 500 cc of fluid.  Remains intubated.  Tolerating tube feedings.  Nurses reported that she is not on any sedation for about 48 hours.    01/13/2025   Weekend events noted.  Patient's condition is unchanged.  Remains on the ventilator and off sedation or pressors.  Unresponsive.    01/14/2025   Last 24 hours events noted.  Patient's condition remains unchanged and she is off pressors and off sedation and still unresponsive although she grimaces to painful stimuli.     Review of Systems:     Unable to obtain ROS due to mental  status/intubation.     Past medical, family, surgical, and social history reviewed and unchanged from initial consult note.     Objective:       VITAL SIGNS: 24 HR MIN & MAX LAST    Temp  Min: 98 °F (36.7 °C)  Max: 98.9 °F (37.2 °C)  98.8 °F (37.1 °C)        BP  Min: 89/60  Max: 139/44  (!) 119/51     Pulse  Min: 91  Max: 103  103     Resp  Min: 19  Max: 26  (!) 22    SpO2  Min: 99 %  Max: 100 %  100 %      GEN:  Ill-appearing female, unresponsive.  HEENT unchanged, grimaces to painful stimuli.  CV: RRR +S1,S2 without murmur  PULM: CTAB, on ventilator  ABD: Soft, NT/ND abdomen with NABS  EXT: No cyanosis or edema  SKIN: Warm and dry  PSYCH:   unresponsive  Dialysis access:  Left IJ PermCath            Component Value Date/Time     (L) 01/14/2025 0501     (L) 01/13/2025 0235     05/08/2024 0739     03/15/2024 1101    K 4.6 01/14/2025 0501    K 4.2 01/13/2025 0235    K 3.8 05/08/2024 0739    K 3.9 03/15/2024 1101    CO2 22 (L) 01/14/2025 0501    CO2 20 (L) 01/13/2025 0235    CO2 22 05/08/2024 0739    CO2 21 (L) 03/15/2024 1101    BUN 63.1 (H) 01/14/2025 0501    BUN 87.2 (H) 01/13/2025 0235    BUN 55 (H) 10/02/2024 0000    BUN 23 (H) 07/12/2024 0000    CREATININE 3.22 (H) 01/14/2025 0501    CREATININE 4.90 (H) 01/13/2025 0235    CREATININE 3.37 (H) 05/08/2024 0739    CREATININE 3.2 (H) 03/15/2024 1101    CALCIUM 8.2 (L) 01/14/2025 0501    CALCIUM 8.3 (L) 01/13/2025 0235    CALCIUM 9 05/08/2024 0739    CALCIUM 9.9 03/15/2024 1101    PHOS 2.8 01/10/2025 0321            Component Value Date/Time    WBC 14.81 (H) 01/14/2025 0501    WBC 14.8 01/14/2025 0501    WBC 19.91 (H) 01/13/2025 0235    WBC 19.91 01/13/2025 0235    HGB 7.3 (L) 01/14/2025 0501    HGB 7.0 (L) 01/13/2025 0235    HGB 10.3 (L) 11/24/2024 0000    HGB 10 (L) 11/18/2024 0000    HCT 21.9 (L) 01/14/2025 0501    HCT 20.9 (L) 01/13/2025 0235     01/14/2025 0501     01/13/2025 0235         Imaging reviewed      Assessment /  Plan:     ESRD.  Normally MWF hemodialysis.    New onset seizure  Acute hypoxic respiratory failure  Hypertension, anemia  Diabetes mellitus type 2   History of coronary artery disease   History of previous stroke  Urinary tract infection    Plan:  Continue dialysis Monday Wednesday Friday  Continue all other medical management

## 2025-01-15 LAB
ABS NEUT (OLG): 8.55 X10(3)/MCL (ref 2.1–9.2)
ALBUMIN SERPL-MCNC: 1.6 G/DL (ref 3.4–4.8)
ALBUMIN/GLOB SERPL: 0.4 RATIO (ref 1.1–2)
ALP SERPL-CCNC: 152 UNIT/L (ref 40–150)
ALT SERPL-CCNC: 18 UNIT/L (ref 0–55)
ANION GAP SERPL CALC-SCNC: 9 MEQ/L
ANISOCYTOSIS BLD QL SMEAR: ABNORMAL
AST SERPL-CCNC: 19 UNIT/L (ref 5–34)
BASOPHILS NFR BLD MANUAL: 0.12 X10(3)/MCL (ref 0–0.2)
BASOPHILS NFR BLD MANUAL: 1 %
BILIRUB SERPL-MCNC: 0.1 MG/DL
BUN SERPL-MCNC: 104.5 MG/DL (ref 9.8–20.1)
CALCIUM SERPL-MCNC: 7.9 MG/DL (ref 8.4–10.2)
CHLORIDE SERPL-SCNC: 101 MMOL/L (ref 98–107)
CO2 SERPL-SCNC: 21 MMOL/L (ref 23–31)
CREAT SERPL-MCNC: 4.18 MG/DL (ref 0.55–1.02)
CREAT/UREA NIT SERPL: 25
EOSINOPHIL NFR BLD MANUAL: 0.87 X10(3)/MCL (ref 0–0.9)
EOSINOPHIL NFR BLD MANUAL: 7 %
ERYTHROCYTE [DISTWIDTH] IN BLOOD BY AUTOMATED COUNT: 17.7 % (ref 11.5–17)
GFR SERPLBLD CREATININE-BSD FMLA CKD-EPI: 10 ML/MIN/1.73/M2
GLOBULIN SER-MCNC: 3.9 GM/DL (ref 2.4–3.5)
GLUCOSE SERPL-MCNC: 316 MG/DL (ref 82–115)
HCT VFR BLD AUTO: 20.1 % (ref 37–47)
HGB BLD-MCNC: 6.4 G/DL (ref 12–16)
INSTRUMENT WBC (OLG): 12.39 X10(3)/MCL
LYMPHOCYTES NFR BLD MANUAL: 1.36 X10(3)/MCL
LYMPHOCYTES NFR BLD MANUAL: 11 %
MACROCYTES BLD QL SMEAR: ABNORMAL
MAGNESIUM SERPL-MCNC: 2.5 MG/DL (ref 1.6–2.6)
MCH RBC QN AUTO: 30 PG (ref 27–31)
MCHC RBC AUTO-ENTMCNC: 31.8 G/DL (ref 33–36)
MCV RBC AUTO: 94.4 FL (ref 80–94)
METAMYELOCYTES NFR BLD MANUAL: 3 %
MONOCYTES NFR BLD MANUAL: 1.24 X10(3)/MCL (ref 0.1–1.3)
MONOCYTES NFR BLD MANUAL: 10 %
NEUTROPHILS NFR BLD MANUAL: 69 %
NRBC BLD AUTO-RTO: 0 %
PHOSPHATE SERPL-MCNC: 2.5 MG/DL (ref 2.3–4.7)
PLATELET # BLD AUTO: 230 X10(3)/MCL (ref 130–400)
PLATELET # BLD EST: NORMAL 10*3/UL
PMV BLD AUTO: 9.5 FL (ref 7.4–10.4)
POCT GLUCOSE: 234 MG/DL (ref 70–110)
POCT GLUCOSE: 272 MG/DL (ref 70–110)
POCT GLUCOSE: 317 MG/DL (ref 70–110)
POCT GLUCOSE: 320 MG/DL (ref 70–110)
POCT GLUCOSE: 325 MG/DL (ref 70–110)
POCT GLUCOSE: 390 MG/DL (ref 70–110)
POCT GLUCOSE: 426 MG/DL (ref 70–110)
POIKILOCYTOSIS BLD QL SMEAR: ABNORMAL
POTASSIUM SERPL-SCNC: 4.4 MMOL/L (ref 3.5–5.1)
PROT SERPL-MCNC: 5.5 GM/DL (ref 5.8–7.6)
RBC # BLD AUTO: 2.13 X10(6)/MCL (ref 4.2–5.4)
RBC MORPH BLD: ABNORMAL
SODIUM SERPL-SCNC: 131 MMOL/L (ref 136–145)
WBC # BLD AUTO: 12.39 X10(3)/MCL (ref 4.5–11.5)

## 2025-01-15 PROCEDURE — 63600175 PHARM REV CODE 636 W HCPCS: Performed by: INTERNAL MEDICINE

## 2025-01-15 PROCEDURE — 63600175 PHARM REV CODE 636 W HCPCS

## 2025-01-15 PROCEDURE — 99900031 HC PATIENT EDUCATION (STAT)

## 2025-01-15 PROCEDURE — 85025 COMPLETE CBC W/AUTO DIFF WBC: CPT

## 2025-01-15 PROCEDURE — 83735 ASSAY OF MAGNESIUM: CPT

## 2025-01-15 PROCEDURE — 99233 SBSQ HOSP IP/OBS HIGH 50: CPT | Mod: ,,,

## 2025-01-15 PROCEDURE — 94761 N-INVAS EAR/PLS OXIMETRY MLT: CPT

## 2025-01-15 PROCEDURE — 27200966 HC CLOSED SUCTION SYSTEM

## 2025-01-15 PROCEDURE — 80100014 HC HEMODIALYSIS 1:1

## 2025-01-15 PROCEDURE — 80053 COMPREHEN METABOLIC PANEL: CPT

## 2025-01-15 PROCEDURE — 25000003 PHARM REV CODE 250

## 2025-01-15 PROCEDURE — 94003 VENT MGMT INPAT SUBQ DAY: CPT

## 2025-01-15 PROCEDURE — 27100171 HC OXYGEN HIGH FLOW UP TO 24 HOURS

## 2025-01-15 PROCEDURE — 99900035 HC TECH TIME PER 15 MIN (STAT)

## 2025-01-15 PROCEDURE — 11000001 HC ACUTE MED/SURG PRIVATE ROOM

## 2025-01-15 PROCEDURE — 36415 COLL VENOUS BLD VENIPUNCTURE: CPT

## 2025-01-15 PROCEDURE — 99900026 HC AIRWAY MAINTENANCE (STAT)

## 2025-01-15 PROCEDURE — 94760 N-INVAS EAR/PLS OXIMETRY 1: CPT

## 2025-01-15 PROCEDURE — 84100 ASSAY OF PHOSPHORUS: CPT

## 2025-01-15 PROCEDURE — 25000003 PHARM REV CODE 250: Performed by: INTERNAL MEDICINE

## 2025-01-15 RX ORDER — INSULIN GLARGINE 100 [IU]/ML
15 INJECTION, SOLUTION SUBCUTANEOUS 2 TIMES DAILY
Status: DISCONTINUED | OUTPATIENT
Start: 2025-01-15 | End: 2025-01-24

## 2025-01-15 RX ADMIN — Medication: at 09:01

## 2025-01-15 RX ADMIN — VALPROATE SODIUM 500 MG: 100 INJECTION, SOLUTION INTRAVENOUS at 12:01

## 2025-01-15 RX ADMIN — INSULIN ASPART 12 UNITS: 100 INJECTION, SOLUTION INTRAVENOUS; SUBCUTANEOUS at 06:01

## 2025-01-15 RX ADMIN — VALPROATE SODIUM 500 MG: 100 INJECTION, SOLUTION INTRAVENOUS at 01:01

## 2025-01-15 RX ADMIN — ENOXAPARIN SODIUM 30 MG: 30 INJECTION SUBCUTANEOUS at 06:01

## 2025-01-15 RX ADMIN — LEVETIRACETAM 500 MG: 100 INJECTION, SOLUTION INTRAVENOUS at 09:01

## 2025-01-15 RX ADMIN — INSULIN ASPART 15 UNITS: 100 INJECTION, SOLUTION INTRAVENOUS; SUBCUTANEOUS at 11:01

## 2025-01-15 RX ADMIN — INSULIN ASPART 15 UNITS: 100 INJECTION, SOLUTION INTRAVENOUS; SUBCUTANEOUS at 05:01

## 2025-01-15 RX ADMIN — INSULIN GLARGINE 15 UNITS: 100 INJECTION, SOLUTION SUBCUTANEOUS at 09:01

## 2025-01-15 RX ADMIN — VALPROATE SODIUM 500 MG: 100 INJECTION, SOLUTION INTRAVENOUS at 11:01

## 2025-01-15 RX ADMIN — INSULIN ASPART 15 UNITS: 100 INJECTION, SOLUTION INTRAVENOUS; SUBCUTANEOUS at 12:01

## 2025-01-15 RX ADMIN — LEVETIRACETAM 500 MG: 100 INJECTION, SOLUTION INTRAVENOUS at 08:01

## 2025-01-15 RX ADMIN — PANTOPRAZOLE SODIUM 40 MG: 40 INJECTION, POWDER, FOR SOLUTION INTRAVENOUS at 08:01

## 2025-01-15 RX ADMIN — INSULIN GLARGINE 10 UNITS: 100 INJECTION, SOLUTION SUBCUTANEOUS at 09:01

## 2025-01-15 RX ADMIN — INSULIN ASPART 6 UNITS: 100 INJECTION, SOLUTION INTRAVENOUS; SUBCUTANEOUS at 01:01

## 2025-01-15 NOTE — PROGRESS NOTES
Nephrology progress note      Krysta Jansen is a 79 y.o. female is a nursing home resident and has ESRD and is on dialysis on Monday Wednesday Friday.  Brought to this hospital for altered mental status.  She was found to have some grand mal seizure.  She received some Ativan.  She had EEG done.  Her respiratory status and mental status deteriorated and required intubation to protect her airways.  Could not get any history from the patient.  Records revealed she was found to have some dysarthria and decreased level of consciousness Friday through being brought to this hospital.  Significant past medical history is positive for diabetes mellitus type 2 previous stroke hypertension coronary artery disease and congestive heart failure.    Interval history:     01/07/2024  Patient remains intubated and sedated.  On epinephrine for hypotension this morning.    01/08/25  Remains intubated and sedated on vent. Off pressors with BP stable at 130/60 range. Noted leucocytosis on am lab. Blood cultures pending.  Hgb stable at 7.6. Dialyzed yesterday and tolerated 500mL UF.     01/09/2025  No acute events overnight.  Patient remains intubated.  Not currently requiring vasopressors, but blood pressure does remain borderline low.  ICU team is requesting to hold hemodialysis today so they can have goals of care conversation with family.    01/10/2025   Currently tolerating dialysis.  Will remove only 500 cc of fluid.  Remains intubated.  Tolerating tube feedings.  Nurses reported that she is not on any sedation for about 48 hours.    01/13/2025   Weekend events noted.  Patient's condition is unchanged.  Remains on the ventilator and off sedation or pressors.  Unresponsive.    01/14/2025   Last 24 hours events noted.  Patient's condition remains unchanged and she is off pressors and off sedation and still unresponsive although she grimaces to painful stimuli.     01/15/2025   Currently tolerating dialysis.  Neuro status and  respiratory status remains unchanged.      Review of Systems:     Unable to obtain ROS due to mental status/intubation.     Past medical, family, surgical, and social history reviewed and unchanged from initial consult note.     Objective:       VITAL SIGNS: 24 HR MIN & MAX LAST    Temp  Min: 98.2 °F (36.8 °C)  Max: 99 °F (37.2 °C)  98.4 °F (36.9 °C)        BP  Min: 88/41  Max: 129/62  (!) 115/53     Pulse  Min: 93  Max: 106  96     Resp  Min: 17  Max: 23  (!) 22    SpO2  Min: 99 %  Max: 100 %  100 %      GEN:  Ill-appearing female, unresponsive.  HEENT unchanged, grimaces to painful stimuli.  CV: RRR without rub.  PULM: CTAB, on ventilator  ABD: Soft, NT/ND abdomen with NABS  EXT: No cyanosis or edema  SKIN: Warm and dry  PSYCH:   unresponsive  Dialysis access:  Left IJ PermCath            Component Value Date/Time     (L) 01/15/2025 0809     (L) 01/14/2025 0501     05/08/2024 0739     03/15/2024 1101    K 4.4 01/15/2025 0809    K 4.6 01/14/2025 0501    K 3.8 05/08/2024 0739    K 3.9 03/15/2024 1101    CO2 21 (L) 01/15/2025 0809    CO2 22 (L) 01/14/2025 0501    CO2 22 05/08/2024 0739    CO2 21 (L) 03/15/2024 1101    .5 (H) 01/15/2025 0809    BUN 63.1 (H) 01/14/2025 0501    BUN 55 (H) 10/02/2024 0000    BUN 23 (H) 07/12/2024 0000    CREATININE 4.18 (H) 01/15/2025 0809    CREATININE 3.22 (H) 01/14/2025 0501    CREATININE 3.37 (H) 05/08/2024 0739    CREATININE 3.2 (H) 03/15/2024 1101    CALCIUM 7.9 (L) 01/15/2025 0809    CALCIUM 8.2 (L) 01/14/2025 0501    CALCIUM 9 05/08/2024 0739    CALCIUM 9.9 03/15/2024 1101    PHOS 2.5 01/15/2025 0809            Component Value Date/Time    WBC 12.39 (H) 01/15/2025 0809    WBC 12.39 01/15/2025 0809    WBC 14.81 (H) 01/14/2025 0501    WBC 14.8 01/14/2025 0501    HGB 6.4 (L) 01/15/2025 0809    HGB 7.3 (L) 01/14/2025 0501    HGB 10.3 (L) 11/24/2024 0000    HGB 10 (L) 11/18/2024 0000    HCT 20.1 (L) 01/15/2025 0809    HCT 21.9 (L) 01/14/2025 0501    PLT  230 01/15/2025 0809     01/14/2025 0501         Imaging reviewed      Assessment / Plan:     ESRD.  Normally MWF hemodialysis.    New onset seizure  Acute hypoxic respiratory failure  Hypertension, anemia  Diabetes mellitus type 2   History of coronary artery disease   History of previous stroke  Urinary tract infection    Plan:  Continue dialysis Monday Wednesday Friday  Continue all other medical management

## 2025-01-15 NOTE — NURSING
01/15/25 1115   Post-Hemodialysis Assessment   Rinseback Volume (mL) 250 mL   Blood Volume Processed (Liters) 51.3 L   Dialyzer Clearance Clotted   Duration of Treatment 180 minutes   Total UF (mL) 1000 mL   Net Fluid Removal 500   Patient Response to Treatment Tolerated well   Post-Hemodialysis Comments Blood rinsed back per P&P. Lines capped and secured.

## 2025-01-15 NOTE — PLAN OF CARE
Problem: Adult Inpatient Plan of Care  Goal: Plan of Care Review  Outcome: Progressing  Goal: Patient-Specific Goal (Individualized)  Outcome: Progressing  Goal: Absence of Hospital-Acquired Illness or Injury  Outcome: Progressing  Goal: Optimal Comfort and Wellbeing  Outcome: Progressing  Goal: Readiness for Transition of Care  Outcome: Progressing     Problem: Hemodialysis  Goal: Safe, Effective Therapy Delivery  Outcome: Progressing  Goal: Effective Tissue Perfusion  Outcome: Progressing  Goal: Absence of Infection Signs and Symptoms  Outcome: Progressing     Problem: Stroke, Ischemic (Includes Transient Ischemic Attack)  Goal: Optimal Coping  Outcome: Progressing  Goal: Effective Bowel Elimination  Outcome: Progressing  Goal: Optimal Cerebral Tissue Perfusion  Outcome: Progressing  Goal: Optimal Cognitive Function  Outcome: Progressing  Goal: Improved Communication Skills  Outcome: Progressing  Goal: Optimal Functional Ability  Outcome: Progressing  Goal: Optimal Nutrition Intake  Outcome: Progressing  Goal: Effective Oxygenation and Ventilation  Outcome: Progressing  Goal: Improved Sensorimotor Function  Outcome: Progressing  Goal: Safe and Effective Swallow  Outcome: Progressing  Goal: Effective Urinary Elimination  Outcome: Progressing     Problem: Skin Injury Risk Increased  Goal: Skin Health and Integrity  Outcome: Progressing     Problem: Wound  Goal: Optimal Coping  Outcome: Progressing  Goal: Optimal Functional Ability  Outcome: Progressing  Goal: Absence of Infection Signs and Symptoms  Outcome: Progressing  Goal: Improved Oral Intake  Outcome: Progressing  Goal: Optimal Pain Control and Function  Outcome: Progressing  Goal: Skin Health and Integrity  Outcome: Progressing  Goal: Optimal Wound Healing  Outcome: Progressing     Problem: Infection  Goal: Absence of Infection Signs and Symptoms  Outcome: Progressing     Problem: Fall Injury Risk  Goal: Absence of Fall and Fall-Related Injury  Outcome:  Progressing     Problem: Coping Ineffective  Goal: Effective Coping  Outcome: Progressing     Problem: Mechanical Ventilation Invasive  Goal: Effective Communication  Outcome: Progressing  Goal: Optimal Device Function  Outcome: Progressing  Goal: Mechanical Ventilation Liberation  Outcome: Progressing  Goal: Optimal Nutrition Delivery  Outcome: Progressing  Goal: Absence of Device-Related Skin and Tissue Injury  Outcome: Progressing  Goal: Absence of Ventilator-Induced Lung Injury  Outcome: Progressing     Problem: Artificial Airway  Goal: Effective Communication  Outcome: Progressing  Goal: Optimal Device Function  Outcome: Progressing  Goal: Absence of Device-Related Skin or Tissue Injury  Outcome: Progressing

## 2025-01-15 NOTE — PROGRESS NOTES
Patient Name: Krysta Jansen   MRN: [id number]   Admission Date: 1/5/2025   Hospital Length of Stay: 10   Attending Provider: DAMARIS Jaime MD   Consulting Provider: EZEQUIEL Stark  Reason for Consult: Goals of Care  Primary Care Physician:  No primary care provider on file.     Principal Problem: <principal problem not specified>     Patient information was obtained from patient, relative(s), and ER records.     Final diagnoses:  [R29.818] Acute focal neurological deficit  [R41.82] AMS (altered mental status)  [R41.82] Altered mental status, unspecified altered mental status type (Primary)  [N18.6] End stage renal disease  [N39.0] Urinary tract infection without hematuria, site unspecified      Assessment/Plan:     I reviewed the family's understanding of the seriousness of the illness and its expected prognosis. We discussed the patient's goals of care and treatment preferences. We discussed the difference between palliative and curative medicine. I identified the surrogate decision maker to be the patient's two adult children, Sherri and Migue. I answered all questions and we formulated a plan including recommendations for symptom management and how to best achieve goals of care.       Patient is resting in bed. Remains intubated, off sedation, on mechanical ventilation. Raises eyebrows and attempts to open her eyes when calling her name. Has weak cough during suctioning ETT. Attempts to raise thumb slightly when asked. Spoke with patient's daughter Sherri. Reviewed these exam findings and discussed some slight neurologic improvement, although reflexes remain weak. Explained that we have been discussing Trach/PEG versus withdrawal of care. Additionally discussed another option being that if she continues to make slight neurologic improvement to attempt extubation, with the understanding she would not be reintubated and transition to comfort focused care if she would decline after extubation. She expresses  Quality 110: Preventive Care And Screening: Influenza Immunization: Influenza Immunization not Administered for Documented Reasons. "appreciation and reports they are still hoping to give her some time. States they have had some family members recover after 10-12 days on the ventilator in "similar situations." However, they do not expect a complete recovery and understand the gravity of her situation. Discussed that we will continue to assess day by day. Discussions for trach/PEG usually begin around 10-14 days. Encouraged her to have further discussions with her brother and to consider all options along with the patient's medical wishes. She expresses gratitude. Offered support. Encouraged to call with questions or concerns. Palliative Medicine will continue to follow. Discussed with ICU RN and attending physician.    Advance Care Planning     Date: 01/15/2025    [facility]  I engaged the family in a voluntary conversation about advance care planning and we specifically addressed what the goals of care would be moving forward, in light of the patient's change in clinical status, specifically current condition.  We did specifically address the patient's likely prognosis, which is poor.  We explored the patient's values and preferences for future care.  The family endorses that what is most important right now is to focus on improvement in condition but with limits to invasive therapies    Accordingly, we have decided that the best plan to meet the patient's goals includes continuing with treatment        Interval History:     No acute events overnight. Tolerated hemodialysis well this morning with 0.5L fluid removed.      Active Ambulatory Problems     Diagnosis Date Noted    No Active Ambulatory Problems     Resolved Ambulatory Problems     Diagnosis Date Noted    No Resolved Ambulatory Problems     No Additional Past Medical History        History reviewed. No pertinent surgical history.     Review of patient's allergies indicates:  No Known Allergies       Current Facility-Administered Medications:     bisacodyL suppository 10 mg, 10 mg, Rectal, " Detail Level: Generalized Daily PRN, David Yoder MD    dextrose 50% injection 12.5 g, 12.5 g, Intravenous, PRN, Trav Chavis MD    enoxaparin injection 30 mg, 30 mg, Subcutaneous, Q24H (prophylaxis, 1700), David Yoder MD, 30 mg at 01/14/25 1712    EPINEPHrine (ADRENALIN) 5 mg in D5W 250 mL infusion, 0-2 mcg/kg/min (Dosing Weight), Intravenous, Continuous, Jean Pierre Friedman MD, Last Rate: 4.4 mL/hr at 01/13/25 1700, 0.02 mcg/kg/min at 01/13/25 1700    glucagon (human recombinant) injection 1 mg, 1 mg, Intramuscular, PRN, Trav Chavis MD    heparin (porcine) injection 5,000 Units, 5,000 Units, Intra-Catheter, PRN, Jean Shine, DO    insulin aspart U-100 injection 0-15 Units, 0-15 Units, Subcutaneous, Q6H PRN, Trav Chavis MD, 6 Units at 01/15/25 1305    insulin glargine U-100 (Lantus) injection 15 Units, 15 Units, Subcutaneous, BID, Robert Frances MD    labetaloL injection 10 mg, 10 mg, Intravenous, Q15 Min PRN, David Yoder MD    levETIRAcetam (Keppra) 500 mg in D5W 100 mL IVPB (MB+), 500 mg, Intravenous, Q12H, Genevieve Cheney, AGACNP-BC, Stopped at 01/15/25 0852    pantoprazole injection 40 mg, 40 mg, Intravenous, Daily, Feliciano Gray MD, 40 mg at 01/15/25 0818    propofol (DIPRIVAN) 10 mg/mL infusion, 0-50 mcg/kg/min (Dosing Weight), Intravenous, Continuous, Jean Pierre Friedman MD, Stopped at 01/08/25 0947    sodium chloride 0.9% flush 10 mL, 10 mL, Intravenous, PRN, David Yoder MD    valproate (DEPACON) 500 mg in D5W 50 mL IVPB, 500 mg, Intravenous, Q12H, DAMARIS Jaime MD, Last Rate: 50 mL/hr at 01/15/25 1308, 500 mg at 01/15/25 1308    zinc oxide-cod liver oil 40 % paste, , Topical (Top), BID, DAMARIS Jaime MD, Given at 01/15/25 0900       Current Facility-Administered Medications:     bisacodyL, 10 mg, Rectal, Daily PRN    dextrose 50%, 12.5 g, Intravenous, PRN    glucagon (human recombinant), 1 mg, Intramuscular, PRN    heparin (porcine), 5,000  Units, Intra-Catheter, PRN    insulin aspart U-100, 0-15 Units, Subcutaneous, Q6H PRN    labetalol, 10 mg, Intravenous, Q15 Min PRN    sodium chloride 0.9%, 10 mL, Intravenous, PRN     History reviewed. No pertinent family history.       Review of Systems   Unable to perform ROS: Intubated            Objective:   /61   Pulse 96   Temp 98.5 °F (36.9 °C) (Oral)   Resp (!) 22   Ht 5' (1.524 m)   Wt 74.1 kg (163 lb 5.8 oz)   SpO2 100%   BMI 31.90 kg/m²      Physical Exam   Constitutional: No distress. She appears ill.   HENT:   Mouth/Throat: Mucous membranes are moist.   Cardiovascular: Normal rate and normal heart sounds.   No murmur heard.Pulmonary:      Effort: Pulmonary effort is normal. No respiratory distress.      Breath sounds: Normal breath sounds.      Comments: Intubated on mechanical ventilation    Abdominal: She exhibits no distension.   Musculoskeletal:         General: Swelling present.   Neurological:   Raises eyebrows to name/voice  Attempts to raise thumb on right hand when asked to give thumbs up   Skin: Skin is warm and dry.          Review of Symptoms  Review of Symptoms      Symptom Assessment (ESAS 0-10 Scale)  Pain:  0  Dyspnea:  0  Anxiety:  0  Nausea:  0  Depression:  0  Anorexia:  0  Fatigue:  0  Insomnia:  0  Restlessness:  0  Agitation:  0         Bowel Management Plan (BMP):  Yes      Psychosocial/Cultural:   See Palliative Psychosocial Note: Yes  **Primary  to Follow**  Palliative Care  Consult: No      Advance Care Planning   Advance Directives:     Decision Making:  Family answered questions  Goals of Care: What is most important right now is to focus on improvement in condition but with limits to invasive therapies. Accordingly, we have decided that the best plan to meet the patient's goals includes continuing with treatment.          PAINAD: NA    Caregiver burden formerly assessed: Yes    > 50% of 45 min of encounter was spent in chart review,  face to face discussion of goals of care, symptom assessment, coordination of care and emotional support.    NATTY Stark-BC  Palliative Medicine  [facility]

## 2025-01-15 NOTE — PROGRESS NOTES
[facility] [address] ICU  Wound Care    Patient Name:  Krysta Jansen   MRN:  [id number]  Date: 1/15/2025  Diagnosis: <principal problem not specified>    History:     History reviewed. No pertinent past medical history.    Social History     Socioeconomic History    Marital status: Single   Tobacco Use    Smoking status: Unknown   Substance and Sexual Activity    Alcohol use: Not Currently     Social Drivers of Health     Financial Resource Strain: Patient Unable To Answer (1/11/2025)    Overall Financial Resource Strain (CARDIA)     Difficulty of Paying Living Expenses: Patient unable to answer   Food Insecurity: Patient Unable To Answer (1/11/2025)    Hunger Vital Sign     Worried About Running Out of Food in the Last Year: Patient unable to answer     Ran Out of Food in the Last Year: Patient unable to answer   Transportation Needs: Patient Unable To Answer (1/11/2025)    TRANSPORTATION NEEDS     Transportation : Patient unable to answer   Stress: Patient Unable To Answer (1/11/2025)    [employer] - Occupational Stress Questionnaire     Feeling of Stress : Patient unable to answer   Housing Stability: Patient Unable To Answer (1/11/2025)    Housing Stability Vital Sign     Unable to Pay for Housing in the Last Year: Patient unable to answer     Homeless in the Last Year: Patient unable to answer       Precautions:     Allergies as of 01/05/2025    (No Known Allergies)       WO Assessment Details/Treatment        01/15/25 1206   WOCN Assessment   Visit Date 01/15/25   Visit Time 1206   Consult Type Follow Up   WOCN Speciality Wound   Wound skin tear   Intervention chart review;assessed;applied;orders   Teaching on-going        Wound 01/05/25 1535 Skin Tear Coccyx   Date First Assessed/Time First Assessed: 01/05/25 1535   Primary Wound Type: Skin Tear  Location: Coccyx   Wound Image    Dressing Appearance Open to air   Drainage Amount None   Drainage  Characteristics/Odor No odor   Appearance Pink;Red;Dry   Tissue loss description Partial thickness   Black (%), Wound Tissue Color 0 %   Red (%), Wound Tissue Color 100 %   Yellow (%), Wound Tissue Color 0 %   Periwound Area Dry;Redness   Wound Edges Irregular   Wound Length (cm) 9 cm   Wound Width (cm) 4 cm   Wound Depth (cm) 0.1 cm   Wound Volume (cm^3) 3.6 cm^3   Wound Surface Area (cm^2) 36 cm^2   Care Cleansed with:;Soap and water;Applied:;Skin Barrier  (zinc oxide/desitin)     WOCN follow up for coccyx. Discussed POC w/nurse Lou who assisted with turning at bedside.No family at bedside. Pt. Has a rectal tube in place for fecal management. Pt. Intubated and on an ICU SIDDHARTHA mattress. Treatment recommendations: Sacrum: clean w/ soap/water, dry well, apply zinc oxide (orange top cream) or desitin to area, abd pad, secure with minimal tape. BID/Prn if soilage. Nursing to cont. With tx recs and preventative measures. Will follow up.     01/15/2025

## 2025-01-15 NOTE — PLAN OF CARE
Problem: Adult Inpatient Plan of Care  Goal: Plan of Care Review  Outcome: Progressing  Goal: Patient-Specific Goal (Individualized)  Outcome: Progressing  Goal: Absence of Hospital-Acquired Illness or Injury  Outcome: Progressing  Goal: Optimal Comfort and Wellbeing  Outcome: Progressing  Goal: Readiness for Transition of Care  Outcome: Progressing     Problem: Adult Inpatient Plan of Care  Goal: Plan of Care Review  Outcome: Progressing  Goal: Patient-Specific Goal (Individualized)  Outcome: Progressing  Goal: Absence of Hospital-Acquired Illness or Injury  Outcome: Progressing  Goal: Optimal Comfort and Wellbeing  Outcome: Progressing  Goal: Readiness for Transition of Care  Outcome: Progressing     Problem: Hemodialysis  Goal: Safe, Effective Therapy Delivery  Outcome: Progressing  Goal: Effective Tissue Perfusion  Outcome: Progressing  Goal: Absence of Infection Signs and Symptoms  Outcome: Progressing     Problem: Stroke, Ischemic (Includes Transient Ischemic Attack)  Goal: Optimal Coping  Outcome: Progressing  Goal: Effective Bowel Elimination  Outcome: Progressing  Goal: Optimal Cerebral Tissue Perfusion  Outcome: Progressing  Goal: Optimal Cognitive Function  Outcome: Progressing  Goal: Improved Communication Skills  Outcome: Progressing  Goal: Optimal Functional Ability  Outcome: Progressing  Goal: Optimal Nutrition Intake  Outcome: Progressing  Goal: Effective Oxygenation and Ventilation  Outcome: Progressing  Goal: Improved Sensorimotor Function  Outcome: Progressing  Goal: Safe and Effective Swallow  Outcome: Progressing  Goal: Effective Urinary Elimination  Outcome: Progressing     Problem: Wound  Goal: Optimal Coping  Outcome: Progressing  Goal: Optimal Functional Ability  Outcome: Progressing  Goal: Absence of Infection Signs and Symptoms  Outcome: Progressing  Goal: Improved Oral Intake  Outcome: Progressing  Goal: Optimal Pain Control and Function  Outcome: Progressing  Goal: Skin Health and  Integrity  Outcome: Progressing  Goal: Optimal Wound Healing  Outcome: Progressing     Problem: Coping Ineffective  Goal: Effective Coping  Outcome: Progressing     Problem: Artificial Airway  Goal: Effective Communication  Outcome: Progressing  Goal: Optimal Device Function  Outcome: Progressing  Goal: Absence of Device-Related Skin or Tissue Injury  Outcome: Progressing

## 2025-01-15 NOTE — PROGRESS NOTES
[facility] - Emergency Dept  Pulmonary Critical Care Note    Patient Name: Krysta Jansen  MRN: [id number]  Admission Date: 1/5/2025  Hospital Length of Stay: 10 days  Code Status: DNR  Attending Provider: DAMARIS Jaime MD  Primary Care Provider: No primary care provider on file.     Subjective:     HPI:   Patient is a 79-year-old female with a past medical history of ESRD on HD, HTN, CAD, CHF unknown EF, DM2, previous left MCA territory stroke who was brought into the ER after her son reported seeing her normal an hour prior, briefly left in when came back patient was nonverbal and lethargic.      She was emergently brought to the ER and a code fast was called, she was assessed by Neurology in the ER where she was found to have an NIH of 22 based on dysarthria and level of consciousness, but was deemed not a candidate for TNK secondary to overall nonfocal exam and recent left arm HD access surgery reported.  Vitals and laboratory work were overall unremarkable except for changes as expected in any age renal disease, and a potential urinary tract infection.  She is admitted under typical stroke protocol.     1/6/2025, EEG at 0800 revealed seizure-like activity. She was given Lorazepam 2 mg IV. Patient later became unconscious, unresponsive to pain stimuli, and hypotensive. She was intubated for airway protection (see procedure note).       Hospital Course/Significant events:  1/6/2025 - Admitted to ICU       24 Hour Interval History:  No acute events overnight.  Remains afebrile, leukocytosis improving from yesterday.  Some slight improvement in neurologic status from yesterday.  Ongoing goals of care conversations with family and palliative care.        Review of systems unobtainable due to intubation, altered mental status.        Social History     Socioeconomic History    Marital status: Single   Tobacco Use    Smoking status: Unknown   Substance and Sexual Activity    Alcohol use: Not  Currently     Social Drivers of Health     Financial Resource Strain: Patient Unable To Answer (1/11/2025)    Overall Financial Resource Strain (CARDIA)     Difficulty of Paying Living Expenses: Patient unable to answer   Food Insecurity: Patient Unable To Answer (1/11/2025)    Hunger Vital Sign     Worried About Running Out of Food in the Last Year: Patient unable to answer     Ran Out of Food in the Last Year: Patient unable to answer   Transportation Needs: Patient Unable To Answer (1/11/2025)    TRANSPORTATION NEEDS     Transportation : Patient unable to answer   Stress: Patient Unable To Answer (1/11/2025)    [place] [place] [employer] [employer] - Occupational Stress Questionnaire     Feeling of Stress : Patient unable to answer   Housing Stability: Patient Unable To Answer (1/11/2025)    Housing Stability Vital Sign     Unable to Pay for Housing in the Last Year: Patient unable to answer     Homeless in the Last Year: Patient unable to answer       Current Outpatient Medications   Medication Instructions    aspirin 81 MG Chew 1 tablet, Daily    BASAGLAR KWIKPEN U-100 INSULIN 30 Units, Nightly    CeleXA 10 mg, Daily    ENTRESTO 24-26 mg per tablet 1 tablet, 2 times daily    metoprolol succinate 25 mg CSpX 1 capsule, Daily    PLAVIX 75 mg tablet 1 tablet, Daily    rosuvastatin (CRESTOR) 40 MG Tab 1 tablet, Nightly    traMADoL (ULTRAM) 50 mg tablet 1 tablet, Every 6 hours PRN    vancomycin (VANCOCIN) 500 mg, Every Mon, Wed, Fri     Current Inpatient Medications   enoxparin  30 mg Subcutaneous Q24H (prophylaxis, 1700)    insulin glargine U-100  10 Units Subcutaneous BID    levETIRAcetam (Keppra) IV (PEDS and ADULTS)  500 mg Intravenous Q12H    pantoprazole  40 mg Intravenous Daily    valproate sodium (DEPACON) IVPB  500 mg Intravenous Q12H    zinc oxide-cod liver oil   Topical (Top) BID     Current Intravenous Infusions   EPINEPHrine  0-2 mcg/kg/min (Dosing Weight) Intravenous Continuous 4.4 mL/hr at 01/13/25  1700 0.02 mcg/kg/min at 01/13/25 1700    propofoL  0-50 mcg/kg/min (Dosing Weight) Intravenous Continuous   Stopped at 01/08/25 0947       Objective:     Intake/Output Summary (Last 24 hours) at 1/15/2025 0931  Last data filed at 1/15/2025 0549  Gross per 24 hour   Intake 1528 ml   Output 500 ml   Net 1028 ml     Vital Signs (Most Recent):  Temp: 98.2 °F (36.8 °C) (01/15/25 0400)  Pulse: 96 (01/15/25 0500)  Resp: (!) 22 (01/15/25 0500)  BP: (!) 117/58 (01/15/25 0500)  SpO2: 100 % (01/15/25 0500)  Body mass index is 31.9 kg/m².  Weight: 74.1 kg (163 lb 5.8 oz) Vital Signs (24h Range):  Temp:  [98.2 °F (36.8 °C)-99 °F (37.2 °C)] 98.2 °F (36.8 °C)  Pulse:  [] 96  Resp:  [17-22] 22  SpO2:  [99 %-100 %] 100 %  BP: ()/(41-65) 117/58         Physical Exam:  Gen- intubated, attempts to open eyes to voice  HENT- ATNC, MMM, ETT in place  CV- RRR  Resp- CTAB, compliant with mechanical ventilation  MSK- WWP, 1+ bilateral edema  Neuro- weak cough with deep suctioning, attempts to open eyes to voice and weekly attempts to follow commands within the right upper extremity  Psych- unable to assess 2/2 neurologic status         Lines/Drains/Airways       Central Venous Catheter Line  Duration                  Hemodialysis Catheter 01/06/25 1400 left internal jugular 8 days              Drain  Duration                  NG/OG Tube 01/06/25 1109 Center mouth 8 days         Fecal Incontinence  01/14/25 1430 <1 day              Airway  Duration                  Airway - Non-Surgical 01/06/25 1104 Endotracheal Tube 8 days              Peripheral Intravenous Line  Duration                  Peripheral IV - Single Lumen 01/06/25 1055 18 G Anterior;Right Upper Arm 8 days                  Significant Labs:  Lab Results   Component Value Date    WBC 12.39 (H) 01/15/2025    WBC 12.39 01/15/2025    HGB 6.4 (L) 01/15/2025    HCT 20.1 (L) 01/15/2025    MCV 94.4 (H) 01/15/2025     01/15/2025       BMP  Lab Results    Component Value Date     (L) 01/15/2025    K 4.4 01/15/2025    CO2 21 (L) 01/15/2025    .5 (H) 01/15/2025    CREATININE 4.18 (H) 01/15/2025    CALCIUM 7.9 (L) 01/15/2025    AGAP 9.0 01/15/2025    ESTGFRAFRICA 13 05/08/2024     ABG  Recent Labs   Lab 01/10/25  0357   PH 7.480*   PO2 126.0*   PCO2 26.0*   HCO3 19.4*     Mechanical Ventilation Support:  Vent Mode: VOLUME A/C (01/15/25 0500)  Ventilator Initiated: Yes (01/06/25 1104)  Set Rate: 22 BPM (01/15/25 0500)  Vt Set: 450 mL (01/15/25 0500)  PEEP/CPAP: 5 cmH20 (01/15/25 0500)  Oxygen Concentration (%): 30 (01/15/25 0500)  Peak Airway Pressure: 22 cmH20 (01/15/25 0500)  Total Ve: 10 L/m (01/15/25 0500)  F/VT Ratio<105 (RSBI): (!) 48.57 (01/15/25 0500)      Assessment/Plan:     Assessment  Persistent nonconvulsive status epilepticus   Pseudomonas UTI  Prior left MCA territory stroke   Hypotension (resolved)   ESRD   Hx of HTN, CAD, DM2       Plan  -hospital admission complicated by evidence of ongoing seizure activity on EEG refractory to antiepileptic administration; neurology previously following, recommends palliative care  -continue antiepileptic regimen for now, no further outward evidence of seizure activity   -completed ciprofloxacin for pansensitive Pseudomonas UTI  -continue mechanical ventilation, inappropriate for trial of extubation given poor mental status  -palliative care following, code status now DO NOT RESUSCITATE  -extremely poor long-term neurologic outlook, particularly given baseline significant debility, with extremely low likelihood for liberation from mechanical ventilation and significant functional neurologic improvement  -some slight improvements in alertness this morning, but still prohibitively poor mental status for consideration of extubation  -family continuing to discuss with palliative care yesterday and remain undecided on next steps of care         DVT Prophylaxis: LMWH  GI Prophylaxis: None         I spent 33  minutes providing critical care services to this patient.  This does not include time spent for separately billed procedures.        Robert Frances MD  Pulmonary Disease and Critical Care Medicine

## 2025-01-16 LAB
ABS NEUT (OLG): 7.72 X10(3)/MCL (ref 2.1–9.2)
ALBUMIN SERPL-MCNC: 1.5 G/DL (ref 3.4–4.8)
ALBUMIN/GLOB SERPL: 0.3 RATIO (ref 1.1–2)
ALP SERPL-CCNC: 133 UNIT/L (ref 40–150)
ALT SERPL-CCNC: 21 UNIT/L (ref 0–55)
ANION GAP SERPL CALC-SCNC: 7 MEQ/L
ANISOCYTOSIS BLD QL SMEAR: ABNORMAL
AST SERPL-CCNC: 23 UNIT/L (ref 5–34)
BASOPHILS NFR BLD MANUAL: 0.14 X10(3)/MCL (ref 0–0.2)
BASOPHILS NFR BLD MANUAL: 1 %
BILIRUB SERPL-MCNC: 0.1 MG/DL
BUN SERPL-MCNC: 72.6 MG/DL (ref 9.8–20.1)
CALCIUM SERPL-MCNC: 8 MG/DL (ref 8.4–10.2)
CHLORIDE SERPL-SCNC: 102 MMOL/L (ref 98–107)
CO2 SERPL-SCNC: 23 MMOL/L (ref 23–31)
CREAT SERPL-MCNC: 2.72 MG/DL (ref 0.55–1.02)
CREAT/UREA NIT SERPL: 27
EOSINOPHIL NFR BLD MANUAL: 0.54 X10(3)/MCL (ref 0–0.9)
EOSINOPHIL NFR BLD MANUAL: 4 %
ERYTHROCYTE [DISTWIDTH] IN BLOOD BY AUTOMATED COUNT: 17.7 % (ref 11.5–17)
GFR SERPLBLD CREATININE-BSD FMLA CKD-EPI: 17 ML/MIN/1.73/M2
GLOBULIN SER-MCNC: 4.6 GM/DL (ref 2.4–3.5)
GLUCOSE SERPL-MCNC: 238 MG/DL (ref 82–115)
HCT VFR BLD AUTO: 21.9 % (ref 37–47)
HGB BLD-MCNC: 7 G/DL (ref 12–16)
INSTRUMENT WBC (OLG): 13.54 X10(3)/MCL
LYMPHOCYTES NFR BLD MANUAL: 17 %
LYMPHOCYTES NFR BLD MANUAL: 2.3 X10(3)/MCL
MACROCYTES BLD QL SMEAR: ABNORMAL
MAGNESIUM SERPL-MCNC: 2.3 MG/DL (ref 1.6–2.6)
MCH RBC QN AUTO: 30 PG (ref 27–31)
MCHC RBC AUTO-ENTMCNC: 32 G/DL (ref 33–36)
MCV RBC AUTO: 94 FL (ref 80–94)
METAMYELOCYTES NFR BLD MANUAL: 3 %
MONOCYTES NFR BLD MANUAL: 1.9 X10(3)/MCL (ref 0.1–1.3)
MONOCYTES NFR BLD MANUAL: 14 %
MYELOCYTES NFR BLD MANUAL: 4 %
NEUTROPHILS NFR BLD MANUAL: 57 %
NRBC BLD AUTO-RTO: 0 %
PHOSPHATE SERPL-MCNC: 1.9 MG/DL (ref 2.3–4.7)
PLATELET # BLD AUTO: 232 X10(3)/MCL (ref 130–400)
PLATELET # BLD EST: ABNORMAL 10*3/UL
PMV BLD AUTO: 9.4 FL (ref 7.4–10.4)
POCT GLUCOSE: 171 MG/DL (ref 70–110)
POCT GLUCOSE: 200 MG/DL (ref 70–110)
POCT GLUCOSE: 252 MG/DL (ref 70–110)
POIKILOCYTOSIS BLD QL SMEAR: ABNORMAL
POTASSIUM SERPL-SCNC: 4.2 MMOL/L (ref 3.5–5.1)
PROT SERPL-MCNC: 6.1 GM/DL (ref 5.8–7.6)
RBC # BLD AUTO: 2.33 X10(6)/MCL (ref 4.2–5.4)
RBC MORPH BLD: ABNORMAL
SODIUM SERPL-SCNC: 132 MMOL/L (ref 136–145)
TARGETS BLD QL SMEAR: ABNORMAL
WBC # BLD AUTO: 13.54 X10(3)/MCL (ref 4.5–11.5)

## 2025-01-16 PROCEDURE — 80053 COMPREHEN METABOLIC PANEL: CPT

## 2025-01-16 PROCEDURE — 63600175 PHARM REV CODE 636 W HCPCS: Performed by: INTERNAL MEDICINE

## 2025-01-16 PROCEDURE — 25000003 PHARM REV CODE 250

## 2025-01-16 PROCEDURE — 84100 ASSAY OF PHOSPHORUS: CPT

## 2025-01-16 PROCEDURE — 94760 N-INVAS EAR/PLS OXIMETRY 1: CPT

## 2025-01-16 PROCEDURE — 25000003 PHARM REV CODE 250: Performed by: INTERNAL MEDICINE

## 2025-01-16 PROCEDURE — 99900026 HC AIRWAY MAINTENANCE (STAT)

## 2025-01-16 PROCEDURE — 27200966 HC CLOSED SUCTION SYSTEM

## 2025-01-16 PROCEDURE — 99900031 HC PATIENT EDUCATION (STAT)

## 2025-01-16 PROCEDURE — 83735 ASSAY OF MAGNESIUM: CPT

## 2025-01-16 PROCEDURE — 27100171 HC OXYGEN HIGH FLOW UP TO 24 HOURS

## 2025-01-16 PROCEDURE — 63600175 PHARM REV CODE 636 W HCPCS

## 2025-01-16 PROCEDURE — 85025 COMPLETE CBC W/AUTO DIFF WBC: CPT

## 2025-01-16 PROCEDURE — 94761 N-INVAS EAR/PLS OXIMETRY MLT: CPT

## 2025-01-16 PROCEDURE — 99900035 HC TECH TIME PER 15 MIN (STAT)

## 2025-01-16 PROCEDURE — 11000001 HC ACUTE MED/SURG PRIVATE ROOM

## 2025-01-16 PROCEDURE — 94003 VENT MGMT INPAT SUBQ DAY: CPT

## 2025-01-16 PROCEDURE — 36415 COLL VENOUS BLD VENIPUNCTURE: CPT

## 2025-01-16 RX ORDER — NOREPINEPHRINE BITARTRATE/D5W 8 MG/250ML
0-3 PLASTIC BAG, INJECTION (ML) INTRAVENOUS CONTINUOUS
Status: DISCONTINUED | OUTPATIENT
Start: 2025-01-16 | End: 2025-01-24

## 2025-01-16 RX ADMIN — INSULIN ASPART 3 UNITS: 100 INJECTION, SOLUTION INTRAVENOUS; SUBCUTANEOUS at 05:01

## 2025-01-16 RX ADMIN — Medication: at 09:01

## 2025-01-16 RX ADMIN — Medication: at 08:01

## 2025-01-16 RX ADMIN — INSULIN ASPART 6 UNITS: 100 INJECTION, SOLUTION INTRAVENOUS; SUBCUTANEOUS at 06:01

## 2025-01-16 RX ADMIN — INSULIN ASPART 9 UNITS: 100 INJECTION, SOLUTION INTRAVENOUS; SUBCUTANEOUS at 11:01

## 2025-01-16 RX ADMIN — NOREPINEPHRINE BITARTRATE 0.02 MCG/KG/MIN: 8 INJECTION, SOLUTION INTRAVENOUS at 10:01

## 2025-01-16 RX ADMIN — INSULIN GLARGINE 15 UNITS: 100 INJECTION, SOLUTION SUBCUTANEOUS at 08:01

## 2025-01-16 RX ADMIN — VALPROATE SODIUM 500 MG: 100 INJECTION, SOLUTION INTRAVENOUS at 11:01

## 2025-01-16 RX ADMIN — INSULIN ASPART 3 UNITS: 100 INJECTION, SOLUTION INTRAVENOUS; SUBCUTANEOUS at 08:01

## 2025-01-16 RX ADMIN — LEVETIRACETAM 500 MG: 100 INJECTION, SOLUTION INTRAVENOUS at 08:01

## 2025-01-16 RX ADMIN — PANTOPRAZOLE SODIUM 40 MG: 40 INJECTION, POWDER, FOR SOLUTION INTRAVENOUS at 08:01

## 2025-01-16 RX ADMIN — ENOXAPARIN SODIUM 30 MG: 30 INJECTION SUBCUTANEOUS at 05:01

## 2025-01-16 NOTE — PROGRESS NOTES
Nephrology consult follow up note    HPI:      Krysta Jansen is a 79 y.o. female is a nursing home resident and has ESRD and is on dialysis on Monday Wednesday Friday.  Brought to this hospital for altered mental status.  She was found to have some grand mal seizure.  She received some Ativan.  She had EEG done.  Her respiratory status and mental status deteriorated and required intubation to protect her airways.  Could not get any history from the patient.  Records revealed she was found to have some dysarthria and decreased level of consciousness Friday through being brought to this hospital.  Significant past medical history is positive for diabetes mellitus type 2 previous stroke hypertension coronary artery disease and congestive heart failure.    Interval history:     01/07/2024  Patient remains intubated and sedated.  On epinephrine for hypotension this morning.     01/08/25  Remains intubated and sedated on vent. Off pressors with BP stable at 130/60 range. Noted leucocytosis on am lab. Blood cultures pending.  Hgb stable at 7.6. Dialyzed yesterday and tolerated 500mL UF.      01/09/2025  No acute events overnight.  Patient remains intubated.  Not currently requiring vasopressors, but blood pressure does remain borderline low.  ICU team is requesting to hold hemodialysis today so they can have goals of care conversation with family.     01/10/2025   Currently tolerating dialysis.  Will remove only 500 cc of fluid.  Remains intubated.  Tolerating tube feedings.  Nurses reported that she is not on any sedation for about 48 hours.     01/13/2025   Weekend events noted.  Patient's condition is unchanged.  Remains on the ventilator and off sedation or pressors.  Unresponsive.     01/14/2025   Last 24 hours events noted.  Patient's condition remains unchanged and she is off pressors and off sedation and still unresponsive although she grimaces to painful stimuli.     01/15/2025   Currently tolerating  dialysis.  Neuro status and respiratory status remains unchanged.    01/16/2025  Dialyze yesterday.  No clinical change overnight.     Review of Systems:     Unable to obtain ROS due to mental status/intubation.     Past medical, family, surgical, and social history reviewed and unchanged from initial consult note.     Objective:       VITAL SIGNS: 24 HR MIN & MAX LAST    Temp  Min: 98.5 °F (36.9 °C)  Max: 99.2 °F (37.3 °C)  99 °F (37.2 °C)        BP  Min: 88/53  Max: 134/66  106/60     Pulse  Min: 91  Max: 104  96     Resp  Min: 22  Max: 27  (!) 22    SpO2  Min: 100 %  Max: 100 %  100 %      GEN: Chronically ill appearing AA female in NAD  HEENT:  ET tube in place  CV: RRR +S1,S2 without murmur  PULM: CTAB, unlabored  ABD: Soft, NT/ND abdomen with NABS  EXT: No cyanosis or edema  SKIN: Warm and dry  PSYCH:  Unresponsive  Dialysis access:  Left IJ PermCath            Component Value Date/Time     (L) 01/16/2025 0512     (L) 01/15/2025 0809     05/08/2024 0739     03/15/2024 1101    K 4.2 01/16/2025 0512    K 4.4 01/15/2025 0809    K 3.8 05/08/2024 0739    K 3.9 03/15/2024 1101    CO2 23 01/16/2025 0512    CO2 21 (L) 01/15/2025 0809    CO2 22 05/08/2024 0739    CO2 21 (L) 03/15/2024 1101    BUN 72.6 (H) 01/16/2025 0512    .5 (H) 01/15/2025 0809    BUN 55 (H) 10/02/2024 0000    BUN 23 (H) 07/12/2024 0000    CREATININE 2.72 (H) 01/16/2025 0512    CREATININE 4.18 (H) 01/15/2025 0809    CREATININE 3.37 (H) 05/08/2024 0739    CREATININE 3.2 (H) 03/15/2024 1101    CALCIUM 8.0 (L) 01/16/2025 0512    CALCIUM 7.9 (L) 01/15/2025 0809    CALCIUM 9 05/08/2024 0739    CALCIUM 9.9 03/15/2024 1101    PHOS 1.9 (L) 01/16/2025 0512            Component Value Date/Time    WBC 13.54 (H) 01/16/2025 0512    WBC 13.54 01/16/2025 0512    WBC 12.39 (H) 01/15/2025 0809    WBC 12.39 01/15/2025 0809    HGB 7.0 (L) 01/16/2025 0512    HGB 6.4 (L) 01/15/2025 0809    HGB 10.3 (L) 11/24/2024 0000    HGB 10 (L)  11/18/2024 0000    HCT 21.9 (L) 01/16/2025 0512    HCT 20.1 (L) 01/15/2025 0809     01/16/2025 0512     01/15/2025 0809         Imaging reviewed      Assessment / Plan:       Active Hospital Problems    Diagnosis  POA    Seizures [R56.9]  Yes    Status epilepticus [G40.901]  Yes    Altered mental status [R41.82]  Unknown      Resolved Hospital Problems    Diagnosis Date Resolved POA    Seizure-like activity [R56.9] 01/08/2025 Yes       ESRD.  Normally MWF hemodialysis.    New onset seizure  Acute hypoxic respiratory failure  Hypertension, anemia  Diabetes mellitus type 2   History of coronary artery disease   History of previous stroke  Urinary tract infection  Anemia of chronic disease    Plan:  No acute indication for off schedule hemodialysis today.  Will plan to dialyze tomorrow on regular MWF schedule.  Start erythropoietin 20,000 units MWF for anemia of chronic disease    Jean Shine DO  Nephrology  [facility]  Clinic number: [phone]      Note was created with the assistance of electronic Dictation Services.  Note was reviewed to decrease errors, however, these may still be present.  Please contact me about questions or concerns with the dictation.

## 2025-01-16 NOTE — PLAN OF CARE
Problem: Adult Inpatient Plan of Care  Goal: Plan of Care Review  Outcome: Progressing  Goal: Patient-Specific Goal (Individualized)  Outcome: Progressing  Goal: Absence of Hospital-Acquired Illness or Injury  Outcome: Progressing  Goal: Optimal Comfort and Wellbeing  Outcome: Progressing  Goal: Readiness for Transition of Care  Outcome: Progressing     Problem: Hemodialysis  Goal: Safe, Effective Therapy Delivery  Outcome: Progressing  Goal: Effective Tissue Perfusion  Outcome: Progressing  Goal: Absence of Infection Signs and Symptoms  Outcome: Progressing     Problem: Skin Injury Risk Increased  Goal: Skin Health and Integrity  Outcome: Progressing     Problem: Wound  Goal: Optimal Coping  Outcome: Progressing  Goal: Optimal Functional Ability  Outcome: Progressing  Goal: Absence of Infection Signs and Symptoms  Outcome: Progressing  Goal: Improved Oral Intake  Outcome: Progressing  Goal: Optimal Pain Control and Function  Outcome: Progressing  Goal: Skin Health and Integrity  Outcome: Progressing  Goal: Optimal Wound Healing  Outcome: Progressing     Problem: Infection  Goal: Absence of Infection Signs and Symptoms  Outcome: Progressing     Problem: Fall Injury Risk  Goal: Absence of Fall and Fall-Related Injury  Outcome: Progressing     Problem: Coping Ineffective  Goal: Effective Coping  Outcome: Progressing     Problem: Mechanical Ventilation Invasive  Goal: Effective Communication  Outcome: Progressing  Goal: Optimal Device Function  Outcome: Progressing  Goal: Mechanical Ventilation Liberation  Outcome: Progressing  Goal: Optimal Nutrition Delivery  Outcome: Progressing  Goal: Absence of Device-Related Skin and Tissue Injury  Outcome: Progressing  Goal: Absence of Ventilator-Induced Lung Injury  Outcome: Progressing     Problem: Artificial Airway  Goal: Effective Communication  Outcome: Progressing  Goal: Optimal Device Function  Outcome: Progressing  Goal: Absence of Device-Related Skin or Tissue  Injury  Outcome: Progressing

## 2025-01-16 NOTE — PROGRESS NOTES
[facility] - Emergency Dept  Pulmonary Critical Care Note    Patient Name: Krysta Jansen  MRN: [id number]  Admission Date: 1/5/2025  Hospital Length of Stay: 11 days  Code Status: DNR  Attending Provider: Robert Frances MD  Primary Care Provider: No primary care provider on file.     Subjective:     HPI:   Patient is a 79-year-old female with a past medical history of ESRD on HD, HTN, CAD, CHF unknown EF, DM2, previous left MCA territory stroke who was brought into the ER after her son reported seeing her normal an hour prior, briefly left in when came back patient was nonverbal and lethargic.      She was emergently brought to the ER and a code fast was called, she was assessed by Neurology in the ER where she was found to have an NIH of 22 based on dysarthria and level of consciousness, but was deemed not a candidate for TNK secondary to overall nonfocal exam and recent left arm HD access surgery reported.  Vitals and laboratory work were overall unremarkable except for changes as expected in any age renal disease, and a potential urinary tract infection.  She is admitted under typical stroke protocol.     1/6/2025, EEG at 0800 revealed seizure-like activity. She was given Lorazepam 2 mg IV. Patient later became unconscious, unresponsive to pain stimuli, and hypotensive. She was intubated for airway protection (see procedure note).       Hospital Course/Significant events:  1/6/2025 - Admitted to ICU       24 Hour Interval History:  No acute events overnight.  Remains afebrile, leukocytosis largely stable.  Neurologic status unchanged from yesterday.        Review of systems unobtainable due to intubation, altered mental status.        Social History     Socioeconomic History    Marital status: Single   Tobacco Use    Smoking status: Unknown   Substance and Sexual Activity    Alcohol use: Not Currently     Social Drivers of Health     Financial Resource Strain: Patient Unable To Answer  (1/11/2025)    Overall Financial Resource Strain (CARDIA)     Difficulty of Paying Living Expenses: Patient unable to answer   Food Insecurity: Patient Unable To Answer (1/11/2025)    Hunger Vital Sign     Worried About Running Out of Food in the Last Year: Patient unable to answer     Ran Out of Food in the Last Year: Patient unable to answer   Transportation Needs: Patient Unable To Answer (1/11/2025)    TRANSPORTATION NEEDS     Transportation : Patient unable to answer   Stress: Patient Unable To Answer (1/11/2025)    [employer] - Occupational Stress Questionnaire     Feeling of Stress : Patient unable to answer   Housing Stability: Patient Unable To Answer (1/11/2025)    Housing Stability Vital Sign     Unable to Pay for Housing in the Last Year: Patient unable to answer     Homeless in the Last Year: Patient unable to answer       Current Outpatient Medications   Medication Instructions    aspirin 81 MG Chew 1 tablet, Daily    BASAGLAR KWIKPEN U-100 INSULIN 30 Units, Nightly    CeleXA 10 mg, Daily    ENTRESTO 24-26 mg per tablet 1 tablet, 2 times daily    metoprolol succinate 25 mg CSpX 1 capsule, Daily    PLAVIX 75 mg tablet 1 tablet, Daily    rosuvastatin (CRESTOR) 40 MG Tab 1 tablet, Nightly    traMADoL (ULTRAM) 50 mg tablet 1 tablet, Every 6 hours PRN    vancomycin (VANCOCIN) 500 mg, Every Mon, Wed, Fri     Current Inpatient Medications   enoxparin  30 mg Subcutaneous Q24H (prophylaxis, 1700)    insulin glargine U-100  15 Units Subcutaneous BID    levETIRAcetam (Keppra) IV (PEDS and ADULTS)  500 mg Intravenous Q12H    pantoprazole  40 mg Intravenous Daily    valproate sodium (DEPACON) IVPB  500 mg Intravenous Q12H    zinc oxide-cod liver oil   Topical (Top) BID     Current Intravenous Infusions   EPINEPHrine  0-2 mcg/kg/min (Dosing Weight) Intravenous Continuous 4.4 mL/hr at 01/13/25 1700 0.02 mcg/kg/min at 01/13/25 1700    propofoL  0-50 mcg/kg/min (Dosing Weight) Intravenous  Continuous   Stopped at 01/08/25 0947       Objective:     Intake/Output Summary (Last 24 hours) at 1/16/2025 0926  Last data filed at 1/16/2025 0557  Gross per 24 hour   Intake 1877.93 ml   Output 1200 ml   Net 677.93 ml     Vital Signs (Most Recent):  Temp: 99.2 °F (37.3 °C) (01/16/25 0400)  Pulse: 95 (01/16/25 0500)  Resp: (!) 22 (01/16/25 0500)  BP: (!) 97/52 (01/16/25 0500)  SpO2: 100 % (01/16/25 0500)  Body mass index is 31.9 kg/m².  Weight: 74.1 kg (163 lb 5.8 oz) Vital Signs (24h Range):  Temp:  [98.5 °F (36.9 °C)-99.2 °F (37.3 °C)] 99.2 °F (37.3 °C)  Pulse:  [] 95  Resp:  [22-27] 22  SpO2:  [100 %] 100 %  BP: ()/(44-74) 97/52         Physical Exam:  Gen- intubated, attempts to open eyes to voice  HENT- ATNC, MMM, ETT in place  CV- RRR  Resp- CTAB, compliant with mechanical ventilation  MSK- WWP, 1+ bilateral edema  Neuro- attempts to open eyes to voice and weakly attempts to follow commands within the right upper extremity  Psych- unable to assess 2/2 neurologic status, calm on no sedation         Lines/Drains/Airways       Central Venous Catheter Line  Duration                  Hemodialysis Catheter 01/06/25 1400 left internal jugular 9 days              Drain  Duration                  NG/OG Tube 01/06/25 1109 Center mouth 9 days         Fecal Incontinence  01/14/25 1430 1 day              Airway  Duration                  Airway - Non-Surgical 01/06/25 1104 Endotracheal Tube 9 days              Peripheral Intravenous Line  Duration                  Peripheral IV - Single Lumen 01/06/25 1055 18 G Anterior;Right Upper Arm 9 days                  Significant Labs:  Lab Results   Component Value Date    WBC 13.54 (H) 01/16/2025    WBC 13.54 01/16/2025    HGB 7.0 (L) 01/16/2025    HCT 21.9 (L) 01/16/2025    MCV 94.0 01/16/2025     01/16/2025       BMP  Lab Results   Component Value Date     (L) 01/16/2025    K 4.2 01/16/2025    CO2 23 01/16/2025    BUN 72.6 (H) 01/16/2025     CREATININE 2.72 (H) 01/16/2025    CALCIUM 8.0 (L) 01/16/2025    AGAP 7.0 01/16/2025    ESTGFRAFRICA 13 05/08/2024     ABG  Recent Labs   Lab 01/10/25  0357   PH 7.480*   PO2 126.0*   PCO2 26.0*   HCO3 19.4*     Mechanical Ventilation Support:  Vent Mode: A/C (01/16/25 0559)  Ventilator Initiated: Yes (01/06/25 1104)  Set Rate: 22 BPM (01/16/25 0559)  Vt Set: 450 mL (01/16/25 0559)  PEEP/CPAP: 5 cmH20 (01/16/25 0559)  Oxygen Concentration (%): 30 (01/16/25 0559)  Peak Airway Pressure: 24 cmH20 (01/16/25 0559)  Total Ve: 10 L/m (01/16/25 0559)  F/VT Ratio<105 (RSBI): (!) 47.21 (01/15/25 1527)      Assessment/Plan:     Assessment  Persistent nonconvulsive status epilepticus   Pseudomonas UTI  Prior left MCA territory stroke   Hypotension (resolved)   ESRD   Hx of HTN, CAD, DM2       Plan  -hospital admission complicated by evidence of ongoing seizure activity on EEG refractory to antiepileptic administration; neurology previously following, recommends palliative care  -continue antiepileptic regimen for now, no further outward evidence of seizure activity   -completed ciprofloxacin for pansensitive Pseudomonas UTI  -continue mechanical ventilation, inappropriate for trial of extubation given poor mental status  -palliative care following, code status now DO NOT RESUSCITATE  -extremely poor long-term neurologic outlook, particularly given baseline significant debility, with extremely low likelihood for liberation from mechanical ventilation and significant functional neurologic improvement  -some slight improvements in alertness this morning, but still prohibitively poor mental status for consideration of extubation  -family continuing to discuss with palliative care and remain undecided on next steps of care, but code status remains DO NOT RESUSCITATE  -attempted to call son to discuss, no answer          DVT Prophylaxis: LMWH  GI Prophylaxis: None         I spent 32  minutes providing critical care services to this  patient.  This does not include time spent for separately billed procedures.        Robert Frances MD  Pulmonary Disease and Critical Care Medicine

## 2025-01-16 NOTE — PROGRESS NOTES
Inpatient Nutrition Assessment    Admit Date: 1/5/2025   Total duration of encounter: 11 days   Patient Age: 79 y.o.    Nutrition Recommendation/Prescription     Tube feeding recommendation:     Peptamen AF goal rate 60 ml/hr to provide  1440 kcal/d  (96% est needs)  90 g protein/d  (100% est needs)  972 ml free water/d   (calculations based on estimated 20 hr/d run time)     Humble (provides 90 kcal, 2.5 g protein per serving) BID.     If no IV fluids running, can give 50ml q 4hr water flushes. Total water provided: 1070 ml. Otherwise, FWF per MD.      Communication of Recommendations: reviewed with nurse    Nutrition Assessment     Malnutrition Assessment/Nutrition-Focused Physical Exam       Malnutrition Level: other (see comments) (Does not meet criteria) (01/07/25 1339)  Energy Intake (Malnutrition): other (see comments) (Unable to assess) (01/07/25 1339)  Weight Loss (Malnutrition): other (see comments) (Unable to assess) (01/07/25 1339)                                         Fluid Accumulation (Malnutrition): other (see comments) (Not present) (01/07/25 1339)        A minimum of two characteristics is recommended for diagnosis of either severe or non-severe malnutrition.    Chart Review    Reason Seen: continuous nutrition monitoring and follow-up    Malnutrition Screening Tool Results   Have you recently lost weight without trying?: No  Have you been eating poorly because of a decreased appetite?: No   MST Score: 0   Diagnosis:  Acute global aphasia, seizure/postictal state  Obtunded  UTI, present on arrival    Relevant Medical History: ESRD on HD, HTN, CAD, CHF, DM2, MCA territory stroke     Scheduled Medications:  enoxparin, 30 mg, Q24H (prophylaxis, 1700)  [START ON 1/17/2025] epoetin chelsea-ebpx (RETACRIT) injection, 20,000 Units, Every Mon, Wed, Fri  insulin glargine U-100, 15 Units, BID  levETIRAcetam (Keppra) IV (PEDS and ADULTS), 500 mg, Q12H  pantoprazole, 40 mg, Daily  valproate sodium (DEPACON)  IVPB, 500 mg, Q12H  zinc oxide-cod liver oil, , BID    Continuous Infusions:  EPINEPHrine, Last Rate: 0.02 mcg/kg/min (01/13/25 1700)  propofoL, Last Rate: Stopped (01/08/25 0947)    PRN Medications:  bisacodyL, 10 mg, Daily PRN  dextrose 50%, 12.5 g, PRN  glucagon (human recombinant), 1 mg, PRN  heparin (porcine), 5,000 Units, PRN  insulin aspart U-100, 0-15 Units, Q6H PRN  labetalol, 10 mg, Q15 Min PRN  sodium chloride 0.9%, 10 mL, PRN    Calorie Containing IV Medications: no significant kcals from medications at this time    Recent Labs   Lab 01/10/25  0321 01/10/25  0702 01/10/25  0702 01/12/25  0256 01/13/25  0235 01/14/25  0501 01/15/25  0809 01/16/25  0512   *  --   --  128* 128* 130* 131* 132*   K 4.4  --   --  4.0 4.2 4.6 4.4 4.2   CALCIUM 7.7*  --   --  7.9* 8.3* 8.2* 7.9* 8.0*   PHOS 2.8  --   --   --   --   --  2.5 1.9*   MG  --   --   --   --   --   --  2.50 2.30   CL 98  --   --  97* 98 100 101 102   CO2 15*  --   --  21* 20* 22* 21* 23   BUN 38.2*  --   --  58.6* 87.2* 63.1* 104.5* 72.6*   CREATININE 5.16*  --   --  4.27* 4.90* 3.22* 4.18* 2.72*   EGFRNORACEVR 8  --   --  10 9 14 10 17   GLUCOSE 299*  --   --  499* 456* 170* 316* 238*   BILITOT  --   --   --  0.2 0.2 0.1 0.1 0.1   ALKPHOS  --   --   --  133 156* 122 152* 133   ALT  --   --   --  14 16 18 18 21   AST  --   --   --  15 18 25 19 23   ALBUMIN 1.9*  --   --  1.6* 1.6* 1.5* 1.6* 1.5*   WBC  --  46.76  48.03*   < > 29.44  29.44* 19.91  19.91* 14.8  14.81* 12.39  12.39* 13.54  13.54*   HGB  --  8.3*  --  6.9* 7.0* 7.3* 6.4* 7.0*   HCT  --  25.4*  --  20.8* 20.9* 21.9* 20.1* 21.9*    < > = values in this interval not displayed.     Nutrition Orders:  Diet NPO  Tube Feedings/Formulas Other (see comments); OG; Humble - Fruit Punch,Tube Feedings/Formulas 50; 1,000; Impact Peptide 1.5; OG; Banatrol TF (prn, up to TID); 3 times daily; 50; Every 4 hours    Appetite/Oral Intake: not applicable/not applicable  Factors Affecting Nutritional  Intake: on mechanical ventilation  Social Needs Impacting Access to Food: unable to assess at this time; will attempt on follow-up  Food/Sabianist/Cultural Preferences: unable to obtain  Food Allergies: no known food allergies  Last Bowel Movement: 01/15/25  Wound(s):     Wound 01/05/25 1535 Skin Tear Coccyx-Tissue loss description: Partial thickness   I/O: Past 24 Hours: +677, Since Admit: +[phone]     Comments    1/7/25: Discussed with RN. Will provide tube feeding recommendations for when appropriate to start tube feeding. Receiving kcal from meds.      1/8/25: Plans for starting TF today. Still receiving kcal from meds.     1/10/25: TF seen running at goal rate. Pt remains intubated, no longer receiving kcals from meds. Significant amount of diarrhea per RN. EN regimen updated to meet kcal needs.     1/14/25: Pt remains intubated, off all sedation. Tolerating TF at goal rate.     1/16/25: TF continues, tolerated per RN. Noted elevated CBGs. Will change to formula with less CHO/ml than current formula.     Anthropometrics    Height: 5' (152.4 cm),    Last Weight: 74.1 kg (163 lb 5.8 oz) (01/05/25 1518), Weight Method: Standard Scale  BMI (Calculated): 31.9  BMI Classification: obese grade I (BMI 30-34.9)     Ideal Body Weight (IBW), Female: 100 lb     % Ideal Body Weight, Female (lb): 163.36 %                             Usual Weight Provided By: unable to obtain usual weight    Wt Readings from Last 5 Encounters:   01/05/25 74.1 kg (163 lb 5.8 oz)     Weight Change(s) Since Admission:   Wt Readings from Last 1 Encounters:   01/05/25 1518 74.1 kg (163 lb 5.8 oz)   Admit Weight: 74.1 kg (163 lb 5.8 oz) (01/05/25 1518), Weight Method: Standard Scale    Estimated Needs    Weight Used For Calorie Calculations: 74.1 kg (163 lb 5.8 oz)  Energy Calorie Requirements (kcal): 1495kcal  Energy Need Method: [place] (modified)  Weight Used For Protein Calculations: 45.5 kg (100 lb 3.2 oz) (IBW)  Protein Requirements: 91gm  (2g/kg IBW)  Fluid Requirements (mL): 1000ml + urinary output  CHO Requirement: 170gm (45% est kcal needs)     Enteral Nutrition     Formula: Impact Peptide 1.5 Manny  Rate/Volume: 50 mL/hr  Water Flushes: 50 mL q4hr  Additives/Modulars: Humble  Route: orogastric tube  Method: continuous  Total Nutrition Provided by Tube Feeding, Additives, and Flushes:  Calories Provided  1500 kcal/d, 100% needs   Protein Provided  93 g/d, 103% needs   Fluid Provided  1070 ml/d, N/A% needs   Continuous feeding calculations based on estimated 20 hr/d run time unless otherwise stated.    Parenteral Nutrition     Patient not receiving parenteral nutrition support at this time.    Evaluation of Received Nutrient Intake    Calories: meeting estimated needs  Protein: meeting estimated needs    Patient Education     Not applicable.    Nutrition Diagnosis     PES: Inadequate oral intake related to acute illness as evidenced by intubation since admit. (active)     PES:            Nutrition Interventions     Intervention(s): modified composition of enteral nutrition, modified rate of enteral nutrition, and collaboration with other providers  Intervention(s):      Goal: Meet greater than 80% of nutritional needs by follow-up. (goal progressing)  Goal: Tolerate enteral feeding at goal rate by follow-up. (goal progressing)    Nutrition Goals & Monitoring     Dietitian will monitor: energy intake and enteral nutrition intake  Discharge planning: too early to determine; pending clinical course  Nutrition Risk/Follow-Up: high (follow-up in 1-4 days)   Please consult if re-assessment needed sooner.

## 2025-01-17 LAB
ABO + RH BLD: NORMAL
ABS NEUT (OLG): 11.55 X10(3)/MCL (ref 2.1–9.2)
ALBUMIN SERPL-MCNC: 1.6 G/DL (ref 3.4–4.8)
ALBUMIN/GLOB SERPL: 0.3 RATIO (ref 1.1–2)
ALP SERPL-CCNC: 136 UNIT/L (ref 40–150)
ALT SERPL-CCNC: 23 UNIT/L (ref 0–55)
ANION GAP SERPL CALC-SCNC: 10 MEQ/L
ANISOCYTOSIS BLD QL SMEAR: ABNORMAL
AST SERPL-CCNC: 31 UNIT/L (ref 5–34)
BILIRUB SERPL-MCNC: 0.1 MG/DL
BLD PROD TYP BPU: NORMAL
BLOOD UNIT EXPIRATION DATE: NORMAL
BLOOD UNIT TYPE CODE: 5100
BUN SERPL-MCNC: 96.9 MG/DL (ref 9.8–20.1)
BURR CELLS (OLG): ABNORMAL
CALCIUM SERPL-MCNC: 8.3 MG/DL (ref 8.4–10.2)
CHLORIDE SERPL-SCNC: 101 MMOL/L (ref 98–107)
CO2 SERPL-SCNC: 21 MMOL/L (ref 23–31)
CREAT SERPL-MCNC: 3.26 MG/DL (ref 0.55–1.02)
CREAT/UREA NIT SERPL: 30
CROSSMATCH INTERPRETATION: NORMAL
DISPENSE STATUS: NORMAL
EOSINOPHIL NFR BLD MANUAL: 0.29 X10(3)/MCL (ref 0–0.9)
EOSINOPHIL NFR BLD MANUAL: 2 %
ERYTHROCYTE [DISTWIDTH] IN BLOOD BY AUTOMATED COUNT: 17.9 % (ref 11.5–17)
GFR SERPLBLD CREATININE-BSD FMLA CKD-EPI: 14 ML/MIN/1.73/M2
GLOBULIN SER-MCNC: 4.7 GM/DL (ref 2.4–3.5)
GLUCOSE SERPL-MCNC: 220 MG/DL (ref 82–115)
GROUP & RH: NORMAL
HCT VFR BLD AUTO: 21.6 % (ref 37–47)
HGB BLD-MCNC: 6.8 G/DL (ref 12–16)
INDIRECT COOMBS: NORMAL
INSTRUMENT WBC (OLG): 14.26 X10(3)/MCL
LYMPHOCYTES NFR BLD MANUAL: 1.85 X10(3)/MCL
LYMPHOCYTES NFR BLD MANUAL: 13 %
MAGNESIUM SERPL-MCNC: 2.5 MG/DL (ref 1.6–2.6)
MCH RBC QN AUTO: 30 PG (ref 27–31)
MCHC RBC AUTO-ENTMCNC: 31.5 G/DL (ref 33–36)
MCV RBC AUTO: 95.2 FL (ref 80–94)
MONOCYTES NFR BLD MANUAL: 0.29 X10(3)/MCL (ref 0.1–1.3)
MONOCYTES NFR BLD MANUAL: 2 %
MYELOCYTES NFR BLD MANUAL: 3 %
NEUTROPHILS NFR BLD MANUAL: 81 %
NRBC BLD AUTO-RTO: 0 %
PHOSPHATE SERPL-MCNC: 2 MG/DL (ref 2.3–4.7)
PLATELET # BLD AUTO: 247 X10(3)/MCL (ref 130–400)
PLATELET # BLD EST: ABNORMAL 10*3/UL
PMV BLD AUTO: 9 FL (ref 7.4–10.4)
POCT GLUCOSE: 213 MG/DL (ref 70–110)
POCT GLUCOSE: 230 MG/DL (ref 70–110)
POCT GLUCOSE: 247 MG/DL (ref 70–110)
POCT GLUCOSE: 280 MG/DL (ref 70–110)
POCT GLUCOSE: 290 MG/DL (ref 70–110)
POIKILOCYTOSIS BLD QL SMEAR: ABNORMAL
POTASSIUM SERPL-SCNC: 4.6 MMOL/L (ref 3.5–5.1)
PROT SERPL-MCNC: 6.3 GM/DL (ref 5.8–7.6)
RBC # BLD AUTO: 2.27 X10(6)/MCL (ref 4.2–5.4)
RBC MORPH BLD: ABNORMAL
SODIUM SERPL-SCNC: 132 MMOL/L (ref 136–145)
SPECIMEN OUTDATE: NORMAL
TARGETS BLD QL SMEAR: ABNORMAL
UNIT NUMBER: NORMAL
WBC # BLD AUTO: 14.26 X10(3)/MCL (ref 4.5–11.5)

## 2025-01-17 PROCEDURE — 25000003 PHARM REV CODE 250

## 2025-01-17 PROCEDURE — 99900026 HC AIRWAY MAINTENANCE (STAT)

## 2025-01-17 PROCEDURE — 99900031 HC PATIENT EDUCATION (STAT)

## 2025-01-17 PROCEDURE — 27200966 HC CLOSED SUCTION SYSTEM

## 2025-01-17 PROCEDURE — 63600175 PHARM REV CODE 636 W HCPCS: Performed by: INTERNAL MEDICINE

## 2025-01-17 PROCEDURE — 63600175 PHARM REV CODE 636 W HCPCS

## 2025-01-17 PROCEDURE — 80053 COMPREHEN METABOLIC PANEL: CPT

## 2025-01-17 PROCEDURE — 86923 COMPATIBILITY TEST ELECTRIC: CPT | Performed by: STUDENT IN AN ORGANIZED HEALTH CARE EDUCATION/TRAINING PROGRAM

## 2025-01-17 PROCEDURE — 85025 COMPLETE CBC W/AUTO DIFF WBC: CPT

## 2025-01-17 PROCEDURE — P9016 RBC LEUKOCYTES REDUCED: HCPCS | Performed by: STUDENT IN AN ORGANIZED HEALTH CARE EDUCATION/TRAINING PROGRAM

## 2025-01-17 PROCEDURE — 80100014 HC HEMODIALYSIS 1:1

## 2025-01-17 PROCEDURE — 36415 COLL VENOUS BLD VENIPUNCTURE: CPT

## 2025-01-17 PROCEDURE — 11000001 HC ACUTE MED/SURG PRIVATE ROOM

## 2025-01-17 PROCEDURE — 99900035 HC TECH TIME PER 15 MIN (STAT)

## 2025-01-17 PROCEDURE — 30233N1 TRANSFUSION OF NONAUTOLOGOUS RED BLOOD CELLS INTO PERIPHERAL VEIN, PERCUTANEOUS APPROACH: ICD-10-PCS | Performed by: STUDENT IN AN ORGANIZED HEALTH CARE EDUCATION/TRAINING PROGRAM

## 2025-01-17 PROCEDURE — 94760 N-INVAS EAR/PLS OXIMETRY 1: CPT

## 2025-01-17 PROCEDURE — 83735 ASSAY OF MAGNESIUM: CPT

## 2025-01-17 PROCEDURE — 25000003 PHARM REV CODE 250: Performed by: INTERNAL MEDICINE

## 2025-01-17 PROCEDURE — 27100171 HC OXYGEN HIGH FLOW UP TO 24 HOURS

## 2025-01-17 PROCEDURE — 84100 ASSAY OF PHOSPHORUS: CPT

## 2025-01-17 PROCEDURE — 63600175 PHARM REV CODE 636 W HCPCS: Mod: JZ,TB | Performed by: STUDENT IN AN ORGANIZED HEALTH CARE EDUCATION/TRAINING PROGRAM

## 2025-01-17 PROCEDURE — 36415 COLL VENOUS BLD VENIPUNCTURE: CPT | Performed by: STUDENT IN AN ORGANIZED HEALTH CARE EDUCATION/TRAINING PROGRAM

## 2025-01-17 PROCEDURE — 94003 VENT MGMT INPAT SUBQ DAY: CPT

## 2025-01-17 PROCEDURE — 86850 RBC ANTIBODY SCREEN: CPT | Performed by: STUDENT IN AN ORGANIZED HEALTH CARE EDUCATION/TRAINING PROGRAM

## 2025-01-17 PROCEDURE — 99233 SBSQ HOSP IP/OBS HIGH 50: CPT | Mod: ,,,

## 2025-01-17 RX ORDER — HYDROCODONE BITARTRATE AND ACETAMINOPHEN 500; 5 MG/1; MG/1
TABLET ORAL
Status: DISCONTINUED | OUTPATIENT
Start: 2025-01-17 | End: 2025-02-03 | Stop reason: HOSPADM

## 2025-01-17 RX ADMIN — LEVETIRACETAM 500 MG: 100 INJECTION, SOLUTION INTRAVENOUS at 08:01

## 2025-01-17 RX ADMIN — Medication: at 09:01

## 2025-01-17 RX ADMIN — VALPROATE SODIUM 500 MG: 100 INJECTION, SOLUTION INTRAVENOUS at 01:01

## 2025-01-17 RX ADMIN — INSULIN GLARGINE 15 UNITS: 100 INJECTION, SOLUTION SUBCUTANEOUS at 08:01

## 2025-01-17 RX ADMIN — INSULIN ASPART 6 UNITS: 100 INJECTION, SOLUTION INTRAVENOUS; SUBCUTANEOUS at 01:01

## 2025-01-17 RX ADMIN — INSULIN ASPART 6 UNITS: 100 INJECTION, SOLUTION INTRAVENOUS; SUBCUTANEOUS at 05:01

## 2025-01-17 RX ADMIN — ENOXAPARIN SODIUM 30 MG: 30 INJECTION SUBCUTANEOUS at 05:01

## 2025-01-17 RX ADMIN — VALPROATE SODIUM 500 MG: 100 INJECTION, SOLUTION INTRAVENOUS at 12:01

## 2025-01-17 RX ADMIN — INSULIN ASPART 9 UNITS: 100 INJECTION, SOLUTION INTRAVENOUS; SUBCUTANEOUS at 05:01

## 2025-01-17 RX ADMIN — EPOETIN ALFA-EPBX 20000 UNITS: 20000 INJECTION, SOLUTION INTRAVENOUS; SUBCUTANEOUS at 10:01

## 2025-01-17 RX ADMIN — INSULIN ASPART 9 UNITS: 100 INJECTION, SOLUTION INTRAVENOUS; SUBCUTANEOUS at 12:01

## 2025-01-17 RX ADMIN — Medication: at 08:01

## 2025-01-17 RX ADMIN — PANTOPRAZOLE SODIUM 40 MG: 40 INJECTION, POWDER, FOR SOLUTION INTRAVENOUS at 08:01

## 2025-01-17 NOTE — PROGRESS NOTES
Patient Name: Krysta Jansen   MRN: [id number]   Admission Date: 1/5/2025   Hospital Length of Stay: 12   Attending Provider: Robert Frances MD   Consulting Provider: EZEQUIEL Stark  Reason for Consult: Goals of Care  Primary Care Physician: No primary care provider on file.     Principal Problem: <principal problem not specified>     Patient information was obtained from relative(s) and ER records.      Final diagnoses:  [R29.818] Acute focal neurological deficit  [R41.82] AMS (altered mental status)  [R41.82] Altered mental status, unspecified altered mental status type (Primary)  [N18.6] End stage renal disease  [N39.0] Urinary tract infection without hematuria, site unspecified     Assessment/Plan:     I reviewed the family's understanding of the seriousness of the illness and its expected prognosis. We discussed the patient's goals of care and treatment preferences. We discussed the difference between palliative and curative medicine. I clarified current code status. I identified the surrogate decision maker to be the patient's two children, Sherri and Migue. I answered all questions and we formulated a plan including recommendations for symptom management and how to best achieve goals of care.       Patient is resting in bed. Remains intubated on mechanical ventilation.  Off sedation.  Raises eyebrows to verbal stimuli when name is called. Spoke with both children, Sherri and Migue via telephone. Provided update regarding need for brief episode of vasopressor support overnight which has since resolved. Discussed that she continues to raise eyebrows, but pulmonology continues to have concerns regarding safe extubation. Discussed that nursing continues to attempt CPAP trial daily and will provide update from trial today when they arrive to visit this afternoon. Again reviewed different options moving forward, including tracheostomy/PEG tube placement that would likely require nursing home placement,  "attempt for extubation with transition to comfort measures if she would decline, or if she is not deemed safe for extubation consideration for withdrawal of life sustaining treatments. Explained that physicians will likely start to ask for decisions and plan of care moving forward around day 14 on mechanical ventilation due to risk of complications with long term ETT and mechanical ventilation. Explained that day 14 would be Monday and reiterated to continue monitoring progress and discussions between family over the weekend. Sherri explains that she would not see if a benefit to tracheostomy if the patient remains unable to breath on her own. Sherri also states that "it is between herself, her brother, her mother, and God," and that no doctor is going to force them to make decisions. Reiterated that we are providing information regarding different options in order for Sherri and Migue to make the most informed decisions consistent with their mother's medical wishes. They verbalize understanding and express gratitude. Offered support. Encouraged to call with questions or concerns. Palliative Medicine will continue to follow. Discussed with primary nurse.    Advance Care Planning     Date: 01/17/2025    [facility]  I engaged the family in a voluntary conversation about advance care planning and we specifically addressed what the goals of care would be moving forward, in light of the patient's change in clinical status, specifically current condition.  We did specifically address the patient's likely prognosis, which is fair .  We explored the patient's values and preferences for future care.  The family endorses that what is most important right now is to focus on improvement in condition but with limits to invasive therapies    Accordingly, we have decided that the best plan to meet the patient's goals includes continuing with treatment       History of Present Illness:     Developed hypotension for brief period overnight " requiring Levophed, now off of vasopressors.      Active Ambulatory Problems     Diagnosis Date Noted    No Active Ambulatory Problems     Resolved Ambulatory Problems     Diagnosis Date Noted    No Resolved Ambulatory Problems     No Additional Past Medical History        History reviewed. No pertinent surgical history.     Review of patient's allergies indicates:  No Known Allergies       Current Facility-Administered Medications:     bisacodyL suppository 10 mg, 10 mg, Rectal, Daily PRN, David Yoder MD    dextrose 50% injection 12.5 g, 12.5 g, Intravenous, PRN, Trav Chavis MD    enoxaparin injection 30 mg, 30 mg, Subcutaneous, Q24H (prophylaxis, 1700), David Yoder MD, 30 mg at 01/16/25 1755    EPINEPHrine (ADRENALIN) 5 mg in D5W 250 mL infusion, 0-2 mcg/kg/min (Dosing Weight), Intravenous, Continuous, Jean Pierre Friedman MD, Last Rate: 4.4 mL/hr at 01/17/25 0549, 0.02 mcg/kg/min at 01/17/25 0549    epoetin chelsea-epbx injection 20,000 Units, 20,000 Units, Subcutaneous, Every Mon, Wed, Fri, Jean Shine T, DO    glucagon (human recombinant) injection 1 mg, 1 mg, Intramuscular, PRN, Trav Chavis MD    heparin (porcine) injection 5,000 Units, 5,000 Units, Intra-Catheter, PRN, Jean Shine, DO    insulin aspart U-100 injection 0-15 Units, 0-15 Units, Subcutaneous, Q6H PRN, Trav Chavis MD, 6 Units at 01/17/25 0531    insulin glargine U-100 (Lantus) injection 15 Units, 15 Units, Subcutaneous, BID, Robert Frances MD, 15 Units at 01/17/25 0818    labetaloL injection 10 mg, 10 mg, Intravenous, Q15 Min PRN, David Yoder MD    levETIRAcetam (Keppra) 500 mg in D5W 100 mL IVPB (MB+), 500 mg, Intravenous, Q12H, Genevieve Cheney AGACNP-BC, Last Rate: 200 mL/hr at 01/17/25 0814, 500 mg at 01/17/25 0814    NORepinephrine 8 mg in dextrose 5% 250 mL infusion, 0-3 mcg/kg/min (Dosing Weight), Intravenous, Continuous, Trav Chavis MD, Stopped at 01/17/25  0547    pantoprazole injection 40 mg, 40 mg, Intravenous, Daily, Feliciano Gray MD, 40 mg at 01/17/25 0809    propofol (DIPRIVAN) 10 mg/mL infusion, 0-50 mcg/kg/min (Dosing Weight), Intravenous, Continuous, Jean Pierre Friedman MD, Stopped at 01/08/25 0947    sodium chloride 0.9% flush 10 mL, 10 mL, Intravenous, PRN, David Yoder MD    valproate (DEPACON) 500 mg in D5W 50 mL IVPB, 500 mg, Intravenous, Q12H, DAMARIS Jaime MD, Stopped at 01/17/25 0211    zinc oxide-cod liver oil 40 % paste, , Topical (Top), BID, DAMARIS Jaime MD, Given at 01/17/25 0900       Current Facility-Administered Medications:     bisacodyL, 10 mg, Rectal, Daily PRN    dextrose 50%, 12.5 g, Intravenous, PRN    glucagon (human recombinant), 1 mg, Intramuscular, PRN    heparin (porcine), 5,000 Units, Intra-Catheter, PRN    insulin aspart U-100, 0-15 Units, Subcutaneous, Q6H PRN    labetalol, 10 mg, Intravenous, Q15 Min PRN    sodium chloride 0.9%, 10 mL, Intravenous, PRN     History reviewed. No pertinent family history.     Review of Systems   Unable to perform ROS: Intubated            Objective:   /65   Pulse 95   Temp 98.7 °F (37.1 °C) (Oral)   Resp (!) 22   Ht 5' (1.524 m)   Wt 74.1 kg (163 lb 5.8 oz)   SpO2 100%   BMI 31.90 kg/m²      Physical Exam  Constitutional:       General: She is not in acute distress.     Appearance: She is ill-appearing.   HENT:      Mouth/Throat:      Mouth: Mucous membranes are moist.   Cardiovascular:      Rate and Rhythm: Normal rate.      Heart sounds: Normal heart sounds. No murmur heard.  Pulmonary:      Effort: Pulmonary effort is normal. No respiratory distress.      Breath sounds: Normal breath sounds.      Comments: Intubated on mechanical ventilation  Abdominal:      General: There is no distension.   Musculoskeletal:         General: Swelling present.   Skin:     General: Skin is warm and dry.   Neurological:      Comments: Raises eyebrows to when name is called             Review of Symptoms  Review of Symptoms      Symptom Assessment (ESAS 0-10 Scale)  Pain:  0  Dyspnea:  0  Anxiety:  0  Nausea:  0  Depression:  0  Anorexia:  0  Fatigue:  0  Insomnia:  0  Restlessness:  0  Agitation:  0         Bowel Management Plan (BMP):  Yes      Psychosocial/Cultural:   See Palliative Psychosocial Note: Yes  **Primary  to Follow**  Palliative Care  Consult: No      Advance Care Planning   Advance Directives:     Decision Making:  Family answered questions  Goals of Care: What is most important right now is to focus on improvement in condition but with limits to invasive therapies. Accordingly, we have decided that the best plan to meet the patient's goals includes continuing with treatment.          PAINAD: NA    Caregiver burden formerly assessed: Yes    > 50% of 45 min of encounter was spent in chart review, face to face discussion of goals of care, symptom assessment, coordination of care and emotional support.    EZEQUIEL Stark-BC  Palliative Medicine  [facility]

## 2025-01-17 NOTE — PLAN OF CARE
Problem: Adult Inpatient Plan of Care  Goal: Plan of Care Review  Outcome: Progressing  Goal: Patient-Specific Goal (Individualized)  Outcome: Progressing  Goal: Absence of Hospital-Acquired Illness or Injury  Outcome: Progressing  Goal: Optimal Comfort and Wellbeing  Outcome: Progressing  Goal: Readiness for Transition of Care  Outcome: Progressing     Problem: Hemodialysis  Goal: Safe, Effective Therapy Delivery  Outcome: Progressing  Goal: Effective Tissue Perfusion  Outcome: Progressing  Goal: Absence of Infection Signs and Symptoms  Outcome: Progressing     Problem: Stroke, Ischemic (Includes Transient Ischemic Attack)  Goal: Optimal Coping  Outcome: Progressing  Goal: Effective Bowel Elimination  Outcome: Progressing  Goal: Optimal Cerebral Tissue Perfusion  Outcome: Progressing  Goal: Optimal Cognitive Function  Outcome: Progressing  Goal: Improved Communication Skills  Outcome: Progressing  Goal: Optimal Functional Ability  Outcome: Progressing  Goal: Optimal Nutrition Intake  Outcome: Progressing  Goal: Effective Oxygenation and Ventilation  Outcome: Progressing  Goal: Improved Sensorimotor Function  Outcome: Progressing  Goal: Safe and Effective Swallow  Outcome: Progressing  Goal: Effective Urinary Elimination  Outcome: Progressing     Problem: Skin Injury Risk Increased  Goal: Skin Health and Integrity  Outcome: Progressing     Problem: Wound  Goal: Optimal Coping  Outcome: Progressing  Goal: Optimal Functional Ability  Outcome: Progressing  Goal: Absence of Infection Signs and Symptoms  Outcome: Progressing  Goal: Improved Oral Intake  Outcome: Progressing  Goal: Optimal Pain Control and Function  Outcome: Progressing  Goal: Skin Health and Integrity  Outcome: Progressing  Goal: Optimal Wound Healing  Outcome: Progressing     Problem: Fall Injury Risk  Goal: Absence of Fall and Fall-Related Injury  Outcome: Progressing     Problem: Coping Ineffective  Goal: Effective Coping  Outcome: Progressing      Problem: Mechanical Ventilation Invasive  Goal: Effective Communication  Outcome: Progressing  Goal: Optimal Device Function  Outcome: Progressing  Goal: Mechanical Ventilation Liberation  Outcome: Progressing  Goal: Optimal Nutrition Delivery  Outcome: Progressing  Goal: Absence of Device-Related Skin and Tissue Injury  Outcome: Progressing  Goal: Absence of Ventilator-Induced Lung Injury  Outcome: Progressing

## 2025-01-17 NOTE — NURSING
01/17/25 1532   Post-Hemodialysis Assessment   Rinseback Volume (mL) 500 mL   Blood Volume Processed (Liters) 54 L   Dialyzer Clearance Clotted   Duration of Treatment 180 minutes   Additional Fluid Intake (mL) 300 mL   Total UF (mL) 1300 mL   Net Fluid Removal 500   Patient Response to Treatment pt tolerated well. Pt did clot off once. Re-setup and completed tx. Pt also got 1 unit PRBC. New caps applied.

## 2025-01-17 NOTE — PROGRESS NOTES
Inpatient Nutrition Assessment    Admit Date: 1/5/2025   Total duration of encounter: 12 days   Patient Age: 79 y.o.    Nutrition Recommendation/Prescription     Tube feeding recommendation:     Peptamen AF goal rate 60 ml/hr to provide  1440 kcal/d  (96% est needs)  90 g protein/d  (100% est needs)  972 ml free water/d   (calculations based on estimated 20 hr/d run time)     Humble (provides 90 kcal, 2.5 g protein per serving) BID.     If no IV fluids running, can give 50ml q 4hr water flushes. Total water provided: 1070 ml. Otherwise, FWF per MD.      Communication of Recommendations: reviewed with nurse    Nutrition Assessment     Malnutrition Assessment/Nutrition-Focused Physical Exam       Malnutrition Level: other (see comments) (Does not meet criteria) (01/07/25 1339)  Energy Intake (Malnutrition): other (see comments) (Unable to assess) (01/07/25 1339)  Weight Loss (Malnutrition): other (see comments) (Unable to assess) (01/07/25 1339)                                         Fluid Accumulation (Malnutrition): other (see comments) (Not present) (01/07/25 1339)        A minimum of two characteristics is recommended for diagnosis of either severe or non-severe malnutrition.    Chart Review    Reason Seen: continuous nutrition monitoring and follow-up    Malnutrition Screening Tool Results   Have you recently lost weight without trying?: No  Have you been eating poorly because of a decreased appetite?: No   MST Score: 0   Diagnosis:  Acute global aphasia, seizure/postictal state  Obtunded  UTI, present on arrival    Relevant Medical History: ESRD on HD, HTN, CAD, CHF, DM2, MCA territory stroke     Scheduled Medications:  enoxparin, 30 mg, Q24H (prophylaxis, 1700)  epoetin chelsea-ebpx (RETACRIT) injection, 20,000 Units, Every Mon, Wed, Fri  insulin glargine U-100, 15 Units, BID  levETIRAcetam (Keppra) IV (PEDS and ADULTS), 500 mg, Q12H  pantoprazole, 40 mg, Daily  valproate sodium (DEPACON) IVPB, 500 mg, Q12H  zinc  oxide-cod liver oil, , BID    Continuous Infusions:  EPINEPHrine, Last Rate: 0.02 mcg/kg/min (01/17/25 0549)  NORepinephrine bitartrate-D5W, Last Rate: Stopped (01/17/25 0547)  propofoL, Last Rate: Stopped (01/08/25 0947)    PRN Medications:  0.9%  NaCl infusion (for blood administration), , Q24H PRN  bisacodyL, 10 mg, Daily PRN  dextrose 50%, 12.5 g, PRN  glucagon (human recombinant), 1 mg, PRN  heparin (porcine), 5,000 Units, PRN  insulin aspart U-100, 0-15 Units, Q6H PRN  labetalol, 10 mg, Q15 Min PRN  sodium chloride 0.9%, 10 mL, PRN    Calorie Containing IV Medications: no significant kcals from medications at this time    Recent Labs   Lab 01/12/25  0256 01/13/25  0235 01/14/25  0501 01/15/25  0809 01/16/25  0512 01/17/25  0156   * 128* 130* 131* 132* 132*   K 4.0 4.2 4.6 4.4 4.2 4.6   CALCIUM 7.9* 8.3* 8.2* 7.9* 8.0* 8.3*   PHOS  --   --   --  2.5 1.9* 2.0*   MG  --   --   --  2.50 2.30 2.50   CL 97* 98 100 101 102 101   CO2 21* 20* 22* 21* 23 21*   BUN 58.6* 87.2* 63.1* 104.5* 72.6* 96.9*   CREATININE 4.27* 4.90* 3.22* 4.18* 2.72* 3.26*   EGFRNORACEVR 10 9 14 10 17 14   GLUCOSE 499* 456* 170* 316* 238* 220*   BILITOT 0.2 0.2 0.1 0.1 0.1 0.1   ALKPHOS 133 156* 122 152* 133 136   ALT 14 16 18 18 21 23   AST 15 18 25 19 23 31   ALBUMIN 1.6* 1.6* 1.5* 1.6* 1.5* 1.6*   WBC 29.44  29.44* 19.91  19.91* 14.8  14.81* 12.39  12.39* 13.54  13.54* 14.26  14.26*   HGB 6.9* 7.0* 7.3* 6.4* 7.0* 6.8*   HCT 20.8* 20.9* 21.9* 20.1* 21.9* 21.6*     Nutrition Orders:  Diet NPO  Tube Feedings/Formulas Other (see comments); OG; Humble - Fruit Punch,Tube Feedings/Formulas 60; 1,200; Peptamen AF; OG; 50; Every 4 hours    Appetite/Oral Intake: not applicable/not applicable  Factors Affecting Nutritional Intake: on mechanical ventilation  Social Needs Impacting Access to Food: unable to assess at this time; will attempt on follow-up  Food/Christian/Cultural Preferences: unable to obtain  Food Allergies: no known food  allergies  Last Bowel Movement: 01/16/25  Wound(s):     Wound 01/05/25 1535 Skin Tear Coccyx-Tissue loss description: Partial thickness   I/O: Past 24 Hours: +1043, Since Admit: +8220     Comments    1/7/25: Discussed with RN. Will provide tube feeding recommendations for when appropriate to start tube feeding. Receiving kcal from meds.      1/8/25: Plans for starting TF today. Still receiving kcal from meds.     1/10/25: TF seen running at goal rate. Pt remains intubated, no longer receiving kcals from meds. Significant amount of diarrhea per RN. EN regimen updated to meet kcal needs.     1/14/25: Pt remains intubated, off all sedation. Tolerating TF at goal rate.     1/16/25: TF continues, tolerated per RN. Noted elevated CBGs. Will change to formula with less CHO/ml than current formula.     1/17/25: TF continues, tolerated per RN. Better CBG control per RN. No kcal from meds.     Anthropometrics    Height: 5' (152.4 cm),    Last Weight: 74.1 kg (163 lb 5.8 oz) (01/05/25 1518), Weight Method: Standard Scale  BMI (Calculated): 31.9  BMI Classification: obese grade I (BMI 30-34.9)     Ideal Body Weight (IBW), Female: 100 lb     % Ideal Body Weight, Female (lb): 163.36 %                             Usual Weight Provided By: unable to obtain usual weight    Wt Readings from Last 5 Encounters:   01/05/25 74.1 kg (163 lb 5.8 oz)     Weight Change(s) Since Admission:   Wt Readings from Last 1 Encounters:   01/05/25 1518 74.1 kg (163 lb 5.8 oz)   Admit Weight: 74.1 kg (163 lb 5.8 oz) (01/05/25 1518), Weight Method: Standard Scale    Estimated Needs    Weight Used For Calorie Calculations: 74.1 kg (163 lb 5.8 oz)  Energy Calorie Requirements (kcal): 1495kcal  Energy Need Method: [facility] (modified)  Weight Used For Protein Calculations: 45.5 kg (100 lb 3.2 oz) (IBW)  Protein Requirements: 91gm (2g/kg IBW)  Fluid Requirements (mL): 1000ml + urinary output  CHO Requirement: 170gm (45% est kcal needs)     Enteral Nutrition      Formula: Peptamen AF  Rate/Volume: 50 mL/hr  Water Flushes: 50 mL q4hr  Additives/Modulars: Humble  Route: orogastric tube  Method: continuous  Total Nutrition Provided by Tube Feeding, Additives, and Flushes:  Calories Provided  1440 kcal/d, 96% needs   Protein Provided  90 g/d, 100% needs   Fluid Provided  972 ml/d, N/A% needs   Continuous feeding calculations based on estimated 20 hr/d run time unless otherwise stated.    Parenteral Nutrition     Patient not receiving parenteral nutrition support at this time.    Evaluation of Received Nutrient Intake    Calories: meeting estimated needs  Protein: meeting estimated needs    Patient Education     Not applicable.    Nutrition Diagnosis     PES: Inadequate oral intake related to acute illness as evidenced by intubation since admit. (active)     PES:            Nutrition Interventions     Intervention(s): collaboration with other providers  Intervention(s):      Goal: Meet greater than 80% of nutritional needs by follow-up. (goal progressing)  Goal: Tolerate enteral feeding at goal rate by follow-up. (goal progressing)    Nutrition Goals & Monitoring     Dietitian will monitor: energy intake and enteral nutrition intake  Discharge planning: too early to determine; pending clinical course  Nutrition Risk/Follow-Up: high (follow-up in 1-4 days)   Please consult if re-assessment needed sooner.

## 2025-01-17 NOTE — PROGRESS NOTES
[facility]  Pulmonary Critical Care Note    Patient Name: Krysta Jansen  MRN: [id number]  Admission Date: 1/5/2025  Hospital Length of Stay: 12 days  Code Status: DNR  Attending Provider: Robert Frances MD  Primary Care Provider: No primary care provider on file.     Subjective:     HPI:   Patient is a 79-year-old female with a past medical history of ESRD on HD, HTN, CAD, CHF unknown EF, DM2, previous left MCA territory stroke who was brought into the ER after her son reported seeing her normal an hour prior, briefly left in when came back patient was nonverbal and lethargic.      She was emergently brought to the ER and a code fast was called, she was assessed by Neurology in the ER where she was found to have an NIH of 22 based on dysarthria and level of consciousness, but was deemed not a candidate for TNK secondary to overall nonfocal exam and recent left arm HD access surgery reported.  Vitals and laboratory work were overall unremarkable except for changes as expected in any age renal disease, and a potential urinary tract infection.  She is admitted under typical stroke protocol.     1/6/2025, EEG at 0800 revealed seizure-like activity. She was given Lorazepam 2 mg IV. Patient later became unconscious, unresponsive to pain stimuli, and hypotensive. She was intubated for airway protection (see procedure note).       Hospital Course/Significant events:  1/6/2025 - Admitted to ICU       24 Hour Interval History:  Patient more awake than last time I saw her about a week ago.  No clinical seizures noted.  Brief episode of hypotension last night requiring Levophed but normotensive this morning.  Breathing easily on present ventilator settings.  Tolerating tube feedings well.  On no sedation.  Intake 1193 output 150      Review of systems unobtainable due to intubation, altered mental status.        Social History     Socioeconomic History    Marital status: Single   Tobacco  Use    Smoking status: Unknown   Substance and Sexual Activity    Alcohol use: Not Currently     Social Drivers of Health     Financial Resource Strain: Patient Unable To Answer (1/11/2025)    Overall Financial Resource Strain (CARDIA)     Difficulty of Paying Living Expenses: Patient unable to answer   Food Insecurity: Patient Unable To Answer (1/11/2025)    Hunger Vital Sign     Worried About Running Out of Food in the Last Year: Patient unable to answer     Ran Out of Food in the Last Year: Patient unable to answer   Transportation Needs: Patient Unable To Answer (1/11/2025)    TRANSPORTATION NEEDS     Transportation : Patient unable to answer   Stress: Patient Unable To Answer (1/11/2025)    [place] [employer] - Occupational Stress Questionnaire     Feeling of Stress : Patient unable to answer   Housing Stability: Patient Unable To Answer (1/11/2025)    Housing Stability Vital Sign     Unable to Pay for Housing in the Last Year: Patient unable to answer     Homeless in the Last Year: Patient unable to answer       Current Outpatient Medications   Medication Instructions    aspirin 81 MG Chew 1 tablet, Daily    BASAGLAR KWIKPEN U-100 INSULIN 30 Units, Nightly    CeleXA 10 mg, Daily    ENTRESTO 24-26 mg per tablet 1 tablet, 2 times daily    metoprolol succinate 25 mg CSpX 1 capsule, Daily    PLAVIX 75 mg tablet 1 tablet, Daily    rosuvastatin (CRESTOR) 40 MG Tab 1 tablet, Nightly    traMADoL (ULTRAM) 50 mg tablet 1 tablet, Every 6 hours PRN    vancomycin (VANCOCIN) 500 mg, Every Mon, Wed, Fri     Current Inpatient Medications   enoxparin  30 mg Subcutaneous Q24H (prophylaxis, 1700)    epoetin chelsea-ebpx (RETACRIT) injection  20,000 Units Subcutaneous Every Mon, Wed, Fri    insulin glargine U-100  15 Units Subcutaneous BID    levETIRAcetam (Keppra) IV (PEDS and ADULTS)  500 mg Intravenous Q12H    pantoprazole  40 mg Intravenous Daily    valproate sodium (DEPACON) IVPB  500 mg Intravenous Q12H     zinc oxide-cod liver oil   Topical (Top) BID     Current Intravenous Infusions   EPINEPHrine  0-2 mcg/kg/min (Dosing Weight) Intravenous Continuous 4.4 mL/hr at 01/17/25 0549 0.02 mcg/kg/min at 01/17/25 0549    NORepinephrine bitartrate-D5W  0-3 mcg/kg/min (Dosing Weight) Intravenous Continuous   Stopped at 01/17/25 0547    propofoL  0-50 mcg/kg/min (Dosing Weight) Intravenous Continuous   Stopped at 01/08/25 0947       Objective:     Intake/Output Summary (Last 24 hours) at 1/17/2025 0733  Last data filed at 1/17/2025 0549  Gross per 24 hour   Intake 1193.35 ml   Output 150 ml   Net 1043.35 ml     Vital Signs (Most Recent):  Temp: 98.7 °F (37.1 °C) (01/17/25 0400)  Pulse: 95 (01/17/25 0630)  Resp: (!) 22 (01/17/25 0630)  BP: 125/65 (01/17/25 0630)  SpO2: 100 % (01/17/25 0630)  Body mass index is 31.9 kg/m².  Weight: 74.1 kg (163 lb 5.8 oz) Vital Signs (24h Range):  Temp:  [98.6 °F (37 °C)-99 °F (37.2 °C)] 98.7 °F (37.1 °C)  Pulse:  [81-99] 95  Resp:  [19-22] 22  SpO2:  [100 %] 100 %  BP: ()/(41-76) 125/65         Physical Exam:  Gen- intubated, attempts to open eyes to voice  HENT- ATNC, MMM, ETT in place  CV- RRR  Resp- CTAB, compliant with mechanical ventilation  MSK- WWP, 1+ bilateral edema  Neuro- attempts to open eyes to voice and weakly attempts to follow commands within the right upper extremity  Psych- unable to assess        Lines/Drains/Airways       Central Venous Catheter Line  Duration                  Hemodialysis Catheter 01/06/25 1400 left internal jugular 10 days              Drain  Duration                  NG/OG Tube 01/06/25 1109 Center mouth 10 days         Fecal Incontinence  01/14/25 1430 2 days              Airway  Duration                  Airway - Non-Surgical 01/06/25 1104 Endotracheal Tube 10 days              Peripheral Intravenous Line  Duration                  Peripheral IV - Single Lumen 01/06/25 1055 18 G Anterior;Right Upper Arm 10 days               "    Significant Labs:  Lab Results   Component Value Date    WBC 14.26 (H) 01/17/2025    WBC 14.26 01/17/2025    HGB 6.8 (L) 01/17/2025    HCT 21.6 (L) 01/17/2025    MCV 95.2 (H) 01/17/2025     01/17/2025       BMP  Lab Results   Component Value Date     (L) 01/17/2025    K 4.6 01/17/2025    CO2 21 (L) 01/17/2025    BUN 96.9 (H) 01/17/2025    CREATININE 3.26 (H) 01/17/2025    CALCIUM 8.3 (L) 01/17/2025    AGAP 10.0 01/17/2025    ESTGFRAFRICA 13 05/08/2024     ABG  No results for input(s): "PH", "PO2", "PCO2", "HCO3", "BE" in the last 168 hours.    Mechanical Ventilation Support:  Vent Mode: A/C (01/17/25 0531)  Ventilator Initiated: Yes (01/06/25 1104)  Set Rate: 22 BPM (01/17/25 0531)  Vt Set: 450 mL (01/17/25 0531)  PEEP/CPAP: 5 cmH20 (01/17/25 0531)  Oxygen Concentration (%): 30 (01/17/25 0531)  Peak Airway Pressure: 24 cmH20 (01/17/25 0531)  Total Ve: 9.7 L/m (01/17/25 0531)  F/VT Ratio<105 (RSBI): (!) 50.23 (01/17/25 0531)      Assessment/Plan:     Assessment  Persistent nonconvulsive status epilepticus appears to have resolved.    Pseudomonas UTI  Prior left MCA territory stroke with improving encephalopathy.    Hypotension-now stable   ESRD   Hx of HTN, CAD, DM2       Plan  -hospital admission complicated by evidence of ongoing seizure activity on EEG refractory to antiepileptic administration; neurology previously following, recommends palliative care  -continue antiepileptic regimen for now, no further outward evidence of seizure activity   -completed ciprofloxacin for pansensitive Pseudomonas UTI  -continue mechanical ventilation, inappropriate for trial of extubation given poor mental status  -palliative care following, code status now DO NOT RESUSCITATE  -extremely poor long-term neurologic outlook, particularly given baseline significant debility, with extremely low likelihood for liberation from mechanical ventilation and significant functional neurologic improvement  -some slight " improvements in alertness this morning, but still prohibitively poor mental status for consideration of extubation  -family continuing to discuss with palliative care and remain undecided on next steps of care, but code status remains DO NOT RESUSCITATE           DVT Prophylaxis: LMWH  GI Prophylaxis: None         I spent 36 minutes providing critical care services to this patient.  This does not include time spent for separately billed procedures.

## 2025-01-18 LAB
ABS NEUT (OLG): 8.43 X10(3)/MCL (ref 2.1–9.2)
ACANTHOCYTES (OLG): ABNORMAL
ALBUMIN SERPL-MCNC: 1.5 G/DL (ref 3.4–4.8)
ALLENS TEST BLOOD GAS (OHS): YES
BASE EXCESS BLD CALC-SCNC: 1.3 MMOL/L (ref -2–2)
BASOPHILS NFR BLD MANUAL: 0.14 X10(3)/MCL (ref 0–0.2)
BASOPHILS NFR BLD MANUAL: 1 %
BLOOD GAS SAMPLE TYPE (OHS): ABNORMAL
BUN SERPL-MCNC: 71.7 MG/DL (ref 9.8–20.1)
BURR CELLS (OLG): ABNORMAL
CA-I BLD-SCNC: 1.12 MMOL/L (ref 1.12–1.23)
CALCIUM SERPL-MCNC: 7.7 MG/DL (ref 8.4–10.2)
CHLORIDE SERPL-SCNC: 102 MMOL/L (ref 98–107)
CO2 BLDA-SCNC: 25.6 MMOL/L
CO2 SERPL-SCNC: 19 MMOL/L (ref 23–31)
COHGB MFR BLDA: 1.8 % (ref 0.5–1.5)
CREAT SERPL-MCNC: 2.36 MG/DL (ref 0.55–1.02)
DRAWN BY BLOOD GAS (OHS): ABNORMAL
EOSINOPHIL NFR BLD MANUAL: 0.27 X10(3)/MCL (ref 0–0.9)
EOSINOPHIL NFR BLD MANUAL: 2 %
ERYTHROCYTE [DISTWIDTH] IN BLOOD BY AUTOMATED COUNT: 19.2 % (ref 11.5–17)
GFR SERPLBLD CREATININE-BSD FMLA CKD-EPI: 20 ML/MIN/1.73/M2
GLUCOSE SERPL-MCNC: 238 MG/DL (ref 82–115)
HCO3 BLDA-SCNC: 24.6 MMOL/L (ref 22–26)
HCT VFR BLD AUTO: 25.1 % (ref 37–47)
HGB BLD-MCNC: 8.3 G/DL (ref 12–16)
INHALED O2 CONCENTRATION: 30 %
INSTRUMENT WBC (OLG): 13.59 X10(3)/MCL
LYMPHOCYTES NFR BLD MANUAL: 2.72 X10(3)/MCL
LYMPHOCYTES NFR BLD MANUAL: 20 %
MCH RBC QN AUTO: 29.7 PG (ref 27–31)
MCHC RBC AUTO-ENTMCNC: 33.1 G/DL (ref 33–36)
MCV RBC AUTO: 90 FL (ref 80–94)
MECH RR (OHS): 22 B/MIN
METAMYELOCYTES NFR BLD MANUAL: 3 %
METHGB MFR BLDA: 1.1 % (ref 0.4–1.5)
MODE (OHS): AC
MONOCYTES NFR BLD MANUAL: 1.36 X10(3)/MCL (ref 0.1–1.3)
MONOCYTES NFR BLD MANUAL: 10 %
MYELOCYTES NFR BLD MANUAL: 4 %
NEUTROPHILS NFR BLD MANUAL: 62 %
NRBC BLD AUTO-RTO: 0 %
O2 HB BLOOD GAS (OHS): 96.7 % (ref 94–97)
OXYGEN DEVICE BLOOD GAS (OHS): ABNORMAL
OXYHGB MFR BLDA: 9 G/DL (ref 12–16)
PCO2 BLDA: 33 MMHG (ref 35–45)
PEEP RESPIRATORY: 5 CMH2O
PH BLDA: 7.48 [PH] (ref 7.35–7.45)
PHOSPHATE SERPL-MCNC: 2.1 MG/DL (ref 2.3–4.7)
PLATELET # BLD AUTO: 203 X10(3)/MCL (ref 130–400)
PLATELET # BLD EST: NORMAL 10*3/UL
PLATELETS.RETICULATED NFR BLD AUTO: 3.8 % (ref 0.9–11.2)
PMV BLD AUTO: 10.1 FL (ref 7.4–10.4)
PO2 BLDA: 120 MMHG (ref 80–100)
POCT GLUCOSE: 197 MG/DL (ref 70–110)
POCT GLUCOSE: 247 MG/DL (ref 70–110)
POCT GLUCOSE: 253 MG/DL (ref 70–110)
POCT GLUCOSE: 263 MG/DL (ref 70–110)
POCT GLUCOSE: 271 MG/DL (ref 70–110)
POIKILOCYTOSIS BLD QL SMEAR: ABNORMAL
POTASSIUM BLOOD GAS (OHS): 4.3 MMOL/L (ref 3.5–5)
POTASSIUM SERPL-SCNC: 5.2 MMOL/L (ref 3.5–5.1)
RBC # BLD AUTO: 2.79 X10(6)/MCL (ref 4.2–5.4)
RBC MORPH BLD: ABNORMAL
SAMPLE SITE BLOOD GAS (OHS): ABNORMAL
SAO2 % BLDA: 98.9 %
SODIUM BLOOD GAS (OHS): 129 MMOL/L (ref 137–145)
SODIUM SERPL-SCNC: 130 MMOL/L (ref 136–145)
SPONT+MECH VT ON VENT: 450 ML
WBC # BLD AUTO: 13.59 X10(3)/MCL (ref 4.5–11.5)

## 2025-01-18 PROCEDURE — 94761 N-INVAS EAR/PLS OXIMETRY MLT: CPT | Mod: XB

## 2025-01-18 PROCEDURE — 36600 WITHDRAWAL OF ARTERIAL BLOOD: CPT

## 2025-01-18 PROCEDURE — 99900026 HC AIRWAY MAINTENANCE (STAT)

## 2025-01-18 PROCEDURE — 94760 N-INVAS EAR/PLS OXIMETRY 1: CPT | Mod: XB

## 2025-01-18 PROCEDURE — 85025 COMPLETE CBC W/AUTO DIFF WBC: CPT | Performed by: STUDENT IN AN ORGANIZED HEALTH CARE EDUCATION/TRAINING PROGRAM

## 2025-01-18 PROCEDURE — 11000001 HC ACUTE MED/SURG PRIVATE ROOM

## 2025-01-18 PROCEDURE — 36415 COLL VENOUS BLD VENIPUNCTURE: CPT | Performed by: STUDENT IN AN ORGANIZED HEALTH CARE EDUCATION/TRAINING PROGRAM

## 2025-01-18 PROCEDURE — 82803 BLOOD GASES ANY COMBINATION: CPT

## 2025-01-18 PROCEDURE — 63600175 PHARM REV CODE 636 W HCPCS

## 2025-01-18 PROCEDURE — 63600175 PHARM REV CODE 636 W HCPCS: Performed by: INTERNAL MEDICINE

## 2025-01-18 PROCEDURE — 25000003 PHARM REV CODE 250: Performed by: INTERNAL MEDICINE

## 2025-01-18 PROCEDURE — 99900031 HC PATIENT EDUCATION (STAT)

## 2025-01-18 PROCEDURE — 94003 VENT MGMT INPAT SUBQ DAY: CPT

## 2025-01-18 PROCEDURE — 80069 RENAL FUNCTION PANEL: CPT | Performed by: STUDENT IN AN ORGANIZED HEALTH CARE EDUCATION/TRAINING PROGRAM

## 2025-01-18 PROCEDURE — 99900035 HC TECH TIME PER 15 MIN (STAT)

## 2025-01-18 PROCEDURE — 27100171 HC OXYGEN HIGH FLOW UP TO 24 HOURS

## 2025-01-18 PROCEDURE — 25000003 PHARM REV CODE 250

## 2025-01-18 PROCEDURE — 27201109 HC SYSTEM FECAL MANAGEMENT

## 2025-01-18 RX ORDER — MIDODRINE HYDROCHLORIDE 5 MG/1
5 TABLET ORAL EVERY 8 HOURS
Status: DISCONTINUED | OUTPATIENT
Start: 2025-01-18 | End: 2025-01-19

## 2025-01-18 RX ADMIN — VALPROATE SODIUM 500 MG: 100 INJECTION, SOLUTION INTRAVENOUS at 12:01

## 2025-01-18 RX ADMIN — INSULIN GLARGINE 15 UNITS: 100 INJECTION, SOLUTION SUBCUTANEOUS at 09:01

## 2025-01-18 RX ADMIN — INSULIN ASPART 9 UNITS: 100 INJECTION, SOLUTION INTRAVENOUS; SUBCUTANEOUS at 06:01

## 2025-01-18 RX ADMIN — PANTOPRAZOLE SODIUM 40 MG: 40 INJECTION, POWDER, FOR SOLUTION INTRAVENOUS at 09:01

## 2025-01-18 RX ADMIN — Medication: at 09:01

## 2025-01-18 RX ADMIN — ENOXAPARIN SODIUM 30 MG: 30 INJECTION SUBCUTANEOUS at 04:01

## 2025-01-18 RX ADMIN — VALPROATE SODIUM 500 MG: 100 INJECTION, SOLUTION INTRAVENOUS at 01:01

## 2025-01-18 RX ADMIN — MIDODRINE HYDROCHLORIDE 5 MG: 5 TABLET ORAL at 09:01

## 2025-01-18 RX ADMIN — INSULIN GLARGINE 15 UNITS: 100 INJECTION, SOLUTION SUBCUTANEOUS at 08:01

## 2025-01-18 RX ADMIN — Medication: at 08:01

## 2025-01-18 RX ADMIN — LEVETIRACETAM 500 MG: 100 INJECTION, SOLUTION INTRAVENOUS at 08:01

## 2025-01-18 RX ADMIN — MIDODRINE HYDROCHLORIDE 5 MG: 5 TABLET ORAL at 03:01

## 2025-01-18 RX ADMIN — INSULIN ASPART 6 UNITS: 100 INJECTION, SOLUTION INTRAVENOUS; SUBCUTANEOUS at 01:01

## 2025-01-18 RX ADMIN — LEVETIRACETAM 500 MG: 100 INJECTION, SOLUTION INTRAVENOUS at 09:01

## 2025-01-18 RX ADMIN — INSULIN ASPART 9 UNITS: 100 INJECTION, SOLUTION INTRAVENOUS; SUBCUTANEOUS at 12:01

## 2025-01-18 NOTE — PROGRESS NOTES
[facility] - Emergency Dept  Pulmonary Critical Care Note    Patient Name: Krysta Jansen  MRN: [id number]  Admission Date: 1/5/2025  Hospital Length of Stay: 13 days  Code Status: DNR  Attending Provider: DAMARIS Jaime MD  Primary Care Provider: No primary care provider on file.     Subjective:     HPI:   Patient is a 79-year-old female with a past medical history of ESRD on HD, HTN, CAD, CHF unknown EF, DM2, previous left MCA territory stroke who was brought into the ER after her son reported seeing her normal an hour prior, briefly left in when came back patient was nonverbal and lethargic.      She was emergently brought to the ER and a code fast was called, she was assessed by Neurology in the ER where she was found to have an NIH of 22 based on dysarthria and level of consciousness, but was deemed not a candidate for TNK secondary to overall nonfocal exam and recent left arm HD access surgery reported.  Vitals and laboratory work were overall unremarkable except for changes as expected in any age renal disease, and a potential urinary tract infection.  She is admitted under typical stroke protocol.     1/6/2025, EEG at 0800 revealed seizure-like activity. She was given Lorazepam 2 mg IV. Patient later became unconscious, unresponsive to pain stimuli, and hypotensive. She was intubated for airway protection (see procedure note).       Hospital Course/Significant events:  1/6/2025 - Admitted to ICU       24 Hour Interval History:  No acute events overnight.  Remains afebrile, leukocytosis minimally improved from yesterday's. Remains on Levophed for hypotension. Dialyzed yesterday, 1.3L UF removed, tolerated with concomitant improvement in renal function. More awake, on no sedation.        Review of systems unobtainable due to intubation, altered mental status.        Social History     Socioeconomic History    Marital status: Single   Tobacco Use    Smoking status: Unknown   Substance and  Sexual Activity    Alcohol use: Not Currently     Social Drivers of Health     Financial Resource Strain: Patient Unable To Answer (1/11/2025)    Overall Financial Resource Strain (CARDIA)     Difficulty of Paying Living Expenses: Patient unable to answer   Food Insecurity: Patient Unable To Answer (1/11/2025)    Hunger Vital Sign     Worried About Running Out of Food in the Last Year: Patient unable to answer     Ran Out of Food in the Last Year: Patient unable to answer   Transportation Needs: Patient Unable To Answer (1/11/2025)    TRANSPORTATION NEEDS     Transportation : Patient unable to answer   Stress: Patient Unable To Answer (1/11/2025)    [employer] [place] [employer] - Occupational Stress Questionnaire     Feeling of Stress : Patient unable to answer   Housing Stability: Patient Unable To Answer (1/11/2025)    Housing Stability Vital Sign     Unable to Pay for Housing in the Last Year: Patient unable to answer     Homeless in the Last Year: Patient unable to answer       Current Outpatient Medications   Medication Instructions    aspirin 81 MG Chew 1 tablet, Daily    BASAGLAR KWIKPEN U-100 INSULIN 30 Units, Nightly    CeleXA 10 mg, Daily    ENTRESTO 24-26 mg per tablet 1 tablet, 2 times daily    metoprolol succinate 25 mg CSpX 1 capsule, Daily    PLAVIX 75 mg tablet 1 tablet, Daily    rosuvastatin (CRESTOR) 40 MG Tab 1 tablet, Nightly    traMADoL (ULTRAM) 50 mg tablet 1 tablet, Every 6 hours PRN    vancomycin (VANCOCIN) 500 mg, Every Mon, Wed, Fri     Current Inpatient Medications   enoxparin  30 mg Subcutaneous Q24H (prophylaxis, 1700)    epoetin chelsea-ebpx (RETACRIT) injection  20,000 Units Subcutaneous Every Mon, Wed, Fri    insulin glargine U-100  15 Units Subcutaneous BID    levETIRAcetam (Keppra) IV (PEDS and ADULTS)  500 mg Intravenous Q12H    pantoprazole  40 mg Intravenous Daily    valproate sodium (DEPACON) IVPB  500 mg Intravenous Q12H    zinc oxide-cod liver oil   Topical (Top) BID      Current Intravenous Infusions   EPINEPHrine  0-2 mcg/kg/min (Dosing Weight) Intravenous Continuous 4.4 mL/hr at 01/17/25 0549 0.02 mcg/kg/min at 01/17/25 0549    NORepinephrine bitartrate-D5W  0-3 mcg/kg/min (Dosing Weight) Intravenous Continuous 5.6 mL/hr at 01/18/25 0605 0.04 mcg/kg/min at 01/18/25 0605    propofoL  0-50 mcg/kg/min (Dosing Weight) Intravenous Continuous   Stopped at 01/08/25 0947       Objective:     Intake/Output Summary (Last 24 hours) at 1/18/2025 1008  Last data filed at 1/18/2025 0605  Gross per 24 hour   Intake 3346.22 ml   Output 1450 ml   Net 1896.22 ml     Vital Signs (Most Recent):  Temp: 98.5 °F (36.9 °C) (01/18/25 0400)  Pulse: 101 (01/18/25 0755)  Resp: (!) 32 (01/18/25 0755)  BP: (!) 109/53 (01/18/25 0600)  SpO2: 100 % (01/18/25 0755)  Body mass index is 31.9 kg/m².  Weight: 74.1 kg (163 lb 5.8 oz) Vital Signs (24h Range):  Temp:  [98.2 °F (36.8 °C)-98.9 °F (37.2 °C)] 98.5 °F (36.9 °C)  Pulse:  [] 101  Resp:  [20-32] 32  SpO2:  [100 %] 100 %  BP: ()/(45-77) 109/53         Physical Exam:  Gen- intubated, attempts to open eyes to voice  HENT- ATNC, MMM, ETT in place  CV- RRR  Resp- CTAB, compliant with mechanical ventilation  MSK- WWP, 1+ bilateral edema  Neuro- attempts to open eyes to voice and weakly attempts to follow commands within the right upper extremity  Psych- unable to assess 2/2 neurologic status, calm on no sedation        Lines/Drains/Airways       Central Venous Catheter Line  Duration                  Hemodialysis Catheter 01/06/25 1400 left internal jugular 11 days              Drain  Duration                  NG/OG Tube 01/06/25 1109 Center mouth 11 days         Fecal Incontinence  01/14/25 1430 3 days              Airway  Duration                  Airway - Non-Surgical 01/06/25 1104 Endotracheal Tube 11 days              Peripheral Intravenous Line  Duration                  Peripheral IV - Single Lumen 01/06/25 1055 18 G Anterior;Right  Upper Arm 11 days                  Significant Labs:  Lab Results   Component Value Date    WBC 13.59 (H) 01/18/2025    WBC 13.59 01/18/2025    HGB 8.3 (L) 01/18/2025    HCT 25.1 (L) 01/18/2025    MCV 90.0 01/18/2025     01/18/2025       BMP  Lab Results   Component Value Date     (L) 01/18/2025    K 5.2 (H) 01/18/2025    CO2 19 (L) 01/18/2025    BUN 71.7 (H) 01/18/2025    CREATININE 2.36 (H) 01/18/2025    CALCIUM 7.7 (L) 01/18/2025    AGAP 10.0 01/17/2025    ESTGFRAFRICA 13 05/08/2024     ABG  Recent Labs   Lab 01/18/25 0115   PH 7.480*   PO2 120.0*   PCO2 33.0*   HCO3 24.6     Mechanical Ventilation Support:  Vent Mode: A/C (01/18/25 0755)  Ventilator Initiated: Yes (01/06/25 1104)  Set Rate: 20 BPM (01/18/25 0755)  Vt Set: 450 mL (01/18/25 0755)  PEEP/CPAP: 5 cmH20 (01/18/25 0755)  Oxygen Concentration (%): 28 (01/18/25 0755)  Peak Airway Pressure: 20 cmH20 (01/18/25 0755)  Total Ve: 14.5 L/m (01/18/25 0755)  F/VT Ratio<105 (RSBI): (!) 73.39 (01/18/25 0755)      Assessment/Plan:     Assessment  Persistent nonconvulsive status epilepticus   Pseudomonas UTI  Prior left MCA territory stroke   Hypotension (resolved)   ESRD   Hx of HTN, CAD, DM2       Plan  -hospital admission complicated by evidence of ongoing seizure activity on EEG refractory to antiepileptic administration; neurology previously following, palliative care and nephrology following.  -continue antiepileptic regimen for now  - s/p ciprofloxacin for pansensitive Pseudomonas UTI  -continue mechanical ventilation, inappropriate for trial of extubation given poor mental status  -palliative care following, code status now DO NOT RESUSCITATE  -extremely poor long-term neurologic outlook, particularly given baseline significant debility, with extremely low likelihood for liberation from mechanical ventilation and significant functional neurologic improvement  -some slight improvements in alertness this morning, but still prohibitively poor  mental status for consideration of extubation  -family continuing to discuss with palliative care and remain undecided on next steps of care, but code status remains DO NOT RESUSCITATE  -attempted made previously to contact son to discuss, no answer.          DVT Prophylaxis: LMWH  GI Prophylaxis: None         I spent 32  minutes providing critical care services to this patient.  This does not include time spent for separately billed procedures.        Robert Frances MD  Pulmonary Disease and Critical Care Medicine

## 2025-01-18 NOTE — PROGRESS NOTES
Nephrology consult follow up note    HPI:      Krysta Jansen is a 79 y.o. female is a nursing home resident and has ESRD and is on dialysis on Monday Wednesday Friday.  Brought to this hospital for altered mental status.  She was found to have some grand mal seizure.  She received some Ativan.  She had EEG done.  Her respiratory status and mental status deteriorated and required intubation to protect her airways.  Could not get any history from the patient.  Records revealed she was found to have some dysarthria and decreased level of consciousness Friday through being brought to this hospital.  Significant past medical history is positive for diabetes mellitus type 2 previous stroke hypertension coronary artery disease and congestive heart failure.    Interval history:     01/07/2024  Patient remains intubated and sedated.  On epinephrine for hypotension this morning.     01/08/25  Remains intubated and sedated on vent. Off pressors with BP stable at 130/60 range. Noted leucocytosis on am lab. Blood cultures pending.  Hgb stable at 7.6. Dialyzed yesterday and tolerated 500mL UF.      01/09/2025  No acute events overnight.  Patient remains intubated.  Not currently requiring vasopressors, but blood pressure does remain borderline low.  ICU team is requesting to hold hemodialysis today so they can have goals of care conversation with family.     01/10/2025   Currently tolerating dialysis.  Will remove only 500 cc of fluid.  Remains intubated.  Tolerating tube feedings.  Nurses reported that she is not on any sedation for about 48 hours.     01/13/2025   Weekend events noted.  Patient's condition is unchanged.  Remains on the ventilator and off sedation or pressors.  Unresponsive.     01/14/2025   Last 24 hours events noted.  Patient's condition remains unchanged and she is off pressors and off sedation and still unresponsive although she grimaces to painful stimuli.     01/15/2025   Currently tolerating  dialysis.  Neuro status and respiratory status remains unchanged.    01/16/2025  Dialyze yesterday.  No clinical change overnight.    01/17/2025  No clinical change overnight.  Hemoglobin dropped today.  Remains intubated    01/18/2025  No acute events overnight.  On Levophed for hypotension.  Dialyzed yesterday.     Review of Systems:     Unable to obtain ROS due to mental status/intubation.     Past medical, family, surgical, and social history reviewed and unchanged from initial consult note.     Objective:       VITAL SIGNS: 24 HR MIN & MAX LAST    Temp  Min: 98.2 °F (36.8 °C)  Max: 98.9 °F (37.2 °C)  98.5 °F (36.9 °C)        BP  Min: 86/56  Max: 121/57  (!) 109/53     Pulse  Min: 81  Max: 101  101     Resp  Min: 20  Max: 32  (!) 32    SpO2  Min: 100 %  Max: 100 %  100 %      GEN: Chronically ill appearing AA female in NAD  HEENT:  ET tube in place  CV: RRR +S1,S2 without murmur  PULM: CTAB, unlabored  ABD: Soft, NT/ND abdomen with NABS  EXT: No cyanosis or edema  SKIN: Warm and dry  PSYCH:  Unresponsive  Dialysis access:  Left IJ PermCath            Component Value Date/Time     (L) 01/18/2025 0133     (L) 01/17/2025 0156     05/08/2024 0739     03/15/2024 1101    K 5.2 (H) 01/18/2025 0133    K 4.6 01/17/2025 0156    K 3.8 05/08/2024 0739    K 3.9 03/15/2024 1101    CO2 19 (L) 01/18/2025 0133    CO2 21 (L) 01/17/2025 0156    CO2 22 05/08/2024 0739    CO2 21 (L) 03/15/2024 1101    BUN 71.7 (H) 01/18/2025 0133    BUN 96.9 (H) 01/17/2025 0156    BUN 55 (H) 10/02/2024 0000    BUN 23 (H) 07/12/2024 0000    CREATININE 2.36 (H) 01/18/2025 0133    CREATININE 3.26 (H) 01/17/2025 0156    CREATININE 3.37 (H) 05/08/2024 0739    CREATININE 3.2 (H) 03/15/2024 1101    CALCIUM 7.7 (L) 01/18/2025 0133    CALCIUM 8.3 (L) 01/17/2025 0156    CALCIUM 9 05/08/2024 0739    CALCIUM 9.9 03/15/2024 1101    PHOS 2.1 (L) 01/18/2025 0133            Component Value Date/Time    WBC 13.59 (H) 01/18/2025 0133    WBC  13.59 01/18/2025 0133    WBC 14.26 (H) 01/17/2025 0156    WBC 14.26 01/17/2025 0156    HGB 8.3 (L) 01/18/2025 0133    HGB 6.8 (L) 01/17/2025 0156    HGB 10.3 (L) 11/24/2024 0000    HGB 10 (L) 11/18/2024 0000    HCT 25.1 (L) 01/18/2025 0133    HCT 21.6 (L) 01/17/2025 0156     01/18/2025 0133     01/17/2025 0156         Imaging reviewed      Assessment / Plan:       Active Hospital Problems    Diagnosis  POA    Seizures [R56.9]  Yes    Status epilepticus [G40.901]  Yes    Altered mental status [R41.82]  Unknown      Resolved Hospital Problems    Diagnosis Date Resolved POA    Seizure-like activity [R56.9] 01/08/2025 Yes       ESRD.  Normally MWF hemodialysis.    New onset seizure  Acute hypoxic respiratory failure  Hypertension, anemia  Diabetes mellitus type 2   History of coronary artery disease   History of previous stroke  Urinary tract infection  Anemia of chronic disease    Plan:  Dialyzed yesterday without problem.  H&H improved with PRBC transfusion yesterday.  On Levophed.  No acute indication for off schedule hemodialysis today.  Plan to continue regular MWF hemodialysis.    Jean Shine DO  Nephrology  [facility]  Clinic number: [phone]      Note was created with the assistance of electronic Dictation Services.  Note was reviewed to decrease errors, however, these may still be present.  Please contact me about questions or concerns with the dictation.

## 2025-01-19 LAB
ABS NEUT (OLG): 12.71 X10(3)/MCL (ref 2.1–9.2)
ALBUMIN SERPL-MCNC: 1.7 G/DL (ref 3.4–4.8)
ANISOCYTOSIS BLD QL SMEAR: ABNORMAL
BASOPHILS NFR BLD MANUAL: 0.17 X10(3)/MCL (ref 0–0.2)
BASOPHILS NFR BLD MANUAL: 1 %
BUN SERPL-MCNC: 106.3 MG/DL (ref 9.8–20.1)
CALCIUM SERPL-MCNC: 8.4 MG/DL (ref 8.4–10.2)
CHLORIDE SERPL-SCNC: 99 MMOL/L (ref 98–107)
CO2 SERPL-SCNC: 20 MMOL/L (ref 23–31)
CREAT SERPL-MCNC: 3.13 MG/DL (ref 0.55–1.02)
EOSINOPHIL NFR BLD MANUAL: 0.17 X10(3)/MCL (ref 0–0.9)
EOSINOPHIL NFR BLD MANUAL: 1 %
ERYTHROCYTE [DISTWIDTH] IN BLOOD BY AUTOMATED COUNT: 19.1 % (ref 11.5–17)
GFR SERPLBLD CREATININE-BSD FMLA CKD-EPI: 15 ML/MIN/1.73/M2
GLUCOSE SERPL-MCNC: 162 MG/DL (ref 82–115)
HCT VFR BLD AUTO: 23.6 % (ref 37–47)
HGB BLD-MCNC: 7.7 G/DL (ref 12–16)
INSTRUMENT WBC (OLG): 16.5 X10(3)/MCL
LYMPHOCYTES NFR BLD MANUAL: 13 %
LYMPHOCYTES NFR BLD MANUAL: 2.15 X10(3)/MCL
MCH RBC QN AUTO: 29.5 PG (ref 27–31)
MCHC RBC AUTO-ENTMCNC: 32.6 G/DL (ref 33–36)
MCV RBC AUTO: 90.4 FL (ref 80–94)
METAMYELOCYTES NFR BLD MANUAL: 2 %
MONOCYTES NFR BLD MANUAL: 0.82 X10(3)/MCL (ref 0.1–1.3)
MONOCYTES NFR BLD MANUAL: 5 %
MYELOCYTES NFR BLD MANUAL: 1 %
NEUTROPHILS NFR BLD MANUAL: 77 %
NRBC BLD AUTO-RTO: 0 %
PHOSPHATE SERPL-MCNC: 1.9 MG/DL (ref 2.3–4.7)
PLATELET # BLD AUTO: 303 X10(3)/MCL (ref 130–400)
PLATELET # BLD EST: NORMAL 10*3/UL
PMV BLD AUTO: 9.1 FL (ref 7.4–10.4)
POCT GLUCOSE: 180 MG/DL (ref 70–110)
POCT GLUCOSE: 206 MG/DL (ref 70–110)
POCT GLUCOSE: 216 MG/DL (ref 70–110)
POCT GLUCOSE: 224 MG/DL (ref 70–110)
POCT GLUCOSE: 275 MG/DL (ref 70–110)
POTASSIUM SERPL-SCNC: 5 MMOL/L (ref 3.5–5.1)
RBC # BLD AUTO: 2.61 X10(6)/MCL (ref 4.2–5.4)
RBC MORPH BLD: ABNORMAL
SODIUM SERPL-SCNC: 129 MMOL/L (ref 136–145)
WBC # BLD AUTO: 16.5 X10(3)/MCL (ref 4.5–11.5)

## 2025-01-19 PROCEDURE — 25000003 PHARM REV CODE 250: Performed by: INTERNAL MEDICINE

## 2025-01-19 PROCEDURE — 94003 VENT MGMT INPAT SUBQ DAY: CPT

## 2025-01-19 PROCEDURE — 63600175 PHARM REV CODE 636 W HCPCS

## 2025-01-19 PROCEDURE — 99900031 HC PATIENT EDUCATION (STAT)

## 2025-01-19 PROCEDURE — 63600175 PHARM REV CODE 636 W HCPCS: Performed by: INTERNAL MEDICINE

## 2025-01-19 PROCEDURE — 85025 COMPLETE CBC W/AUTO DIFF WBC: CPT | Performed by: STUDENT IN AN ORGANIZED HEALTH CARE EDUCATION/TRAINING PROGRAM

## 2025-01-19 PROCEDURE — 94799 UNLISTED PULMONARY SVC/PX: CPT

## 2025-01-19 PROCEDURE — 25000003 PHARM REV CODE 250

## 2025-01-19 PROCEDURE — 80069 RENAL FUNCTION PANEL: CPT | Performed by: STUDENT IN AN ORGANIZED HEALTH CARE EDUCATION/TRAINING PROGRAM

## 2025-01-19 PROCEDURE — 11000001 HC ACUTE MED/SURG PRIVATE ROOM

## 2025-01-19 PROCEDURE — 36415 COLL VENOUS BLD VENIPUNCTURE: CPT | Performed by: STUDENT IN AN ORGANIZED HEALTH CARE EDUCATION/TRAINING PROGRAM

## 2025-01-19 PROCEDURE — 99900035 HC TECH TIME PER 15 MIN (STAT)

## 2025-01-19 PROCEDURE — 99900026 HC AIRWAY MAINTENANCE (STAT)

## 2025-01-19 PROCEDURE — 27100171 HC OXYGEN HIGH FLOW UP TO 24 HOURS

## 2025-01-19 PROCEDURE — 94761 N-INVAS EAR/PLS OXIMETRY MLT: CPT

## 2025-01-19 PROCEDURE — 94760 N-INVAS EAR/PLS OXIMETRY 1: CPT

## 2025-01-19 RX ORDER — MIDODRINE HYDROCHLORIDE 5 MG/1
10 TABLET ORAL EVERY 8 HOURS
Status: DISCONTINUED | OUTPATIENT
Start: 2025-01-19 | End: 2025-01-29

## 2025-01-19 RX ADMIN — VALPROATE SODIUM 500 MG: 100 INJECTION, SOLUTION INTRAVENOUS at 12:01

## 2025-01-19 RX ADMIN — LEVETIRACETAM 500 MG: 100 INJECTION, SOLUTION INTRAVENOUS at 08:01

## 2025-01-19 RX ADMIN — INSULIN ASPART 6 UNITS: 100 INJECTION, SOLUTION INTRAVENOUS; SUBCUTANEOUS at 12:01

## 2025-01-19 RX ADMIN — INSULIN ASPART 3 UNITS: 100 INJECTION, SOLUTION INTRAVENOUS; SUBCUTANEOUS at 05:01

## 2025-01-19 RX ADMIN — PANTOPRAZOLE SODIUM 40 MG: 40 INJECTION, POWDER, FOR SOLUTION INTRAVENOUS at 08:01

## 2025-01-19 RX ADMIN — Medication: at 08:01

## 2025-01-19 RX ADMIN — INSULIN GLARGINE 15 UNITS: 100 INJECTION, SOLUTION SUBCUTANEOUS at 09:01

## 2025-01-19 RX ADMIN — LEVETIRACETAM 500 MG: 100 INJECTION, SOLUTION INTRAVENOUS at 09:01

## 2025-01-19 RX ADMIN — MIDODRINE HYDROCHLORIDE 10 MG: 5 TABLET ORAL at 09:01

## 2025-01-19 RX ADMIN — INSULIN ASPART 9 UNITS: 100 INJECTION, SOLUTION INTRAVENOUS; SUBCUTANEOUS at 12:01

## 2025-01-19 RX ADMIN — NOREPINEPHRINE BITARTRATE 0.04 MCG/KG/MIN: 8 INJECTION, SOLUTION INTRAVENOUS at 05:01

## 2025-01-19 RX ADMIN — INSULIN GLARGINE 15 UNITS: 100 INJECTION, SOLUTION SUBCUTANEOUS at 08:01

## 2025-01-19 RX ADMIN — MIDODRINE HYDROCHLORIDE 10 MG: 5 TABLET ORAL at 01:01

## 2025-01-19 RX ADMIN — INSULIN ASPART 6 UNITS: 100 INJECTION, SOLUTION INTRAVENOUS; SUBCUTANEOUS at 05:01

## 2025-01-19 RX ADMIN — ENOXAPARIN SODIUM 30 MG: 30 INJECTION SUBCUTANEOUS at 05:01

## 2025-01-19 RX ADMIN — Medication: at 09:01

## 2025-01-19 RX ADMIN — MIDODRINE HYDROCHLORIDE 5 MG: 5 TABLET ORAL at 05:01

## 2025-01-19 NOTE — PROGRESS NOTES
Nephrology consult follow up note    HPI:      Krysta Jansen is a 79 y.o. female is a nursing home resident and has ESRD and is on dialysis on Monday Wednesday Friday.  Brought to this hospital for altered mental status.  She was found to have some grand mal seizure.  She received some Ativan.  She had EEG done.  Her respiratory status and mental status deteriorated and required intubation to protect her airways.  Could not get any history from the patient.  Records revealed she was found to have some dysarthria and decreased level of consciousness Friday through being brought to this hospital.  Significant past medical history is positive for diabetes mellitus type 2 previous stroke hypertension coronary artery disease and congestive heart failure.    Interval history:     01/07/2024  Patient remains intubated and sedated.  On epinephrine for hypotension this morning.     01/08/25  Remains intubated and sedated on vent. Off pressors with BP stable at 130/60 range. Noted leucocytosis on am lab. Blood cultures pending.  Hgb stable at 7.6. Dialyzed yesterday and tolerated 500mL UF.      01/09/2025  No acute events overnight.  Patient remains intubated.  Not currently requiring vasopressors, but blood pressure does remain borderline low.  ICU team is requesting to hold hemodialysis today so they can have goals of care conversation with family.     01/10/2025   Currently tolerating dialysis.  Will remove only 500 cc of fluid.  Remains intubated.  Tolerating tube feedings.  Nurses reported that she is not on any sedation for about 48 hours.     01/13/2025   Weekend events noted.  Patient's condition is unchanged.  Remains on the ventilator and off sedation or pressors.  Unresponsive.     01/14/2025   Last 24 hours events noted.  Patient's condition remains unchanged and she is off pressors and off sedation and still unresponsive although she grimaces to painful stimuli.     01/15/2025   Currently tolerating  dialysis.  Neuro status and respiratory status remains unchanged.    01/16/2025  Dialyze yesterday.  No clinical change overnight.    01/17/2025  No clinical change overnight.  Hemoglobin dropped today.  Remains intubated    01/18/2025  No acute events overnight.  On Levophed for hypotension.  Dialyzed yesterday.    01/19/2025  No acute events overnight.  She does appear more edematous.  Remains on Levophed.  Getting tube feedings.     Review of Systems:     Unable to obtain ROS due to mental status/intubation.     Past medical, family, surgical, and social history reviewed and unchanged from initial consult note.     Objective:       VITAL SIGNS: 24 HR MIN & MAX LAST    Temp  Min: 98.7 °F (37.1 °C)  Max: 99.3 °F (37.4 °C)  98.7 °F (37.1 °C)        BP  Min: 86/51  Max: 137/62  (!) 109/47     Pulse  Min: 76  Max: 108  97     Resp  Min: 20  Max: 35  20    SpO2  Min: 100 %  Max: 100 %  100 %      GEN: Chronically ill appearing AA female in NAD  HEENT:  ET tube in place  CV: RRR +S1,S2 without murmur  PULM: CTAB, unlabored  ABD: Soft, NT/ND abdomen with NABS  EXT:  2+ dependent edema  SKIN: Warm and dry  PSYCH:  Unresponsive  Dialysis access:  Left IJ PermCath            Component Value Date/Time     (L) 01/19/2025 0429     (L) 01/18/2025 0133     05/08/2024 0739     03/15/2024 1101    K 5.0 01/19/2025 0429    K 5.2 (H) 01/18/2025 0133    K 3.8 05/08/2024 0739    K 3.9 03/15/2024 1101    CO2 20 (L) 01/19/2025 0429    CO2 19 (L) 01/18/2025 0133    CO2 22 05/08/2024 0739    CO2 21 (L) 03/15/2024 1101    .3 (H) 01/19/2025 0429    BUN 71.7 (H) 01/18/2025 0133    BUN 55 (H) 10/02/2024 0000    BUN 23 (H) 07/12/2024 0000    CREATININE 3.13 (H) 01/19/2025 0429    CREATININE 2.36 (H) 01/18/2025 0133    CREATININE 3.37 (H) 05/08/2024 0739    CREATININE 3.2 (H) 03/15/2024 1101    CALCIUM 8.4 01/19/2025 0429    CALCIUM 7.7 (L) 01/18/2025 0133    CALCIUM 9 05/08/2024 0739    CALCIUM 9.9 03/15/2024 1101     PHOS 1.9 (L) 01/19/2025 0429            Component Value Date/Time    WBC 16.50 (H) 01/19/2025 0429    WBC 16.5 01/19/2025 0429    WBC 13.59 (H) 01/18/2025 0133    WBC 13.59 01/18/2025 0133    HGB 7.7 (L) 01/19/2025 0429    HGB 8.3 (L) 01/18/2025 0133    HGB 10.3 (L) 11/24/2024 0000    HGB 10 (L) 11/18/2024 0000    HCT 23.6 (L) 01/19/2025 0429    HCT 25.1 (L) 01/18/2025 0133     01/19/2025 0429     01/18/2025 0133         Imaging reviewed      Assessment / Plan:       Active Hospital Problems    Diagnosis  POA    Seizures [R56.9]  Yes    Status epilepticus [G40.901]  Yes    Altered mental status [R41.82]  Unknown      Resolved Hospital Problems    Diagnosis Date Resolved POA    Seizure-like activity [R56.9] 01/08/2025 Yes       ESRD.  Normally MWF hemodialysis.    New onset seizure  Acute hypoxic respiratory failure  Hypertension, anemia  Diabetes mellitus type 2   History of coronary artery disease   History of previous stroke  Urinary tract infection  Anemia of chronic disease    Plan:  Plan for hemodialysis tomorrow on MWF schedule.  Patient is developing worsening edema, and hyponatremia.  We will need to start pulling fluid with hemodialysis tomorrow.  This will likely cause further hypotension, and she may need more vasopressors.    Jean Shine DO  Nephrology  [facility]  Clinic number: [phone]      Note was created with the assistance of electronic Dictation Services.  Note was reviewed to decrease errors, however, these may still be present.  Please contact me about questions or concerns with the dictation.

## 2025-01-19 NOTE — PROGRESS NOTES
[facility] - Emergency Dept  Pulmonary Critical Care Note    Patient Name: Krysta Jasnen  MRN: [id number]  Admission Date: 1/5/2025  Hospital Length of Stay: 14 days  Code Status: DNR  Attending Provider: DAMARIS Jaime MD  Primary Care Provider: No primary care provider on file.     Subjective:     HPI:   Patient is a 79-year-old female with a past medical history of ESRD on HD, HTN, CAD, CHF unknown EF, DM2, previous left MCA territory stroke who was brought into the ER after her son reported seeing her normal an hour prior, briefly left in when came back patient was nonverbal and lethargic.      She was emergently brought to the ER and a code fast was called, she was assessed by Neurology in the ER where she was found to have an NIH of 22 based on dysarthria and level of consciousness, but was deemed not a candidate for TNK secondary to overall nonfocal exam and recent left arm HD access surgery reported.  Vitals and laboratory work were overall unremarkable except for changes as expected in any age renal disease, and a potential urinary tract infection.  She is admitted under typical stroke protocol.     1/6/2025, EEG at 0800 revealed seizure-like activity. She was given Lorazepam 2 mg IV. Patient later became unconscious, unresponsive to pain stimuli, and hypotensive. She was intubated for airway protection (see procedure note).       Hospital Course/Significant events:  1/6/2025 - Admitted to ICU       24 Hour Interval History:  No acute events overnight.  Remains afebrile, leukocytosis improved from yesterday's. Remains on Levophed for hypotension. Dialyzed 2 days ago, 1.3L UF removed, tolerated with concomitant improvement in renal function.         Review of systems unobtainable due to intubation, altered mental status.        Social History     Socioeconomic History    Marital status: Single   Tobacco Use    Smoking status: Unknown   Substance and Sexual Activity    Alcohol use: Not  Currently     Social Drivers of Health     Financial Resource Strain: Patient Unable To Answer (1/11/2025)    Overall Financial Resource Strain (CARDIA)     Difficulty of Paying Living Expenses: Patient unable to answer   Food Insecurity: Patient Unable To Answer (1/11/2025)    Hunger Vital Sign     Worried About Running Out of Food in the Last Year: Patient unable to answer     Ran Out of Food in the Last Year: Patient unable to answer   Transportation Needs: Patient Unable To Answer (1/11/2025)    TRANSPORTATION NEEDS     Transportation : Patient unable to answer   Stress: Patient Unable To Answer (1/11/2025)    [employer] - Occupational Stress Questionnaire     Feeling of Stress : Patient unable to answer   Housing Stability: Patient Unable To Answer (1/11/2025)    Housing Stability Vital Sign     Unable to Pay for Housing in the Last Year: Patient unable to answer     Homeless in the Last Year: Patient unable to answer       Current Outpatient Medications   Medication Instructions    aspirin 81 MG Chew 1 tablet, Daily    BASAGLAR KWIKPEN U-100 INSULIN 30 Units, Nightly    CeleXA 10 mg, Daily    ENTRESTO 24-26 mg per tablet 1 tablet, 2 times daily    metoprolol succinate 25 mg CSpX 1 capsule, Daily    PLAVIX 75 mg tablet 1 tablet, Daily    rosuvastatin (CRESTOR) 40 MG Tab 1 tablet, Nightly    traMADoL (ULTRAM) 50 mg tablet 1 tablet, Every 6 hours PRN    vancomycin (VANCOCIN) 500 mg, Every Mon, Wed, Fri     Current Inpatient Medications   enoxparin  30 mg Subcutaneous Q24H (prophylaxis, 1700)    epoetin chelsea-ebpx (RETACRIT) injection  20,000 Units Subcutaneous Every Mon, Wed, Fri    insulin glargine U-100  15 Units Subcutaneous BID    levETIRAcetam (Keppra) IV (PEDS and ADULTS)  500 mg Intravenous Q12H    midodrine  5 mg Oral Q8H    pantoprazole  40 mg Intravenous Daily    valproate sodium (DEPACON) IVPB  500 mg Intravenous Q12H    zinc oxide-cod liver oil   Topical (Top) BID      Current Intravenous Infusions   EPINEPHrine  0-2 mcg/kg/min (Dosing Weight) Intravenous Continuous 4.4 mL/hr at 01/18/25 1800 0.02 mcg/kg/min at 01/18/25 1800    NORepinephrine bitartrate-D5W  0-3 mcg/kg/min (Dosing Weight) Intravenous Continuous 5.6 mL/hr at 01/19/25 0616 0.04 mcg/kg/min at 01/19/25 0616    propofoL  0-50 mcg/kg/min (Dosing Weight) Intravenous Continuous   Stopped at 01/08/25 0947       Objective:     Intake/Output Summary (Last 24 hours) at 1/19/2025 1137  Last data filed at 1/19/2025 0616  Gross per 24 hour   Intake 2057.54 ml   Output 50 ml   Net 2007.54 ml     Vital Signs (Most Recent):  Temp: 98.7 °F (37.1 °C) (01/19/25 0400)  Pulse: 85 (01/19/25 1103)  Resp: 20 (01/19/25 1103)  BP: (!) 109/47 (01/19/25 0600)  SpO2: 100 % (01/19/25 1103)  Body mass index is 31.9 kg/m².  Weight: 74.1 kg (163 lb 5.8 oz) Vital Signs (24h Range):  Temp:  [98.7 °F (37.1 °C)-99.3 °F (37.4 °C)] 98.7 °F (37.1 °C)  Pulse:  [] 85  Resp:  [20-35] 20  SpO2:  [100 %] 100 %  BP: ()/(46-86) 109/47         Physical Exam:  Gen- intubated, attempts to open eyes to voice  HENT- ATNC, MMM, ETT in place  CV- RRR ; Left IJ permcath  Resp- CTAB, compliant with mechanical ventilation  MSK- WWP, 2+ BUE edema and BLE edema   Neuro- attempts to open eyes to voice and weakly attempts to follow commands within the right upper extremity  Psych- unable to assess 2/2 neurologic status, calm on no sedation        Lines/Drains/Airways       Central Venous Catheter Line  Duration                  Hemodialysis Catheter 01/06/25 1400 left internal jugular 12 days              Drain  Duration                  NG/OG Tube 01/06/25 1109 Center mouth 13 days              Airway  Duration                  Airway - Non-Surgical 01/06/25 1104 Endotracheal Tube 13 days              Peripheral Intravenous Line  Duration                  Peripheral IV - Single Lumen 01/06/25 1055 18 G Anterior;Right Upper Arm 13 days                   Significant Labs:  Lab Results   Component Value Date    WBC 16.50 (H) 01/19/2025    WBC 16.5 01/19/2025    HGB 7.7 (L) 01/19/2025    HCT 23.6 (L) 01/19/2025    MCV 90.4 01/19/2025     01/19/2025       BMP  Lab Results   Component Value Date     (L) 01/19/2025    K 5.0 01/19/2025    CO2 20 (L) 01/19/2025    .3 (H) 01/19/2025    CREATININE 3.13 (H) 01/19/2025    CALCIUM 8.4 01/19/2025    AGAP 10.0 01/17/2025    ESTGFRAFRICA 13 05/08/2024     ABG  Recent Labs   Lab 01/18/25  0115   PH 7.480*   PO2 120.0*   PCO2 33.0*   HCO3 24.6     Mechanical Ventilation Support:  Vent Mode: A/C (01/19/25 1103)  Ventilator Initiated: Yes (01/06/25 1104)  Set Rate: 20 BPM (01/19/25 1103)  Vt Set: 450 mL (01/19/25 1103)  PEEP/CPAP: 5 cmH20 (01/19/25 1103)  Oxygen Concentration (%): 28 (01/19/25 1103)  Peak Airway Pressure: 24 cmH20 (01/19/25 1103)  Total Ve: 8.5 L/m (01/19/25 1103)  F/VT Ratio<105 (RSBI): (!) 44.84 (01/19/25 1103)      Assessment/Plan:     Assessment  Persistent nonconvulsive status epilepticus   Pseudomonas UTI  Prior left MCA territory stroke   Hypotension (resolved)   ESRD   Hx of HTN, CAD, DM2       Plan  -hospital admission complicated by evidence of ongoing seizure activity on EEG refractory to antiepileptic administration; neurology previously following, palliative care and nephrology following.  - Midodrine on board  - leukocytosis improving ( 16.5 from 13.5 )  - anticipate dialysis tomorrow given increasing edema  - May need to add more vasopressors after dialysis   -continue antiepileptic regimen for now  - s/p ciprofloxacin for pansensitive Pseudomonas UTI  -continue mechanical ventilation, inappropriate for trial of extubation given poor mental status  -palliative care following, code status now DO NOT RESUSCITATE  -extremely poor long-term neurologic outlook, particularly given baseline significant debility, with extremely low likelihood for liberation from mechanical ventilation  and significant functional neurologic improvement  -some slight improvements in alertness this morning, but still prohibitively poor mental status for consideration of extubation  -family continuing to discuss with palliative care and remain undecided on next steps of care, but code status remains DO NOT RESUSCITATE         DVT Prophylaxis: LMWH  GI Prophylaxis: None         I spent 32  minutes providing critical care services to this patient.  This does not include time spent for separately billed procedures.        Sharif Dunne MD  [facility], PGY-2  Pulmonary Disease and Critical Care Medicine

## 2025-01-19 NOTE — PROGRESS NOTES
[facility] [address] ICU  Wound Care    Patient Name:  Krysta Jansen   MRN:  [id number]  Date: 1/19/2025  Diagnosis: <principal problem not specified>    History:     History reviewed. No pertinent past medical history.    Social History     Socioeconomic History    Marital status: Single   Tobacco Use    Smoking status: Unknown   Substance and Sexual Activity    Alcohol use: Not Currently     Social Drivers of Health     Financial Resource Strain: Patient Unable To Answer (1/11/2025)    Overall Financial Resource Strain (CARDIA)     Difficulty of Paying Living Expenses: Patient unable to answer   Food Insecurity: Patient Unable To Answer (1/11/2025)    Hunger Vital Sign     Worried About Running Out of Food in the Last Year: Patient unable to answer     Ran Out of Food in the Last Year: Patient unable to answer   Transportation Needs: Patient Unable To Answer (1/11/2025)    TRANSPORTATION NEEDS     Transportation : Patient unable to answer   Stress: Patient Unable To Answer (1/11/2025)    [employer] - Occupational Stress Questionnaire     Feeling of Stress : Patient unable to answer   Housing Stability: Patient Unable To Answer (1/11/2025)    Housing Stability Vital Sign     Unable to Pay for Housing in the Last Year: Patient unable to answer     Homeless in the Last Year: Patient unable to answer       Precautions:     Allergies as of 01/05/2025    (No Known Allergies)       WO Assessment Details/Treatment      01/19/25 1059   WO Assessment   Visit Date 01/19/25   Visit Time 1059   Consult Type Follow Up   [facility] Speciality Wound   Intervention chart review;assessed;applied   Teaching on-going        Wound 01/05/25 1535 Skin Tear Coccyx   Date First Assessed/Time First Assessed: 01/05/25 1535   Primary Wound Type: Skin Tear  Location: Coccyx   Wound Image    Dressing Appearance Intact;Dry;Clean   Drainage Amount None   Drainage Characteristics/Odor No odor   Appearance  Pink;Red;Dry   Tissue loss description Partial thickness   Black (%), Wound Tissue Color 0 %   Red (%), Wound Tissue Color 100 %   Yellow (%), Wound Tissue Color 0 %   Periwound Area Dry;Redness   Wound Length (cm) 9 cm   Wound Width (cm) 4 cm   Wound Depth (cm) 0.1 cm   Wound Volume (cm^3) 3.6 cm^3   Wound Surface Area (cm^2) 36 cm^2   Care Cleansed with:;Soap and water   Dressing Applied;Foam     WOCN follow up for butt. Discussed plan of care with nurse Zaldivar at bedside, he also assisted with repositioning during visit. Treatment recommendations in place. Area cleansed and redressed. New photographs taken for EMR. Nursing to continue with treatment recommendations and other preventative measures. Patient on ICU SIDDHARTHA mattress. Will follow up.   01/19/2025

## 2025-01-20 LAB
ALBUMIN SERPL-MCNC: 1.5 G/DL (ref 3.4–4.8)
BASOPHILS # BLD AUTO: 0.07 X10(3)/MCL
BASOPHILS NFR BLD AUTO: 0.4 %
BUN SERPL-MCNC: 131 MG/DL (ref 9.8–20.1)
CALCIUM SERPL-MCNC: 8.3 MG/DL (ref 8.4–10.2)
CHLORIDE SERPL-SCNC: 98 MMOL/L (ref 98–107)
CO2 SERPL-SCNC: 20 MMOL/L (ref 23–31)
CREAT SERPL-MCNC: 3.6 MG/DL (ref 0.55–1.02)
EOSINOPHIL # BLD AUTO: 0.26 X10(3)/MCL (ref 0–0.9)
EOSINOPHIL NFR BLD AUTO: 1.4 %
ERYTHROCYTE [DISTWIDTH] IN BLOOD BY AUTOMATED COUNT: 18.6 % (ref 11.5–17)
GFR SERPLBLD CREATININE-BSD FMLA CKD-EPI: 12 ML/MIN/1.73/M2
GLUCOSE SERPL-MCNC: 240 MG/DL (ref 82–115)
HCT VFR BLD AUTO: 22.1 % (ref 37–47)
HGB BLD-MCNC: 7.1 G/DL (ref 12–16)
IMM GRANULOCYTES # BLD AUTO: 0.65 X10(3)/MCL (ref 0–0.04)
IMM GRANULOCYTES NFR BLD AUTO: 3.6 %
LYMPHOCYTES # BLD AUTO: 3.44 X10(3)/MCL (ref 0.6–4.6)
LYMPHOCYTES NFR BLD AUTO: 19 %
MCH RBC QN AUTO: 29.7 PG (ref 27–31)
MCHC RBC AUTO-ENTMCNC: 32.1 G/DL (ref 33–36)
MCV RBC AUTO: 92.5 FL (ref 80–94)
MONOCYTES # BLD AUTO: 1.65 X10(3)/MCL (ref 0.1–1.3)
MONOCYTES NFR BLD AUTO: 9.1 %
NEUTROPHILS # BLD AUTO: 12.07 X10(3)/MCL (ref 2.1–9.2)
NEUTROPHILS NFR BLD AUTO: 66.5 %
NRBC BLD AUTO-RTO: 0 %
PHOSPHATE SERPL-MCNC: 2.1 MG/DL (ref 2.3–4.7)
PLATELET # BLD AUTO: 333 X10(3)/MCL (ref 130–400)
PMV BLD AUTO: 9.1 FL (ref 7.4–10.4)
POCT GLUCOSE: 197 MG/DL (ref 70–110)
POCT GLUCOSE: 202 MG/DL (ref 70–110)
POCT GLUCOSE: 215 MG/DL (ref 70–110)
POCT GLUCOSE: 230 MG/DL (ref 70–110)
POCT GLUCOSE: 262 MG/DL (ref 70–110)
POCT GLUCOSE: 266 MG/DL (ref 70–110)
POTASSIUM SERPL-SCNC: 5.3 MMOL/L (ref 3.5–5.1)
RBC # BLD AUTO: 2.39 X10(6)/MCL (ref 4.2–5.4)
SODIUM SERPL-SCNC: 126 MMOL/L (ref 136–145)
WBC # BLD AUTO: 18.14 X10(3)/MCL (ref 4.5–11.5)

## 2025-01-20 PROCEDURE — 25000003 PHARM REV CODE 250: Performed by: INTERNAL MEDICINE

## 2025-01-20 PROCEDURE — 99900026 HC AIRWAY MAINTENANCE (STAT)

## 2025-01-20 PROCEDURE — 63600175 PHARM REV CODE 636 W HCPCS: Performed by: INTERNAL MEDICINE

## 2025-01-20 PROCEDURE — 80069 RENAL FUNCTION PANEL: CPT | Performed by: STUDENT IN AN ORGANIZED HEALTH CARE EDUCATION/TRAINING PROGRAM

## 2025-01-20 PROCEDURE — 94760 N-INVAS EAR/PLS OXIMETRY 1: CPT

## 2025-01-20 PROCEDURE — 85025 COMPLETE CBC W/AUTO DIFF WBC: CPT | Performed by: STUDENT IN AN ORGANIZED HEALTH CARE EDUCATION/TRAINING PROGRAM

## 2025-01-20 PROCEDURE — 94761 N-INVAS EAR/PLS OXIMETRY MLT: CPT

## 2025-01-20 PROCEDURE — 63600175 PHARM REV CODE 636 W HCPCS

## 2025-01-20 PROCEDURE — 27200966 HC CLOSED SUCTION SYSTEM

## 2025-01-20 PROCEDURE — 63600175 PHARM REV CODE 636 W HCPCS: Performed by: STUDENT IN AN ORGANIZED HEALTH CARE EDUCATION/TRAINING PROGRAM

## 2025-01-20 PROCEDURE — 99900035 HC TECH TIME PER 15 MIN (STAT)

## 2025-01-20 PROCEDURE — 11000001 HC ACUTE MED/SURG PRIVATE ROOM

## 2025-01-20 PROCEDURE — 36415 COLL VENOUS BLD VENIPUNCTURE: CPT | Performed by: STUDENT IN AN ORGANIZED HEALTH CARE EDUCATION/TRAINING PROGRAM

## 2025-01-20 PROCEDURE — 25000003 PHARM REV CODE 250

## 2025-01-20 PROCEDURE — 80100014 HC HEMODIALYSIS 1:1

## 2025-01-20 PROCEDURE — 99900031 HC PATIENT EDUCATION (STAT)

## 2025-01-20 PROCEDURE — P9047 ALBUMIN (HUMAN), 25%, 50ML: HCPCS | Performed by: INTERNAL MEDICINE

## 2025-01-20 PROCEDURE — 27100171 HC OXYGEN HIGH FLOW UP TO 24 HOURS

## 2025-01-20 PROCEDURE — 94003 VENT MGMT INPAT SUBQ DAY: CPT

## 2025-01-20 RX ORDER — LORAZEPAM 2 MG/ML
2 INJECTION INTRAMUSCULAR ONCE
Status: DISCONTINUED | OUTPATIENT
Start: 2025-01-20 | End: 2025-01-30

## 2025-01-20 RX ORDER — ALBUMIN HUMAN 250 G/1000ML
25 SOLUTION INTRAVENOUS ONCE
Status: COMPLETED | OUTPATIENT
Start: 2025-01-20 | End: 2025-01-20

## 2025-01-20 RX ADMIN — INSULIN ASPART 3 UNITS: 100 INJECTION, SOLUTION INTRAVENOUS; SUBCUTANEOUS at 12:01

## 2025-01-20 RX ADMIN — Medication: at 08:01

## 2025-01-20 RX ADMIN — INSULIN GLARGINE 15 UNITS: 100 INJECTION, SOLUTION SUBCUTANEOUS at 09:01

## 2025-01-20 RX ADMIN — MIDODRINE HYDROCHLORIDE 10 MG: 5 TABLET ORAL at 09:01

## 2025-01-20 RX ADMIN — ALBUMIN (HUMAN) 25 G: 12.5 SOLUTION INTRAVENOUS at 09:01

## 2025-01-20 RX ADMIN — LEVETIRACETAM 500 MG: 100 INJECTION, SOLUTION INTRAVENOUS at 08:01

## 2025-01-20 RX ADMIN — VALPROATE SODIUM 500 MG: 100 INJECTION, SOLUTION INTRAVENOUS at 12:01

## 2025-01-20 RX ADMIN — INSULIN ASPART 6 UNITS: 100 INJECTION, SOLUTION INTRAVENOUS; SUBCUTANEOUS at 12:01

## 2025-01-20 RX ADMIN — INSULIN ASPART 6 UNITS: 100 INJECTION, SOLUTION INTRAVENOUS; SUBCUTANEOUS at 09:01

## 2025-01-20 RX ADMIN — EPOETIN ALFA-EPBX 20000 UNITS: 20000 INJECTION, SOLUTION INTRAVENOUS; SUBCUTANEOUS at 09:01

## 2025-01-20 RX ADMIN — LEVETIRACETAM 500 MG: 100 INJECTION, SOLUTION INTRAVENOUS at 09:01

## 2025-01-20 RX ADMIN — HEPARIN SODIUM 5000 UNITS: 1000 INJECTION, SOLUTION INTRAVENOUS; SUBCUTANEOUS at 12:01

## 2025-01-20 RX ADMIN — Medication: at 09:01

## 2025-01-20 RX ADMIN — INSULIN ASPART 6 UNITS: 100 INJECTION, SOLUTION INTRAVENOUS; SUBCUTANEOUS at 06:01

## 2025-01-20 RX ADMIN — MIDODRINE HYDROCHLORIDE 10 MG: 5 TABLET ORAL at 05:01

## 2025-01-20 RX ADMIN — ENOXAPARIN SODIUM 30 MG: 30 INJECTION SUBCUTANEOUS at 05:01

## 2025-01-20 RX ADMIN — MIDODRINE HYDROCHLORIDE 10 MG: 5 TABLET ORAL at 02:01

## 2025-01-20 RX ADMIN — PANTOPRAZOLE SODIUM 40 MG: 40 INJECTION, POWDER, FOR SOLUTION INTRAVENOUS at 08:01

## 2025-01-20 RX ADMIN — INSULIN ASPART 9 UNITS: 100 INJECTION, SOLUTION INTRAVENOUS; SUBCUTANEOUS at 05:01

## 2025-01-20 NOTE — NURSING
01/20/25 1300   Post-Hemodialysis Assessment   Blood Volume Processed (Liters) 72 L   Dialyzer Clearance Clotted   Duration of Treatment 240 minutes   Total UF (mL) 3000 mL   Post-Hemodialysis Comments Tx completed. Net 3L fluid removed in 4 hours. VSS throughout and post tx. Pt did clot dialysis system off twice requiring resetup.CVC packed w heparin, clearguard caps applied.

## 2025-01-20 NOTE — PROGRESS NOTES
Nephrology consult follow up note    HPI:      Krysta Jansen is a 79 y.o. female is a nursing home resident and has ESRD and is on dialysis on Monday Wednesday Friday.  Brought to this hospital for altered mental status.  She was found to have some grand mal seizure.  She received some Ativan.  She had EEG done.  Her respiratory status and mental status deteriorated and required intubation to protect her airways.  Could not get any history from the patient.  Records revealed she was found to have some dysarthria and decreased level of consciousness Friday through being brought to this hospital.  Significant past medical history is positive for diabetes mellitus type 2 previous stroke hypertension coronary artery disease and congestive heart failure.    Interval history:     01/07/2024  Patient remains intubated and sedated.  On epinephrine for hypotension this morning.     01/08/25  Remains intubated and sedated on vent. Off pressors with BP stable at 130/60 range. Noted leucocytosis on am lab. Blood cultures pending.  Hgb stable at 7.6. Dialyzed yesterday and tolerated 500mL UF.      01/09/2025  No acute events overnight.  Patient remains intubated.  Not currently requiring vasopressors, but blood pressure does remain borderline low.  ICU team is requesting to hold hemodialysis today so they can have goals of care conversation with family.     01/10/2025   Currently tolerating dialysis.  Will remove only 500 cc of fluid.  Remains intubated.  Tolerating tube feedings.  Nurses reported that she is not on any sedation for about 48 hours.     01/13/2025   Weekend events noted.  Patient's condition is unchanged.  Remains on the ventilator and off sedation or pressors.  Unresponsive.     01/14/2025   Last 24 hours events noted.  Patient's condition remains unchanged and she is off pressors and off sedation and still unresponsive although she grimaces to painful stimuli.     01/15/2025   Currently tolerating  dialysis.  Neuro status and respiratory status remains unchanged.    01/16/2025  Dialyze yesterday.  No clinical change overnight.    01/17/2025  No clinical change overnight.  Hemoglobin dropped today.  Remains intubated    01/18/2025  No acute events overnight.  On Levophed for hypotension.  Dialyzed yesterday.    01/19/2025  No acute events overnight.  She does appear more edematous.  Remains on Levophed.  Getting tube feedings.     01/20/2025   Currently tolerating dialysis.  Will try to remove some fluid.  Will increase dialysis time to 4 hours.  Comfortable on the ventilator.      Review of Systems:     Unable to obtain ROS due to mental status/intubation.     Past medical, family, surgical, and social history reviewed and unchanged from initial consult note.     Objective:       VITAL SIGNS: 24 HR MIN & MAX LAST    Temp  Min: 98.5 °F (36.9 °C)  Max: 99 °F (37.2 °C)  99 °F (37.2 °C)        BP  Min: 91/51  Max: 161/120  (!) 142/54     Pulse  Min: 56  Max: 100  86     Resp  Min: 18  Max: 27  20    SpO2  Min: 99 %  Max: 100 %  99 %      GEN: Chronically ill appearing AA female, comfortable on the ventilator.  HEENT:  ET tube in place  CV: RRR +S1,S2 without murmur  PULM: CTAB, unlabored  ABD: Soft, NT/ND abdomen with NABS  EXT:  2+ dependent edema  SKIN: Warm and dry  PSYCH:  Grimaces to painful stimuli and even opens eyes to verbal command.  Dialysis access:  Left IJ PermCath            Component Value Date/Time     (L) 01/20/2025 0427     (L) 01/19/2025 0429     05/08/2024 0739     03/15/2024 1101    K 5.3 (H) 01/20/2025 0427    K 5.0 01/19/2025 0429    K 3.8 05/08/2024 0739    K 3.9 03/15/2024 1101    CO2 20 (L) 01/20/2025 0427    CO2 20 (L) 01/19/2025 0429    CO2 22 05/08/2024 0739    CO2 21 (L) 03/15/2024 1101    .0 (H) 01/20/2025 0427    .3 (H) 01/19/2025 0429    BUN 55 (H) 10/02/2024 0000    BUN 23 (H) 07/12/2024 0000    CREATININE 3.60 (H) 01/20/2025 0427     CREATININE 3.13 (H) 01/19/2025 0429    CREATININE 3.37 (H) 05/08/2024 0739    CREATININE 3.2 (H) 03/15/2024 1101    CALCIUM 8.3 (L) 01/20/2025 0427    CALCIUM 8.4 01/19/2025 0429    CALCIUM 9 05/08/2024 0739    CALCIUM 9.9 03/15/2024 1101    PHOS 2.1 (L) 01/20/2025 0427            Component Value Date/Time    WBC 18.14 (H) 01/20/2025 0427    WBC 16.50 (H) 01/19/2025 0429    WBC 16.5 01/19/2025 0429    WBC 13.59 01/18/2025 0133    HGB 7.1 (L) 01/20/2025 0427    HGB 7.7 (L) 01/19/2025 0429    HGB 10.3 (L) 11/24/2024 0000    HGB 10 (L) 11/18/2024 0000    HCT 22.1 (L) 01/20/2025 0427    HCT 23.6 (L) 01/19/2025 0429     01/20/2025 0427     01/19/2025 0429         Imaging reviewed      Assessment / Plan:       ESRD.  Normally MWF hemodialysis.    New onset seizure  Acute hypoxic respiratory failure  Hypertension, anemia  Diabetes mellitus type 2   History of coronary artery disease   History of previous stroke  Urinary tract infection  Anemia of chronic disease    Plan:  Increase dialysis time to 4 hours  today and try to remove some fluids also.  Will give patient additional dialysis tomorrow to help remove uremic toxins

## 2025-01-20 NOTE — PROGRESS NOTES
Inpatient Nutrition Assessment    Admit Date: 1/5/2025   Total duration of encounter: 15 days   Patient Age: 79 y.o.    Nutrition Recommendation/Prescription     Tube feeding recommendation:     Peptamen AF goal rate 60 ml/hr to provide  1440 kcal/d  (96% est needs)  90 g protein/d  (100% est needs)  972 ml free water/d   (calculations based on estimated 20 hr/d run time)     Humble (provides 90 kcal, 2.5 g protein per serving) BID.     If no IV fluids running, can give 50ml q 4hr water flushes. Total water provided: 1070 ml. Otherwise, FWF per MD.      Communication of Recommendations: reviewed with nurse    Nutrition Assessment     Malnutrition Assessment/Nutrition-Focused Physical Exam       Malnutrition Level: other (see comments) (Does not meet criteria) (01/07/25 1339)  Energy Intake (Malnutrition): other (see comments) (Unable to assess) (01/07/25 1339)  Weight Loss (Malnutrition): other (see comments) (Unable to assess) (01/07/25 1339)                                         Fluid Accumulation (Malnutrition): other (see comments) (Not present) (01/07/25 1339)        A minimum of two characteristics is recommended for diagnosis of either severe or non-severe malnutrition.    Chart Review    Reason Seen: continuous nutrition monitoring and follow-up    Malnutrition Screening Tool Results   Have you recently lost weight without trying?: No  Have you been eating poorly because of a decreased appetite?: No   MST Score: 0   Diagnosis:  Acute global aphasia, seizure/postictal state  Obtunded  UTI, present on arrival    Relevant Medical History: ESRD on HD, HTN, CAD, CHF, DM2, MCA territory stroke     Scheduled Medications:  enoxparin, 30 mg, Q24H (prophylaxis, 1700)  epoetin chelsea-ebpx (RETACRIT) injection, 20,000 Units, Every Mon, Wed, Fri  insulin glargine U-100, 15 Units, BID  levETIRAcetam (Keppra) IV (PEDS and ADULTS), 500 mg, Q12H  lorazepam, 2 mg, Once  midodrine, 10 mg, Q8H  pantoprazole, 40 mg,  Daily  valproate sodium (DEPACON) IVPB, 500 mg, Q12H  zinc oxide-cod liver oil, , BID    Continuous Infusions:  EPINEPHrine, Last Rate: 0.02 mcg/kg/min (01/19/25 1838)  NORepinephrine bitartrate-D5W, Last Rate: 0.03 mcg/kg/min (01/20/25 0627)  propofoL, Last Rate: Stopped (01/08/25 0947)    PRN Medications:  0.9%  NaCl infusion (for blood administration), , Q24H PRN  bisacodyL, 10 mg, Daily PRN  dextrose 50%, 12.5 g, PRN  glucagon (human recombinant), 1 mg, PRN  heparin (porcine), 5,000 Units, PRN  insulin aspart U-100, 0-15 Units, Q6H PRN  labetalol, 10 mg, Q15 Min PRN  sodium chloride 0.9%, 10 mL, PRN    Calorie Containing IV Medications: no significant kcals from medications at this time    Recent Labs   Lab 01/14/25  0501 01/15/25  0809 01/16/25  0512 01/17/25  0156 01/18/25  0133 01/19/25  0429 01/20/25  0427   * 131* 132* 132* 130* 129* 126*   K 4.6 4.4 4.2 4.6 5.2* 5.0 5.3*   CALCIUM 8.2* 7.9* 8.0* 8.3* 7.7* 8.4 8.3*   PHOS  --  2.5 1.9* 2.0* 2.1* 1.9* 2.1*   MG  --  2.50 2.30 2.50  --   --   --     101 102 101 102 99 98   CO2 22* 21* 23 21* 19* 20* 20*   BUN 63.1* 104.5* 72.6* 96.9* 71.7* 106.3* 131.0*   CREATININE 3.22* 4.18* 2.72* 3.26* 2.36* 3.13* 3.60*   EGFRNORACEVR 14 10 17 14 20 15 12   GLUCOSE 170* 316* 238* 220* 238* 162* 240*   BILITOT 0.1 0.1 0.1 0.1  --   --   --    ALKPHOS 122 152* 133 136  --   --   --    ALT 18 18 21 23  --   --   --    AST 25 19 23 31  --   --   --    ALBUMIN 1.5* 1.6* 1.5* 1.6* 1.5* 1.7* 1.5*   WBC 14.8  14.81* 12.39  12.39* 13.54  13.54* 14.26  14.26* 13.59  13.59* 16.5  16.50* 18.14*   HGB 7.3* 6.4* 7.0* 6.8* 8.3* 7.7* 7.1*   HCT 21.9* 20.1* 21.9* 21.6* 25.1* 23.6* 22.1*     Nutrition Orders:  Diet NPO  Tube Feedings/Formulas Other (see comments); OG; Humble - Fruit Punch,Tube Feedings/Formulas 60; 1,200; Peptamen AF; OG; 50; Every 4 hours    Appetite/Oral Intake: not applicable/not applicable  Factors Affecting Nutritional Intake: on mechanical  ventilation  Social Needs Impacting Access to Food: unable to assess at this time; will attempt on follow-up  Food/Church/Cultural Preferences: unable to obtain  Food Allergies: no known food allergies  Last Bowel Movement: 01/20/25  Wound(s):     Wound 01/05/25 1535 Skin Tear Coccyx-Tissue loss description: Partial thickness   I/O: Past 24 Hours: +[phone], Since Admit: [phone]     Comments    1/7/25: Discussed with RN. Will provide tube feeding recommendations for when appropriate to start tube feeding. Receiving kcal from meds.      1/8/25: Plans for starting TF today. Still receiving kcal from meds.     1/10/25: TF seen running at goal rate. Pt remains intubated, no longer receiving kcals from meds. Significant amount of diarrhea per RN. EN regimen updated to meet kcal needs.     1/14/25: Pt remains intubated, off all sedation. Tolerating TF at goal rate.     1/16/25: TF continues, tolerated per RN. Noted elevated CBGs. Will change to formula with less CHO/ml than current formula.     1/17/25: TF continues, tolerated per RN. Better CBG control per RN. No kcal from meds.     1/20/25: TF continues, tolerated per RN. Noted elevated K, HD ongoing. If still elevated upon F/U, may need to change to renal formula. Currently Phos low.     Anthropometrics    Height: 5' (152.4 cm),    Last Weight: 74.1 kg (163 lb 5.8 oz) (01/05/25 1518), Weight Method: Standard Scale  BMI (Calculated): 31.9  BMI Classification: obese grade I (BMI 30-34.9)     Ideal Body Weight (IBW), Female: 100 lb     % Ideal Body Weight, Female (lb): 163.36 %                             Usual Weight Provided By: unable to obtain usual weight    Wt Readings from Last 5 Encounters:   01/05/25 74.1 kg (163 lb 5.8 oz)     Weight Change(s) Since Admission:   Wt Readings from Last 1 Encounters:   01/05/25 1518 74.1 kg (163 lb 5.8 oz)   Admit Weight: 74.1 kg (163 lb 5.8 oz) (01/05/25 1518), Weight Method: Standard Scale    Estimated Needs    Weight Used For  Calorie Calculations: 74.1 kg (163 lb 5.8 oz)  Energy Calorie Requirements (kcal): 1495kcal  Energy Need Method: Patric State (modified)  Weight Used For Protein Calculations: 45.5 kg (100 lb 3.2 oz) (IBW)  Protein Requirements: 91gm (2g/kg IBW)  Fluid Requirements (mL): 1000ml + urinary output  CHO Requirement: 170gm (45% est kcal needs)     Enteral Nutrition     Formula: Peptamen AF  Rate/Volume: 50 mL/hr  Water Flushes: 50 mL q4hr  Additives/Modulars: Humble  Route: orogastric tube  Method: continuous  Total Nutrition Provided by Tube Feeding, Additives, and Flushes:  Calories Provided  1440 kcal/d, 96% needs   Protein Provided  90 g/d, 100% needs   Fluid Provided  972 ml/d, N/A% needs   Continuous feeding calculations based on estimated 20 hr/d run time unless otherwise stated.    Parenteral Nutrition     Patient not receiving parenteral nutrition support at this time.    Evaluation of Received Nutrient Intake    Calories: meeting estimated needs  Protein: meeting estimated needs    Patient Education     Not applicable.    Nutrition Diagnosis     PES: Inadequate oral intake related to acute illness as evidenced by intubation since admit. (active)     PES:            Nutrition Interventions     Intervention(s): collaboration with other providers  Intervention(s):      Goal: Meet greater than 80% of nutritional needs by follow-up. (goal progressing)  Goal: Tolerate enteral feeding at goal rate by follow-up. (goal progressing)    Nutrition Goals & Monitoring     Dietitian will monitor: energy intake and enteral nutrition intake  Discharge planning: too early to determine; pending clinical course  Nutrition Risk/Follow-Up: high (follow-up in 1-4 days)   Please consult if re-assessment needed sooner.

## 2025-01-20 NOTE — PROGRESS NOTES
[facility] - Emergency Dept  Pulmonary Critical Care Note    Patient Name: Krysta Jansen  MRN: [id number]  Admission Date: 1/5/2025  Hospital Length of Stay: 15 days  Code Status: DNR  Attending Provider: DAMARIS Jaime MD  Primary Care Provider: No primary care provider on file.     Subjective:     HPI:   Patient is a 79-year-old female with a past medical history of ESRD on HD, HTN, CAD, CHF unknown EF, DM2, previous left MCA territory stroke who was brought into the ER after her son reported seeing her normal an hour prior, briefly left in when came back patient was nonverbal and lethargic.      She was emergently brought to the ER and a code fast was called, she was assessed by Neurology in the ER where she was found to have an NIH of 22 based on dysarthria and level of consciousness, but was deemed not a candidate for TNK secondary to overall nonfocal exam and recent left arm HD access surgery reported.  Vitals and laboratory work were overall unremarkable except for changes as expected in any age renal disease, and a potential urinary tract infection.  She is admitted under typical stroke protocol.     1/6/2025, EEG at 0800 revealed seizure-like activity. She was given Lorazepam 2 mg IV. Patient later became unconscious, unresponsive to pain stimuli, and hypotensive. She was intubated for airway protection (see procedure note).       Hospital Course/Significant events:  1/6/2025 - Admitted to ICU       24 Hour Interval History:  Patient remains on mechanical ventilation.  She was essentially unresponsive this morning during my evaluation with rhythmic facial twitching that was very suspicious for seizure activity.  With short while later she did open her eyes and grimace to noxious stimuli when evaluated by Nephrology.  Remains on Levophed        Review of systems unobtainable due to intubation, altered mental status.        Social History     Socioeconomic History    Marital status: Single    Tobacco Use    Smoking status: Unknown   Substance and Sexual Activity    Alcohol use: Not Currently     Social Drivers of Health     Financial Resource Strain: Patient Unable To Answer (1/11/2025)    Overall Financial Resource Strain (CARDIA)     Difficulty of Paying Living Expenses: Patient unable to answer   Food Insecurity: Patient Unable To Answer (1/11/2025)    Hunger Vital Sign     Worried About Running Out of Food in the Last Year: Patient unable to answer     Ran Out of Food in the Last Year: Patient unable to answer   Transportation Needs: Patient Unable To Answer (1/11/2025)    TRANSPORTATION NEEDS     Transportation : Patient unable to answer   Stress: Patient Unable To Answer (1/11/2025)    [place] [employer] - Occupational Stress Questionnaire     Feeling of Stress : Patient unable to answer   Housing Stability: Patient Unable To Answer (1/11/2025)    Housing Stability Vital Sign     Unable to Pay for Housing in the Last Year: Patient unable to answer     Homeless in the Last Year: Patient unable to answer       Current Outpatient Medications   Medication Instructions    aspirin 81 MG Chew 1 tablet, Daily    BASAGLAR KWIKPEN U-100 INSULIN 30 Units, Nightly    CeleXA 10 mg, Daily    ENTRESTO 24-26 mg per tablet 1 tablet, 2 times daily    metoprolol succinate 25 mg CSpX 1 capsule, Daily    PLAVIX 75 mg tablet 1 tablet, Daily    rosuvastatin (CRESTOR) 40 MG Tab 1 tablet, Nightly    traMADoL (ULTRAM) 50 mg tablet 1 tablet, Every 6 hours PRN    vancomycin (VANCOCIN) 500 mg, Every Mon, Wed, Fri     Current Inpatient Medications   enoxparin  30 mg Subcutaneous Q24H (prophylaxis, 1700)    epoetin chelsea-ebpx (RETACRIT) injection  20,000 Units Subcutaneous Every Mon, Wed, Fri    insulin glargine U-100  15 Units Subcutaneous BID    levETIRAcetam (Keppra) IV (PEDS and ADULTS)  500 mg Intravenous Q12H    lorazepam  2 mg Intravenous Once    midodrine  10 mg Oral Q8H    pantoprazole  40 mg  Intravenous Daily    valproate sodium (DEPACON) IVPB  500 mg Intravenous Q12H    zinc oxide-cod liver oil   Topical (Top) BID     Current Intravenous Infusions   EPINEPHrine  0-2 mcg/kg/min (Dosing Weight) Intravenous Continuous 4.4 mL/hr at 01/19/25 1838 0.02 mcg/kg/min at 01/19/25 1838    NORepinephrine bitartrate-D5W  0-3 mcg/kg/min (Dosing Weight) Intravenous Continuous 4.2 mL/hr at 01/20/25 0627 0.03 mcg/kg/min at 01/20/25 0627    propofoL  0-50 mcg/kg/min (Dosing Weight) Intravenous Continuous   Stopped at 01/08/25 0947       Objective:     Intake/Output Summary (Last 24 hours) at 1/20/2025 0852  Last data filed at 1/20/2025 0627  Gross per 24 hour   Intake 2165.36 ml   Output --   Net 2165.36 ml     Vital Signs (Most Recent):  Temp: 99 °F (37.2 °C) (01/20/25 0400)  Pulse: 86 (01/20/25 0600)  Resp: 20 (01/20/25 0600)  BP: (!) 142/54 (01/20/25 0600)  SpO2: 99 % (01/20/25 0830)  Body mass index is 31.9 kg/m².  Weight: 74.1 kg (163 lb 5.8 oz) Vital Signs (24h Range):  Temp:  [98.5 °F (36.9 °C)-99 °F (37.2 °C)] 99 °F (37.2 °C)  Pulse:  [] 86  Resp:  [18-27] 20  SpO2:  [99 %-100 %] 99 %  BP: ()/() 142/54         Physical Exam:  Gen- intubated, attempts to open eyes to voice  HENT- ATNC, MMM, ETT in place  CV- RRR   Resp- CTAB, compliant with mechanical ventilation  MSK- WWP, 2+ BUE edema and BLE edema   Neuro- questionable seizure-like activity followed by periods of more purposeful wakefulness with noxious stimuli  Psych- unable to assess 2/2 neurologic status, calm on no sedation        Lines/Drains/Airways       Central Venous Catheter Line  Duration                  Hemodialysis Catheter 01/06/25 1400 left internal jugular 13 days              Drain  Duration                  NG/OG Tube 01/06/25 1109 Center mouth 13 days              Airway  Duration                  Airway - Non-Surgical 01/06/25 1104 Endotracheal Tube 13 days              Peripheral Intravenous Line  Duration                   Peripheral IV - Single Lumen 01/06/25 1055 18 G Anterior;Right Upper Arm 13 days                  Significant Labs:  Lab Results   Component Value Date    WBC 18.14 (H) 01/20/2025    HGB 7.1 (L) 01/20/2025    HCT 22.1 (L) 01/20/2025    MCV 92.5 01/20/2025     01/20/2025       BMP  Lab Results   Component Value Date     (L) 01/20/2025    K 5.3 (H) 01/20/2025    CO2 20 (L) 01/20/2025    .0 (H) 01/20/2025    CREATININE 3.60 (H) 01/20/2025    CALCIUM 8.3 (L) 01/20/2025    AGAP 10.0 01/17/2025    ESTGFRAFRICA 13 05/08/2024     ABG  Recent Labs   Lab 01/18/25  0115   PH 7.480*   PO2 120.0*   PCO2 33.0*   HCO3 24.6     Mechanical Ventilation Support:  Vent Mode: A/C (01/20/25 0830)  Ventilator Initiated: Yes (01/06/25 1104)  Set Rate: 20 BPM (01/20/25 0830)  Vt Set: 450 mL (01/20/25 0830)  PEEP/CPAP: 5 cmH20 (01/20/25 0830)  Oxygen Concentration (%): 28 (01/20/25 0830)  Peak Airway Pressure: 27 cmH20 (01/20/25 0830)  Total Ve: 11.1 L/m (01/20/25 0830)  F/VT Ratio<105 (RSBI): (!) 55.43 (01/19/25 1649)      Assessment/Plan:     Assessment  Persistent nonconvulsive status epilepticus   Pseudomonas UTI  Prior left MCA territory stroke   Hypotension (resolved)   ESRD   Hx of HTN, CAD, DM2       Plan  -hospital admission complicated by evidence of ongoing seizure activity on EEG refractory to antiepileptic administration; neurology previously following, palliative care and nephrology following.  - Midodrine and vasopressors for hemodynamic support  -currently undergoing hemodialysis  -continue antiepileptic regimen for now  - s/p ciprofloxacin for pansensitive Pseudomonas UTI  -check ABG  -continue mechanical ventilation, wean vent if ABG allows, inappropriate for trial of extubation given poor mental status  -palliative care following, code status now DO NOT RESUSCITATE  -extremely poor long-term neurologic outlook, particularly given baseline significant debility, with extremely low likelihood for  liberation from mechanical ventilation and significant functional neurologic improvement  -family continuing to discuss with palliative care and remain undecided on next steps of care, but code status remains DO NOT RESUSCITATE         DVT Prophylaxis: LMWH  GI Prophylaxis: None         I spent 31  minutes providing critical care services to this patient.  This does not include time spent for separately billed procedures.  Critical care diagnosis: Hemodynamic instability, respiratory failure, neurologic dysfunction  Critical care interventions: Direct hands-on care of the patient, review of lab and physiologic parameters        Apolinar Roblero MD  Pulmonary Disease and Critical Care Medicine

## 2025-01-21 LAB
ALLENS TEST BLOOD GAS (OHS): YES
BASE EXCESS BLD CALC-SCNC: 3.6 MMOL/L
BLOOD GAS SAMPLE TYPE (OHS): ABNORMAL
CA-I BLD-SCNC: 1.16 MMOL/L (ref 1.12–1.23)
CO2 BLDA-SCNC: 27.3 MMOL/L
DRAWN BY BLOOD GAS (OHS): ABNORMAL
HCO3 BLDA-SCNC: 26.3 MMOL/L (ref 22–26)
INHALED O2 CONCENTRATION: 28 %
MECH RR (OHS): 20 B/MIN
MODE (OHS): AC
OXYGEN DEVICE BLOOD GAS (OHS): ABNORMAL
PCO2 BLDA: 33 MMHG (ref 35–45)
PEEP RESPIRATORY: 5 CMH2O
PH BLDA: 7.51 [PH] (ref 7.35–7.45)
PO2 BLDA: 108 MMHG (ref 80–100)
POCT GLUCOSE: 123 MG/DL (ref 70–110)
POCT GLUCOSE: 178 MG/DL (ref 70–110)
POCT GLUCOSE: 238 MG/DL (ref 70–110)
POCT GLUCOSE: 241 MG/DL (ref 70–110)
POTASSIUM BLOOD GAS (OHS): 3.2 MMOL/L (ref 3.5–5)
SAMPLE SITE BLOOD GAS (OHS): ABNORMAL
SAO2 % BLDA: 99 %
SODIUM BLOOD GAS (OHS): 132 MMOL/L (ref 137–145)
SPONT+MECH VT ON VENT: 450 ML

## 2025-01-21 PROCEDURE — 94003 VENT MGMT INPAT SUBQ DAY: CPT

## 2025-01-21 PROCEDURE — 27200966 HC CLOSED SUCTION SYSTEM

## 2025-01-21 PROCEDURE — 25000003 PHARM REV CODE 250: Performed by: INTERNAL MEDICINE

## 2025-01-21 PROCEDURE — 25000003 PHARM REV CODE 250

## 2025-01-21 PROCEDURE — 82803 BLOOD GASES ANY COMBINATION: CPT

## 2025-01-21 PROCEDURE — 63600175 PHARM REV CODE 636 W HCPCS: Performed by: STUDENT IN AN ORGANIZED HEALTH CARE EDUCATION/TRAINING PROGRAM

## 2025-01-21 PROCEDURE — 99900035 HC TECH TIME PER 15 MIN (STAT)

## 2025-01-21 PROCEDURE — 99900026 HC AIRWAY MAINTENANCE (STAT)

## 2025-01-21 PROCEDURE — 11000001 HC ACUTE MED/SURG PRIVATE ROOM

## 2025-01-21 PROCEDURE — 27100171 HC OXYGEN HIGH FLOW UP TO 24 HOURS

## 2025-01-21 PROCEDURE — 36600 WITHDRAWAL OF ARTERIAL BLOOD: CPT

## 2025-01-21 PROCEDURE — 63600175 PHARM REV CODE 636 W HCPCS: Performed by: INTERNAL MEDICINE

## 2025-01-21 PROCEDURE — 94760 N-INVAS EAR/PLS OXIMETRY 1: CPT

## 2025-01-21 PROCEDURE — 99900031 HC PATIENT EDUCATION (STAT)

## 2025-01-21 PROCEDURE — 94761 N-INVAS EAR/PLS OXIMETRY MLT: CPT

## 2025-01-21 PROCEDURE — 63600175 PHARM REV CODE 636 W HCPCS

## 2025-01-21 PROCEDURE — 80100014 HC HEMODIALYSIS 1:1

## 2025-01-21 RX ORDER — SCOPOLAMINE 1 MG/3D
1 PATCH, EXTENDED RELEASE TRANSDERMAL
Status: DISCONTINUED | OUTPATIENT
Start: 2025-01-21 | End: 2025-01-31

## 2025-01-21 RX ORDER — HEPARIN SODIUM 1000 [USP'U]/ML
4000 INJECTION, SOLUTION INTRAVENOUS; SUBCUTANEOUS ONCE
Status: COMPLETED | OUTPATIENT
Start: 2025-01-21 | End: 2025-01-21

## 2025-01-21 RX ORDER — DEXMEDETOMIDINE HYDROCHLORIDE 4 UG/ML
0-1.4 INJECTION, SOLUTION INTRAVENOUS CONTINUOUS
Status: DISCONTINUED | OUTPATIENT
Start: 2025-01-21 | End: 2025-01-24

## 2025-01-21 RX ADMIN — SCOPOLAMINE 1 PATCH: 1 PATCH TRANSDERMAL at 07:01

## 2025-01-21 RX ADMIN — HEPARIN SODIUM 5000 UNITS: 1000 INJECTION, SOLUTION INTRAVENOUS; SUBCUTANEOUS at 11:01

## 2025-01-21 RX ADMIN — INSULIN ASPART 6 UNITS: 100 INJECTION, SOLUTION INTRAVENOUS; SUBCUTANEOUS at 05:01

## 2025-01-21 RX ADMIN — INSULIN ASPART 6 UNITS: 100 INJECTION, SOLUTION INTRAVENOUS; SUBCUTANEOUS at 12:01

## 2025-01-21 RX ADMIN — INSULIN ASPART 3 UNITS: 100 INJECTION, SOLUTION INTRAVENOUS; SUBCUTANEOUS at 12:01

## 2025-01-21 RX ADMIN — DEXMEDETOMIDINE HYDROCHLORIDE 0.2 MCG/KG/HR: 400 INJECTION INTRAVENOUS at 02:01

## 2025-01-21 RX ADMIN — LEVETIRACETAM 500 MG: 100 INJECTION, SOLUTION INTRAVENOUS at 08:01

## 2025-01-21 RX ADMIN — ENOXAPARIN SODIUM 30 MG: 30 INJECTION SUBCUTANEOUS at 04:01

## 2025-01-21 RX ADMIN — VALPROATE SODIUM 500 MG: 100 INJECTION, SOLUTION INTRAVENOUS at 12:01

## 2025-01-21 RX ADMIN — INSULIN GLARGINE 15 UNITS: 100 INJECTION, SOLUTION SUBCUTANEOUS at 08:01

## 2025-01-21 RX ADMIN — MIDODRINE HYDROCHLORIDE 10 MG: 5 TABLET ORAL at 06:01

## 2025-01-21 RX ADMIN — HEPARIN SODIUM 4000 UNITS: 1000 INJECTION, SOLUTION INTRAVENOUS; SUBCUTANEOUS at 08:01

## 2025-01-21 RX ADMIN — MIDODRINE HYDROCHLORIDE 10 MG: 5 TABLET ORAL at 09:01

## 2025-01-21 RX ADMIN — Medication: at 08:01

## 2025-01-21 RX ADMIN — PANTOPRAZOLE SODIUM 40 MG: 40 INJECTION, POWDER, FOR SOLUTION INTRAVENOUS at 08:01

## 2025-01-21 RX ADMIN — DEXMEDETOMIDINE HYDROCHLORIDE 0.2 MCG/KG/HR: 400 INJECTION INTRAVENOUS at 05:01

## 2025-01-21 RX ADMIN — MIDODRINE HYDROCHLORIDE 10 MG: 5 TABLET ORAL at 02:01

## 2025-01-21 NOTE — PROGRESS NOTES
Nephrology consult follow up note    HPI:      Krysta Jansen is a 79 y.o. female is a nursing home resident and has ESRD and is on dialysis on Monday Wednesday Friday.  Brought to this hospital for altered mental status.  She was found to have some grand mal seizure.  She received some Ativan.  She had EEG done.  Her respiratory status and mental status deteriorated and required intubation to protect her airways.  Could not get any history from the patient.  Records revealed she was found to have some dysarthria and decreased level of consciousness Friday through being brought to this hospital.  Significant past medical history is positive for diabetes mellitus type 2 previous stroke hypertension coronary artery disease and congestive heart failure.    Interval history:     01/07/2024  Patient remains intubated and sedated.  On epinephrine for hypotension this morning.     01/08/25  Remains intubated and sedated on vent. Off pressors with BP stable at 130/60 range. Noted leucocytosis on am lab. Blood cultures pending.  Hgb stable at 7.6. Dialyzed yesterday and tolerated 500mL UF.      01/09/2025  No acute events overnight.  Patient remains intubated.  Not currently requiring vasopressors, but blood pressure does remain borderline low.  ICU team is requesting to hold hemodialysis today so they can have goals of care conversation with family.     01/10/2025   Currently tolerating dialysis.  Will remove only 500 cc of fluid.  Remains intubated.  Tolerating tube feedings.  Nurses reported that she is not on any sedation for about 48 hours.     01/13/2025   Weekend events noted.  Patient's condition is unchanged.  Remains on the ventilator and off sedation or pressors.  Unresponsive.     01/14/2025   Last 24 hours events noted.  Patient's condition remains unchanged and she is off pressors and off sedation and still unresponsive although she grimaces to painful stimuli.     01/15/2025   Currently tolerating  dialysis.  Neuro status and respiratory status remains unchanged.    01/16/2025  Dialyze yesterday.  No clinical change overnight.    01/17/2025  No clinical change overnight.  Hemoglobin dropped today.  Remains intubated    01/18/2025  No acute events overnight.  On Levophed for hypotension.  Dialyzed yesterday.    01/19/2025  No acute events overnight.  She does appear more edematous.  Remains on Levophed.  Getting tube feedings.     01/20/2025   Currently tolerating dialysis.  Will try to remove some fluid.  Will increase dialysis time to 4 hours.  Comfortable on the ventilator.    01/21/2025   Currently tolerating dialysis.  The plan is to remove some fluid and to help correct electrolytes and uremic toxins.    Review of Systems:     Unable to obtain ROS due to mental status/intubation.     Past medical, family, surgical, and social history reviewed and unchanged from initial consult note.     Objective:       VITAL SIGNS: 24 HR MIN & MAX LAST    Temp  Min: 97.7 °F (36.5 °C)  Max: 99.1 °F (37.3 °C)  98.9 °F (37.2 °C)        BP  Min: 105/52  Max: 145/69  (!) 117/49     Pulse  Min: 55  Max: 87  67     Resp  Min: 17  Max: 25  20    SpO2  Min: 99 %  Max: 100 %  100 %      GEN: Chronically ill appearing AA female, comfortable on the ventilator.  Now on Precedex.  HEENT:  ET tube in place  CV: RRR +S1,S2 without murmur  PULM: CTAB, unlabored  ABD: Soft, NT/ND abdomen with NABS  EXT:  2+ dependent edema  SKIN: Warm and dry  PSYCH:  Grimaces to painful stimuli and even opens eyes to verbal command.  Dialysis access:  Left IJ PermCath            Component Value Date/Time     (L) 01/20/2025 0427     (L) 01/19/2025 0429     05/08/2024 0739     03/15/2024 1101    K 5.3 (H) 01/20/2025 0427    K 5.0 01/19/2025 0429    K 3.8 05/08/2024 0739    K 3.9 03/15/2024 1101    CO2 20 (L) 01/20/2025 0427    CO2 20 (L) 01/19/2025 0429    CO2 22 05/08/2024 0739    CO2 21 (L) 03/15/2024 1101    .0 (H)  01/20/2025 0427    .3 (H) 01/19/2025 0429    BUN 55 (H) 10/02/2024 0000    BUN 23 (H) 07/12/2024 0000    CREATININE 3.60 (H) 01/20/2025 0427    CREATININE 3.13 (H) 01/19/2025 0429    CREATININE 3.37 (H) 05/08/2024 0739    CREATININE 3.2 (H) 03/15/2024 1101    CALCIUM 8.3 (L) 01/20/2025 0427    CALCIUM 8.4 01/19/2025 0429    CALCIUM 9 05/08/2024 0739    CALCIUM 9.9 03/15/2024 1101    PHOS 2.1 (L) 01/20/2025 0427            Component Value Date/Time    WBC 18.14 (H) 01/20/2025 0427    WBC 16.50 (H) 01/19/2025 0429    WBC 16.5 01/19/2025 0429    WBC 13.59 01/18/2025 0133    HGB 7.1 (L) 01/20/2025 0427    HGB 7.7 (L) 01/19/2025 0429    HGB 10.3 (L) 11/24/2024 0000    HGB 10 (L) 11/18/2024 0000    HCT 22.1 (L) 01/20/2025 0427    HCT 23.6 (L) 01/19/2025 0429     01/20/2025 0427     01/19/2025 0429         Imaging reviewed      Assessment / Plan:       ESRD.  Normally MWF hemodialysis.    New onset seizure  Acute hypoxic respiratory failure  Hypertension, anemia  Diabetes mellitus type 2   History of coronary artery disease   History of previous stroke  Urinary tract infection  Anemia of chronic disease    Plan:  Will continue to provide dialysis on Monday Wednesday Friday as her regular dialysis days.  Continue tube feedings.

## 2025-01-21 NOTE — NURSING
01/21/25 1213   Post-Hemodialysis Assessment   Blood Volume Processed (Liters) 72 L   Dialyzer Clearance Heavily streaked   Duration of Treatment 240 minutes   Total UF (mL) 500 mL   Post-Hemodialysis Comments tx completed as ordered. Net 500ml removed over 4 hours. ICU RN had to initiate levophed infusion at the beginning of tx, vss afterwards. cvc packed w heparin, end caps applied.

## 2025-01-21 NOTE — PROGRESS NOTES
[facility] - Emergency Dept  Pulmonary Critical Care Note    Patient Name: Krysta Jansen  MRN: [id number]  Admission Date: 1/5/2025  Hospital Length of Stay: 16 days  Code Status: DNR  Attending Provider: DAMARIS Jaime MD  Primary Care Provider: No primary care provider on file.     Subjective:     HPI:   Patient is a 79-year-old female with a past medical history of ESRD on HD, HTN, CAD, CHF unknown EF, DM2, previous left MCA territory stroke who was brought into the ER after her son reported seeing her normal an hour prior, briefly left in when came back patient was nonverbal and lethargic.      She was emergently brought to the ER and a code fast was called, she was assessed by Neurology in the ER where she was found to have an NIH of 22 based on dysarthria and level of consciousness, but was deemed not a candidate for TNK secondary to overall nonfocal exam and recent left arm HD access surgery reported.  Vitals and laboratory work were overall unremarkable except for changes as expected in any age renal disease, and a potential urinary tract infection.  She is admitted under typical stroke protocol.     1/6/2025, EEG at 0800 revealed seizure-like activity. She was given Lorazepam 2 mg IV. Patient later became unconscious, unresponsive to pain stimuli, and hypotensive. She was intubated for airway protection (see procedure note).       Hospital Course/Significant events:  1/6/2025 - Admitted to ICU       24 Hour Interval History:  Patient remains on mechanical ventilation.  Levophed has been weaned off.  She did undergo dialysis yesterday with volume removal in his slated undergo hemodialysis again today.  No more seizure-like activity appreciated.  Nursing attempted to breech goals of care conversation with family yesterday but not much progress is made.        Review of systems unobtainable due to intubation, altered mental status.        Social History     Socioeconomic History    Marital  status: Single   Tobacco Use    Smoking status: Unknown   Substance and Sexual Activity    Alcohol use: Not Currently     Social Drivers of Health     Financial Resource Strain: Patient Unable To Answer (1/11/2025)    Overall Financial Resource Strain (CARDIA)     Difficulty of Paying Living Expenses: Patient unable to answer   Food Insecurity: Patient Unable To Answer (1/11/2025)    Hunger Vital Sign     Worried About Running Out of Food in the Last Year: Patient unable to answer     Ran Out of Food in the Last Year: Patient unable to answer   Transportation Needs: Patient Unable To Answer (1/11/2025)    TRANSPORTATION NEEDS     Transportation : Patient unable to answer   Stress: Patient Unable To Answer (1/11/2025)    [employer] [place] [employer] - Occupational Stress Questionnaire     Feeling of Stress : Patient unable to answer   Housing Stability: Patient Unable To Answer (1/11/2025)    Housing Stability Vital Sign     Unable to Pay for Housing in the Last Year: Patient unable to answer     Homeless in the Last Year: Patient unable to answer       Current Outpatient Medications   Medication Instructions    aspirin 81 MG Chew 1 tablet, Daily    BASAGLAR KWIKPEN U-100 INSULIN 30 Units, Nightly    CeleXA 10 mg, Daily    ENTRESTO 24-26 mg per tablet 1 tablet, 2 times daily    metoprolol succinate 25 mg CSpX 1 capsule, Daily    PLAVIX 75 mg tablet 1 tablet, Daily    rosuvastatin (CRESTOR) 40 MG Tab 1 tablet, Nightly    traMADoL (ULTRAM) 50 mg tablet 1 tablet, Every 6 hours PRN    vancomycin (VANCOCIN) 500 mg, Every Mon, Wed, Fri     Current Inpatient Medications   enoxparin  30 mg Subcutaneous Q24H (prophylaxis, 1700)    epoetin chelsea-ebpx (RETACRIT) injection  20,000 Units Subcutaneous Every Mon, Wed, Fri    insulin glargine U-100  15 Units Subcutaneous BID    levETIRAcetam (Keppra) IV (PEDS and ADULTS)  500 mg Intravenous Q12H    lorazepam  2 mg Intravenous Once    midodrine  10 mg Oral Q8H     pantoprazole  40 mg Intravenous Daily    valproate sodium (DEPACON) IVPB  500 mg Intravenous Q12H    zinc oxide-cod liver oil   Topical (Top) BID     Current Intravenous Infusions   dexmedeTOMIDine (Precedex) infusion (titrating)  0-1.4 mcg/kg/hr Intravenous Continuous 3.71 mL/hr at 01/21/25 0229 0.2 mcg/kg/hr at 01/21/25 0229    EPINEPHrine  0-2 mcg/kg/min (Dosing Weight) Intravenous Continuous 4.4 mL/hr at 01/19/25 1838 0.02 mcg/kg/min at 01/19/25 1838    NORepinephrine bitartrate-D5W  0-3 mcg/kg/min (Dosing Weight) Intravenous Continuous 4.2 mL/hr at 01/20/25 0627 0.03 mcg/kg/min at 01/20/25 0627    propofoL  0-50 mcg/kg/min (Dosing Weight) Intravenous Continuous   Stopped at 01/08/25 0947       Objective:     Intake/Output Summary (Last 24 hours) at 1/21/2025 0744  Last data filed at 1/21/2025 0600  Gross per 24 hour   Intake 1572 ml   Output 3000 ml   Net -1428 ml     Vital Signs (Most Recent):  Temp: 98.9 °F (37.2 °C) (01/21/25 0400)  Pulse: 67 (01/21/25 0500)  Resp: 20 (01/21/25 0500)  BP: (!) 117/49 (01/21/25 0500)  SpO2: 100 % (01/21/25 0500)  Body mass index is 31.9 kg/m².  Weight: 74.1 kg (163 lb 5.8 oz) Vital Signs (24h Range):  Temp:  [97.7 °F (36.5 °C)-99.1 °F (37.3 °C)] 98.9 °F (37.2 °C)  Pulse:  [55-99] 67  Resp:  [17-25] 20  SpO2:  [99 %-100 %] 100 %  BP: (105-171)/(46-90) 117/49         Physical Exam:  Gen- intubated, on low-dose Precedex  HENT- ATNC, MMM, ETT in place  CV- RRR   Resp- CTAB, compliant with mechanical ventilation  MSK- WWP, 2+ BUE edema and BLE edema   Neuro- resting comfortably, not following commands, lightly sedated  Abdomen-positive bowel sounds, no mass, nontender        Lines/Drains/Airways       Central Venous Catheter Line  Duration                  Hemodialysis Catheter 01/06/25 1400 left internal jugular 14 days              Drain  Duration                  NG/OG Tube 01/06/25 1109 Center mouth 14 days              Airway  Duration                  Airway - Non-Surgical  01/06/25 1104 Endotracheal Tube 14 days              Peripheral Intravenous Line  Duration                  Peripheral IV - Single Lumen 01/06/25 1055 18 G Anterior;Right Upper Arm 14 days                  Significant Labs:  Lab Results   Component Value Date    WBC 18.14 (H) 01/20/2025    HGB 7.1 (L) 01/20/2025    HCT 22.1 (L) 01/20/2025    MCV 92.5 01/20/2025     01/20/2025       BMP  Lab Results   Component Value Date     (L) 01/20/2025    K 5.3 (H) 01/20/2025    CO2 20 (L) 01/20/2025    .0 (H) 01/20/2025    CREATININE 3.60 (H) 01/20/2025    CALCIUM 8.3 (L) 01/20/2025    AGAP 10.0 01/17/2025    ESTGFRAFRICA 13 05/08/2024     ABG  Recent Labs   Lab 01/18/25  0115   PH 7.480*   PO2 120.0*   PCO2 33.0*   HCO3 24.6     Mechanical Ventilation Support:  Vent Mode: A/C (01/21/25 0540)  Ventilator Initiated: Yes (01/06/25 1104)  Set Rate: 20 BPM (01/21/25 0540)  Vt Set: 450 mL (01/21/25 0540)  PEEP/CPAP: 5 cmH20 (01/21/25 0540)  Oxygen Concentration (%): 28 (01/21/25 0540)  Peak Airway Pressure: 24 cmH20 (01/21/25 0540)  Total Ve: 9.2 L/m (01/21/25 0540)  F/VT Ratio<105 (RSBI): (!) 41.75 (01/20/25 2300)      Assessment/Plan:     Assessment  Persistent nonconvulsive status epilepticus   Pseudomonas UTI  Prior left MCA territory stroke   Hypotension (resolved)   ESRD   Hx of HTN, CAD, DM2       Plan  -hospital admission complicated by evidence of ongoing seizure activity on EEG refractory to antiepileptic administration; neurology previously following, palliative care and nephrology following.  - Continue Midodrine  -about to undergo repeat hemodialysis  -continue antiepileptic regimen for now  - s/p ciprofloxacin for pansensitive Pseudomonas UTI  -check ABG  -continue mechanical ventilation, wean vent if ABG allows, inappropriate for trial of extubation given poor mental status  -palliative care following, code status now DO NOT RESUSCITATE  -extremely poor long-term neurologic outlook, particularly  given baseline significant debility, with extremely low likelihood for liberation from mechanical ventilation and significant functional neurologic improvement  -family continuing to discuss with palliative care and remain undecided on next steps of care, but code status remains DO NOT RESUSCITATE         DVT Prophylaxis: LMWH  GI Prophylaxis: None         I spent 31  minutes providing critical care services to this patient.  This does not include time spent for separately billed procedures.  Critical care diagnosis: Hemodynamic instability, respiratory failure, neurologic dysfunction  Critical care interventions: Direct hands-on care of the patient, review of lab and physiologic parameters        Apolinar Roblero MD  Pulmonary Disease and Critical Care Medicine

## 2025-01-22 LAB
ALBUMIN SERPL-MCNC: 2.1 G/DL (ref 3.4–4.8)
ALBUMIN/GLOB SERPL: 0.4 RATIO (ref 1.1–2)
ALP SERPL-CCNC: 147 UNIT/L (ref 40–150)
ALT SERPL-CCNC: 13 UNIT/L (ref 0–55)
ANION GAP SERPL CALC-SCNC: 10 MEQ/L
AST SERPL-CCNC: 20 UNIT/L (ref 5–34)
BASOPHILS # BLD AUTO: 0.14 X10(3)/MCL
BASOPHILS NFR BLD AUTO: 0.8 %
BILIRUB SERPL-MCNC: 0.2 MG/DL
BUN SERPL-MCNC: 61.1 MG/DL (ref 9.8–20.1)
CALCIUM SERPL-MCNC: 8.6 MG/DL (ref 8.4–10.2)
CHLORIDE SERPL-SCNC: 99 MMOL/L (ref 98–107)
CO2 SERPL-SCNC: 20 MMOL/L (ref 23–31)
CREAT SERPL-MCNC: 1.81 MG/DL (ref 0.55–1.02)
CREAT/UREA NIT SERPL: 34
EOSINOPHIL # BLD AUTO: 0.32 X10(3)/MCL (ref 0–0.9)
EOSINOPHIL NFR BLD AUTO: 1.8 %
ERYTHROCYTE [DISTWIDTH] IN BLOOD BY AUTOMATED COUNT: 18.7 % (ref 11.5–17)
GFR SERPLBLD CREATININE-BSD FMLA CKD-EPI: 28 ML/MIN/1.73/M2
GLOBULIN SER-MCNC: 5.2 GM/DL (ref 2.4–3.5)
GLUCOSE SERPL-MCNC: 237 MG/DL (ref 82–115)
HCT VFR BLD AUTO: 26 % (ref 37–47)
HGB BLD-MCNC: 8.1 G/DL (ref 12–16)
IMM GRANULOCYTES # BLD AUTO: 0.5 X10(3)/MCL (ref 0–0.04)
IMM GRANULOCYTES NFR BLD AUTO: 2.7 %
LYMPHOCYTES # BLD AUTO: 3.06 X10(3)/MCL (ref 0.6–4.6)
LYMPHOCYTES NFR BLD AUTO: 16.7 %
MAGNESIUM SERPL-MCNC: 2.2 MG/DL (ref 1.6–2.6)
MCH RBC QN AUTO: 29.8 PG (ref 27–31)
MCHC RBC AUTO-ENTMCNC: 31.2 G/DL (ref 33–36)
MCV RBC AUTO: 95.6 FL (ref 80–94)
MONOCYTES # BLD AUTO: 1.61 X10(3)/MCL (ref 0.1–1.3)
MONOCYTES NFR BLD AUTO: 8.8 %
NEUTROPHILS # BLD AUTO: 12.65 X10(3)/MCL (ref 2.1–9.2)
NEUTROPHILS NFR BLD AUTO: 69.2 %
NRBC BLD AUTO-RTO: 0.1 %
PHOSPHATE SERPL-MCNC: 1.2 MG/DL (ref 2.3–4.7)
PLATELET # BLD AUTO: 414 X10(3)/MCL (ref 130–400)
PMV BLD AUTO: 8.7 FL (ref 7.4–10.4)
POCT GLUCOSE: 128 MG/DL (ref 70–110)
POCT GLUCOSE: 195 MG/DL (ref 70–110)
POCT GLUCOSE: 250 MG/DL (ref 70–110)
POCT GLUCOSE: 265 MG/DL (ref 70–110)
POCT GLUCOSE: 309 MG/DL (ref 70–110)
POTASSIUM SERPL-SCNC: 4.3 MMOL/L (ref 3.5–5.1)
PROT SERPL-MCNC: 7.3 GM/DL (ref 5.8–7.6)
RBC # BLD AUTO: 2.72 X10(6)/MCL (ref 4.2–5.4)
SODIUM SERPL-SCNC: 129 MMOL/L (ref 136–145)
WBC # BLD AUTO: 18.28 X10(3)/MCL (ref 4.5–11.5)

## 2025-01-22 PROCEDURE — 63600175 PHARM REV CODE 636 W HCPCS: Mod: JZ,TB | Performed by: STUDENT IN AN ORGANIZED HEALTH CARE EDUCATION/TRAINING PROGRAM

## 2025-01-22 PROCEDURE — 25000003 PHARM REV CODE 250: Performed by: INTERNAL MEDICINE

## 2025-01-22 PROCEDURE — 36415 COLL VENOUS BLD VENIPUNCTURE: CPT

## 2025-01-22 PROCEDURE — 85025 COMPLETE CBC W/AUTO DIFF WBC: CPT

## 2025-01-22 PROCEDURE — 63600175 PHARM REV CODE 636 W HCPCS: Performed by: INTERNAL MEDICINE

## 2025-01-22 PROCEDURE — 63600175 PHARM REV CODE 636 W HCPCS

## 2025-01-22 PROCEDURE — 99900026 HC AIRWAY MAINTENANCE (STAT)

## 2025-01-22 PROCEDURE — 11000001 HC ACUTE MED/SURG PRIVATE ROOM

## 2025-01-22 PROCEDURE — 83735 ASSAY OF MAGNESIUM: CPT

## 2025-01-22 PROCEDURE — 99900031 HC PATIENT EDUCATION (STAT)

## 2025-01-22 PROCEDURE — 80053 COMPREHEN METABOLIC PANEL: CPT

## 2025-01-22 PROCEDURE — 25000003 PHARM REV CODE 250

## 2025-01-22 PROCEDURE — 99900035 HC TECH TIME PER 15 MIN (STAT)

## 2025-01-22 PROCEDURE — 94761 N-INVAS EAR/PLS OXIMETRY MLT: CPT

## 2025-01-22 PROCEDURE — 27100171 HC OXYGEN HIGH FLOW UP TO 24 HOURS

## 2025-01-22 PROCEDURE — 94003 VENT MGMT INPAT SUBQ DAY: CPT

## 2025-01-22 PROCEDURE — 27200966 HC CLOSED SUCTION SYSTEM

## 2025-01-22 PROCEDURE — 84100 ASSAY OF PHOSPHORUS: CPT

## 2025-01-22 RX ORDER — FENTANYL CITRATE 50 UG/ML
25 INJECTION, SOLUTION INTRAMUSCULAR; INTRAVENOUS EVERY 4 HOURS PRN
Status: DISCONTINUED | OUTPATIENT
Start: 2025-01-22 | End: 2025-01-24

## 2025-01-22 RX ORDER — FENTANYL CITRATE 50 UG/ML
25 INJECTION, SOLUTION INTRAMUSCULAR; INTRAVENOUS
Status: DISCONTINUED | OUTPATIENT
Start: 2025-01-22 | End: 2025-01-22

## 2025-01-22 RX ADMIN — INSULIN GLARGINE 15 UNITS: 100 INJECTION, SOLUTION SUBCUTANEOUS at 08:01

## 2025-01-22 RX ADMIN — INSULIN ASPART 6 UNITS: 100 INJECTION, SOLUTION INTRAVENOUS; SUBCUTANEOUS at 11:01

## 2025-01-22 RX ADMIN — INSULIN ASPART 12 UNITS: 100 INJECTION, SOLUTION INTRAVENOUS; SUBCUTANEOUS at 01:01

## 2025-01-22 RX ADMIN — LEVETIRACETAM 500 MG: 100 INJECTION, SOLUTION INTRAVENOUS at 08:01

## 2025-01-22 RX ADMIN — ENOXAPARIN SODIUM 30 MG: 30 INJECTION SUBCUTANEOUS at 05:01

## 2025-01-22 RX ADMIN — MIDODRINE HYDROCHLORIDE 10 MG: 5 TABLET ORAL at 09:01

## 2025-01-22 RX ADMIN — VALPROATE SODIUM 500 MG: 100 INJECTION, SOLUTION INTRAVENOUS at 01:01

## 2025-01-22 RX ADMIN — Medication: at 08:01

## 2025-01-22 RX ADMIN — INSULIN ASPART 3 UNITS: 100 INJECTION, SOLUTION INTRAVENOUS; SUBCUTANEOUS at 05:01

## 2025-01-22 RX ADMIN — INSULIN ASPART 9 UNITS: 100 INJECTION, SOLUTION INTRAVENOUS; SUBCUTANEOUS at 05:01

## 2025-01-22 RX ADMIN — NOREPINEPHRINE BITARTRATE 0.06 MCG/KG/MIN: 8 INJECTION, SOLUTION INTRAVENOUS at 08:01

## 2025-01-22 RX ADMIN — MIDODRINE HYDROCHLORIDE 10 MG: 5 TABLET ORAL at 05:01

## 2025-01-22 RX ADMIN — FENTANYL CITRATE 25 MCG: 50 INJECTION, SOLUTION INTRAMUSCULAR; INTRAVENOUS at 08:01

## 2025-01-22 RX ADMIN — PANTOPRAZOLE SODIUM 40 MG: 40 INJECTION, POWDER, FOR SOLUTION INTRAVENOUS at 08:01

## 2025-01-22 RX ADMIN — FENTANYL CITRATE 25 MCG: 50 INJECTION, SOLUTION INTRAMUSCULAR; INTRAVENOUS at 01:01

## 2025-01-22 RX ADMIN — MIDODRINE HYDROCHLORIDE 10 MG: 5 TABLET ORAL at 01:01

## 2025-01-22 RX ADMIN — EPOETIN ALFA-EPBX 20000 UNITS: 20000 INJECTION, SOLUTION INTRAVENOUS; SUBCUTANEOUS at 01:01

## 2025-01-22 NOTE — PROGRESS NOTES
[facility] - Emergency Dept  Pulmonary Critical Care Note    Patient Name: Krysta Jansen  MRN: [id number]  Admission Date: 1/5/2025  Hospital Length of Stay: 17 days  Code Status: DNR  Attending Provider: DAMARIS Jaime MD  Primary Care Provider: No primary care provider on file.     Subjective:     HPI:   Patient is a 79-year-old female with a past medical history of ESRD on HD, HTN, CAD, CHF unknown EF, DM2, previous left MCA territory stroke who was brought into the ER after her son reported seeing her normal an hour prior, briefly left in when came back patient was nonverbal and lethargic.      She was emergently brought to the ER and a code fast was called, she was assessed by Neurology in the ER where she was found to have an NIH of 22 based on dysarthria and level of consciousness, but was deemed not a candidate for TNK secondary to overall nonfocal exam and recent left arm HD access surgery reported.  Vitals and laboratory work were overall unremarkable except for changes as expected in any age renal disease, and a potential urinary tract infection.  She is admitted under typical stroke protocol.     1/6/2025, EEG at 0800 revealed seizure-like activity. She was given Lorazepam 2 mg IV. Patient later became unconscious, unresponsive to pain stimuli, and hypotensive. She was intubated for airway protection (see procedure note).       Hospital Course/Significant events:  1/6/2025 - Admitted to ICU   1/9/2025 - EEG shows evidence of epileptiform discharges but no clinical convulsing noted.    24 Hour Interval History:  Patient remains on mechanical ventilation.  Nursing reports she grimaces with stimulation.  Patient placed on Precedex for comfort but developed bradycardia.  Two days ago SBT was performed with tachypnea and tachycardia resulting.  Patient tolerating tube feedings well.  Had dialysis for the past 2 days.        Review of systems unobtainable due to intubation, altered mental  status.        Social History     Socioeconomic History    Marital status: Single   Tobacco Use    Smoking status: Unknown   Substance and Sexual Activity    Alcohol use: Not Currently     Social Drivers of Health     Financial Resource Strain: Patient Unable To Answer (1/11/2025)    Overall Financial Resource Strain (CARDIA)     Difficulty of Paying Living Expenses: Patient unable to answer   Food Insecurity: Patient Unable To Answer (1/11/2025)    Hunger Vital Sign     Worried About Running Out of Food in the Last Year: Patient unable to answer     Ran Out of Food in the Last Year: Patient unable to answer   Transportation Needs: Patient Unable To Answer (1/11/2025)    TRANSPORTATION NEEDS     Transportation : Patient unable to answer   Stress: Patient Unable To Answer (1/11/2025)    [employer] [place] [employer] - Occupational Stress Questionnaire     Feeling of Stress : Patient unable to answer   Housing Stability: Patient Unable To Answer (1/11/2025)    Housing Stability Vital Sign     Unable to Pay for Housing in the Last Year: Patient unable to answer     Homeless in the Last Year: Patient unable to answer       Current Outpatient Medications   Medication Instructions    aspirin 81 MG Chew 1 tablet, Daily    BASAGLAR KWIKPEN U-100 INSULIN 30 Units, Nightly    CeleXA 10 mg, Daily    ENTRESTO 24-26 mg per tablet 1 tablet, 2 times daily    metoprolol succinate 25 mg CSpX 1 capsule, Daily    PLAVIX 75 mg tablet 1 tablet, Daily    rosuvastatin (CRESTOR) 40 MG Tab 1 tablet, Nightly    traMADoL (ULTRAM) 50 mg tablet 1 tablet, Every 6 hours PRN    vancomycin (VANCOCIN) 500 mg, Every Mon, Wed, Fri     Current Inpatient Medications   enoxparin  30 mg Subcutaneous Q24H (prophylaxis, 1700)    epoetin chelsea-ebpx (RETACRIT) injection  20,000 Units Subcutaneous Every Mon, Wed, Fri    insulin glargine U-100  15 Units Subcutaneous BID    levETIRAcetam (Keppra) IV (PEDS and ADULTS)  500 mg Intravenous Q12H     lorazepam  2 mg Intravenous Once    midodrine  10 mg Oral Q8H    pantoprazole  40 mg Intravenous Daily    scopolamine  1 patch Transdermal Q3 Days    valproate sodium (DEPACON) IVPB  500 mg Intravenous Q12H    zinc oxide-cod liver oil   Topical (Top) BID     Current Intravenous Infusions   dexmedeTOMIDine (Precedex) infusion (titrating)  0-1.4 mcg/kg/hr Intravenous Continuous   Stopped at 01/22/25 0022    EPINEPHrine  0-2 mcg/kg/min (Dosing Weight) Intravenous Continuous 4.4 mL/hr at 01/19/25 1838 0.02 mcg/kg/min at 01/19/25 1838    NORepinephrine bitartrate-D5W  0-3 mcg/kg/min (Dosing Weight) Intravenous Continuous 4.2 mL/hr at 01/20/25 0627 0.03 mcg/kg/min at 01/20/25 0627    propofoL  0-50 mcg/kg/min (Dosing Weight) Intravenous Continuous   Stopped at 01/08/25 0947       Objective:     Intake/Output Summary (Last 24 hours) at 1/22/2025 0804  Last data filed at 1/22/2025 0557  Gross per 24 hour   Intake 2505.85 ml   Output 500 ml   Net 2005.85 ml     Vital Signs (Most Recent):  Temp: 99 °F (37.2 °C) (01/22/25 0400)  Pulse: 80 (01/22/25 0600)  Resp: 20 (01/22/25 0600)  BP: (!) 120/58 (01/22/25 0600)  SpO2: 100 % (01/22/25 0600)  Body mass index is 31.9 kg/m².  Weight: 74.1 kg (163 lb 5.8 oz) Vital Signs (24h Range):  Temp:  [98.4 °F (36.9 °C)-99.7 °F (37.6 °C)] 99 °F (37.2 °C)  Pulse:  [] 80  Resp:  [20-33] 20  SpO2:  [100 %] 100 %  BP: ()/(37-76) 120/58         Physical Exam:  Gen- intubated, on low-dose Precedex  HENT- ATNC, MMM, ETT in place  CV- RRR   Resp- CTAB, compliant with mechanical ventilation  MSK- WWP, 2+ BUE edema and BLE edema   Neuro- resting comfortably, not following commands, lightly sedated  Abdomen-positive bowel sounds, no mass, nontender        Lines/Drains/Airways       Central Venous Catheter Line  Duration                  Hemodialysis Catheter 01/06/25 1400 left internal jugular 15 days              Drain  Duration                  NG/OG Tube 01/06/25 1109 Center mouth 15 days               Airway  Duration                  Airway - Non-Surgical 01/06/25 1104 Endotracheal Tube 15 days              Peripheral Intravenous Line  Duration                  Peripheral IV - Single Lumen 01/06/25 1055 18 G Anterior;Right Upper Arm 15 days                  Significant Labs:  Lab Results   Component Value Date    WBC 18.28 (H) 01/22/2025    HGB 8.1 (L) 01/22/2025    HCT 26.0 (L) 01/22/2025    MCV 95.6 (H) 01/22/2025     (H) 01/22/2025       BMP  Lab Results   Component Value Date     (L) 01/22/2025    K 4.3 01/22/2025    CO2 20 (L) 01/22/2025    BUN 61.1 (H) 01/22/2025    CREATININE 1.81 (H) 01/22/2025    CALCIUM 8.6 01/22/2025    AGAP 10.0 01/22/2025    ESTGFRAFRICA 13 05/08/2024     ABG  Recent Labs   Lab 01/21/25  1215   PH 7.510*   PO2 108.0*   PCO2 33.0*   HCO3 26.3*     Mechanical Ventilation Support:  Vent Mode: A/C (01/22/25 0410)  Ventilator Initiated: Yes (01/06/25 1104)  Set Rate: 20 BPM (01/22/25 0410)  Vt Set: 450 mL (01/22/25 0410)  PEEP/CPAP: 5 cmH20 (01/22/25 0410)  Oxygen Concentration (%): 28 (01/22/25 0410)  Peak Airway Pressure: 26 cmH20 (01/22/25 0410)  Total Ve: 9 L/m (01/22/25 0410)  F/VT Ratio<105 (RSBI): (!) 43.29 (01/21/25 1701)      Assessment/Plan:     Assessment  Persistent nonconvulsive status epilepticus based on most recent EEG from 01/09.  Acute respiratory failure-not ready for ventilator weaning based on failed SBT.  Pseudomonas UTI  Prior left MCA territory stroke   Hypotension (resolved)   ESRD   Hx of HTN, CAD, DM2       Plan  -hospital admission complicated by evidence of ongoing seizure activity on EEG refractory to antiepileptic administration; neurology previously following, palliative care and nephrology following.  - Continue Midodrine  -about to undergo repeat hemodialysis  -continue antiepileptic regimen for now  - s/p ciprofloxacin for pansensitive Pseudomonas UTI  -check ABG  -continue mechanical ventilation, wean vent if ABG allows,  inappropriate for trial of extubation given poor mental status  -palliative care following, code status now DO NOT RESUSCITATE  -extremely poor long-term neurologic outlook, particularly given baseline significant debility, with extremely low likelihood for liberation from mechanical ventilation and significant functional neurologic improvement  -family continuing to discuss with palliative care and remain undecided on next steps of care, but code status remains DO NOT RESUSCITATE    I attempted to call patient's daughter Sherri this morning.  I left a message as there was no answer.         DVT Prophylaxis: LMWH  GI Prophylaxis: None         I spent 35  minutes providing critical care services to this patient.  This does not include time spent for separately billed procedures.  Critical care diagnosis: Hemodynamic instability, respiratory failure, neurologic dysfunction  Critical care interventions: Direct hands-on care of the patient, review of lab and physiologic parameters        Apolinar Roblero MD  Pulmonary Disease and Critical Care Medicine

## 2025-01-23 LAB
ABO + RH BLD: NORMAL
ABO + RH BLD: NORMAL
ALBUMIN SERPL-MCNC: 1.8 G/DL (ref 3.4–4.8)
ALBUMIN/GLOB SERPL: 0.4 RATIO (ref 1.1–2)
ALLENS TEST BLOOD GAS (OHS): YES
ALP SERPL-CCNC: 120 UNIT/L (ref 40–150)
ALT SERPL-CCNC: 12 UNIT/L (ref 0–55)
ANION GAP SERPL CALC-SCNC: 10 MEQ/L
AST SERPL-CCNC: 18 UNIT/L (ref 5–34)
BASE EXCESS BLD CALC-SCNC: 0.8 MMOL/L
BASOPHILS # BLD AUTO: 0.1 X10(3)/MCL
BASOPHILS NFR BLD AUTO: 0.6 %
BILIRUB SERPL-MCNC: 0.2 MG/DL
BLD PROD TYP BPU: NORMAL
BLD PROD TYP BPU: NORMAL
BLOOD GAS SAMPLE TYPE (OHS): ABNORMAL
BLOOD UNIT EXPIRATION DATE: NORMAL
BLOOD UNIT EXPIRATION DATE: NORMAL
BLOOD UNIT TYPE CODE: 9500
BLOOD UNIT TYPE CODE: 9500
BUN SERPL-MCNC: 83.5 MG/DL (ref 9.8–20.1)
CA-I BLD-SCNC: 1.22 MMOL/L (ref 1.12–1.23)
CALCIUM SERPL-MCNC: 8.3 MG/DL (ref 8.4–10.2)
CHLORIDE SERPL-SCNC: 99 MMOL/L (ref 98–107)
CO2 BLDA-SCNC: 25.2 MMOL/L
CO2 SERPL-SCNC: 20 MMOL/L (ref 23–31)
CREAT SERPL-MCNC: 2.49 MG/DL (ref 0.55–1.02)
CREAT/UREA NIT SERPL: 34
CROSSMATCH INTERPRETATION: NORMAL
CROSSMATCH INTERPRETATION: NORMAL
DISPENSE STATUS: NORMAL
DISPENSE STATUS: NORMAL
DRAWN BY BLOOD GAS (OHS): ABNORMAL
EOSINOPHIL # BLD AUTO: 0.34 X10(3)/MCL (ref 0–0.9)
EOSINOPHIL NFR BLD AUTO: 2 %
ERYTHROCYTE [DISTWIDTH] IN BLOOD BY AUTOMATED COUNT: 18.9 % (ref 11.5–17)
GFR SERPLBLD CREATININE-BSD FMLA CKD-EPI: 19 ML/MIN/1.73/M2
GLOBULIN SER-MCNC: 4.6 GM/DL (ref 2.4–3.5)
GLUCOSE SERPL-MCNC: 222 MG/DL (ref 82–115)
GROUP & RH: NORMAL
HCO3 BLDA-SCNC: 24.2 MMOL/L (ref 22–26)
HCT VFR BLD AUTO: 21.6 % (ref 37–47)
HGB BLD-MCNC: 6.8 G/DL (ref 12–16)
IMM GRANULOCYTES # BLD AUTO: 0.59 X10(3)/MCL (ref 0–0.04)
IMM GRANULOCYTES NFR BLD AUTO: 3.5 %
INDIRECT COOMBS: NORMAL
INHALED O2 CONCENTRATION: 28 %
LYMPHOCYTES # BLD AUTO: 3.27 X10(3)/MCL (ref 0.6–4.6)
LYMPHOCYTES NFR BLD AUTO: 19.2 %
MAGNESIUM SERPL-MCNC: 2.4 MG/DL (ref 1.6–2.6)
MCH RBC QN AUTO: 29.8 PG (ref 27–31)
MCHC RBC AUTO-ENTMCNC: 31.5 G/DL (ref 33–36)
MCV RBC AUTO: 94.7 FL (ref 80–94)
MODE (OHS): ABNORMAL
MONOCYTES # BLD AUTO: 1.56 X10(3)/MCL (ref 0.1–1.3)
MONOCYTES NFR BLD AUTO: 9.2 %
NEUTROPHILS # BLD AUTO: 11.14 X10(3)/MCL (ref 2.1–9.2)
NEUTROPHILS NFR BLD AUTO: 65.5 %
NRBC BLD AUTO-RTO: 0 %
PCO2 BLDA: 34 MMHG (ref 35–45)
PEEP RESPIRATORY: 5 CMH2O
PH BLDA: 7.46 [PH] (ref 7.35–7.45)
PHOSPHATE SERPL-MCNC: 1.6 MG/DL (ref 2.3–4.7)
PLATELET # BLD AUTO: 419 X10(3)/MCL (ref 130–400)
PMV BLD AUTO: 8.8 FL (ref 7.4–10.4)
PO2 BLDA: 123 MMHG (ref 80–100)
POCT GLUCOSE: 130 MG/DL (ref 70–110)
POCT GLUCOSE: 195 MG/DL (ref 70–110)
POCT GLUCOSE: 274 MG/DL (ref 70–110)
POTASSIUM BLOOD GAS (OHS): 3.8 MMOL/L (ref 3.5–5)
POTASSIUM SERPL-SCNC: 4.7 MMOL/L (ref 3.5–5.1)
PROT SERPL-MCNC: 6.4 GM/DL (ref 5.8–7.6)
PS (OHS): 10 CMH2O
RBC # BLD AUTO: 2.28 X10(6)/MCL (ref 4.2–5.4)
SAMPLE SITE BLOOD GAS (OHS): ABNORMAL
SAO2 % BLDA: 99 %
SODIUM BLOOD GAS (OHS): 129 MMOL/L (ref 137–145)
SODIUM SERPL-SCNC: 129 MMOL/L (ref 136–145)
SPECIMEN OUTDATE: NORMAL
UNIT NUMBER: NORMAL
UNIT NUMBER: NORMAL
WBC # BLD AUTO: 17 X10(3)/MCL (ref 4.5–11.5)

## 2025-01-23 PROCEDURE — 63600175 PHARM REV CODE 636 W HCPCS: Performed by: INTERNAL MEDICINE

## 2025-01-23 PROCEDURE — 94761 N-INVAS EAR/PLS OXIMETRY MLT: CPT | Mod: XB

## 2025-01-23 PROCEDURE — P9016 RBC LEUKOCYTES REDUCED: HCPCS | Performed by: INTERNAL MEDICINE

## 2025-01-23 PROCEDURE — 25000003 PHARM REV CODE 250

## 2025-01-23 PROCEDURE — 86923 COMPATIBILITY TEST ELECTRIC: CPT | Performed by: INTERNAL MEDICINE

## 2025-01-23 PROCEDURE — 36415 COLL VENOUS BLD VENIPUNCTURE: CPT

## 2025-01-23 PROCEDURE — 99900026 HC AIRWAY MAINTENANCE (STAT)

## 2025-01-23 PROCEDURE — 83735 ASSAY OF MAGNESIUM: CPT

## 2025-01-23 PROCEDURE — 99900031 HC PATIENT EDUCATION (STAT)

## 2025-01-23 PROCEDURE — 36600 WITHDRAWAL OF ARTERIAL BLOOD: CPT

## 2025-01-23 PROCEDURE — 63600175 PHARM REV CODE 636 W HCPCS

## 2025-01-23 PROCEDURE — 27100171 HC OXYGEN HIGH FLOW UP TO 24 HOURS

## 2025-01-23 PROCEDURE — 99900035 HC TECH TIME PER 15 MIN (STAT)

## 2025-01-23 PROCEDURE — 99497 ADVNCD CARE PLAN 30 MIN: CPT | Mod: ,,,

## 2025-01-23 PROCEDURE — 25000003 PHARM REV CODE 250: Performed by: INTERNAL MEDICINE

## 2025-01-23 PROCEDURE — 86900 BLOOD TYPING SEROLOGIC ABO: CPT | Performed by: INTERNAL MEDICINE

## 2025-01-23 PROCEDURE — 84100 ASSAY OF PHOSPHORUS: CPT

## 2025-01-23 PROCEDURE — 82803 BLOOD GASES ANY COMBINATION: CPT

## 2025-01-23 PROCEDURE — 94760 N-INVAS EAR/PLS OXIMETRY 1: CPT | Mod: XB

## 2025-01-23 PROCEDURE — 11000001 HC ACUTE MED/SURG PRIVATE ROOM

## 2025-01-23 PROCEDURE — 85025 COMPLETE CBC W/AUTO DIFF WBC: CPT

## 2025-01-23 PROCEDURE — 36415 COLL VENOUS BLD VENIPUNCTURE: CPT | Performed by: INTERNAL MEDICINE

## 2025-01-23 PROCEDURE — 80053 COMPREHEN METABOLIC PANEL: CPT

## 2025-01-23 PROCEDURE — 80100014 HC HEMODIALYSIS 1:1

## 2025-01-23 PROCEDURE — 94003 VENT MGMT INPAT SUBQ DAY: CPT

## 2025-01-23 RX ORDER — HYDROCODONE BITARTRATE AND ACETAMINOPHEN 500; 5 MG/1; MG/1
TABLET ORAL
Status: DISCONTINUED | OUTPATIENT
Start: 2025-01-23 | End: 2025-02-03 | Stop reason: HOSPADM

## 2025-01-23 RX ADMIN — INSULIN ASPART 6 UNITS: 100 INJECTION, SOLUTION INTRAVENOUS; SUBCUTANEOUS at 09:01

## 2025-01-23 RX ADMIN — PANTOPRAZOLE SODIUM 40 MG: 40 INJECTION, POWDER, FOR SOLUTION INTRAVENOUS at 09:01

## 2025-01-23 RX ADMIN — ENOXAPARIN SODIUM 30 MG: 30 INJECTION SUBCUTANEOUS at 04:01

## 2025-01-23 RX ADMIN — LEVETIRACETAM 500 MG: 100 INJECTION, SOLUTION INTRAVENOUS at 09:01

## 2025-01-23 RX ADMIN — INSULIN GLARGINE 15 UNITS: 100 INJECTION, SOLUTION SUBCUTANEOUS at 09:01

## 2025-01-23 RX ADMIN — Medication: at 09:01

## 2025-01-23 RX ADMIN — INSULIN ASPART 9 UNITS: 100 INJECTION, SOLUTION INTRAVENOUS; SUBCUTANEOUS at 05:01

## 2025-01-23 RX ADMIN — VALPROATE SODIUM 500 MG: 100 INJECTION, SOLUTION INTRAVENOUS at 12:01

## 2025-01-23 RX ADMIN — FENTANYL CITRATE 25 MCG: 50 INJECTION, SOLUTION INTRAMUSCULAR; INTRAVENOUS at 04:01

## 2025-01-23 RX ADMIN — INSULIN ASPART 3 UNITS: 100 INJECTION, SOLUTION INTRAVENOUS; SUBCUTANEOUS at 12:01

## 2025-01-23 RX ADMIN — MIDODRINE HYDROCHLORIDE 10 MG: 5 TABLET ORAL at 05:01

## 2025-01-23 RX ADMIN — MIDODRINE HYDROCHLORIDE 10 MG: 5 TABLET ORAL at 02:01

## 2025-01-23 RX ADMIN — MIDODRINE HYDROCHLORIDE 10 MG: 5 TABLET ORAL at 09:01

## 2025-01-23 NOTE — PROGRESS NOTES
"Palliative Medicine Progress Note    Interval History: Patient remains intubated on mechanical ventilation. Per chart review and nursing report there is no significant improvement in neurologic status. Continues to attempt to open eyes to stimulation and squeezes with right hand.    ROS/Physical Exam: Unable to perform ROS d/t patient being intubated and poor neurologic status. Unable to perform physical exam d/t working remotely with inclement weather conditions.    Plan:  Spoke with patient's daughter, Sherri, via telephone. Provided update as I received from primary nurse via telephone regarding weaning from levophed overnight, dialysis, and utilization of PRN medication for ventilator tolerance/comfort. She relates that she spoke with ICU MD yesterday, who explained to her that there will need to be some decisions made in the near future regarding her mother's care, with tracheostomy versus withdrawal of life sustaining treatments. She asks if tracheostomy is permanent. Explained that there are some circumstances in which tracheostomies are reversible. However, in this case tracheostomy would likely be permanent given neurologic status and generalized deconditioning. Explained potential risks of prolonged intubation, including tracheo-esophageal fistula and infection. She explains that she is currently unable to get to the hospital due to inclement weather and unsafe driving conditions. She hopes to be able to visit her mother today or tomorrow. Her brother, Migue, is in [place] and unable to return currently given inclement weather conditions. She explains they are "leaning towards making her comfortable," but that this is a very difficult decision to make. Explained that no decision is expected today or while family is unable to be present at the hospital. Offered to call Migue for update, which Sherri feels may be beneficial. Attempted to call him twice with no answer. Left voicemail. Offered support. Encouraged " to call with questions or concerns. Palliative Medicine will continue to follow.    Advance Care Planning     Date: 01/23/2025    [facility]  I engaged the family in a voluntary conversation about advance care planning and we specifically addressed what the goals of care would be moving forward, in light of the patient's change in clinical status, specifically current condition.  We did specifically address the patient's likely prognosis, which is poor.  We explored the patient's values and preferences for future care.  The family endorses that what is most important right now is to focus on improvement in condition but with limits to invasive therapies    Accordingly, we have decided that the best plan to meet the patient's goals includes continuing with treatment       This includes 20 minutes of times spent in Advanced Care Planning discussions.  This visit was performed via telephone with patient's children and nursing staff d/t inclement weather conditions.     EZEQUIEL Stark-BC  Palliative Medicine  [facility]

## 2025-01-23 NOTE — PROGRESS NOTES
[facility] - Emergency Dept  Pulmonary Critical Care Note    Patient Name: Krysta Jansen  MRN: [id number]  Admission Date: 1/5/2025  Hospital Length of Stay: 18 days  Code Status: DNR  Attending Provider: DAMARIS Jaime MD  Primary Care Provider: No primary care provider on file.     Subjective:     HPI:   Patient is a 79-year-old female with a past medical history of ESRD on HD, HTN, CAD, CHF unknown EF, DM2, previous left MCA territory stroke who was brought into the ER after her son reported seeing her normal an hour prior, briefly left in when came back patient was nonverbal and lethargic.      She was emergently brought to the ER and a code fast was called, she was assessed by Neurology in the ER where she was found to have an NIH of 22 based on dysarthria and level of consciousness, but was deemed not a candidate for TNK secondary to overall nonfocal exam and recent left arm HD access surgery reported.  Vitals and laboratory work were overall unremarkable except for changes as expected in any age renal disease, and a potential urinary tract infection.  She is admitted under typical stroke protocol.     1/6/2025, EEG at 0800 revealed seizure-like activity. She was given Lorazepam 2 mg IV. Patient later became unconscious, unresponsive to pain stimuli, and hypotensive. She was intubated for airway protection (see procedure note).       Hospital Course/Significant events:  1/6/2025 - Admitted to ICU   1/9/2025 - EEG shows evidence of epileptiform discharges but no clinical convulsing noted.    24 Hour Interval History:  Patient remains on mechanical ventilation.  Patient attempts to open her eyes with stimulation.  She does squeeze my hand with her right hand.  No movement on the left arm.  Tube feedings at 60 cc/hour.  Levophed weaned off overnight.        Review of systems unobtainable due to intubation, altered mental status.        Social History     Socioeconomic History    Marital status:  Single   Tobacco Use    Smoking status: Unknown   Substance and Sexual Activity    Alcohol use: Not Currently     Social Drivers of Health     Financial Resource Strain: Patient Unable To Answer (1/11/2025)    Overall Financial Resource Strain (CARDIA)     Difficulty of Paying Living Expenses: Patient unable to answer   Food Insecurity: Patient Unable To Answer (1/11/2025)    Hunger Vital Sign     Worried About Running Out of Food in the Last Year: Patient unable to answer     Ran Out of Food in the Last Year: Patient unable to answer   Transportation Needs: Patient Unable To Answer (1/11/2025)    TRANSPORTATION NEEDS     Transportation : Patient unable to answer   Stress: Patient Unable To Answer (1/11/2025)    [employer] - Occupational Stress Questionnaire     Feeling of Stress : Patient unable to answer   Housing Stability: Patient Unable To Answer (1/11/2025)    Housing Stability Vital Sign     Unable to Pay for Housing in the Last Year: Patient unable to answer     Homeless in the Last Year: Patient unable to answer       Current Outpatient Medications   Medication Instructions    aspirin 81 MG Chew 1 tablet, Daily    BASAGLAR KWIKPEN U-100 INSULIN 30 Units, Nightly    CeleXA 10 mg, Daily    ENTRESTO 24-26 mg per tablet 1 tablet, 2 times daily    metoprolol succinate 25 mg CSpX 1 capsule, Daily    PLAVIX 75 mg tablet 1 tablet, Daily    rosuvastatin (CRESTOR) 40 MG Tab 1 tablet, Nightly    traMADoL (ULTRAM) 50 mg tablet 1 tablet, Every 6 hours PRN    vancomycin (VANCOCIN) 500 mg, Every Mon, Wed, Fri     Current Inpatient Medications   enoxparin  30 mg Subcutaneous Q24H (prophylaxis, 1700)    epoetin chelsea-ebpx (RETACRIT) injection  20,000 Units Subcutaneous Every Mon, Wed, Fri    insulin glargine U-100  15 Units Subcutaneous BID    levETIRAcetam (Keppra) IV (PEDS and ADULTS)  500 mg Intravenous Q12H    lorazepam  2 mg Intravenous Once    midodrine  10 mg Oral Q8H    pantoprazole  40  mg Intravenous Daily    scopolamine  1 patch Transdermal Q3 Days    valproate sodium (DEPACON) IVPB  500 mg Intravenous Q12H    zinc oxide-cod liver oil   Topical (Top) BID     Current Intravenous Infusions   dexmedeTOMIDine (Precedex) infusion (titrating)  0-1.4 mcg/kg/hr Intravenous Continuous   Stopped at 01/22/25 0022    EPINEPHrine  0-2 mcg/kg/min (Dosing Weight) Intravenous Continuous 4.4 mL/hr at 01/19/25 1838 0.02 mcg/kg/min at 01/19/25 1838    NORepinephrine bitartrate-D5W  0-3 mcg/kg/min (Dosing Weight) Intravenous Continuous   Stopped at 01/23/25 0016    propofoL  0-50 mcg/kg/min (Dosing Weight) Intravenous Continuous   Stopped at 01/08/25 0947       Objective:     Intake/Output Summary (Last 24 hours) at 1/23/2025 0621  Last data filed at 1/23/2025 0109  Gross per 24 hour   Intake 1212.64 ml   Output --   Net 1212.64 ml     Vital Signs (Most Recent):  Temp: 99 °F (37.2 °C) (01/23/25 0400)  Pulse: 71 (01/23/25 0500)  Resp: 20 (01/23/25 0500)  BP: (!) 115/51 (01/23/25 0500)  SpO2: 100 % (01/23/25 0500)  Body mass index is 31.9 kg/m².  Weight: 74.1 kg (163 lb 5.8 oz) Vital Signs (24h Range):  Temp:  [98.9 °F (37.2 °C)-99.3 °F (37.4 °C)] 99 °F (37.2 °C)  Pulse:  [] 71  Resp:  [18-30] 20  SpO2:  [100 %] 100 %  BP: ()/(42-80) 115/51         Physical Exam:  Gen- intubated, on low-dose Precedex  HENT- ATNC, MMM, ETT in place  CV- RRR   Resp- CTAB, compliant with mechanical ventilation  MSK- WWP, 2+ BUE edema and BLE edema   Neuro- resting comfortably, not following commands, lightly sedated  Abdomen-positive bowel sounds, no mass, nontender        Lines/Drains/Airways       Central Venous Catheter Line  Duration                  Hemodialysis Catheter 01/06/25 1400 left internal jugular 16 days              Drain  Duration                  NG/OG Tube 01/06/25 1109 Center mouth 16 days              Airway  Duration                  Airway - Non-Surgical 01/06/25 1104 Endotracheal Tube 16 days               Peripheral Intravenous Line  Duration                  Peripheral IV - Single Lumen 01/06/25 1055 18 G Anterior;Right Upper Arm 16 days                  Significant Labs:  Lab Results   Component Value Date    WBC 17.00 (H) 01/23/2025    HGB 6.8 (L) 01/23/2025    HCT 21.6 (L) 01/23/2025    MCV 94.7 (H) 01/23/2025     (H) 01/23/2025       BMP  Lab Results   Component Value Date     (L) 01/23/2025    K 4.7 01/23/2025    CO2 20 (L) 01/23/2025    BUN 83.5 (H) 01/23/2025    CREATININE 2.49 (H) 01/23/2025    CALCIUM 8.3 (L) 01/23/2025    AGAP 10.0 01/23/2025    ESTGFRAFRICA 13 05/08/2024     ABG  Recent Labs   Lab 01/21/25  1215   PH 7.510*   PO2 108.0*   PCO2 33.0*   HCO3 26.3*     Mechanical Ventilation Support:  Vent Mode: A/C (01/23/25 0450)  Ventilator Initiated: Yes (01/06/25 1104)  Set Rate: 20 BPM (01/23/25 0450)  Vt Set: 450 mL (01/23/25 0450)  PEEP/CPAP: 5 cmH20 (01/23/25 0450)  Oxygen Concentration (%): 28 (01/23/25 0400)  Peak Airway Pressure: 26 cmH20 (01/23/25 0450)  Total Ve: 9.1 L/m (01/23/25 0450)  F/VT Ratio<105 (RSBI): (!) 44.25 (01/22/25 2245)      Assessment/Plan:     Assessment  Persistent nonconvulsive status epilepticus based on most recent EEG from 01/09.  Acute respiratory failure-not ready for ventilator weaning based on failed SBT.  Pseudomonas UTI  Prior left MCA territory stroke   Hypotension (resolved)  Anemia with no obvious source of bleeding.    ESRD   Hx of HTN, CAD, DM2       Plan  -hospital admission complicated by evidence of ongoing seizure activity on EEG refractory to antiepileptic administration; neurology previously following, palliative care and nephrology following.  - Continue Midodrine  -about to undergo repeat hemodialysis  -continue antiepileptic regimen for now  - s/p ciprofloxacin for pansensitive Pseudomonas UTI  -check ABG  -continue mechanical ventilation, wean vent if ABG allows, inappropriate for trial of extubation given poor mental status although  her neuro status has improved over past couple of days but does not appear that she would tolerate extubation at this point.  -palliative care following, code status now DO NOT RESUSCITATE  -extremely poor long-term neurologic outlook, particularly given baseline significant debility, with extremely low likelihood for liberation from mechanical ventilation and significant functional neurologic improvement  -anemia.  No obvious bleeding source at this time.  BMs are not melanotic.  We will transfuse 2 units of packed red cells with dialysis.  -family continuing to discuss with palliative care and remain undecided on next steps of care, but code status remains DO NOT RESUSCITATE.  I discussed with patient's daughter Sherri yesterday.  Patient's son is in [place] for the next couple of days but will return and she will discuss further plans with him.                   DVT Prophylaxis: LMWH  GI Prophylaxis: None         I spent 36 minutes providing critical care services to this patient.  This does not include time spent for separately billed procedures.  Critical care diagnosis: Hemodynamic instability, respiratory failure, neurologic dysfunction  Critical care interventions: Direct hands-on care of the patient, review of lab and physiologic parameters

## 2025-01-23 NOTE — PROGRESS NOTES
Nephrology consult follow up note    HPI:      Krysta Jansen is a 79 y.o. female is a nursing home resident and has ESRD and is on dialysis on Monday Wednesday Friday.  Brought to this hospital for altered mental status.  She was found to have some grand mal seizure.  She received some Ativan.  She had EEG done.  Her respiratory status and mental status deteriorated and required intubation to protect her airways.  Could not get any history from the patient.  Records revealed she was found to have some dysarthria and decreased level of consciousness Friday through being brought to this hospital.  Significant past medical history is positive for diabetes mellitus type 2 previous stroke hypertension coronary artery disease and congestive heart failure.    Interval history:     01/07/2024  Patient remains intubated and sedated.  On epinephrine for hypotension this morning.     01/08/25  Remains intubated and sedated on vent. Off pressors with BP stable at 130/60 range. Noted leucocytosis on am lab. Blood cultures pending.  Hgb stable at 7.6. Dialyzed yesterday and tolerated 500mL UF.      01/09/2025  No acute events overnight.  Patient remains intubated.  Not currently requiring vasopressors, but blood pressure does remain borderline low.  ICU team is requesting to hold hemodialysis today so they can have goals of care conversation with family.     01/10/2025   Currently tolerating dialysis.  Will remove only 500 cc of fluid.  Remains intubated.  Tolerating tube feedings.  Nurses reported that she is not on any sedation for about 48 hours.     01/13/2025   Weekend events noted.  Patient's condition is unchanged.  Remains on the ventilator and off sedation or pressors.  Unresponsive.     01/14/2025   Last 24 hours events noted.  Patient's condition remains unchanged and she is off pressors and off sedation and still unresponsive although she grimaces to painful stimuli.     01/15/2025   Currently tolerating  dialysis.  Neuro status and respiratory status remains unchanged.    01/16/2025  Dialyze yesterday.  No clinical change overnight.    01/17/2025  No clinical change overnight.  Hemoglobin dropped today.  Remains intubated    01/18/2025  No acute events overnight.  On Levophed for hypotension.  Dialyzed yesterday.    01/19/2025  No acute events overnight.  She does appear more edematous.  Remains on Levophed.  Getting tube feedings.     01/20/2025   Currently tolerating dialysis.  Will try to remove some fluid.  Will increase dialysis time to 4 hours.  Comfortable on the ventilator.    01/21/2025   Currently tolerating dialysis.  The plan is to remove some fluid and to help correct electrolytes and uremic toxins.    01/22/2025   Patient has had dialysis for 2 days in a row for 4 hours each time and now on low-dose Levophed.  No change neurologically.  Still tolerating tube feedings and patient remains on the ventilator.    01/23/2025   Just started on dialysis.  Will decrease fluid removal to 1 L only.  Overall condition is unchanged.  She is off Levophed now.      Review of Systems:     Unable to obtain ROS due to mental status/intubation.     Past medical, family, surgical, and social history reviewed and unchanged from initial consult note.     Objective:       VITAL SIGNS: 24 HR MIN & MAX LAST    Temp  Min: 98.9 °F (37.2 °C)  Max: 99.2 °F (37.3 °C)  99 °F (37.2 °C)        BP  Min: 98/46  Max: 170/72  (!) 115/51     Pulse  Min: 52  Max: 109  71     Resp  Min: 18  Max: 30  20    SpO2  Min: 100 %  Max: 100 %  100 %      GEN: Chronically ill appearing AA female, comfortable on the ventilator.  Off Precedex.    HEENT:  ET tube in place  CV: RRR without rub.  PULM: CTAB, unlabored  ABD: Soft, NT/ND abdomen with NABS  EXT:  2+ dependent edema  SKIN: Warm and dry  PSYCH:  Grimaces to painful stimuli and even opens eyes to verbal command.  Dialysis access:  Left IJ PermCath            Component Value Date/Time      (L) 01/23/2025 0409     (L) 01/22/2025 0657     05/08/2024 0739     03/15/2024 1101    K 4.7 01/23/2025 0409    K 4.3 01/22/2025 0657    K 3.8 05/08/2024 0739    K 3.9 03/15/2024 1101    CO2 20 (L) 01/23/2025 0409    CO2 20 (L) 01/22/2025 0657    CO2 22 05/08/2024 0739    CO2 21 (L) 03/15/2024 1101    BUN 83.5 (H) 01/23/2025 0409    BUN 61.1 (H) 01/22/2025 0657    BUN 55 (H) 10/02/2024 0000    BUN 23 (H) 07/12/2024 0000    CREATININE 2.49 (H) 01/23/2025 0409    CREATININE 1.81 (H) 01/22/2025 0657    CREATININE 3.37 (H) 05/08/2024 0739    CREATININE 3.2 (H) 03/15/2024 1101    CALCIUM 8.3 (L) 01/23/2025 0409    CALCIUM 8.6 01/22/2025 0657    CALCIUM 9 05/08/2024 0739    CALCIUM 9.9 03/15/2024 1101    PHOS 1.6 (L) 01/23/2025 0409            Component Value Date/Time    WBC 17.00 (H) 01/23/2025 0409    WBC 18.28 (H) 01/22/2025 0657    WBC 16.5 01/19/2025 0429    WBC 13.59 01/18/2025 0133    HGB 6.8 (L) 01/23/2025 0409    HGB 8.1 (L) 01/22/2025 0657    HGB 10.3 (L) 11/24/2024 0000    HGB 10 (L) 11/18/2024 0000    HCT 21.6 (L) 01/23/2025 0409    HCT 26.0 (L) 01/22/2025 0657     (H) 01/23/2025 0409     (H) 01/22/2025 0657         Imaging reviewed      Assessment / Plan:       ESRD.  Normally MWF hemodialysis.  Now I will change it to TTS schedule.  New onset seizure  Acute hypoxic respiratory failure  Hypertension, anemia  Diabetes mellitus type 2   History of coronary artery disease   History of previous stroke  Urinary tract infection  Anemia of chronic disease    Plan:  Continue dialysis on TTS schedule  Continue other medical management

## 2025-01-23 NOTE — PROGRESS NOTES
01/23/25 1100   Vital Signs   Pulse 88   Resp (!) 21   SpO2 100 %   BP (!) 142/74   MAP (mmHg) 79        Hemodialysis Catheter 01/06/25 1400 left internal jugular   Placement Date/Time: (c) 01/06/25 1400   Present Prior to Hospital Arrival?: Yes  Location: left internal jugular   Dressing Type CHG impregnated dressing/sponge   Dressing Status Clean;Dry;Intact   Dressing Intervention Integrity maintained   Date on Dressing 01/22/25   Dressing Due to be Changed 01/29/25   Venous Patency/Care flushed w/o difficulty;normal saline locked   Arterial Patency/Care flushed w/o difficulty;normal saline locked   Post-Hemodialysis Assessment   Blood Volume Processed (Liters) 43 L   Dialyzer Clearance Clotted   Duration of Treatment 180 minutes   Total UF (mL) 1400 mL   Net Fluid Removal 770   Patient Response to Treatment pt tolerated well   Post-Hemodialysis Comments pt completed 3 of 3.5hrs ordered due to patient clotting Dialysis Delivery Syster twice. Removed 770ml Net Fluid removal. Pt received 2 units PRBC with HD.

## 2025-01-24 LAB
ALBUMIN SERPL-MCNC: 1.8 G/DL (ref 3.4–4.8)
ALBUMIN/GLOB SERPL: 0.4 RATIO (ref 1.1–2)
ALP SERPL-CCNC: 116 UNIT/L (ref 40–150)
ALT SERPL-CCNC: 13 UNIT/L (ref 0–55)
ANION GAP SERPL CALC-SCNC: 9 MEQ/L
AST SERPL-CCNC: 17 UNIT/L (ref 5–34)
BASOPHILS # BLD AUTO: 0.16 X10(3)/MCL
BASOPHILS NFR BLD AUTO: 1 %
BILIRUB SERPL-MCNC: 0.3 MG/DL
BUN SERPL-MCNC: 60.3 MG/DL (ref 9.8–20.1)
CALCIUM SERPL-MCNC: 8.2 MG/DL (ref 8.4–10.2)
CHLORIDE SERPL-SCNC: 101 MMOL/L (ref 98–107)
CO2 SERPL-SCNC: 19 MMOL/L (ref 23–31)
CREAT SERPL-MCNC: 2.15 MG/DL (ref 0.55–1.02)
CREAT/UREA NIT SERPL: 28
EOSINOPHIL # BLD AUTO: 0.34 X10(3)/MCL (ref 0–0.9)
EOSINOPHIL NFR BLD AUTO: 2.2 %
ERYTHROCYTE [DISTWIDTH] IN BLOOD BY AUTOMATED COUNT: 19.9 % (ref 11.5–17)
GFR SERPLBLD CREATININE-BSD FMLA CKD-EPI: 23 ML/MIN/1.73/M2
GLOBULIN SER-MCNC: 4.8 GM/DL (ref 2.4–3.5)
GLUCOSE SERPL-MCNC: 167 MG/DL (ref 82–115)
HCT VFR BLD AUTO: 28.5 % (ref 37–47)
HGB BLD-MCNC: 9.5 G/DL (ref 12–16)
IMM GRANULOCYTES # BLD AUTO: 0.71 X10(3)/MCL (ref 0–0.04)
IMM GRANULOCYTES NFR BLD AUTO: 4.5 %
LYMPHOCYTES # BLD AUTO: 2.77 X10(3)/MCL (ref 0.6–4.6)
LYMPHOCYTES NFR BLD AUTO: 17.6 %
MAGNESIUM SERPL-MCNC: 2.3 MG/DL (ref 1.6–2.6)
MCH RBC QN AUTO: 29.5 PG (ref 27–31)
MCHC RBC AUTO-ENTMCNC: 33.3 G/DL (ref 33–36)
MCV RBC AUTO: 88.5 FL (ref 80–94)
MONOCYTES # BLD AUTO: 1.5 X10(3)/MCL (ref 0.1–1.3)
MONOCYTES NFR BLD AUTO: 9.5 %
NEUTROPHILS # BLD AUTO: 10.27 X10(3)/MCL (ref 2.1–9.2)
NEUTROPHILS NFR BLD AUTO: 65.2 %
NRBC BLD AUTO-RTO: 0.1 %
PHOSPHATE SERPL-MCNC: 1.4 MG/DL (ref 2.3–4.7)
PLATELET # BLD AUTO: 342 X10(3)/MCL (ref 130–400)
PMV BLD AUTO: 8.3 FL (ref 7.4–10.4)
POCT GLUCOSE: 174 MG/DL (ref 70–110)
POCT GLUCOSE: 176 MG/DL (ref 70–110)
POCT GLUCOSE: 192 MG/DL (ref 70–110)
POCT GLUCOSE: 212 MG/DL (ref 70–110)
POCT GLUCOSE: 216 MG/DL (ref 70–110)
POCT GLUCOSE: 224 MG/DL (ref 70–110)
POCT GLUCOSE: 232 MG/DL (ref 70–110)
POTASSIUM SERPL-SCNC: 4 MMOL/L (ref 3.5–5.1)
PROT SERPL-MCNC: 6.6 GM/DL (ref 5.8–7.6)
RBC # BLD AUTO: 3.22 X10(6)/MCL (ref 4.2–5.4)
SODIUM SERPL-SCNC: 129 MMOL/L (ref 136–145)
WBC # BLD AUTO: 15.75 X10(3)/MCL (ref 4.5–11.5)

## 2025-01-24 PROCEDURE — 25000003 PHARM REV CODE 250: Performed by: INTERNAL MEDICINE

## 2025-01-24 PROCEDURE — 36600 WITHDRAWAL OF ARTERIAL BLOOD: CPT

## 2025-01-24 PROCEDURE — 63600175 PHARM REV CODE 636 W HCPCS

## 2025-01-24 PROCEDURE — 94003 VENT MGMT INPAT SUBQ DAY: CPT

## 2025-01-24 PROCEDURE — 25000003 PHARM REV CODE 250: Performed by: STUDENT IN AN ORGANIZED HEALTH CARE EDUCATION/TRAINING PROGRAM

## 2025-01-24 PROCEDURE — 63600175 PHARM REV CODE 636 W HCPCS: Performed by: STUDENT IN AN ORGANIZED HEALTH CARE EDUCATION/TRAINING PROGRAM

## 2025-01-24 PROCEDURE — 99900035 HC TECH TIME PER 15 MIN (STAT)

## 2025-01-24 PROCEDURE — 63600175 PHARM REV CODE 636 W HCPCS: Performed by: INTERNAL MEDICINE

## 2025-01-24 PROCEDURE — 27100171 HC OXYGEN HIGH FLOW UP TO 24 HOURS

## 2025-01-24 PROCEDURE — 80053 COMPREHEN METABOLIC PANEL: CPT

## 2025-01-24 PROCEDURE — 25000003 PHARM REV CODE 250

## 2025-01-24 PROCEDURE — 94760 N-INVAS EAR/PLS OXIMETRY 1: CPT

## 2025-01-24 PROCEDURE — 85025 COMPLETE CBC W/AUTO DIFF WBC: CPT

## 2025-01-24 PROCEDURE — 99900031 HC PATIENT EDUCATION (STAT)

## 2025-01-24 PROCEDURE — 84100 ASSAY OF PHOSPHORUS: CPT

## 2025-01-24 PROCEDURE — 36415 COLL VENOUS BLD VENIPUNCTURE: CPT

## 2025-01-24 PROCEDURE — 99900026 HC AIRWAY MAINTENANCE (STAT)

## 2025-01-24 PROCEDURE — 11000001 HC ACUTE MED/SURG PRIVATE ROOM

## 2025-01-24 PROCEDURE — 83735 ASSAY OF MAGNESIUM: CPT

## 2025-01-24 PROCEDURE — 27200966 HC CLOSED SUCTION SYSTEM

## 2025-01-24 PROCEDURE — 63600175 PHARM REV CODE 636 W HCPCS: Mod: JZ,TB | Performed by: STUDENT IN AN ORGANIZED HEALTH CARE EDUCATION/TRAINING PROGRAM

## 2025-01-24 PROCEDURE — 94761 N-INVAS EAR/PLS OXIMETRY MLT: CPT

## 2025-01-24 RX ORDER — INSULIN GLARGINE 100 [IU]/ML
18 INJECTION, SOLUTION SUBCUTANEOUS 2 TIMES DAILY
Status: DISCONTINUED | OUTPATIENT
Start: 2025-01-24 | End: 2025-01-28

## 2025-01-24 RX ORDER — FENTANYL CITRATE-0.9 % NACL/PF 10 MCG/ML
0-250 PLASTIC BAG, INJECTION (ML) INTRAVENOUS CONTINUOUS
Status: DISCONTINUED | OUTPATIENT
Start: 2025-01-24 | End: 2025-01-28

## 2025-01-24 RX ORDER — FENTANYL CITRATE 50 UG/ML
50 INJECTION, SOLUTION INTRAMUSCULAR; INTRAVENOUS
Status: DISCONTINUED | OUTPATIENT
Start: 2025-01-24 | End: 2025-01-28

## 2025-01-24 RX ADMIN — PANTOPRAZOLE SODIUM 40 MG: 40 INJECTION, POWDER, FOR SOLUTION INTRAVENOUS at 09:01

## 2025-01-24 RX ADMIN — INSULIN ASPART 6 UNITS: 100 INJECTION, SOLUTION INTRAVENOUS; SUBCUTANEOUS at 05:01

## 2025-01-24 RX ADMIN — LEVETIRACETAM 500 MG: 100 INJECTION, SOLUTION INTRAVENOUS at 09:01

## 2025-01-24 RX ADMIN — FENTANYL CITRATE 25 MCG: 50 INJECTION, SOLUTION INTRAMUSCULAR; INTRAVENOUS at 01:01

## 2025-01-24 RX ADMIN — INSULIN ASPART 3 UNITS: 100 INJECTION, SOLUTION INTRAVENOUS; SUBCUTANEOUS at 12:01

## 2025-01-24 RX ADMIN — VALPROATE SODIUM 500 MG: 100 INJECTION, SOLUTION INTRAVENOUS at 12:01

## 2025-01-24 RX ADMIN — INSULIN GLARGINE 18 UNITS: 100 INJECTION, SOLUTION SUBCUTANEOUS at 09:01

## 2025-01-24 RX ADMIN — Medication: at 09:01

## 2025-01-24 RX ADMIN — ENOXAPARIN SODIUM 30 MG: 30 INJECTION SUBCUTANEOUS at 05:01

## 2025-01-24 RX ADMIN — EPOETIN ALFA-EPBX 20000 UNITS: 20000 INJECTION, SOLUTION INTRAVENOUS; SUBCUTANEOUS at 09:01

## 2025-01-24 RX ADMIN — INSULIN ASPART 3 UNITS: 100 INJECTION, SOLUTION INTRAVENOUS; SUBCUTANEOUS at 06:01

## 2025-01-24 RX ADMIN — MIDODRINE HYDROCHLORIDE 10 MG: 5 TABLET ORAL at 09:01

## 2025-01-24 RX ADMIN — MIDODRINE HYDROCHLORIDE 10 MG: 5 TABLET ORAL at 02:01

## 2025-01-24 RX ADMIN — SODIUM PHOSPHATE, MONOBASIC, MONOHYDRATE AND SODIUM PHOSPHATE, DIBASIC, ANHYDROUS 15 MMOL: 142; 276 INJECTION, SOLUTION INTRAVENOUS at 08:01

## 2025-01-24 RX ADMIN — INSULIN ASPART 3 UNITS: 100 INJECTION, SOLUTION INTRAVENOUS; SUBCUTANEOUS at 09:01

## 2025-01-24 RX ADMIN — Medication 25 MCG/HR: at 07:01

## 2025-01-24 RX ADMIN — MIDODRINE HYDROCHLORIDE 10 MG: 5 TABLET ORAL at 06:01

## 2025-01-24 RX ADMIN — INSULIN ASPART 6 UNITS: 100 INJECTION, SOLUTION INTRAVENOUS; SUBCUTANEOUS at 12:01

## 2025-01-24 RX ADMIN — SCOPOLAMINE 1 PATCH: 1 PATCH TRANSDERMAL at 09:01

## 2025-01-24 NOTE — PROGRESS NOTES
[facility] - Emergency Dept  Pulmonary Critical Care Note    Patient Name: Krysta Jansen  MRN: [id number]  Admission Date: 1/5/2025  Hospital Length of Stay: 19 days  Code Status: DNR  Attending Provider: DAMARIS Jaime MD  Primary Care Provider: No primary care provider on file.     Subjective:     HPI:   Patient is a 79-year-old female with a past medical history of ESRD on HD, HTN, CAD, CHF unknown EF, DM2, previous left MCA territory stroke who was brought into the ER after her son reported seeing her normal an hour prior, briefly left in when came back patient was nonverbal and lethargic.      She was emergently brought to the ER and a code fast was called, she was assessed by Neurology in the ER where she was found to have an NIH of 22 based on dysarthria and level of consciousness, but was deemed not a candidate for TNK secondary to overall nonfocal exam and recent left arm HD access surgery reported.  Vitals and laboratory work were overall unremarkable except for changes as expected in any age renal disease, and a potential urinary tract infection.  She was admitted under typical stroke protocol.     On 1/6/2025, EEG at 0800 revealed seizure-like activity. She was given Lorazepam 2 mg IV. Patient later became unconscious, unresponsive to pain stimuli, and hypotensive. She was intubated for airway protection.       Hospital Course/Significant events:  1/6/2025 - Admitted to ICU   1/9/2025 - EEG shows evidence of epileptiform discharges but no clinical convulsing noted.    24 Hour Interval History:  No events overnight.  Palliative care discussed goals of care with family yesterday.  Underwent dialysis with 770 mL of fluid removed.  Remains intubated and off all sedation.  Hyperglycemic. Phosphorus 1.4.       Review of systems unobtainable due to intubation, altered mental status.        Social History     Socioeconomic History    Marital status: Single   Tobacco Use    Smoking status:  Unknown   Substance and Sexual Activity    Alcohol use: Not Currently     Social Drivers of Health     Financial Resource Strain: Patient Unable To Answer (1/11/2025)    Overall Financial Resource Strain (CARDIA)     Difficulty of Paying Living Expenses: Patient unable to answer   Food Insecurity: Patient Unable To Answer (1/11/2025)    Hunger Vital Sign     Worried About Running Out of Food in the Last Year: Patient unable to answer     Ran Out of Food in the Last Year: Patient unable to answer   Transportation Needs: Patient Unable To Answer (1/11/2025)    TRANSPORTATION NEEDS     Transportation : Patient unable to answer   Stress: Patient Unable To Answer (1/11/2025)    [employer] - Occupational Stress Questionnaire     Feeling of Stress : Patient unable to answer   Housing Stability: Patient Unable To Answer (1/11/2025)    Housing Stability Vital Sign     Unable to Pay for Housing in the Last Year: Patient unable to answer     Homeless in the Last Year: Patient unable to answer       Current Outpatient Medications   Medication Instructions    aspirin 81 MG Chew 1 tablet, Daily    BASAGLAR KWIKPEN U-100 INSULIN 30 Units, Nightly    CeleXA 10 mg, Daily    ENTRESTO 24-26 mg per tablet 1 tablet, 2 times daily    metoprolol succinate 25 mg CSpX 1 capsule, Daily    PLAVIX 75 mg tablet 1 tablet, Daily    rosuvastatin (CRESTOR) 40 MG Tab 1 tablet, Nightly    traMADoL (ULTRAM) 50 mg tablet 1 tablet, Every 6 hours PRN    vancomycin (VANCOCIN) 500 mg, Every Mon, Wed, Fri     Current Inpatient Medications   enoxparin  30 mg Subcutaneous Q24H (prophylaxis, 1700)    epoetin chelsea-ebpx (RETACRIT) injection  20,000 Units Subcutaneous Every Mon, Wed, Fri    insulin glargine U-100  15 Units Subcutaneous BID    levETIRAcetam (Keppra) IV (PEDS and ADULTS)  500 mg Intravenous Q12H    lorazepam  2 mg Intravenous Once    midodrine  10 mg Oral Q8H    pantoprazole  40 mg Intravenous Daily    scopolamine  1  patch Transdermal Q3 Days    valproate sodium (DEPACON) IVPB  500 mg Intravenous Q12H    zinc oxide-cod liver oil   Topical (Top) BID     Current Intravenous Infusions   dexmedeTOMIDine (Precedex) infusion (titrating)  0-1.4 mcg/kg/hr Intravenous Continuous   Stopped at 01/22/25 0022    EPINEPHrine  0-2 mcg/kg/min (Dosing Weight) Intravenous Continuous 4.4 mL/hr at 01/19/25 1838 0.02 mcg/kg/min at 01/19/25 1838    NORepinephrine bitartrate-D5W  0-3 mcg/kg/min (Dosing Weight) Intravenous Continuous   Stopped at 01/23/25 0016    propofoL  0-50 mcg/kg/min (Dosing Weight) Intravenous Continuous   Stopped at 01/08/25 0947       Objective:     Intake/Output Summary (Last 24 hours) at 1/24/2025 0604  Last data filed at 1/23/2025 1600  Gross per 24 hour   Intake 1885 ml   Output 1400 ml   Net 485 ml     Vital Signs (Most Recent):  Temp: 99.3 °F (37.4 °C) (01/24/25 0400)  Pulse: (!) 53 (01/24/25 0415)  Resp: 20 (01/24/25 0415)  BP: (!) 124/42 (01/24/25 0400)  SpO2: 100 % (01/24/25 0415)  Body mass index is 31.9 kg/m².  Weight: 74.1 kg (163 lb 5.8 oz) Vital Signs (24h Range):  Temp:  [98.2 °F (36.8 °C)-99.6 °F (37.6 °C)] 99.3 °F (37.4 °C)  Pulse:  [44-99] 53  Resp:  [18-33] 20  SpO2:  [99 %-100 %] 100 %  BP: (102-151)/(39-85) 124/42         Physical Exam:  Gen- intubated, on low-dose Precedex  HENT- ATNC, MMM, ETT in place  CV- RRR   Resp- CTAB, compliant with mechanical ventilation  MSK- WWP, 2+ BUE edema and BLE edema   Neuro- resting comfortably, not following commands, lightly sedated  Abdomen-positive bowel sounds, no mass, nontender        Lines/Drains/Airways       Central Venous Catheter Line  Duration                  Hemodialysis Catheter 01/06/25 1400 left internal jugular 17 days              Drain  Duration                  NG/OG Tube 01/06/25 1109 Center mouth 17 days              Airway  Duration                  Airway - Non-Surgical 01/06/25 1104 Endotracheal Tube 17 days              Peripheral Intravenous  Line  Duration                  Peripheral IV - Single Lumen 01/24/25 0200 20 G Yes Anterior;Proximal;Right Upper Arm <1 day                  Significant Labs:  Lab Results   Component Value Date    WBC 15.75 (H) 01/24/2025    HGB 9.5 (L) 01/24/2025    HCT 28.5 (L) 01/24/2025    MCV 88.5 01/24/2025     01/24/2025       BMP  Lab Results   Component Value Date     (L) 01/24/2025    K 4.0 01/24/2025    CO2 19 (L) 01/24/2025    BUN 60.3 (H) 01/24/2025    CREATININE 2.15 (H) 01/24/2025    CALCIUM 8.2 (L) 01/24/2025    AGAP 9.0 01/24/2025    ESTGFRAFRICA 13 05/08/2024     ABG  Recent Labs   Lab 01/23/25  0900   PH 7.460*   PO2 123.0*   PCO2 34.0*   HCO3 24.2     Mechanical Ventilation Support:  Vent Mode: A/C (01/24/25 0308)  Ventilator Initiated: Yes (01/06/25 1104)  Set Rate: 20 BPM (01/24/25 0308)  Vt Set: 450 mL (01/24/25 0308)  Pressure Support: 10 cmH20 (01/23/25 0830)  PEEP/CPAP: 5 cmH20 (01/24/25 0308)  Oxygen Concentration (%): 28 (01/24/25 0400)  Peak Airway Pressure: 26 cmH20 (01/24/25 0308)  Total Ve: 7.8 L/m (01/24/25 0308)  F/VT Ratio<105 (RSBI): (!) 40 (01/23/25 1200)      Assessment/Plan:     Assessment  Persistent nonconvulsive status epilepticus based on most recent EEG from 01/09.  Acute respiratory failure-not ready for ventilator weaning based on failed SBT.  Pseudomonas UTI  Prior left MCA territory stroke   Hypotension (resolved)  Anemia with no obvious source of bleeding.    ESRD   Hx of HTN, CAD, DM2       Plan  Continue mechanical ventilation. RASS goal 0 to -1.  SBT is not indicated due to mental status.  Family deciding between tracheostomy versus comfort care  Continue Keppra b.i.d. and valproate  Increase Lantus to 18 units twice a day for hyperglycemia in addition to sliding scale  Continue midodrine 10 mg q.8  Replete phosphorus  Appreciate nephrology and palliative care recommendations  Code status DNR      DVT Prophylaxis: subq lovenox  GI Prophylaxis: pantoprazole     Ulices  MD [place]  Pulmonary and Critical Care

## 2025-01-24 NOTE — PROGRESS NOTES
Inpatient Nutrition Assessment    Admit Date: 1/5/2025   Total duration of encounter: 19 days   Patient Age: 79 y.o.    Nutrition Recommendation/Prescription     Tube feeding recommendation:     Peptamen AF goal rate 60 ml/hr to provide  1440 kcal/d  (96% est needs)  90 g protein/d  (100% est needs)  972 ml free water/d   (calculations based on estimated 20 hr/d run time)     Humble (provides 90 kcal, 2.5 g protein per serving) BID.     If no IV fluids running, can give 50ml q 4hr water flushes. Total water provided: 1070 ml. Otherwise, FWF per MD.      Communication of Recommendations: reviewed with nurse    Nutrition Assessment     Malnutrition Assessment/Nutrition-Focused Physical Exam       Malnutrition Level: other (see comments) (Does not meet criteria) (01/07/25 1339)  Energy Intake (Malnutrition): other (see comments) (Unable to assess) (01/07/25 1339)  Weight Loss (Malnutrition): other (see comments) (Unable to assess) (01/07/25 1339)                                         Fluid Accumulation (Malnutrition): other (see comments) (Not present) (01/07/25 1339)        A minimum of two characteristics is recommended for diagnosis of either severe or non-severe malnutrition.    Chart Review    Reason Seen: continuous nutrition monitoring and follow-up    Malnutrition Screening Tool Results   Have you recently lost weight without trying?: No  Have you been eating poorly because of a decreased appetite?: No   MST Score: 0   Diagnosis:  Acute global aphasia, seizure/postictal state  Obtunded  UTI, present on arrival    Relevant Medical History: ESRD on HD, HTN, CAD, CHF, DM2, MCA territory stroke     Scheduled Medications:  enoxparin, 30 mg, Q24H (prophylaxis, 1700)  epoetin chelsea-ebpx (RETACRIT) injection, 20,000 Units, Every Mon, Wed, Fri  insulin glargine U-100, 18 Units, BID  levETIRAcetam (Keppra) IV (PEDS and ADULTS), 500 mg, Q12H  lorazepam, 2 mg, Once  midodrine, 10 mg, Q8H  pantoprazole, 40 mg,  Daily  scopolamine, 1 patch, Q3 Days  sodium phosphate 15 mmol in D5W 250 mL IVPB, 15 mmol, Once  valproate sodium (DEPACON) IVPB, 500 mg, Q12H  zinc oxide-cod liver oil, , BID    Continuous Infusions:  fentanyl, Last Rate: 25 mcg/hr (01/24/25 0727)    PRN Medications:  0.9%  NaCl infusion (for blood administration), , Q24H PRN  0.9%  NaCl infusion (for blood administration), , Q24H PRN  bisacodyL, 10 mg, Daily PRN  dextrose 50%, 12.5 g, PRN  fentaNYL, 50 mcg, Q2H PRN  glucagon (human recombinant), 1 mg, PRN  heparin (porcine), 5,000 Units, PRN  insulin aspart U-100, 0-15 Units, Q6H PRN  labetalol, 10 mg, Q15 Min PRN  sodium chloride 0.9%, 10 mL, PRN    Calorie Containing IV Medications: no significant kcals from medications at this time    Recent Labs   Lab 01/18/25  0133 01/19/25  0429 01/20/25  0427 01/22/25  0657 01/23/25  0409 01/24/25  0427   * 129* 126* 129* 129* 129*   K 5.2* 5.0 5.3* 4.3 4.7 4.0   CALCIUM 7.7* 8.4 8.3* 8.6 8.3* 8.2*   PHOS 2.1* 1.9* 2.1* 1.2* 1.6* 1.4*   MG  --   --   --  2.20 2.40 2.30    99 98 99 99 101   CO2 19* 20* 20* 20* 20* 19*   BUN 71.7* 106.3* 131.0* 61.1* 83.5* 60.3*   CREATININE 2.36* 3.13* 3.60* 1.81* 2.49* 2.15*   EGFRNORACEVR 20 15 12 28 19 23   GLUCOSE 238* 162* 240* 237* 222* 167*   BILITOT  --   --   --  0.2 0.2 0.3   ALKPHOS  --   --   --  147 120 116   ALT  --   --   --  13 12 13   AST  --   --   --  20 18 17   ALBUMIN 1.5* 1.7* 1.5* 2.1* 1.8* 1.8*   WBC 13.59  13.59* 16.5  16.50* 18.14* 18.28* 17.00* 15.75*   HGB 8.3* 7.7* 7.1* 8.1* 6.8* 9.5*   HCT 25.1* 23.6* 22.1* 26.0* 21.6* 28.5*     Nutrition Orders:  Diet NPO  Tube Feedings/Formulas Other (see comments); OG; Humble - Fruit Punch,Tube Feedings/Formulas 60; 1,200; Peptamen AF; OG; 50; Every 4 hours    Appetite/Oral Intake: not applicable/not applicable  Factors Affecting Nutritional Intake: on mechanical ventilation  Social Needs Impacting Access to Food: unable to assess at this time; will attempt on  follow-up  Food/Episcopal/Cultural Preferences: unable to obtain  Food Allergies: no known food allergies  Last Bowel Movement: 01/24/25  Wound(s):     Wound 01/05/25 1535 Skin Tear Coccyx-Tissue loss description: Full thickness       Wound 01/23/25 1500 Pressure Injury Right Antecubital-Tissue loss description: Full thickness   I/O: Past 24 Hours: +[phone], Since Admit: [phone]     Comments    1/7/25: Discussed with RN. Will provide tube feeding recommendations for when appropriate to start tube feeding. Receiving kcal from meds.      1/8/25: Plans for starting TF today. Still receiving kcal from meds.     1/10/25: TF seen running at goal rate. Pt remains intubated, no longer receiving kcals from meds. Significant amount of diarrhea per RN. EN regimen updated to meet kcal needs.     1/14/25: Pt remains intubated, off all sedation. Tolerating TF at goal rate.     1/16/25: TF continues, tolerated per RN. Noted elevated CBGs. Will change to formula with less CHO/ml than current formula.     1/17/25: TF continues, tolerated per RN. Better CBG control per RN. No kcal from meds.     1/20/25: TF continues, tolerated per RN. Noted elevated K, HD ongoing. If still elevated upon F/U, may need to change to renal formula. Currently Phos low.     1/24/25: TF continues. Tolerated per RN. No kcal from meds.     Anthropometrics    Height: 5' (152.4 cm),    Last Weight: 74.1 kg (163 lb 5.8 oz) (01/05/25 1518), Weight Method: Standard Scale  BMI (Calculated): 31.9  BMI Classification: obese grade I (BMI 30-34.9)     Ideal Body Weight (IBW), Female: 100 lb     % Ideal Body Weight, Female (lb): 163.36 %                             Usual Weight Provided By: unable to obtain usual weight    Wt Readings from Last 5 Encounters:   01/05/25 74.1 kg (163 lb 5.8 oz)     Weight Change(s) Since Admission:   Wt Readings from Last 1 Encounters:   01/05/25 1518 74.1 kg (163 lb 5.8 oz)   Admit Weight: 74.1 kg (163 lb 5.8 oz) (01/05/25 1518), Weight  Method: Standard Scale    Estimated Needs    Weight Used For Calorie Calculations: 74.1 kg (163 lb 5.8 oz)  Energy Calorie Requirements (kcal): 1495kcal  Energy Need Method: Patric State (modified)  Weight Used For Protein Calculations: 45.5 kg (100 lb 3.2 oz) (IBW)  Protein Requirements: 91gm (2g/kg IBW)  Fluid Requirements (mL): 1000ml + urinary output  CHO Requirement: 170gm (45% est kcal needs)     Enteral Nutrition     Formula: Peptamen AF  Rate/Volume: 50 mL/hr  Water Flushes: 50 mL q4hr  Additives/Modulars: Humble  Route: orogastric tube  Method: continuous  Total Nutrition Provided by Tube Feeding, Additives, and Flushes:  Calories Provided  1440 kcal/d, 96% needs   Protein Provided  90 g/d, 100% needs   Fluid Provided  972 ml/d, N/A% needs   Continuous feeding calculations based on estimated 20 hr/d run time unless otherwise stated.    Parenteral Nutrition     Patient not receiving parenteral nutrition support at this time.    Evaluation of Received Nutrient Intake    Calories: meeting estimated needs  Protein: meeting estimated needs    Patient Education     Not applicable.    Nutrition Diagnosis     PES: Inadequate oral intake related to acute illness as evidenced by intubation since admit. (active)     PES:            Nutrition Interventions     Intervention(s): collaboration with other providers  Intervention(s):      Goal: Meet greater than 80% of nutritional needs by follow-up. (goal progressing)  Goal: Tolerate enteral feeding at goal rate by follow-up. (goal progressing)    Nutrition Goals & Monitoring     Dietitian will monitor: energy intake and enteral nutrition intake  Discharge planning: too early to determine; pending clinical course  Nutrition Risk/Follow-Up: high (follow-up in 1-4 days)   Please consult if re-assessment needed sooner.

## 2025-01-24 NOTE — NURSING
[facility] [address] ICU  Wound Care    Patient Name:  Krysta Jansen   MRN:  [id number]  Date: 1/24/2025  Diagnosis: <principal problem not specified>    History:     History reviewed. No pertinent past medical history.    Social History     Socioeconomic History    Marital status: Single   Tobacco Use    Smoking status: Unknown   Substance and Sexual Activity    Alcohol use: Not Currently     Social Drivers of Health     Financial Resource Strain: Patient Unable To Answer (1/11/2025)    Overall Financial Resource Strain (CARDIA)     Difficulty of Paying Living Expenses: Patient unable to answer   Food Insecurity: Patient Unable To Answer (1/11/2025)    Hunger Vital Sign     Worried About Running Out of Food in the Last Year: Patient unable to answer     Ran Out of Food in the Last Year: Patient unable to answer   Transportation Needs: Patient Unable To Answer (1/11/2025)    TRANSPORTATION NEEDS     Transportation : Patient unable to answer   Stress: Patient Unable To Answer (1/11/2025)    Citizen of [place] of Occupational [employer] - Occupational Stress Questionnaire     Feeling of Stress : Patient unable to answer   Housing Stability: Patient Unable To Answer (1/11/2025)    Housing Stability Vital Sign     Unable to Pay for Housing in the Last Year: Patient unable to answer     Homeless in the Last Year: Patient unable to answer       Precautions:     Allergies as of 01/05/2025    (No Known Allergies)       [facility] Assessment Details/Treatment      01/23/25 1100   ECG   Pulse 88        Hemodialysis Catheter 01/06/25 1400 left internal jugular   Placement Date/Time: (c) 01/06/25 1400   Present Prior to Hospital Arrival?: Yes  Location: left internal jugular   Dressing Type CHG impregnated dressing/sponge   Dressing Status Clean;Dry;Intact   Dressing Intervention Integrity maintained   Date on Dressing 01/22/25   Dressing Due to be Changed 01/29/25   Venous Patency/Care flushed w/o difficulty;normal saline  locked   Arterial Patency/Care flushed w/o difficulty;normal saline locked        Wound 01/05/25 1535 Skin Tear Coccyx   Date First Assessed/Time First Assessed: 01/05/25 1535   Primary Wound Type: Skin Tear  Location: Coccyx   Wound Image   (macerated area)   Dressing Appearance Open to air   Drainage Amount Scant   Drainage Characteristics/Odor Serosanguineous;No odor   Appearance Red  (spotted areas-see photo)   Tissue loss description Partial thickness   Red (%), Wound Tissue Color 100 %   Periwound Area Macerated   Wound Edges Jagged   Wound Length (cm) 4 cm  (full area)   Wound Width (cm) 4 cm   Wound Surface Area (cm^2) 16 cm^2   Care Cleansed with:;Wound cleanser;Applied:;Skin Barrier   Dressing Absorptive Pad     Re-eval of coccyx area eroded areas.  See new photo.    Pt remains intubated and is a DNR.     Assessment with nurse snow.     She remains on ICU total care bed and is being turned q2hrs with wedge.   No changes to care at this time  Will continue to follow weekly.  01/24/2025

## 2025-01-24 NOTE — PROGRESS NOTES
Nephrology consult follow up note    HPI:      Krysta Jansen is a 79 y.o. female is a nursing home resident and has ESRD and is on dialysis on Monday Wednesday Friday.  Brought to this hospital for altered mental status.  She was found to have some grand mal seizure.  She received some Ativan.  She had EEG done.  Her respiratory status and mental status deteriorated and required intubation to protect her airways.  Could not get any history from the patient.  Records revealed she was found to have some dysarthria and decreased level of consciousness Friday through being brought to this hospital.  Significant past medical history is positive for diabetes mellitus type 2 previous stroke hypertension coronary artery disease and congestive heart failure.    Interval history:     01/07/2024  Patient remains intubated and sedated.  On epinephrine for hypotension this morning.     01/08/25  Remains intubated and sedated on vent. Off pressors with BP stable at 130/60 range. Noted leucocytosis on am lab. Blood cultures pending.  Hgb stable at 7.6. Dialyzed yesterday and tolerated 500mL UF.      01/09/2025  No acute events overnight.  Patient remains intubated.  Not currently requiring vasopressors, but blood pressure does remain borderline low.  ICU team is requesting to hold hemodialysis today so they can have goals of care conversation with family.     01/10/2025   Currently tolerating dialysis.  Will remove only 500 cc of fluid.  Remains intubated.  Tolerating tube feedings.  Nurses reported that she is not on any sedation for about 48 hours.     01/13/2025   Weekend events noted.  Patient's condition is unchanged.  Remains on the ventilator and off sedation or pressors.  Unresponsive.     01/14/2025   Last 24 hours events noted.  Patient's condition remains unchanged and she is off pressors and off sedation and still unresponsive although she grimaces to painful stimuli.     01/15/2025   Currently tolerating  dialysis.  Neuro status and respiratory status remains unchanged.    01/16/2025  Dialyze yesterday.  No clinical change overnight.    01/17/2025  No clinical change overnight.  Hemoglobin dropped today.  Remains intubated    01/18/2025  No acute events overnight.  On Levophed for hypotension.  Dialyzed yesterday.    01/19/2025  No acute events overnight.  She does appear more edematous.  Remains on Levophed.  Getting tube feedings.     01/20/2025   Currently tolerating dialysis.  Will try to remove some fluid.  Will increase dialysis time to 4 hours.  Comfortable on the ventilator.    01/21/2025   Currently tolerating dialysis.  The plan is to remove some fluid and to help correct electrolytes and uremic toxins.    01/22/2025   Patient has had dialysis for 2 days in a row for 4 hours each time and now on low-dose Levophed.  No change neurologically.  Still tolerating tube feedings and patient remains on the ventilator.    01/23/2025   Just started on dialysis.  Will decrease fluid removal to 1 L only.  Overall condition is unchanged.  She is off Levophed now.    01/24/2025   She tolerated dialysis yesterday.  Overall condition remains unchanged.  Off pressors.  Off sedation.  Getting sodium phosphate for hypophosphatemia.  Tolerating tube feedings.    Review of Systems:     Unable to obtain ROS due to mental status/intubation.     Past medical, family, surgical, and social history reviewed and unchanged from initial consult note.     Objective:       VITAL SIGNS: 24 HR MIN & MAX LAST    Temp  Min: 98.2 °F (36.8 °C)  Max: 99.6 °F (37.6 °C)  99.3 °F (37.4 °C)        BP  Min: 102/47  Max: 151/73  (!) 149/54     Pulse  Min: 44  Max: 99  86     Resp  Min: 18  Max: 33  20    SpO2  Min: 99 %  Max: 100 %  100 %      GEN: Chronically ill appearing AA female, comfortable on the ventilator.  Off all sedation and pressors.  HEENT:  ET tube in place  CV: RRR without rub.  PULM: CTAB, unlabored  ABD: Soft, NT/ND abdomen with  NABS  EXT:  2+ dependent edema  SKIN: Warm and dry  PSYCH:  Grimaces to painful stimuli and even opens eyes to verbal command.  Dialysis access:  Left IJ PermCath            Component Value Date/Time     (L) 01/24/2025 0427     (L) 01/23/2025 0409     05/08/2024 0739     03/15/2024 1101    K 4.0 01/24/2025 0427    K 4.7 01/23/2025 0409    K 3.8 05/08/2024 0739    K 3.9 03/15/2024 1101    CO2 19 (L) 01/24/2025 0427    CO2 20 (L) 01/23/2025 0409    CO2 22 05/08/2024 0739    CO2 21 (L) 03/15/2024 1101    BUN 60.3 (H) 01/24/2025 0427    BUN 83.5 (H) 01/23/2025 0409    BUN 55 (H) 10/02/2024 0000    BUN 23 (H) 07/12/2024 0000    CREATININE 2.15 (H) 01/24/2025 0427    CREATININE 2.49 (H) 01/23/2025 0409    CREATININE 3.37 (H) 05/08/2024 0739    CREATININE 3.2 (H) 03/15/2024 1101    CALCIUM 8.2 (L) 01/24/2025 0427    CALCIUM 8.3 (L) 01/23/2025 0409    CALCIUM 9 05/08/2024 0739    CALCIUM 9.9 03/15/2024 1101    PHOS 1.4 (L) 01/24/2025 0427            Component Value Date/Time    WBC 15.75 (H) 01/24/2025 0427    WBC 17.00 (H) 01/23/2025 0409    WBC 16.5 01/19/2025 0429    WBC 13.59 01/18/2025 0133    HGB 9.5 (L) 01/24/2025 0427    HGB 6.8 (L) 01/23/2025 0409    HGB 10.3 (L) 11/24/2024 0000    HGB 10 (L) 11/18/2024 0000    HCT 28.5 (L) 01/24/2025 0427    HCT 21.6 (L) 01/23/2025 0409     01/24/2025 0427     (H) 01/23/2025 0409         Imaging reviewed      Assessment / Plan:       ESRD.  TTS schedule.  New onset seizure  Acute hypoxic respiratory failure  Hypertension  Diabetes mellitus type 2   History of coronary artery disease   History of previous stroke  Urinary tract infection  Anemia of chronic disease    Plan:  Continue dialysis on TTS schedule  Continue other medical management

## 2025-01-25 LAB
ALBUMIN SERPL-MCNC: 1.9 G/DL (ref 3.4–4.8)
ALBUMIN/GLOB SERPL: 0.4 RATIO (ref 1.1–2)
ALP SERPL-CCNC: 123 UNIT/L (ref 40–150)
ALT SERPL-CCNC: 13 UNIT/L (ref 0–55)
ANION GAP SERPL CALC-SCNC: 10 MEQ/L
AST SERPL-CCNC: 19 UNIT/L (ref 5–34)
BASOPHILS # BLD AUTO: 0.12 X10(3)/MCL
BASOPHILS NFR BLD AUTO: 0.8 %
BILIRUB SERPL-MCNC: 0.3 MG/DL
BUN SERPL-MCNC: 75.9 MG/DL (ref 9.8–20.1)
CALCIUM SERPL-MCNC: 8.6 MG/DL (ref 8.4–10.2)
CHLORIDE SERPL-SCNC: 100 MMOL/L (ref 98–107)
CO2 SERPL-SCNC: 20 MMOL/L (ref 23–31)
CREAT SERPL-MCNC: 2.56 MG/DL (ref 0.55–1.02)
CREAT/UREA NIT SERPL: 30
EOSINOPHIL # BLD AUTO: 0.31 X10(3)/MCL (ref 0–0.9)
EOSINOPHIL NFR BLD AUTO: 2 %
ERYTHROCYTE [DISTWIDTH] IN BLOOD BY AUTOMATED COUNT: 19.8 % (ref 11.5–17)
GFR SERPLBLD CREATININE-BSD FMLA CKD-EPI: 19 ML/MIN/1.73/M2
GLOBULIN SER-MCNC: 5 GM/DL (ref 2.4–3.5)
GLUCOSE SERPL-MCNC: 127 MG/DL (ref 82–115)
HCT VFR BLD AUTO: 30.1 % (ref 37–47)
HGB BLD-MCNC: 9.9 G/DL (ref 12–16)
IMM GRANULOCYTES # BLD AUTO: 0.77 X10(3)/MCL (ref 0–0.04)
IMM GRANULOCYTES NFR BLD AUTO: 5 %
LYMPHOCYTES # BLD AUTO: 2.73 X10(3)/MCL (ref 0.6–4.6)
LYMPHOCYTES NFR BLD AUTO: 17.7 %
MAGNESIUM SERPL-MCNC: 2.4 MG/DL (ref 1.6–2.6)
MCH RBC QN AUTO: 29.5 PG (ref 27–31)
MCHC RBC AUTO-ENTMCNC: 32.9 G/DL (ref 33–36)
MCV RBC AUTO: 89.6 FL (ref 80–94)
MONOCYTES # BLD AUTO: 1.49 X10(3)/MCL (ref 0.1–1.3)
MONOCYTES NFR BLD AUTO: 9.7 %
NEUTROPHILS # BLD AUTO: 9.98 X10(3)/MCL (ref 2.1–9.2)
NEUTROPHILS NFR BLD AUTO: 64.8 %
NRBC BLD AUTO-RTO: 0.3 %
PHOSPHATE SERPL-MCNC: 2.5 MG/DL (ref 2.3–4.7)
PLATELET # BLD AUTO: 381 X10(3)/MCL (ref 130–400)
PMV BLD AUTO: 8.2 FL (ref 7.4–10.4)
POCT GLUCOSE: 107 MG/DL (ref 70–110)
POCT GLUCOSE: 148 MG/DL (ref 70–110)
POCT GLUCOSE: 159 MG/DL (ref 70–110)
POCT GLUCOSE: 180 MG/DL (ref 70–110)
POCT GLUCOSE: 90 MG/DL (ref 70–110)
POTASSIUM SERPL-SCNC: 4.5 MMOL/L (ref 3.5–5.1)
PROT SERPL-MCNC: 6.9 GM/DL (ref 5.8–7.6)
RBC # BLD AUTO: 3.36 X10(6)/MCL (ref 4.2–5.4)
SODIUM SERPL-SCNC: 130 MMOL/L (ref 136–145)
WBC # BLD AUTO: 15.4 X10(3)/MCL (ref 4.5–11.5)

## 2025-01-25 PROCEDURE — 80100014 HC HEMODIALYSIS 1:1

## 2025-01-25 PROCEDURE — 83735 ASSAY OF MAGNESIUM: CPT

## 2025-01-25 PROCEDURE — 63600175 PHARM REV CODE 636 W HCPCS: Performed by: INTERNAL MEDICINE

## 2025-01-25 PROCEDURE — 84100 ASSAY OF PHOSPHORUS: CPT

## 2025-01-25 PROCEDURE — 25000003 PHARM REV CODE 250: Performed by: INTERNAL MEDICINE

## 2025-01-25 PROCEDURE — 99900031 HC PATIENT EDUCATION (STAT)

## 2025-01-25 PROCEDURE — 80053 COMPREHEN METABOLIC PANEL: CPT

## 2025-01-25 PROCEDURE — 94003 VENT MGMT INPAT SUBQ DAY: CPT

## 2025-01-25 PROCEDURE — 94760 N-INVAS EAR/PLS OXIMETRY 1: CPT

## 2025-01-25 PROCEDURE — 36415 COLL VENOUS BLD VENIPUNCTURE: CPT

## 2025-01-25 PROCEDURE — 27100171 HC OXYGEN HIGH FLOW UP TO 24 HOURS

## 2025-01-25 PROCEDURE — 99900035 HC TECH TIME PER 15 MIN (STAT)

## 2025-01-25 PROCEDURE — 63600175 PHARM REV CODE 636 W HCPCS: Performed by: STUDENT IN AN ORGANIZED HEALTH CARE EDUCATION/TRAINING PROGRAM

## 2025-01-25 PROCEDURE — 25000003 PHARM REV CODE 250

## 2025-01-25 PROCEDURE — 63600175 PHARM REV CODE 636 W HCPCS

## 2025-01-25 PROCEDURE — 99900026 HC AIRWAY MAINTENANCE (STAT)

## 2025-01-25 PROCEDURE — 11000001 HC ACUTE MED/SURG PRIVATE ROOM

## 2025-01-25 PROCEDURE — 94761 N-INVAS EAR/PLS OXIMETRY MLT: CPT

## 2025-01-25 PROCEDURE — 85025 COMPLETE CBC W/AUTO DIFF WBC: CPT

## 2025-01-25 RX ADMIN — INSULIN GLARGINE 18 UNITS: 100 INJECTION, SOLUTION SUBCUTANEOUS at 09:01

## 2025-01-25 RX ADMIN — LEVETIRACETAM 500 MG: 100 INJECTION, SOLUTION INTRAVENOUS at 09:01

## 2025-01-25 RX ADMIN — Medication: at 09:01

## 2025-01-25 RX ADMIN — INSULIN GLARGINE 18 UNITS: 100 INJECTION, SOLUTION SUBCUTANEOUS at 08:01

## 2025-01-25 RX ADMIN — VALPROATE SODIUM 500 MG: 100 INJECTION, SOLUTION INTRAVENOUS at 05:01

## 2025-01-25 RX ADMIN — INSULIN ASPART 3 UNITS: 100 INJECTION, SOLUTION INTRAVENOUS; SUBCUTANEOUS at 07:01

## 2025-01-25 RX ADMIN — ENOXAPARIN SODIUM 30 MG: 30 INJECTION SUBCUTANEOUS at 05:01

## 2025-01-25 RX ADMIN — MIDODRINE HYDROCHLORIDE 10 MG: 5 TABLET ORAL at 06:01

## 2025-01-25 RX ADMIN — Medication: at 08:01

## 2025-01-25 RX ADMIN — VALPROATE SODIUM 500 MG: 100 INJECTION, SOLUTION INTRAVENOUS at 12:01

## 2025-01-25 RX ADMIN — PANTOPRAZOLE SODIUM 40 MG: 40 INJECTION, POWDER, FOR SOLUTION INTRAVENOUS at 08:01

## 2025-01-25 RX ADMIN — MIDODRINE HYDROCHLORIDE 10 MG: 5 TABLET ORAL at 02:01

## 2025-01-25 RX ADMIN — INSULIN ASPART 3 UNITS: 100 INJECTION, SOLUTION INTRAVENOUS; SUBCUTANEOUS at 12:01

## 2025-01-25 RX ADMIN — MIDODRINE HYDROCHLORIDE 10 MG: 5 TABLET ORAL at 09:01

## 2025-01-25 NOTE — PROGRESS NOTES
[facility]    Nephrology Progress Note    Patient Name: Krysta Jansen  Age: 79 y.o.  : 1945  MRN: [id number]  Admission Date: 2025    Chief complaint: Altered Mental Status (Pt began having altered mental status 1 hour pta. Localizes to pain. Having trouble getting words out. Eyes twitching intermittently.)      Hospital course  Krysta Jansen is a 79 y.o. Black or  female with past medical history of end-stage renal disease (has been dialyzing per Monday, Wednesday, Friday schedule).  Patient was admitted for evaluation of altered mental status/seizure.  Past medical history significant for diabetes mellitus type 2, hypertension, CVA, coronary artery disease, and heart failure.  Patient was intubated for airway protection, has been dialyzing per Tuesday, Thursday, Saturday schedule while hospitalized.  Nephrology continues to follow for assistance with management of ESRD.    Subjective  Patient is intubated, no acute events overnight.      Review of Systems  Unable to obtain secondary to patient's condition.    Objective  BP (!) 148/60 (BP Location: Right arm, Patient Position: Lying)   Pulse 80   Temp 98.6 °F (37 °C) (Oral)   Resp (!) 24   Ht 5' (1.524 m)   Wt 74.1 kg (163 lb 5.8 oz)   SpO2 100%   BMI 31.90 kg/m²     Intake/Output Summary (Last 24 hours) at 2025 0931  Last data filed at 2025 0659  Gross per 24 hour   Intake 1841.8 ml   Output --   Net 1841.8 ml       Physical Exam  General appearance:  Chronically ill-appearing elderly female in no acute distress, intubated and sedated with fentanyl  HEENT: PERRLA, EOMI, no scleral icterus, no JVD. Neck is supple.  Left IJ dialysis catheter is in place  Chest: Respirations are unlabored. Lungs sounds are clear.   Heart: S1, S2.   Abdomen:  OG tube is in place.  Tube feeding is infusing..  : Deferred.  Extremities: No edema, peripheral pulses are palpable.   Neuro:  Sedated with  fentanyl    Medications  Scheduled Meds:   enoxparin  30 mg Subcutaneous Q24H (prophylaxis, 1700)    epoetin chelsea-ebpx (RETACRIT) injection  20,000 Units Subcutaneous Every Mon, Wed, Fri    insulin glargine U-100  18 Units Subcutaneous BID    levETIRAcetam (Keppra) IV (PEDS and ADULTS)  500 mg Intravenous Q12H    lorazepam  2 mg Intravenous Once    midodrine  10 mg Oral Q8H    pantoprazole  40 mg Intravenous Daily    scopolamine  1 patch Transdermal Q3 Days    valproate sodium (DEPACON) IVPB  500 mg Intravenous Q12H    zinc oxide-cod liver oil   Topical (Top) BID     Continuous Infusions:   fentanyl  0-250 mcg/hr Intravenous Continuous 5 mL/hr at 01/25/25 0659 50 mcg/hr at 01/25/25 0659     PRN Meds:.  Current Facility-Administered Medications:     0.9%  NaCl infusion (for blood administration), , Intravenous, Q24H PRN    0.9%  NaCl infusion (for blood administration), , Intravenous, Q24H PRN    bisacodyL, 10 mg, Rectal, Daily PRN    dextrose 50%, 12.5 g, Intravenous, PRN    fentaNYL, 50 mcg, Intravenous, Q2H PRN    glucagon (human recombinant), 1 mg, Intramuscular, PRN    heparin (porcine), 5,000 Units, Intra-Catheter, PRN    insulin aspart U-100, 0-15 Units, Subcutaneous, Q6H PRN    labetalol, 10 mg, Intravenous, Q15 Min PRN    sodium chloride 0.9%, 10 mL, Intravenous, PRN     Imaging:    Reviewed    Laboratory Data:    Chemistry  Recent Labs     01/23/25  0409 01/24/25  0427 01/25/25  0324   * 129* 130*   K 4.7 4.0 4.5   CO2 20* 19* 20*   BUN 83.5* 60.3* 75.9*   CREATININE 2.49* 2.15* 2.56*   EGFRNORACEVR 19 23 19   GLUCOSE 222* 167* 127*   CALCIUM 8.3* 8.2* 8.6   MG 2.40 2.30 2.40   PHOS 1.6* 1.4* 2.5      LFT  Lab Results   Component Value Date    ALKPHOS 123 01/25/2025    AST 19 01/25/2025    ALT 13 01/25/2025    BILITOT 0.3 01/25/2025    ALBUMIN 1.9 (L) 01/25/2025      CKD-MBD  Lab Results   Component Value Date     (H) 11/11/2024    [id number] 18 (L) 12/19/2024      Hematology  Recent Labs      01/23/25  0409 01/24/25  0427 01/25/25  0324   WBC 17.00* 15.75* 15.40*   HGB 6.8* 9.5* 9.9*   HCT 21.6* 28.5* 30.1*   * 342 381      Lab Results   Component Value Date    IRON 84 01/06/2025    TIBC 131 (L) 01/06/2025    FERRITIN 2,921.45 (H) 01/06/2025      Additional serology  Lab Results   Component Value Date    HGBA1C 5.9 12/19/2024       Urine studies  Lab Results   Component Value Date    APPEARANCEUA Turbid (A) 01/05/2025    SGUA 1.025 01/05/2025    PROTEINUA Color may interfere with results 01/05/2025    KETONESUA Color may interfere with results 01/05/2025    LEUKOCYTESUR Color may interfere with results 01/05/2025    RBCUA 0-5 01/05/2025    WBCUA >100 (A) 01/05/2025    BACTERIA Few (A) 01/05/2025    SQEPUA Trace 01/05/2025        Impression  End-stage renal disease   Metabolic acidosis   Hyponatremia   New onset seizures   Encephalopathy   Acute hypoxemic respiratory failure   Anemia of chronic disease  History of diabetes mellitus type 2, hypertension, coronary artery disease, CVA    Plan  Will continue hemodialysis per Tuesday, Thursday, Saturday schedule while hospitalized.  Blood pressure readings are at goal.  Continue midodrine as ordered.  Continue erythropoietin for treatment anemia chronic disease, hold for hemoglobin greater than 10.9 grams/deciliter.    Marilynn Mcmahan NP  [facility]  1/25/2025

## 2025-01-25 NOTE — NURSING
01/25/25 1445   Post-Hemodialysis Assessment   Rinseback Volume (mL) 250 mL   Blood Volume Processed (Liters) 47.1 L   Dialyzer Clearance Clotted   Duration of Treatment 150 minutes   Total UF (mL) 1150 mL   Net Fluid Removal 650   Patient Response to Treatment Pt tolerated tx well. HD circuts clotted x2.Nephrology made aware, no reset up required.   Post-Hemodialysis Comments Blood rinsed back per P&P. Lines capped and secured. Pt ran for 2.5 hours

## 2025-01-25 NOTE — PROGRESS NOTES
[facility] - Emergency Dept  Pulmonary Critical Care Note    Patient Name: Krysta Jansen  MRN: [id number]  Admission Date: 1/5/2025  Hospital Length of Stay: 20 days  Code Status: DNR  Attending Provider: DAMARIS Jaime MD  Primary Care Provider: No primary care provider on file.     Subjective:     HPI:   Patient is a 79-year-old female with a past medical history of ESRD on HD, HTN, CAD, CHF unknown EF, DM2, previous left MCA territory stroke who was brought into the ER after her son reported seeing her normal an hour prior, briefly left in when came back patient was nonverbal and lethargic.      She was emergently brought to the ER and a code fast was called, she was assessed by Neurology in the ER where she was found to have an NIH of 22 based on dysarthria and level of consciousness, but was deemed not a candidate for TNK secondary to overall nonfocal exam and recent left arm HD access surgery reported.  Vitals and laboratory work were overall unremarkable except for changes as expected in any age renal disease, and a potential urinary tract infection.  She was admitted under typical stroke protocol.     On 1/6/2025, EEG at 0800 revealed seizure-like activity. She was given Lorazepam 2 mg IV. Patient later became unconscious, unresponsive to pain stimuli, and hypotensive. She was intubated for airway protection.       Hospital Course/Significant events:  1/6/2025 - Admitted to ICU   1/9/2025 - EEG shows evidence of epileptiform discharges but no clinical convulsing noted.    24 Hour Interval History:  No events overnight. Still with minimal neurological response, only opens eyes to deep stimulation but does not follow commands.       Review of systems unobtainable due to intubation, altered mental status.        Social History     Socioeconomic History    Marital status: Single   Tobacco Use    Smoking status: Unknown   Substance and Sexual Activity    Alcohol use: Not Currently     Social  Drivers of Health     Financial Resource Strain: Patient Unable To Answer (1/11/2025)    Overall Financial Resource Strain (CARDIA)     Difficulty of Paying Living Expenses: Patient unable to answer   Food Insecurity: Patient Unable To Answer (1/11/2025)    Hunger Vital Sign     Worried About Running Out of Food in the Last Year: Patient unable to answer     Ran Out of Food in the Last Year: Patient unable to answer   Transportation Needs: Patient Unable To Answer (1/11/2025)    TRANSPORTATION NEEDS     Transportation : Patient unable to answer   Stress: Patient Unable To Answer (1/11/2025)    [employer] [place] [employer] - Occupational Stress Questionnaire     Feeling of Stress : Patient unable to answer   Housing Stability: Patient Unable To Answer (1/11/2025)    Housing Stability Vital Sign     Unable to Pay for Housing in the Last Year: Patient unable to answer     Homeless in the Last Year: Patient unable to answer       Current Outpatient Medications   Medication Instructions    aspirin 81 MG Chew 1 tablet, Daily    BASAGLAR KWIKPEN U-100 INSULIN 30 Units, Nightly    CeleXA 10 mg, Daily    ENTRESTO 24-26 mg per tablet 1 tablet, 2 times daily    metoprolol succinate 25 mg CSpX 1 capsule, Daily    PLAVIX 75 mg tablet 1 tablet, Daily    rosuvastatin (CRESTOR) 40 MG Tab 1 tablet, Nightly    traMADoL (ULTRAM) 50 mg tablet 1 tablet, Every 6 hours PRN    vancomycin (VANCOCIN) 500 mg, Every Mon, Wed, Fri     Current Inpatient Medications   enoxparin  30 mg Subcutaneous Q24H (prophylaxis, 1700)    epoetin chelsea-ebpx (RETACRIT) injection  20,000 Units Subcutaneous Every Mon, Wed, Fri    insulin glargine U-100  18 Units Subcutaneous BID    levETIRAcetam (Keppra) IV (PEDS and ADULTS)  500 mg Intravenous Q12H    lorazepam  2 mg Intravenous Once    midodrine  10 mg Oral Q8H    pantoprazole  40 mg Intravenous Daily    scopolamine  1 patch Transdermal Q3 Days    valproate sodium (DEPACON) IVPB  500 mg Intravenous  Q12H    zinc oxide-cod liver oil   Topical (Top) BID     Current Intravenous Infusions   fentanyl  0-250 mcg/hr Intravenous Continuous 5 mL/hr at 01/25/25 0659 50 mcg/hr at 01/25/25 0659       Objective:     Intake/Output Summary (Last 24 hours) at 1/25/2025 1025  Last data filed at 1/25/2025 0659  Gross per 24 hour   Intake 1841.8 ml   Output --   Net 1841.8 ml     Vital Signs (Most Recent):  Temp: 98.8 °F (37.1 °C) (01/25/25 0800)  Pulse: 109 (01/25/25 0840)  Resp: (!) 34 (01/25/25 0840)  BP: (!) 148/60 (01/25/25 0600)  SpO2: 100 % (01/25/25 0840)  Body mass index is 31.9 kg/m².  Weight: 74.1 kg (163 lb 5.8 oz) Vital Signs (24h Range):  Temp:  [98.2 °F (36.8 °C)-98.8 °F (37.1 °C)] 98.8 °F (37.1 °C)  Pulse:  [] 109  Resp:  [15-43] 34  SpO2:  [98 %-100 %] 100 %  BP: (122-160)/() 148/60         Physical Exam:  Gen- intubated  HENT- ATNC, MMM, ETT in place  CV- RRR   Resp- CTAB, compliant with mechanical ventilation  MSK- WWP, 2+ BUE edema and BLE edema   Neuro- resting comfortably, not following commands, lightly sedated  Abdomen-positive bowel sounds, no mass, nontender        Lines/Drains/Airways       Central Venous Catheter Line  Duration                  Hemodialysis Catheter 01/06/25 1400 left internal jugular 18 days              Drain  Duration                  NG/OG Tube 01/06/25 1109 Center mouth 18 days              Airway  Duration                  Airway - Non-Surgical 01/06/25 1104 Endotracheal Tube 18 days              Peripheral Intravenous Line  Duration                  Peripheral IV - Single Lumen 01/24/25 0200 20 G Yes Anterior;Proximal;Right Upper Arm 1 day                  Significant Labs:  Lab Results   Component Value Date    WBC 15.40 (H) 01/25/2025    HGB 9.9 (L) 01/25/2025    HCT 30.1 (L) 01/25/2025    MCV 89.6 01/25/2025     01/25/2025       BMP  Lab Results   Component Value Date     (L) 01/25/2025    K 4.5 01/25/2025    CO2 20 (L) 01/25/2025    BUN 75.9 (H)  01/25/2025    CREATININE 2.56 (H) 01/25/2025    CALCIUM 8.6 01/25/2025    AGAP 10.0 01/25/2025    ESTGFRAFRICA 13 05/08/2024     ABG  Recent Labs   Lab 01/23/25  0900   PH 7.460*   PO2 123.0*   PCO2 34.0*   HCO3 24.2     Mechanical Ventilation Support:  Vent Mode: SIMV (01/25/25 0840)  Ventilator Initiated: Yes (01/06/25 1104)  Set Rate: 18 BPM (01/25/25 0840)  Vt Set: 450 mL (01/25/25 0840)  Pressure Support: 10 cmH20 (01/25/25 0840)  PEEP/CPAP: 5 cmH20 (01/25/25 0840)  Oxygen Concentration (%): 28 (01/25/25 0840)  Peak Airway Pressure: 15 cmH20 (01/25/25 0840)  Total Ve: 13.8 L/m (01/25/25 0840)  F/VT Ratio<105 (RSBI): (!) 86.29 (01/25/25 0840)      Assessment/Plan:     Assessment  Persistent nonconvulsive status epilepticus based on most recent EEG from 01/09.  Acute respiratory failure-not ready for ventilator weaning based on failed SBT.  Pseudomonas UTI  Prior left MCA territory stroke   Hypotension (resolved)  Anemia with no obvious source of bleeding.    ESRD   Hx of HTN, CAD, DM2       Plan  Continue mechanical ventilation. RASS goal 0 to -1.  SBT is not indicated due to mental status.  Family deciding between tracheostomy versus comfort care  Continue Keppra b.i.d. and valproate  Continue midodrine 10 mg q.8  Plans for HD today  Code status DNR      DVT Prophylaxis: subq lovenox  GI Prophylaxis: pantoprazole     Genevieve Miller NP  Pulmonary and Critical Care

## 2025-01-26 LAB
ABS NEUT (OLG): 10.39 X10(3)/MCL (ref 2.1–9.2)
ALBUMIN SERPL-MCNC: 1.8 G/DL (ref 3.4–4.8)
ALBUMIN/GLOB SERPL: 0.4 RATIO (ref 1.1–2)
ALP SERPL-CCNC: 105 UNIT/L (ref 40–150)
ALT SERPL-CCNC: 12 UNIT/L (ref 0–55)
ANION GAP SERPL CALC-SCNC: 9 MEQ/L
AST SERPL-CCNC: 16 UNIT/L (ref 5–34)
BILIRUB SERPL-MCNC: 0.2 MG/DL
BUN SERPL-MCNC: 51.2 MG/DL (ref 9.8–20.1)
CALCIUM SERPL-MCNC: 8.3 MG/DL (ref 8.4–10.2)
CHLORIDE SERPL-SCNC: 102 MMOL/L (ref 98–107)
CO2 SERPL-SCNC: 19 MMOL/L (ref 23–31)
CREAT SERPL-MCNC: 2.1 MG/DL (ref 0.55–1.02)
CREAT/UREA NIT SERPL: 24
EOSINOPHIL NFR BLD MANUAL: 0.13 X10(3)/MCL (ref 0–0.9)
EOSINOPHIL NFR BLD MANUAL: 1 %
ERYTHROCYTE [DISTWIDTH] IN BLOOD BY AUTOMATED COUNT: 19.9 % (ref 11.5–17)
GFR SERPLBLD CREATININE-BSD FMLA CKD-EPI: 24 ML/MIN/1.73/M2
GLOBULIN SER-MCNC: 4.7 GM/DL (ref 2.4–3.5)
GLUCOSE SERPL-MCNC: 94 MG/DL (ref 82–115)
HCT VFR BLD AUTO: 28.3 % (ref 37–47)
HGB BLD-MCNC: 9.1 G/DL (ref 12–16)
INSTRUMENT WBC (OLG): 13.49 X10(3)/MCL
LYMPHOCYTES NFR BLD MANUAL: 1.21 X10(3)/MCL
LYMPHOCYTES NFR BLD MANUAL: 9 %
MAGNESIUM SERPL-MCNC: 2.2 MG/DL (ref 1.6–2.6)
MCH RBC QN AUTO: 29.5 PG (ref 27–31)
MCHC RBC AUTO-ENTMCNC: 32.2 G/DL (ref 33–36)
MCV RBC AUTO: 91.9 FL (ref 80–94)
MONOCYTES NFR BLD MANUAL: 1.62 X10(3)/MCL (ref 0.1–1.3)
MONOCYTES NFR BLD MANUAL: 12 %
MYELOCYTES NFR BLD MANUAL: 2 %
NEUTROPHILS NFR BLD MANUAL: 77 %
NRBC BLD AUTO-RTO: 0.5 %
PHOSPHATE SERPL-MCNC: 2.1 MG/DL (ref 2.3–4.7)
PLATELET # BLD AUTO: 360 X10(3)/MCL (ref 130–400)
PLATELET # BLD EST: NORMAL 10*3/UL
PMV BLD AUTO: 8 FL (ref 7.4–10.4)
POTASSIUM SERPL-SCNC: 4.4 MMOL/L (ref 3.5–5.1)
PROT SERPL-MCNC: 6.5 GM/DL (ref 5.8–7.6)
RBC # BLD AUTO: 3.08 X10(6)/MCL (ref 4.2–5.4)
RBC MORPH BLD: NORMAL
SODIUM SERPL-SCNC: 130 MMOL/L (ref 136–145)
WBC # BLD AUTO: 13.49 X10(3)/MCL (ref 4.5–11.5)

## 2025-01-26 PROCEDURE — 99900035 HC TECH TIME PER 15 MIN (STAT)

## 2025-01-26 PROCEDURE — 63600175 PHARM REV CODE 636 W HCPCS

## 2025-01-26 PROCEDURE — 25000003 PHARM REV CODE 250: Performed by: INTERNAL MEDICINE

## 2025-01-26 PROCEDURE — 63600175 PHARM REV CODE 636 W HCPCS: Performed by: INTERNAL MEDICINE

## 2025-01-26 PROCEDURE — 99900026 HC AIRWAY MAINTENANCE (STAT)

## 2025-01-26 PROCEDURE — 27100171 HC OXYGEN HIGH FLOW UP TO 24 HOURS

## 2025-01-26 PROCEDURE — 25000003 PHARM REV CODE 250: Performed by: STUDENT IN AN ORGANIZED HEALTH CARE EDUCATION/TRAINING PROGRAM

## 2025-01-26 PROCEDURE — 84100 ASSAY OF PHOSPHORUS: CPT

## 2025-01-26 PROCEDURE — 94761 N-INVAS EAR/PLS OXIMETRY MLT: CPT

## 2025-01-26 PROCEDURE — 85025 COMPLETE CBC W/AUTO DIFF WBC: CPT

## 2025-01-26 PROCEDURE — 94760 N-INVAS EAR/PLS OXIMETRY 1: CPT

## 2025-01-26 PROCEDURE — 94003 VENT MGMT INPAT SUBQ DAY: CPT

## 2025-01-26 PROCEDURE — 99900031 HC PATIENT EDUCATION (STAT)

## 2025-01-26 PROCEDURE — 83735 ASSAY OF MAGNESIUM: CPT

## 2025-01-26 PROCEDURE — 25000003 PHARM REV CODE 250

## 2025-01-26 PROCEDURE — 11000001 HC ACUTE MED/SURG PRIVATE ROOM

## 2025-01-26 PROCEDURE — 27200966 HC CLOSED SUCTION SYSTEM

## 2025-01-26 PROCEDURE — 63600175 PHARM REV CODE 636 W HCPCS: Performed by: STUDENT IN AN ORGANIZED HEALTH CARE EDUCATION/TRAINING PROGRAM

## 2025-01-26 PROCEDURE — 80053 COMPREHEN METABOLIC PANEL: CPT

## 2025-01-26 PROCEDURE — 36415 COLL VENOUS BLD VENIPUNCTURE: CPT

## 2025-01-26 RX ADMIN — INSULIN GLARGINE 18 UNITS: 100 INJECTION, SOLUTION SUBCUTANEOUS at 08:01

## 2025-01-26 RX ADMIN — FENTANYL CITRATE 50 MCG: 50 INJECTION, SOLUTION INTRAMUSCULAR; INTRAVENOUS at 05:01

## 2025-01-26 RX ADMIN — INSULIN GLARGINE 18 UNITS: 100 INJECTION, SOLUTION SUBCUTANEOUS at 09:01

## 2025-01-26 RX ADMIN — LABETALOL HYDROCHLORIDE 10 MG: 5 INJECTION, SOLUTION INTRAVENOUS at 06:01

## 2025-01-26 RX ADMIN — MIDODRINE HYDROCHLORIDE 10 MG: 5 TABLET ORAL at 09:01

## 2025-01-26 RX ADMIN — VALPROATE SODIUM 500 MG: 100 INJECTION, SOLUTION INTRAVENOUS at 05:01

## 2025-01-26 RX ADMIN — PANTOPRAZOLE SODIUM 40 MG: 40 INJECTION, POWDER, FOR SOLUTION INTRAVENOUS at 08:01

## 2025-01-26 RX ADMIN — Medication: at 08:01

## 2025-01-26 RX ADMIN — MIDODRINE HYDROCHLORIDE 10 MG: 5 TABLET ORAL at 05:01

## 2025-01-26 RX ADMIN — LEVETIRACETAM 500 MG: 100 INJECTION, SOLUTION INTRAVENOUS at 09:01

## 2025-01-26 RX ADMIN — LEVETIRACETAM 500 MG: 100 INJECTION, SOLUTION INTRAVENOUS at 08:01

## 2025-01-26 RX ADMIN — Medication: at 09:01

## 2025-01-26 RX ADMIN — INSULIN ASPART 3 UNITS: 100 INJECTION, SOLUTION INTRAVENOUS; SUBCUTANEOUS at 12:01

## 2025-01-26 RX ADMIN — ENOXAPARIN SODIUM 30 MG: 30 INJECTION SUBCUTANEOUS at 05:01

## 2025-01-26 RX ADMIN — FENTANYL CITRATE 50 MCG: 50 INJECTION, SOLUTION INTRAMUSCULAR; INTRAVENOUS at 03:01

## 2025-01-26 RX ADMIN — FENTANYL CITRATE 50 MCG: 50 INJECTION, SOLUTION INTRAMUSCULAR; INTRAVENOUS at 01:01

## 2025-01-26 RX ADMIN — Medication 50 MCG/HR: at 05:01

## 2025-01-26 NOTE — PROGRESS NOTES
[facility]    Nephrology Progress Note    Patient Name: Krysta Jansen  Age: 79 y.o.  : 1945  MRN: [id number]  Admission Date: 2025    Chief complaint: Altered Mental Status (Pt began having altered mental status 1 hour pta. Localizes to pain. Having trouble getting words out. Eyes twitching intermittently.)      Hospital course  Krysta Jansen is a 79 y.o. Black or  female with past medical history of end-stage renal disease (has been dialyzing per Monday, Wednesday, Friday schedule).  Patient was admitted for evaluation of altered mental status/seizure.  Past medical history significant for diabetes mellitus type 2, hypertension, CVA, coronary artery disease, and heart failure.  Patient was intubated for airway protection, has been dialyzing per Tuesday, Thursday, Saturday schedule while hospitalized.  Nephrology continues to follow for assistance with management of ESRD.    Subjective  Patient is intubated, no acute events overnight.      Review of Systems  Unable to obtain secondary to patient's condition.    Objective  BP (!) 138/42   Pulse (!) 47   Temp 98.7 °F (37.1 °C) (Oral)   Resp 18   Ht 5' (1.524 m)   Wt 74.1 kg (163 lb 5.8 oz)   SpO2 100%   BMI 31.90 kg/m²     Intake/Output Summary (Last 24 hours) at 2025 0748  Last data filed at 2025 0721  Gross per 24 hour   Intake 1625.96 ml   Output 1150 ml   Net 475.96 ml       Physical Exam  General appearance:  Chronically ill-appearing elderly female in no acute distress, intubated and sedated with fentanyl  HEENT: PERRLA, EOMI, no scleral icterus, no JVD. Neck is supple.  Left IJ dialysis catheter is in place  Chest: Respirations are unlabored. Lungs sounds are clear.   Heart: S1, S2.  Bradycardic  Abdomen:  OG tube is in place.  Tube feeding is infusing..  : Deferred.  Extremities: No edema, peripheral pulses are palpable.   Neuro:  Sedated with fentanyl    Medications  Scheduled  Meds:   enoxparin  30 mg Subcutaneous Q24H (prophylaxis, 1700)    epoetin chelsea-ebpx (RETACRIT) injection  20,000 Units Subcutaneous Every Mon, Wed, Fri    insulin glargine U-100  18 Units Subcutaneous BID    levETIRAcetam (Keppra) IV (PEDS and ADULTS)  500 mg Intravenous Q12H    lorazepam  2 mg Intravenous Once    midodrine  10 mg Oral Q8H    pantoprazole  40 mg Intravenous Daily    scopolamine  1 patch Transdermal Q3 Days    valproate sodium (DEPACON) IVPB  500 mg Intravenous Q12H    zinc oxide-cod liver oil   Topical (Top) BID     Continuous Infusions:   fentanyl  0-250 mcg/hr Intravenous Continuous 5 mL/hr at 01/26/25 0519 50 mcg/hr at 01/26/25 0519     PRN Meds:.  Current Facility-Administered Medications:     0.9%  NaCl infusion (for blood administration), , Intravenous, Q24H PRN    0.9%  NaCl infusion (for blood administration), , Intravenous, Q24H PRN    bisacodyL, 10 mg, Rectal, Daily PRN    dextrose 50%, 12.5 g, Intravenous, PRN    fentaNYL, 50 mcg, Intravenous, Q2H PRN    glucagon (human recombinant), 1 mg, Intramuscular, PRN    heparin (porcine), 5,000 Units, Intra-Catheter, PRN    insulin aspart U-100, 0-15 Units, Subcutaneous, Q6H PRN    labetalol, 10 mg, Intravenous, Q15 Min PRN    sodium chloride 0.9%, 10 mL, Intravenous, PRN     Imaging:    Reviewed    Laboratory Data:    Chemistry  Recent Labs     01/24/25  0427 01/25/25  0324 01/26/25  0451   * 130* 130*   K 4.0 4.5 4.4   CO2 19* 20* 19*   BUN 60.3* 75.9* 51.2*   CREATININE 2.15* 2.56* 2.10*   EGFRNORACEVR 23 19 24   GLUCOSE 167* 127* 94   CALCIUM 8.2* 8.6 8.3*   MG 2.30 2.40 2.20   PHOS 1.4* 2.5 2.1*      LFT  Lab Results   Component Value Date    ALKPHOS 105 01/26/2025    AST 16 01/26/2025    ALT 12 01/26/2025    BILITOT 0.2 01/26/2025    ALBUMIN 1.8 (L) 01/26/2025      CKD-MBD  Lab Results   Component Value Date     (H) 11/11/2024    ZKYHKRBW39CA 18 (L) 12/19/2024      Hematology  Recent Labs     01/24/25  0427 01/25/25  6750  01/26/25  0451   WBC 15.75* 15.40* 13.49  13.49*   HGB 9.5* 9.9* 9.1*   HCT 28.5* 30.1* 28.3*    381 360      Lab Results   Component Value Date    IRON 84 01/06/2025    TIBC 131 (L) 01/06/2025    FERRITIN 2,921.45 (H) 01/06/2025      Additional serology  Lab Results   Component Value Date    HGBA1C 5.9 12/19/2024       Urine studies  Lab Results   Component Value Date    APPEARANCEUA Turbid (A) 01/05/2025    SGUA 1.025 01/05/2025    PROTEINUA Color may interfere with results 01/05/2025    KETONESUA Color may interfere with results 01/05/2025    LEUKOCYTESUR Color may interfere with results 01/05/2025    RBCUA 0-5 01/05/2025    WBCUA >100 (A) 01/05/2025    BACTERIA Few (A) 01/05/2025    SQEPUA Trace 01/05/2025        Impression  End-stage renal disease   Metabolic acidosis   Hyponatremia   New onset seizures   Encephalopathy   Acute hypoxemic respiratory failure   Anemia of chronic disease  History of diabetes mellitus type 2, hypertension, coronary artery disease, CVA    Plan  Will continue hemodialysis per Tuesday, Thursday, Saturday schedule while hospitalized.  Blood pressure readings are at goal.  Continue midodrine as ordered.  Continue erythropoietin for treatment anemia chronic disease.    Marilynn Mcmahan NP  [facility]  1/26/2025

## 2025-01-26 NOTE — PROGRESS NOTES
[facility] - Emergency Dept  Pulmonary Critical Care Note    Patient Name: Krysta Jansen  MRN: [id number]  Admission Date: 1/5/2025  Hospital Length of Stay: 21 days  Code Status: DNR  Attending Provider: DAMARIS Jaime MD  Primary Care Provider: No primary care provider on file.     Subjective:     HPI:   Patient is a 79-year-old female with a past medical history of ESRD on HD, HTN, CAD, CHF unknown EF, DM2, previous left MCA territory stroke who was brought into the ER after her son reported seeing her normal an hour prior, briefly left in when came back patient was nonverbal and lethargic.      She was emergently brought to the ER and a code fast was called, she was assessed by Neurology in the ER where she was found to have an NIH of 22 based on dysarthria and level of consciousness, but was deemed not a candidate for TNK secondary to overall nonfocal exam and recent left arm HD access surgery reported.  Vitals and laboratory work were overall unremarkable except for changes as expected in any age renal disease, and a potential urinary tract infection.  She was admitted under typical stroke protocol.     On 1/6/2025, EEG at 0800 revealed seizure-like activity. She was given Lorazepam 2 mg IV. Patient later became unconscious, unresponsive to pain stimuli, and hypotensive. She was intubated for airway protection.       Hospital Course/Significant events:  1/6/2025 - Admitted to ICU   1/9/2025 - EEG shows evidence of epileptiform discharges but no clinical convulsing noted.    24 Hour Interval History:  No events overnight. She is opening her eyes and nodding head to command on my exam this AM. Fentanyl gtt still on 50mcg/hr      Review of systems unobtainable due to intubation, altered mental status.        Social History     Socioeconomic History    Marital status: Single   Tobacco Use    Smoking status: Unknown   Substance and Sexual Activity    Alcohol use: Not Currently     Social Drivers  of Health     Financial Resource Strain: Patient Unable To Answer (1/11/2025)    Overall Financial Resource Strain (CARDIA)     Difficulty of Paying Living Expenses: Patient unable to answer   Food Insecurity: Patient Unable To Answer (1/11/2025)    Hunger Vital Sign     Worried About Running Out of Food in the Last Year: Patient unable to answer     Ran Out of Food in the Last Year: Patient unable to answer   Transportation Needs: Patient Unable To Answer (1/11/2025)    TRANSPORTATION NEEDS     Transportation : Patient unable to answer   Stress: Patient Unable To Answer (1/11/2025)    [employer] [place] [employer] - Occupational Stress Questionnaire     Feeling of Stress : Patient unable to answer   Housing Stability: Patient Unable To Answer (1/11/2025)    Housing Stability Vital Sign     Unable to Pay for Housing in the Last Year: Patient unable to answer     Homeless in the Last Year: Patient unable to answer       Current Outpatient Medications   Medication Instructions    aspirin 81 MG Chew 1 tablet, Daily    BASAGLAR KWIKPEN U-100 INSULIN 30 Units, Nightly    CeleXA 10 mg, Daily    ENTRESTO 24-26 mg per tablet 1 tablet, 2 times daily    metoprolol succinate 25 mg CSpX 1 capsule, Daily    PLAVIX 75 mg tablet 1 tablet, Daily    rosuvastatin (CRESTOR) 40 MG Tab 1 tablet, Nightly    traMADoL (ULTRAM) 50 mg tablet 1 tablet, Every 6 hours PRN    vancomycin (VANCOCIN) 500 mg, Every Mon, Wed, Fri     Current Inpatient Medications   enoxparin  30 mg Subcutaneous Q24H (prophylaxis, 1700)    epoetin chelsea-ebpx (RETACRIT) injection  20,000 Units Subcutaneous Every Mon, Wed, Fri    insulin glargine U-100  18 Units Subcutaneous BID    levETIRAcetam (Keppra) IV (PEDS and ADULTS)  500 mg Intravenous Q12H    lorazepam  2 mg Intravenous Once    midodrine  10 mg Oral Q8H    pantoprazole  40 mg Intravenous Daily    scopolamine  1 patch Transdermal Q3 Days    valproate sodium (DEPACON) IVPB  500 mg Intravenous Q12H     zinc oxide-cod liver oil   Topical (Top) BID     Current Intravenous Infusions   fentanyl  0-250 mcg/hr Intravenous Continuous 5 mL/hr at 01/26/25 0519 50 mcg/hr at 01/26/25 0519       Objective:     Intake/Output Summary (Last 24 hours) at 1/26/2025 1230  Last data filed at 1/26/2025 1000  Gross per 24 hour   Intake 1739.21 ml   Output 1150 ml   Net 589.21 ml     Vital Signs (Most Recent):  Temp: 98.7 °F (37.1 °C) (01/26/25 1200)  Pulse: (!) 50 (01/26/25 1215)  Resp: 18 (01/26/25 1215)  BP: (!) 129/45 (01/26/25 1215)  SpO2: 97 % (01/26/25 1215)  Body mass index is 31.9 kg/m².  Weight: 74.1 kg (163 lb 5.8 oz) Vital Signs (24h Range):  Temp:  [98.7 °F (37.1 °C)-99.5 °F (37.5 °C)] 98.7 °F (37.1 °C)  Pulse:  [43-95] 50  Resp:  [14-26] 18  SpO2:  [95 %-100 %] 97 %  BP: (107-162)/() 129/45         Physical Exam:  Gen- intubated  HENT- ATNC, MMM, ETT in place  CV- RRR   Resp- CTAB, compliant with mechanical ventilation  MSK- WWP, 2+ BUE edema and BLE edema   Neuro- resting comfortably, not following commands, lightly sedated  Abdomen-positive bowel sounds, no mass, nontender        Lines/Drains/Airways       Central Venous Catheter Line  Duration                  Hemodialysis Catheter 01/06/25 1400 left internal jugular 19 days              Drain  Duration                  NG/OG Tube 01/06/25 1109 Center mouth 20 days              Airway  Duration                  Airway - Non-Surgical 01/06/25 1104 Endotracheal Tube 20 days              Peripheral Intravenous Line  Duration                  Peripheral IV - Single Lumen 01/24/25 0200 20 G Yes Anterior;Proximal;Right Upper Arm 2 days                  Significant Labs:  Lab Results   Component Value Date    WBC 13.49 (H) 01/26/2025    WBC 13.49 01/26/2025    HGB 9.1 (L) 01/26/2025    HCT 28.3 (L) 01/26/2025    MCV 91.9 01/26/2025     01/26/2025       BMP  Lab Results   Component Value Date     (L) 01/26/2025    K 4.4 01/26/2025    CO2 19 (L) 01/26/2025     BUN 51.2 (H) 01/26/2025    CREATININE 2.10 (H) 01/26/2025    CALCIUM 8.3 (L) 01/26/2025    AGAP 9.0 01/26/2025    ESTGFRAFRICA 13 05/08/2024     ABG  Recent Labs   Lab 01/23/25  0900   PH 7.460*   PO2 123.0*   PCO2 34.0*   HCO3 24.2     Mechanical Ventilation Support:  Vent Mode: SIMV (01/26/25 1020)  Ventilator Initiated: Yes (01/06/25 1104)  Set Rate: 18 BPM (01/26/25 1020)  Vt Set: 450 mL (01/26/25 1020)  Pressure Support: 10 cmH20 (01/26/25 1020)  PEEP/CPAP: 5 cmH20 (01/26/25 1020)  Oxygen Concentration (%): 28 (01/26/25 1200)  Peak Airway Pressure: 26 cmH20 (01/26/25 1020)  Total Ve: 8.1 L/m (01/26/25 1020)  F/VT Ratio<105 (RSBI): (!) 39.74 (01/26/25 1020)      Assessment/Plan:     Assessment  Persistent nonconvulsive status epilepticus based on most recent EEG from 01/09.  Acute respiratory failure-not ready for ventilator weaning based on failed SBT.  Pseudomonas UTI  Prior left MCA territory stroke   Hypotension (resolved)  Anemia with no obvious source of bleeding.    ESRD   Hx of HTN, CAD, DM2       Plan  Continue mechanical ventilation. RASS goal 0 to -1.  SBT is not indicated due to mental status.  Family deciding between tracheostomy versus comfort care  Continue Keppra b.i.d. and valproate  Continue midodrine 10 mg q.8  Code status DNR      DVT Prophylaxis: subq lovenox  GI Prophylaxis: pantoprazole     Genevieve Miller NP  Pulmonary and Critical Care

## 2025-01-27 ENCOUNTER — ANESTHESIA EVENT (OUTPATIENT)
Dept: SURGERY | Facility: HOSPITAL | Age: 80
DRG: 100 | End: 2025-01-27
Payer: MEDICARE

## 2025-01-27 ENCOUNTER — ANESTHESIA (OUTPATIENT)
Dept: SURGERY | Facility: HOSPITAL | Age: 80
DRG: 100 | End: 2025-01-27
Payer: MEDICARE

## 2025-01-27 LAB
ALBUMIN SERPL-MCNC: 1.8 G/DL (ref 3.4–4.8)
ALBUMIN/GLOB SERPL: 0.4 RATIO (ref 1.1–2)
ALP SERPL-CCNC: 117 UNIT/L (ref 40–150)
ALT SERPL-CCNC: 16 UNIT/L (ref 0–55)
ANION GAP SERPL CALC-SCNC: 10 MEQ/L
AST SERPL-CCNC: 18 UNIT/L (ref 5–34)
BASOPHILS # BLD AUTO: 0.13 X10(3)/MCL
BASOPHILS NFR BLD AUTO: 0.9 %
BILIRUB SERPL-MCNC: 0.2 MG/DL
BUN SERPL-MCNC: 62.2 MG/DL (ref 9.8–20.1)
CALCIUM SERPL-MCNC: 8.1 MG/DL (ref 8.4–10.2)
CHLORIDE SERPL-SCNC: 101 MMOL/L (ref 98–107)
CO2 SERPL-SCNC: 18 MMOL/L (ref 23–31)
CREAT SERPL-MCNC: 2.85 MG/DL (ref 0.55–1.02)
CREAT/UREA NIT SERPL: 22
EOSINOPHIL # BLD AUTO: 0.31 X10(3)/MCL (ref 0–0.9)
EOSINOPHIL NFR BLD AUTO: 2.1 %
ERYTHROCYTE [DISTWIDTH] IN BLOOD BY AUTOMATED COUNT: 19.7 % (ref 11.5–17)
GFR SERPLBLD CREATININE-BSD FMLA CKD-EPI: 16 ML/MIN/1.73/M2
GLOBULIN SER-MCNC: 4.3 GM/DL (ref 2.4–3.5)
GLUCOSE SERPL-MCNC: 225 MG/DL (ref 82–115)
HCT VFR BLD AUTO: 28.5 % (ref 37–47)
HGB BLD-MCNC: 8.9 G/DL (ref 12–16)
IMM GRANULOCYTES # BLD AUTO: 0.56 X10(3)/MCL (ref 0–0.04)
IMM GRANULOCYTES NFR BLD AUTO: 3.8 %
LYMPHOCYTES # BLD AUTO: 2.84 X10(3)/MCL (ref 0.6–4.6)
LYMPHOCYTES NFR BLD AUTO: 19.3 %
MAGNESIUM SERPL-MCNC: 2.4 MG/DL (ref 1.6–2.6)
MCH RBC QN AUTO: 29.8 PG (ref 27–31)
MCHC RBC AUTO-ENTMCNC: 31.2 G/DL (ref 33–36)
MCV RBC AUTO: 95.3 FL (ref 80–94)
MONOCYTES # BLD AUTO: 1.47 X10(3)/MCL (ref 0.1–1.3)
MONOCYTES NFR BLD AUTO: 10 %
NEUTROPHILS # BLD AUTO: 9.42 X10(3)/MCL (ref 2.1–9.2)
NEUTROPHILS NFR BLD AUTO: 63.9 %
NRBC BLD AUTO-RTO: 0.2 %
PHOSPHATE SERPL-MCNC: 2.8 MG/DL (ref 2.3–4.7)
PLATELET # BLD AUTO: 358 X10(3)/MCL (ref 130–400)
PMV BLD AUTO: 8.2 FL (ref 7.4–10.4)
POTASSIUM SERPL-SCNC: 5.6 MMOL/L (ref 3.5–5.1)
PROT SERPL-MCNC: 6.1 GM/DL (ref 5.8–7.6)
RBC # BLD AUTO: 2.99 X10(6)/MCL (ref 4.2–5.4)
SODIUM SERPL-SCNC: 129 MMOL/L (ref 136–145)
WBC # BLD AUTO: 14.73 X10(3)/MCL (ref 4.5–11.5)

## 2025-01-27 PROCEDURE — 11000001 HC ACUTE MED/SURG PRIVATE ROOM

## 2025-01-27 PROCEDURE — 25000003 PHARM REV CODE 250: Performed by: NURSE ANESTHETIST, CERTIFIED REGISTERED

## 2025-01-27 PROCEDURE — 63600175 PHARM REV CODE 636 W HCPCS

## 2025-01-27 PROCEDURE — 63600175 PHARM REV CODE 636 W HCPCS: Performed by: INTERNAL MEDICINE

## 2025-01-27 PROCEDURE — 37000009 HC ANESTHESIA EA ADD 15 MINS: Performed by: OTOLARYNGOLOGY

## 2025-01-27 PROCEDURE — 94760 N-INVAS EAR/PLS OXIMETRY 1: CPT

## 2025-01-27 PROCEDURE — 99231 SBSQ HOSP IP/OBS SF/LOW 25: CPT | Mod: ,,,

## 2025-01-27 PROCEDURE — 80053 COMPREHEN METABOLIC PANEL: CPT

## 2025-01-27 PROCEDURE — 63600175 PHARM REV CODE 636 W HCPCS: Performed by: STUDENT IN AN ORGANIZED HEALTH CARE EDUCATION/TRAINING PROGRAM

## 2025-01-27 PROCEDURE — D9220A PRA ANESTHESIA: Mod: CRNA,,, | Performed by: NURSE ANESTHETIST, CERTIFIED REGISTERED

## 2025-01-27 PROCEDURE — 80100016 HC MAINTENANCE HEMODIALYSIS

## 2025-01-27 PROCEDURE — 0B110F4 BYPASS TRACHEA TO CUTANEOUS WITH TRACHEOSTOMY DEVICE, OPEN APPROACH: ICD-10-PCS | Performed by: OTOLARYNGOLOGY

## 2025-01-27 PROCEDURE — 63600175 PHARM REV CODE 636 W HCPCS: Performed by: NURSE ANESTHETIST, CERTIFIED REGISTERED

## 2025-01-27 PROCEDURE — 94761 N-INVAS EAR/PLS OXIMETRY MLT: CPT

## 2025-01-27 PROCEDURE — 25000003 PHARM REV CODE 250: Performed by: INTERNAL MEDICINE

## 2025-01-27 PROCEDURE — 99900026 HC AIRWAY MAINTENANCE (STAT)

## 2025-01-27 PROCEDURE — 25000003 PHARM REV CODE 250

## 2025-01-27 PROCEDURE — C1752 CATH,HEMODIALYSIS,SHORT-TERM: HCPCS

## 2025-01-27 PROCEDURE — 36415 COLL VENOUS BLD VENIPUNCTURE: CPT

## 2025-01-27 PROCEDURE — 37000008 HC ANESTHESIA 1ST 15 MINUTES: Performed by: OTOLARYNGOLOGY

## 2025-01-27 PROCEDURE — 85025 COMPLETE CBC W/AUTO DIFF WBC: CPT

## 2025-01-27 PROCEDURE — 63600175 PHARM REV CODE 636 W HCPCS: Performed by: OTOLARYNGOLOGY

## 2025-01-27 PROCEDURE — 27200966 HC CLOSED SUCTION SYSTEM

## 2025-01-27 PROCEDURE — 36000706: Performed by: OTOLARYNGOLOGY

## 2025-01-27 PROCEDURE — 27201423 OPTIME MED/SURG SUP & DEVICES STERILE SUPPLY: Performed by: OTOLARYNGOLOGY

## 2025-01-27 PROCEDURE — 99900035 HC TECH TIME PER 15 MIN (STAT)

## 2025-01-27 PROCEDURE — 84100 ASSAY OF PHOSPHORUS: CPT

## 2025-01-27 PROCEDURE — 27100171 HC OXYGEN HIGH FLOW UP TO 24 HOURS

## 2025-01-27 PROCEDURE — 36000707: Performed by: OTOLARYNGOLOGY

## 2025-01-27 PROCEDURE — 83735 ASSAY OF MAGNESIUM: CPT

## 2025-01-27 PROCEDURE — 94003 VENT MGMT INPAT SUBQ DAY: CPT

## 2025-01-27 PROCEDURE — D9220A PRA ANESTHESIA: Mod: ANES,,, | Performed by: ANESTHESIOLOGY

## 2025-01-27 PROCEDURE — 99900031 HC PATIENT EDUCATION (STAT)

## 2025-01-27 RX ORDER — LIDOCAINE HYDROCHLORIDE 20 MG/ML
INJECTION, SOLUTION EPIDURAL; INFILTRATION; INTRACAUDAL; PERINEURAL
Status: DISCONTINUED | OUTPATIENT
Start: 2025-01-27 | End: 2025-01-27

## 2025-01-27 RX ORDER — PROPOFOL 10 MG/ML
VIAL (ML) INTRAVENOUS
Status: DISCONTINUED | OUTPATIENT
Start: 2025-01-27 | End: 2025-01-27

## 2025-01-27 RX ORDER — LIDOCAINE HYDROCHLORIDE AND EPINEPHRINE 10; 10 UG/ML; MG/ML
INJECTION, SOLUTION INFILTRATION; PERINEURAL
Status: DISCONTINUED | OUTPATIENT
Start: 2025-01-27 | End: 2025-01-27 | Stop reason: HOSPADM

## 2025-01-27 RX ORDER — ROCURONIUM BROMIDE 10 MG/ML
INJECTION, SOLUTION INTRAVENOUS
Status: DISCONTINUED | OUTPATIENT
Start: 2025-01-27 | End: 2025-01-27

## 2025-01-27 RX ORDER — CEFAZOLIN SODIUM 1 G/3ML
INJECTION, POWDER, FOR SOLUTION INTRAMUSCULAR; INTRAVENOUS
Status: DISCONTINUED | OUTPATIENT
Start: 2025-01-27 | End: 2025-01-27

## 2025-01-27 RX ADMIN — VALPROATE SODIUM 500 MG: 100 INJECTION, SOLUTION INTRAVENOUS at 05:01

## 2025-01-27 RX ADMIN — Medication: at 08:01

## 2025-01-27 RX ADMIN — INSULIN GLARGINE 18 UNITS: 100 INJECTION, SOLUTION SUBCUTANEOUS at 08:01

## 2025-01-27 RX ADMIN — SODIUM CHLORIDE, SODIUM GLUCONATE, SODIUM ACETATE, POTASSIUM CHLORIDE AND MAGNESIUM CHLORIDE: 526; 502; 368; 37; 30 INJECTION, SOLUTION INTRAVENOUS at 04:01

## 2025-01-27 RX ADMIN — LIDOCAINE HYDROCHLORIDE 80 MG: 20 INJECTION, SOLUTION INTRAVENOUS at 05:01

## 2025-01-27 RX ADMIN — LEVETIRACETAM 500 MG: 100 INJECTION, SOLUTION INTRAVENOUS at 08:01

## 2025-01-27 RX ADMIN — INSULIN ASPART 6 UNITS: 100 INJECTION, SOLUTION INTRAVENOUS; SUBCUTANEOUS at 05:01

## 2025-01-27 RX ADMIN — SUGAMMADEX 200 MG: 100 INJECTION, SOLUTION INTRAVENOUS at 05:01

## 2025-01-27 RX ADMIN — VALPROATE SODIUM 500 MG: 100 INJECTION, SOLUTION INTRAVENOUS at 06:01

## 2025-01-27 RX ADMIN — INSULIN ASPART 6 UNITS: 100 INJECTION, SOLUTION INTRAVENOUS; SUBCUTANEOUS at 12:01

## 2025-01-27 RX ADMIN — ROCURONIUM BROMIDE 50 MG: 10 SOLUTION INTRAVENOUS at 04:01

## 2025-01-27 RX ADMIN — PANTOPRAZOLE SODIUM 40 MG: 40 INJECTION, POWDER, FOR SOLUTION INTRAVENOUS at 08:01

## 2025-01-27 RX ADMIN — PROPOFOL 50 MG: 10 INJECTION, EMULSION INTRAVENOUS at 04:01

## 2025-01-27 RX ADMIN — SODIUM ZIRCONIUM CYCLOSILICATE 10 G: 10 POWDER, FOR SUSPENSION ORAL at 07:01

## 2025-01-27 RX ADMIN — MIDODRINE HYDROCHLORIDE 10 MG: 5 TABLET ORAL at 05:01

## 2025-01-27 RX ADMIN — CEFAZOLIN 2 G: 330 INJECTION, POWDER, FOR SOLUTION INTRAMUSCULAR; INTRAVENOUS at 04:01

## 2025-01-27 NOTE — NURSING
01/27/25 1633   Post-Hemodialysis Assessment   Rinseback Volume (mL) 1000 mL   Blood Volume Processed (Liters) 45 L   Dialyzer Clearance Lightly streaked   Duration of Treatment 160 minutes   Total UF (mL) 1500 mL   Patient Response to Treatment Pt tolerated HD well   Post-Hemodialysis Comments HD x 2 hours and 40 minutes (ended early so pt could go to surgery), net UF 0.5L, pt tolerated HD well

## 2025-01-27 NOTE — ANESTHESIA PREPROCEDURE EVALUATION
01/27/2025  Krysta Jansen is a 79 y.o., female.      Pre-op Assessment    I have reviewed the Patient Summary Reports.     I have reviewed the Nursing Notes. I have reviewed the NPO Status.   I have reviewed the Medications.     Review of Systems  Anesthesia Hx:  No problems with previous Anesthesia                Hematology/Oncology:    Oncology Normal    -- Anemia:                                  EENT/Dental:  EENT/Dental Normal           Cardiovascular:        CAD       CHF                                   Pulmonary:         Respiratory failure               Renal/:  Chronic Renal Disease, ESRD, Dialysis                Hepatic/GI:  Hepatic/GI Normal                    Musculoskeletal:  Musculoskeletal Normal                Neurological:       Seizures    AMS     Dementia                         Endocrine:  Diabetes, poorly controlled, type 1           Dermatological:  Skin Normal    Psych:  Psychiatric Normal                  Physical Exam  General: Somnolent    Airway:  Mallampati: unable to assess   Pre-Existing Airway: Oral Endotracheal tube    Anesthesia Plan  Type of Anesthesia, risks & benefits discussed:    Anesthesia Type: Gen ETT  Intra-op Monitoring Plan: Standard ASA Monitors  Post Op Pain Control Plan: multimodal analgesia  Induction:  IV and Inhalation  Airway Plan: Direct  Informed Consent: Informed consent signed with the Patient representative and all parties understand the risks and agree with anesthesia plan.  All questions answered.   ASA Score: 4  Day of Surgery Review of History & Physical: H&P Update referred to the surgeon/provider.    Ready For Surgery From Anesthesia Perspective.   .

## 2025-01-27 NOTE — PROGRESS NOTES
Nephrology consult follow up note    HPI:      Krysta Jansen is a 79 y.o. female is a nursing home resident and has ESRD and is on dialysis on Monday Wednesday Friday.  Brought to this hospital for altered mental status.  She was found to have some grand mal seizure.  She received some Ativan.  She had EEG done.  Her respiratory status and mental status deteriorated and required intubation to protect her airways.  Could not get any history from the patient.  Records revealed she was found to have some dysarthria and decreased level of consciousness Friday through being brought to this hospital.  Significant past medical history is positive for diabetes mellitus type 2 previous stroke hypertension coronary artery disease and congestive heart failure.    Interval history:     01/07/2024  Patient remains intubated and sedated.  On epinephrine for hypotension this morning.     01/08/25  Remains intubated and sedated on vent. Off pressors with BP stable at 130/60 range. Noted leucocytosis on am lab. Blood cultures pending.  Hgb stable at 7.6. Dialyzed yesterday and tolerated 500mL UF.      01/09/2025  No acute events overnight.  Patient remains intubated.  Not currently requiring vasopressors, but blood pressure does remain borderline low.  ICU team is requesting to hold hemodialysis today so they can have goals of care conversation with family.     01/10/2025   Currently tolerating dialysis.  Will remove only 500 cc of fluid.  Remains intubated.  Tolerating tube feedings.  Nurses reported that she is not on any sedation for about 48 hours.     01/13/2025   Weekend events noted.  Patient's condition is unchanged.  Remains on the ventilator and off sedation or pressors.  Unresponsive.     01/14/2025   Last 24 hours events noted.  Patient's condition remains unchanged and she is off pressors and off sedation and still unresponsive although she grimaces to painful stimuli.     01/15/2025   Currently tolerating  dialysis.  Neuro status and respiratory status remains unchanged.    01/16/2025  Dialyze yesterday.  No clinical change overnight.    01/17/2025  No clinical change overnight.  Hemoglobin dropped today.  Remains intubated    01/18/2025  No acute events overnight.  On Levophed for hypotension.  Dialyzed yesterday.    01/19/2025  No acute events overnight.  She does appear more edematous.  Remains on Levophed.  Getting tube feedings.     01/20/2025   Currently tolerating dialysis.  Will try to remove some fluid.  Will increase dialysis time to 4 hours.  Comfortable on the ventilator.    01/21/2025   Currently tolerating dialysis.  The plan is to remove some fluid and to help correct electrolytes and uremic toxins.    01/22/2025   Patient has had dialysis for 2 days in a row for 4 hours each time and now on low-dose Levophed.  No change neurologically.  Still tolerating tube feedings and patient remains on the ventilator.    01/23/2025   Just started on dialysis.  Will decrease fluid removal to 1 L only.  Overall condition is unchanged.  She is off Levophed now.    01/24/2025   She tolerated dialysis yesterday.  Overall condition remains unchanged.  Off pressors.  Off sedation.  Getting sodium phosphate for hypophosphatemia.  Tolerating tube feedings.    01/27/2025   Weekend events noted.  Nurse just informed me that family wanted to do trach and PEG placement.    Review of Systems:     Unable to obtain ROS due to mental status/intubation.     Past medical, family, surgical, and social history reviewed and unchanged from initial consult note.     Objective:       VITAL SIGNS: 24 HR MIN & MAX LAST    Temp  Min: 98.7 °F (37.1 °C)  Max: 99.5 °F (37.5 °C)  98.7 °F (37.1 °C)        BP  Min: 96/39  Max: 190/92  (!) 137/50     Pulse  Min: 47  Max: 130  100     Resp  Min: 16  Max: 39  (!) 34    SpO2  Min: 83 %  Max: 100 %  99 %      GEN: Chronically ill appearing AA female, comfortable on the ventilator.  Off all sedation and  pressors.  HEENT:  ET tube in place  CV: RRR without rub.  PULM: CTAB, unlabored  ABD: Soft, NT/ND abdomen with NABS  EXT:  2+ dependent edema  SKIN: Warm and dry  PSYCH:  Grimaces to painful stimuli and even opens eyes to verbal command.  Dialysis access:  Left IJ PermCath            Component Value Date/Time     (L) 01/27/2025 0439     (L) 01/26/2025 0451     05/08/2024 0739     03/15/2024 1101    K 5.6 (H) 01/27/2025 0439    K 4.4 01/26/2025 0451    K 3.8 05/08/2024 0739    K 3.9 03/15/2024 1101    CO2 18 (L) 01/27/2025 0439    CO2 19 (L) 01/26/2025 0451    CO2 22 05/08/2024 0739    CO2 21 (L) 03/15/2024 1101    BUN 62.2 (H) 01/27/2025 0439    BUN 51.2 (H) 01/26/2025 0451    BUN 55 (H) 10/02/2024 0000    BUN 23 (H) 07/12/2024 0000    CREATININE 2.85 (H) 01/27/2025 0439    CREATININE 2.10 (H) 01/26/2025 0451    CREATININE 3.37 (H) 05/08/2024 0739    CREATININE 3.2 (H) 03/15/2024 1101    CALCIUM 8.1 (L) 01/27/2025 0439    CALCIUM 8.3 (L) 01/26/2025 0451    CALCIUM 9 05/08/2024 0739    CALCIUM 9.9 03/15/2024 1101    PHOS 2.8 01/27/2025 0439            Component Value Date/Time    WBC 14.73 (H) 01/27/2025 0439    WBC 13.49 (H) 01/26/2025 0451    WBC 13.49 01/26/2025 0451    WBC 16.5 01/19/2025 0429    HGB 8.9 (L) 01/27/2025 0439    HGB 9.1 (L) 01/26/2025 0451    HGB 10.3 (L) 11/24/2024 0000    HGB 10 (L) 11/18/2024 0000    HCT 28.5 (L) 01/27/2025 0439    HCT 28.3 (L) 01/26/2025 0451     01/27/2025 0439     01/26/2025 0451         Imaging reviewed      Assessment / Plan:       ESRD.  Will change to Monday Wednesday Friday.  New onset seizure  Acute hypoxic respiratory failure  Hypertension  Diabetes mellitus type 2   History of coronary artery disease   History of previous stroke  Urinary tract infection  Anemia of chronic disease    Plan:  Hemodialysis today for hyperkalemia  Will dialyze her on Monday Wednesday Friday.

## 2025-01-27 NOTE — TRANSFER OF CARE
Anesthesia Transfer of Care Note    Patient: Krysta Jansen    Procedure(s) Performed: Procedure(s) (LRB):  CREATION, TRACHEOSTOMY (N/A)    Patient location: ICU    Anesthesia Type: general    Transport from OR: Transported from OR intubated on 100% O2 by AMBU with adequate controlled ventilation    Post pain: adequate analgesia    Post assessment: no apparent anesthetic complications    Post vital signs: stable    Level of consciousness: sedated    Complications: none    Transfer of care protocol was followed      Last vitals: Visit Vitals  BP (!) 141/63   Pulse 69   Temp 36.9 °C (98.5 °F) (Oral)   Resp (!) 28   Ht 5' (1.524 m)   Wt 74.1 kg (163 lb 5.8 oz)   SpO2 100%   BMI 31.90 kg/m²

## 2025-01-27 NOTE — OP NOTE
[facility] - [facility] ICU  Surgery Department  Operative Note    SUMMARY     Date of Procedure: 1/27/2025     Procedure: Procedure(s) (LRB):  CREATION, TRACHEOSTOMY (N/A)     Surgeons and Role:     * Kofi Alejo MD - Primary    Assisting Surgeon: None    Pre-Operative Diagnosis: ESRD (end stage renal disease) on dialysis [N18.6, Z99.2]    Post-Operative Diagnosis: Post-Op Diagnosis Codes:     * ESRD (end stage renal disease) on dialysis [N18.6, Z99.2]    Anesthesia: General    Operative Findings (including complications, if any):  Normal tracheal anatomy.  8. Cuffed Shiley trach placed and secured    Description of Technical Procedures:  Once the patient was taken to the operating room from the ICU and placed the operating room table she was positioned the usual fashion with the neck extension for tracheotomy.  Normal anatomical landmarks were noted in the neck and surgical markings were delineated.  Two fingerbreadths above the sternocleidomastoid notch an inferior to the cricoid a vertical incision line was made.  It was injected 1% lidocaine with epinephrine 6 cc.  Preoperative antibiotics were given.  The patient was then prepped and draped in sterile fashion for the procedure.      Incision was made with a 15 blade and taken full-thickness through the skin.  Allis clamps were used to grasp the subcuticular adipose tissue.  Hemostasis was the used to remove this adipose tissue into divide the strap muscles the midline using DeBakey.  At this point we could palpate the cricoid and mucosa and periosteum on top of this was incised with the Bovie.  We then dissected along the anterior tracheal wall using the harmonic scalpel.  We then transected the thyroid isthmus with the harmonic scalpel and getting good hemostasis.  Once we exposed the tracheal wall anesthesia reduced O2 concentration as well as deflated the cuff.  The endotracheal rings with the anterior and 3-0 were incised with a 11 blade  in suctioned mucus with the younger.  We then transected the 3rd ring inferiorly.  We could then visualize the ETT was anesthesia removed it proximally.  Upon visualizing the opening into the trachea we suctioned and used a Trousseau dilator to approximate the size of the tracheostomy.  We then were able to place the 8. Shiley with the obturator into the tracheal opening and skin under direct visualization.  We then blew up the cuff and connected the anesthesia circuit and confirmed CO2 returned.  At this point we secured the trach with trach ties and 2-0 silk ties x2 bilateral followed by Betadine-soaked gauze underneath the cuff of the trach.  And we then dressed the tracheostomy outside the collar.    Patient returned to anesthesia of the awakened and taken to the ICU stable condition    Significant Surgical Tasks Conducted by the Assistant(s), if Applicable: none      Estimated Blood Loss (EBL): * No values recorded between 1/27/2025  5:05 PM and 1/27/2025  5:34 PM *           Implants: * No implants in log *    Specimens:   Specimen (24h ago, onward)      None                    Condition: Good    Disposition: PACU - hemodynamically stable.    Attestation: Op Note Attestation: I performed the procedure.

## 2025-01-27 NOTE — PROGRESS NOTES
Patient Name: Krysta Jansen   MRN: [id number]   Admission Date: 1/5/2025   Hospital Length of Stay: 22   Attending Provider: DAMARIS Jaime MD   Consulting Provider: EZEQUIEL Stark  Reason for Consult: Goals of Care  Primary Care Physician:  No primary care provider on file.     Principal Problem: <principal problem not specified>     Patient information was obtained from relative(s) and ER records.     Final diagnoses:  [R29.818] Acute focal neurological deficit  [R41.82] AMS (altered mental status)  [R41.82] Altered mental status, unspecified altered mental status type (Primary)  [N18.6] End stage renal disease  [N39.0] Urinary tract infection without hematuria, site unspecified      Assessment/Plan:     I reviewed the family's understanding of the seriousness of the illness and its expected prognosis. We discussed the patient's goals of care and treatment preferences. We discussed the difference between palliative and curative medicine. I clarified current code status. I identified the surrogate decision maker to be the patient's two children, Migue and Sherri. I answered all questions and we formulated a plan including recommendations for symptom management and how to best achieve goals of care.       Patient remains intubated on mechanical ventilation. On exam today, she does not raise eyebrows or respond to verbal stimuli. Spoke with patient's daughter Sherri, who confirms her and her brother have elected to pursue tracheostomy and PEG tube placement. Plans for tracheostomy today and PEG tube placement tomorrow. Offered support. Encouraged to call with questions or concerns. Palliative Medicine will continue to follow. Discussed with ICU MD and primary nurse.    Advance Care Planning     Date: 01/27/2025    [facility]  I engaged the family in a voluntary conversation about advance care planning and we specifically addressed what the goals of care would be moving forward, in light of the patient's change in  clinical status, specifically current condition.  We did specifically address the patient's likely prognosis, which is fair .  We explored the patient's values and preferences for future care.  The family endorses that what is most important right now is to focus on improvement in condition but with limits to invasive therapies    Accordingly, we have decided that the best plan to meet the patient's goals includes continuing with treatment        Interval History:     No acute events over the weekend. Per Intensivist, the family has elected trach/PEG.      Active Ambulatory Problems     Diagnosis Date Noted    No Active Ambulatory Problems     Resolved Ambulatory Problems     Diagnosis Date Noted    No Resolved Ambulatory Problems     No Additional Past Medical History        History reviewed. No pertinent surgical history.     Review of patient's allergies indicates:  No Known Allergies       Current Facility-Administered Medications:     0.9%  NaCl infusion (for blood administration), , Intravenous, Q24H PRN, Jean Shine DO    0.9%  NaCl infusion (for blood administration), , Intravenous, Q24H PRN, DAMARIS Jaime MD    bisacodyL suppository 10 mg, 10 mg, Rectal, Daily PRN, David Yoder MD    dextrose 50% injection 12.5 g, 12.5 g, Intravenous, PRN, Trav Chavis MD    enoxaparin injection 30 mg, 30 mg, Subcutaneous, Q24H (prophylaxis, 1700), David Yoder MD, 30 mg at 01/26/25 1755    epoetin chelsea-epbx injection 20,000 Units, 20,000 Units, Subcutaneous, Every Mon, Wed, Fri, Jean Shine DO, 20,000 Units at 01/24/25 0953    fentaNYL 2500 mcg in 0.9% sodium chloride 250 mL infusion premix, 0-250 mcg/hr, Intravenous, Continuous, lUices Junior MD, Last Rate: 2.5 mL/hr at 01/27/25 0617, 25 mcg/hr at 01/27/25 0617    fentaNYL injection 50 mcg, 50 mcg, Intravenous, Q2H PRN, Rivera Du DO, 50 mcg at 01/26/25 1755    glucagon (human recombinant) injection 1 mg, 1 mg,  Intramuscular, PRN, Trav Chavis MD    heparin (porcine) injection 5,000 Units, 5,000 Units, Intra-Catheter, PRN, Jean Shine DO, 5,000 Units at 01/21/25 1128    insulin aspart U-100 injection 0-15 Units, 0-15 Units, Subcutaneous, Q6H PRN, Trav Chavis MD, 6 Units at 01/27/25 0552    insulin glargine U-100 (Lantus) injection 18 Units, 18 Units, Subcutaneous, BID, Ulices Junior MD, 18 Units at 01/26/25 2107    labetaloL injection 10 mg, 10 mg, Intravenous, Q15 Min PRN, David Yoder MD, 10 mg at 01/26/25 1820    levETIRAcetam (Keppra) 500 mg in D5W 100 mL IVPB (MB+), 500 mg, Intravenous, Q12H, Genevieve Cheney AGACNP-BC, Last Rate: 200 mL/hr at 01/27/25 0850, 500 mg at 01/27/25 0850    LORazepam injection 2 mg, 2 mg, Intravenous, Once, Apolinar Roblero MD    midodrine tablet 10 mg, 10 mg, Oral, Q8H, Robert Frances MD, 10 mg at 01/27/25 0510    pantoprazole injection 40 mg, 40 mg, Intravenous, Daily, Feliciano Gray MD, 40 mg at 01/27/25 0848    scopolamine 1.3-1.5 mg (1 mg over 3 days) 1 patch, 1 patch, Transdermal, Q3 Days, Sharif Dunne MD, 1 patch at 01/24/25 2151    sodium chloride 0.9% flush 10 mL, 10 mL, Intravenous, PRN, David Yoder MD    valproate (DEPACON) 500 mg in D5W 50 mL IVPB, 500 mg, Intravenous, Q12H, DAMARIS Jaime MD, Stopped at 01/27/25 0611    zinc oxide-cod liver oil 40 % paste, , Topical (Top), BID, DAMARIS Jaime MD, Given at 01/27/25 0849       Current Facility-Administered Medications:     0.9%  NaCl infusion (for blood administration), , Intravenous, Q24H PRN    0.9%  NaCl infusion (for blood administration), , Intravenous, Q24H PRN    bisacodyL, 10 mg, Rectal, Daily PRN    dextrose 50%, 12.5 g, Intravenous, PRN    fentaNYL, 50 mcg, Intravenous, Q2H PRN    glucagon (human recombinant), 1 mg, Intramuscular, PRN    heparin (porcine), 5,000 Units, Intra-Catheter, PRN    insulin aspart U-100, 0-15 Units, Subcutaneous, Q6H PRN     labetalol, 10 mg, Intravenous, Q15 Min PRN    sodium chloride 0.9%, 10 mL, Intravenous, PRN     History reviewed. No pertinent family history.       Review of Systems   Unable to perform ROS: Intubated            Objective:   BP (!) 156/61   Pulse 81   Temp 98.7 °F (37.1 °C) (Oral)   Resp (!) 31   Ht 5' (1.524 m)   Wt 74.1 kg (163 lb 5.8 oz)   SpO2 99%   BMI 31.90 kg/m²      Physical Exam   Constitutional: She appears obese. No distress. She appears ill.   HENT:   Mouth/Throat: Mucous membranes are moist.   Cardiovascular: Normal rate, regular rhythm and normal heart sounds.   No murmur heard.Pulmonary:      Effort: Pulmonary effort is normal. No respiratory distress.      Breath sounds: Normal breath sounds.      Comments: Intubated on mechanical ventilation    Abdominal: She exhibits no distension.   Musculoskeletal:         General: Swelling present.      Right lower leg: Edema present.      Left lower leg: Edema present.   Neurological:   Does not arouse to verbal stimuli   Skin: Skin is warm and dry.          Review of Symptoms  Review of Symptoms      Symptom Assessment (ESAS 0-10 Scale)  Pain:  0  Dyspnea:  0  Anxiety:  0  Nausea:  0  Depression:  0  Anorexia:  0  Fatigue:  0  Insomnia:  0  Restlessness:  0  Agitation:  0         Bowel Management Plan (BMP):  Yes      Living Arrangements:  Lives with family    Psychosocial/Cultural:   See Palliative Psychosocial Note: Yes  **Primary  to Follow**  Palliative Care  Consult: No      Advance Care Planning   Advance Directives:     Decision Making:  Family answered questions  Goals of Care: The family endorses that what is most important right now is to focus on improvement in condition but with limits to invasive therapies    Accordingly, we have decided that the best plan to meet the patient's goals includes continuing with treatment            PAINAD: NA    Caregiver burden formerly assessed: Yes    > 50% of 15 min of encounter  was spent in chart review, face to face discussion of goals of care, symptom assessment, coordination of care and emotional support.    EZEQUIEL Stark-BC  Palliative Medicine  [facility]

## 2025-01-27 NOTE — ANESTHESIA POSTPROCEDURE EVALUATION
Anesthesia Post Evaluation    Patient: Krysta Jansen    Procedure(s) Performed: Procedure(s) (LRB):  CREATION, TRACHEOSTOMY (N/A)    Final Anesthesia Type: general      Patient location during evaluation: ICU  Patient participation: No - Unable to Participate, Sedation  Level of consciousness: sedated  Post-procedure vital signs: reviewed and stable  Pain management: adequate  Airway patency: patent  KEARA mitigation strategies: Multimodal analgesia  PONV status at discharge: No PONV  Anesthetic complications: no      Cardiovascular status: hemodynamically stable and blood pressure returned to baseline  Respiratory status: Tracheostomy  Hydration status: euvolemic  Follow-up not needed.          Vitals Value Taken Time   /59 01/27/25 1746   Temp 36.9 °C (98.5 °F) 01/27/25 1600   Pulse 82 01/27/25 1749   Resp 18 01/27/25 1749   SpO2 92 % 01/27/25 1749   Vitals shown include unfiled device data.      No case tracking events are documented in the log.      Pain/Karl Score: Pain Rating Prior to Med Admin: 0 (1/26/2025  5:55 PM)  Pain Rating Post Med Admin: 0 (1/26/2025  4:16 PM)

## 2025-01-27 NOTE — PLAN OF CARE
Problem: Adult Inpatient Plan of Care  Goal: Plan of Care Review  Outcome: Progressing  Goal: Absence of Hospital-Acquired Illness or Injury  Outcome: Progressing  Goal: Optimal Comfort and Wellbeing  Outcome: Progressing  Goal: Readiness for Transition of Care  Outcome: Progressing     Problem: Hemodialysis  Goal: Safe, Effective Therapy Delivery  Outcome: Progressing  Goal: Effective Tissue Perfusion  Outcome: Progressing  Goal: Absence of Infection Signs and Symptoms  Outcome: Progressing     Problem: Stroke, Ischemic (Includes Transient Ischemic Attack)  Goal: Optimal Coping  Outcome: Progressing  Goal: Effective Bowel Elimination  Outcome: Progressing  Goal: Optimal Cerebral Tissue Perfusion  Outcome: Progressing  Goal: Optimal Cognitive Function  Outcome: Progressing  Goal: Improved Communication Skills  Outcome: Progressing  Goal: Optimal Functional Ability  Outcome: Progressing  Goal: Optimal Nutrition Intake  Outcome: Progressing  Goal: Effective Oxygenation and Ventilation  Outcome: Progressing  Goal: Improved Sensorimotor Function  Outcome: Progressing  Goal: Safe and Effective Swallow  Outcome: Progressing  Goal: Effective Urinary Elimination  Outcome: Progressing     Problem: Skin Injury Risk Increased  Goal: Skin Health and Integrity  Outcome: Progressing     Problem: Wound  Goal: Optimal Coping  Outcome: Progressing  Goal: Optimal Functional Ability  Outcome: Progressing  Goal: Absence of Infection Signs and Symptoms  Outcome: Progressing  Goal: Improved Oral Intake  Outcome: Progressing  Goal: Optimal Pain Control and Function  Outcome: Progressing  Goal: Skin Health and Integrity  Outcome: Progressing  Goal: Optimal Wound Healing  Outcome: Progressing     Problem: Fall Injury Risk  Goal: Absence of Fall and Fall-Related Injury  Outcome: Progressing     Problem: Infection  Goal: Absence of Infection Signs and Symptoms  Outcome: Progressing     Problem: Coping Ineffective  Goal: Effective  Coping  Outcome: Progressing     Problem: Mechanical Ventilation Invasive  Goal: Effective Communication  Outcome: Progressing  Goal: Optimal Device Function  Outcome: Progressing  Goal: Mechanical Ventilation Liberation  Outcome: Progressing  Goal: Optimal Nutrition Delivery  Outcome: Progressing  Goal: Absence of Device-Related Skin and Tissue Injury  Outcome: Progressing  Goal: Absence of Ventilator-Induced Lung Injury  Outcome: Progressing     Problem: Artificial Airway  Goal: Effective Communication  Outcome: Progressing  Goal: Optimal Device Function  Outcome: Progressing  Goal: Absence of Device-Related Skin or Tissue Injury  Outcome: Progressing

## 2025-01-27 NOTE — CONSULTS
"[facility]   Consult Note  Inpatient consult to Gastroenterology  Consult performed by: Elif Roblero FNP  Consult ordered by: Ulices Junior MD             HPI: She is a 79-year-old female unknown to our service with a PMH of ESRD on HD, hypertension, CAD, CHF, and diabetes mellitus who presented with altered mental status.  She then developed seizure-like activity and was intubated for airway protection.  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  She remains intubated with plans for tracheostomy placement.   Currently on Lovenox for DVT prevention, no other anticoagulation.  GI consulted for PEG placement.         ROS: Unobtainable    Medical History:   As above    Surgical History:   Cholecystectomy, vascular procedures for HD access    Social History:  Social History     Tobacco Use    Smoking status: Unknown    Smokeless tobacco: Not on file   Substance Use Topics    Alcohol use: Not Currently       Family History:  Reviewed - noncontributory     Allergies:  Review of patient's allergies indicates:  No Known Allergies    MEDS: Reviewed in EMR    PHYSICAL EXAM:  T 98.7 °F (37.1 °C)   BP (!) 137/50   P 100   RR (!) 34   O2 100 %  GENERAL: Critically ill on mechanical ventilation; NAD; does not appear toxic  SKIN: no rash, no jaundice  HEENT: sclera non-icteric; PERRL; OETT to external os  NECK: supple; no LAD  CHEST: CTA; nonlabored, equal expansion; no adventitious BS  CARDIOVASCULAR: RRR, S1S2; no murmur   ABDOMEN:  active bowel sounds; abdomen soft, nondistended, nontender to palpation; noted RUQ surgical scar  EXTREMITIES: no cyanosis or clubbing  NEURO: Unresponsive     Labs:   Recent Labs     01/26/25  0451 01/27/25  0439   WBC 13.49  13.49* 14.73*   RBC 3.08* 2.99*   HGB 9.1* 8.9*   HCT 28.3* 28.5*   MCV 91.9 95.3*   MCH 29.5 29.8   MCHC 32.2* 31.2*   RDW 19.9* 19.7*    358     No results for input(s): "LACTIC" in the last 72 " "hours.  No results for input(s): "INR", "APTT", "D-DIMER" in the last 72 hours.  No results for input(s): "HGBA1C", "CHOL", "TRIG", "LDL", "VLDL", "HDL" in the last 72 hours.   Recent Labs     01/26/25  0451 01/27/25  0439   * 129*   K 4.4 5.6*   CO2 19* 18*   BUN 51.2* 62.2*   CREATININE 2.10* 2.85*   GLUCOSE 94 225*   CALCIUM 8.3* 8.1*   MG 2.20 2.40   PHOS 2.1* 2.8   ALBUMIN 1.8* 1.8*   GLOBULIN 4.7* 4.3*   ALKPHOS 105 117   ALT 12 16   AST 16 18   BILITOT 0.2 0.2     No results for input(s): "BNP", "CPK", "TROPONINI" in the last 72 hours.        Imaging: Reviewed pertinent imaging    ASSESSMENT / PLAN:  She is a 79-year-old female unknown to our service with a PMH of ESRD on HD, hypertension, CAD, CHF, and diabetes mellitus who presented with altered mental status.  She then developed seizure-like activity and was intubated for airway protection.  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  She remains intubated with plans for tracheostomy placement.   Currently on Lovenox for DVT prevention, no other anticoagulation.  GI consulted for PEG placement.     1.  Need for alternative means of nutrition  2.  Acute respiratory failure  3.  Persistent nonconvulsive status epilepticus      Family agreeable to PEG placement.    Undergoing tracheostomy today with ENT  NPO after MN for PEG placement tomorrow.  Ok for Lovenox dose tonight as scheduled.   INR with am labs.   No family present at time of exam.  Discussed with nursing.       Thank you for allowing us to participate in the care of Robbiemaci Mago Jansen.    Elif Roblero, NEHAL  [facility]  "

## 2025-01-27 NOTE — PROGRESS NOTES
[facility] - Emergency Dept  Pulmonary Critical Care Note    Patient Name: Krysta Jansen  MRN: [id number]  Admission Date: 1/5/2025  Hospital Length of Stay: 22 days  Code Status: DNR  Attending Provider: DAMARIS Jaime MD  Primary Care Provider: No primary care provider on file.     Subjective:     HPI:   Patient is a 79-year-old female with a past medical history of ESRD on HD, HTN, CAD, CHF unknown EF, DM2, previous left MCA territory stroke who was brought into the ER after her son reported seeing her normal an hour prior, briefly left in when came back patient was nonverbal and lethargic.      She was emergently brought to the ER and a code fast was called, she was assessed by Neurology in the ER where she was found to have an NIH of 22 based on dysarthria and level of consciousness, but was deemed not a candidate for TNK secondary to overall nonfocal exam and recent left arm HD access surgery reported.  Vitals and laboratory work were overall unremarkable except for changes as expected in any age renal disease, and a potential urinary tract infection.  She was admitted under typical stroke protocol.     On 1/6/2025, EEG at 0800 revealed seizure-like activity. She was given Lorazepam 2 mg IV. Patient later became unconscious, unresponsive to pain stimuli, and hypotensive. She was intubated for airway protection.       Hospital Course/Significant events:  1/6/2025 - Admitted to ICU   1/9/2025 - EEG shows evidence of epileptiform discharges but no clinical convulsing noted.    24 Hour Interval History:  Bradycardic this morning.  She remains intubated and sedated on low-dose fentanyl.    Review of systems unobtainable due to intubation, altered mental status.        Social History     Socioeconomic History    Marital status: Single   Tobacco Use    Smoking status: Unknown   Substance and Sexual Activity    Alcohol use: Not Currently     Social Drivers of Health     Financial Resource Strain:  Patient Unable To Answer (1/11/2025)    Overall Financial Resource Strain (CARDIA)     Difficulty of Paying Living Expenses: Patient unable to answer   Food Insecurity: Patient Unable To Answer (1/11/2025)    Hunger Vital Sign     Worried About Running Out of Food in the Last Year: Patient unable to answer     Ran Out of Food in the Last Year: Patient unable to answer   Transportation Needs: Patient Unable To Answer (1/11/2025)    TRANSPORTATION NEEDS     Transportation : Patient unable to answer   Stress: Patient Unable To Answer (1/11/2025)    [employer] - Occupational Stress Questionnaire     Feeling of Stress : Patient unable to answer   Housing Stability: Patient Unable To Answer (1/11/2025)    Housing Stability Vital Sign     Unable to Pay for Housing in the Last Year: Patient unable to answer     Homeless in the Last Year: Patient unable to answer       Current Outpatient Medications   Medication Instructions    aspirin 81 MG Chew 1 tablet, Daily    BASAGLAR KWIKPEN U-100 INSULIN 30 Units, Nightly    CeleXA 10 mg, Daily    ENTRESTO 24-26 mg per tablet 1 tablet, 2 times daily    metoprolol succinate 25 mg CSpX 1 capsule, Daily    PLAVIX 75 mg tablet 1 tablet, Daily    rosuvastatin (CRESTOR) 40 MG Tab 1 tablet, Nightly    traMADoL (ULTRAM) 50 mg tablet 1 tablet, Every 6 hours PRN    vancomycin (VANCOCIN) 500 mg, Every Mon, Wed, Fri     Current Inpatient Medications   enoxparin  30 mg Subcutaneous Q24H (prophylaxis, 1700)    epoetin chelsea-ebpx (RETACRIT) injection  20,000 Units Subcutaneous Every Mon, Wed, Fri    insulin glargine U-100  18 Units Subcutaneous BID    levETIRAcetam (Keppra) IV (PEDS and ADULTS)  500 mg Intravenous Q12H    lorazepam  2 mg Intravenous Once    midodrine  10 mg Oral Q8H    pantoprazole  40 mg Intravenous Daily    scopolamine  1 patch Transdermal Q3 Days    valproate sodium (DEPACON) IVPB  500 mg Intravenous Q12H    zinc oxide-cod liver oil   Topical (Top)  BID     Current Intravenous Infusions   fentanyl  0-250 mcg/hr Intravenous Continuous 2.5 mL/hr at 01/27/25 0617 25 mcg/hr at 01/27/25 0617       Objective:     Intake/Output Summary (Last 24 hours) at 1/27/2025 0620  Last data filed at 1/27/2025 0617  Gross per 24 hour   Intake 2284.23 ml   Output --   Net 2284.23 ml     Vital Signs (Most Recent):  Temp: 99.4 °F (37.4 °C) (01/27/25 0400)  Pulse: (!) 50 (01/27/25 0600)  Resp: 18 (01/27/25 0600)  BP: (!) 105/39 (01/27/25 0600)  SpO2: 100 % (01/27/25 0600)  Body mass index is 31.9 kg/m².  Weight: 74.1 kg (163 lb 5.8 oz) Vital Signs (24h Range):  Temp:  [98.7 °F (37.1 °C)-99.5 °F (37.5 °C)] 99.4 °F (37.4 °C)  Pulse:  [] 50  Resp:  [16-39] 18  SpO2:  [83 %-100 %] 100 %  BP: ()/() 105/39         Physical Exam:  Gen- intubated  HENT- ATNC, MMM, ETT in place  CV- RRR   Resp- CTAB, compliant with mechanical ventilation  MSK- WWP, 2+ BUE edema and BLE edema   Neuro- resting comfortably, not following commands, lightly sedated  Abdomen-positive bowel sounds, no mass, nontender        Lines/Drains/Airways       Central Venous Catheter Line  Duration                  Hemodialysis Catheter 01/06/25 1400 left internal jugular 20 days              Drain  Duration                  NG/OG Tube 01/06/25 1109 Center mouth 20 days              Airway  Duration                  Airway - Non-Surgical 01/06/25 1104 Endotracheal Tube 20 days              Peripheral Intravenous Line  Duration                  Peripheral IV - Single Lumen 01/24/25 0200 20 G Yes Anterior;Proximal;Right Upper Arm 3 days                  Significant Labs:  Lab Results   Component Value Date    WBC 14.73 (H) 01/27/2025    HGB 8.9 (L) 01/27/2025    HCT 28.5 (L) 01/27/2025    MCV 95.3 (H) 01/27/2025     01/27/2025       BMP  Lab Results   Component Value Date     (L) 01/27/2025    K 5.6 (H) 01/27/2025    CO2 18 (L) 01/27/2025    BUN 62.2 (H) 01/27/2025    CREATININE 2.85 (H)  01/27/2025    CALCIUM 8.1 (L) 01/27/2025    AGAP 10.0 01/27/2025    ESTGFRAFRICA 13 05/08/2024     ABG  Recent Labs   Lab 01/23/25  0900   PH 7.460*   PO2 123.0*   PCO2 34.0*   HCO3 24.2     Mechanical Ventilation Support:  Vent Mode: VOLUME SIMV (01/27/25 0324)  Ventilator Initiated: Yes (01/06/25 1104)  Set Rate: 18 BPM (01/27/25 0324)  Vt Set: 450 mL (01/27/25 0324)  Pressure Support: 10 cmH20 (01/27/25 0324)  PEEP/CPAP: 5 cmH20 (01/27/25 0324)  Oxygen Concentration (%): 30 (01/27/25 0400)  Peak Airway Pressure: 25 cmH20 (01/27/25 0324)  Total Ve: 7.9 L/m (01/27/25 0324)  F/VT Ratio<105 (RSBI): (!) 40.91 (01/27/25 0324)      Assessment/Plan:     Assessment  Persistent nonconvulsive status epilepticus based on most recent EEG from 01/09.  Acute respiratory failure-not ready for ventilator weaning based on failed SBT.  Pseudomonas UTI  Prior left MCA territory stroke   Hypotension (resolved)  Anemia with no obvious source of bleeding.    ESRD   Hx of HTN, CAD, DM2       Plan  Continue mechanical ventilation. RASS goal 0 to -1.  SBT is not indicated due to mental status.  Discussed with son and family would like to pursue tracheostomy and PEG tube placement. Will consult ENT.  Continue Keppra b.i.d. and valproate  Continue midodrine 10 mg q.8  Code status DNR      DVT Prophylaxis: subq lovenox  GI Prophylaxis: pantoprazole     Ulices Junior MD  Pulmonary and Critical Care

## 2025-01-27 NOTE — PLAN OF CARE
Problem: Adult Inpatient Plan of Care  Goal: Plan of Care Review  Outcome: Progressing  Goal: Absence of Hospital-Acquired Illness or Injury  Outcome: Progressing  Goal: Optimal Comfort and Wellbeing  Outcome: Progressing  Goal: Readiness for Transition of Care  Outcome: Progressing     Problem: Hemodialysis  Goal: Safe, Effective Therapy Delivery  Outcome: Progressing  Goal: Effective Tissue Perfusion  Outcome: Progressing  Goal: Absence of Infection Signs and Symptoms  Outcome: Progressing     Problem: Stroke, Ischemic (Includes Transient Ischemic Attack)  Goal: Optimal Coping  Outcome: Progressing  Goal: Effective Bowel Elimination  Outcome: Progressing  Goal: Optimal Cerebral Tissue Perfusion  Outcome: Progressing  Goal: Optimal Cognitive Function  Outcome: Progressing  Goal: Improved Communication Skills  Outcome: Progressing  Goal: Optimal Functional Ability  Outcome: Progressing  Goal: Optimal Nutrition Intake  Outcome: Progressing  Goal: Effective Oxygenation and Ventilation  Outcome: Progressing  Goal: Improved Sensorimotor Function  Outcome: Progressing  Goal: Safe and Effective Swallow  Outcome: Progressing  Goal: Effective Urinary Elimination  Outcome: Progressing     Problem: Skin Injury Risk Increased  Goal: Skin Health and Integrity  Outcome: Progressing     Problem: Wound  Goal: Optimal Coping  Outcome: Progressing  Goal: Optimal Functional Ability  Outcome: Progressing  Goal: Absence of Infection Signs and Symptoms  Outcome: Progressing  Goal: Improved Oral Intake  Outcome: Progressing  Goal: Optimal Pain Control and Function  Outcome: Progressing  Goal: Skin Health and Integrity  Outcome: Progressing  Goal: Optimal Wound Healing  Outcome: Progressing     Problem: Infection  Goal: Absence of Infection Signs and Symptoms  Outcome: Progressing     Problem: Fall Injury Risk  Goal: Absence of Fall and Fall-Related Injury  Outcome: Progressing     Problem: Coping Ineffective  Goal: Effective  Coping  Outcome: Progressing     Problem: Mechanical Ventilation Invasive  Goal: Effective Communication  Outcome: Progressing  Goal: Optimal Device Function  Outcome: Progressing  Goal: Mechanical Ventilation Liberation  Outcome: Progressing  Goal: Optimal Nutrition Delivery  Outcome: Progressing  Goal: Absence of Device-Related Skin and Tissue Injury  Outcome: Progressing  Goal: Absence of Ventilator-Induced Lung Injury  Outcome: Progressing

## 2025-01-27 NOTE — CONSULTS
[facility] - 7 East ICU  Otorhinolaryngology-Head & Neck Surgery  Consult Note    Patient Name: Krysta Jansen  MRN: [id number]  Code Status: DNR  Admission Date: 1/5/2025  Hospital Length of Stay: 22 days  Attending Physician: DAMARIS Jaime MD  Primary Care Provider: No primary care provider on file.    Inpatient consult to ENT  Consult performed by: Kofi Alejo MD  Consult ordered by: Ulices Junior MD        Subjective:     Chief Complaint/Reason for Admission:  Respiratory dependent a ventilator system    History of Present Illness:  Ms. Quiroga is a 79-year-old female with a history of end-stage renal disease hypertension and coronary artery disease CHF diabetes mellitus who recently had altered mental status due to seizure-like activity.  She was taken to the [facility] ER and eventually admitted for treatment to the ICU.  She has been on ventilator support since admission.  ENT has been asked to consult on the patient for tracheostomy.  Tube feeds held since 10:30 of this morning and Lovenox last dose was last night or 5:00 p.m.    Medications:  Continuous Infusions:   fentanyl  0-250 mcg/hr Intravenous Continuous   Stopped at 01/27/25 0653     Scheduled Meds:   enoxparin  30 mg Subcutaneous Q24H (prophylaxis, 1700)    epoetin chelsea-ebpx (RETACRIT) injection  20,000 Units Subcutaneous Every Mon, Wed, Fri    insulin glargine U-100  18 Units Subcutaneous BID    levETIRAcetam (Keppra) IV (PEDS and ADULTS)  500 mg Intravenous Q12H    lorazepam  2 mg Intravenous Once    midodrine  10 mg Oral Q8H    pantoprazole  40 mg Intravenous Daily    scopolamine  1 patch Transdermal Q3 Days    valproate sodium (DEPACON) IVPB  500 mg Intravenous Q12H    zinc oxide-cod liver oil   Topical (Top) BID     PRN Meds:  Current Facility-Administered Medications:     0.9%  NaCl infusion (for blood administration), , Intravenous, Q24H PRN    0.9%  NaCl infusion (for blood administration), , Intravenous,  Q24H PRN    bisacodyL, 10 mg, Rectal, Daily PRN    dextrose 50%, 12.5 g, Intravenous, PRN    fentaNYL, 50 mcg, Intravenous, Q2H PRN    glucagon (human recombinant), 1 mg, Intramuscular, PRN    heparin (porcine), 5,000 Units, Intra-Catheter, PRN    insulin aspart U-100, 0-15 Units, Subcutaneous, Q6H PRN    labetalol, 10 mg, Intravenous, Q15 Min PRN    sodium chloride 0.9%, 10 mL, Intravenous, PRN     No current facility-administered medications on file prior to encounter.     Current Outpatient Medications on File Prior to Encounter   Medication Sig    aspirin 81 MG Chew Take 1 tablet by mouth once daily.    BASAGLAR KWIKPEN U-100 INSULIN 100 unit/mL (3 mL) InPn pen Inject 30 Units into the skin every evening.    CELEXA 10 mg tablet Take 10 mg by mouth once daily.    ENTRESTO 24-26 mg per tablet Take 1 tablet by mouth 2 (two) times daily.    metoprolol succinate 25 mg CSpX Take 1 capsule by mouth Daily.    PLAVIX 75 mg tablet Take 1 tablet by mouth once daily.    rosuvastatin (CRESTOR) 40 MG Tab Take 1 tablet by mouth every evening.    traMADoL (ULTRAM) 50 mg tablet Take 1 tablet by mouth every 6 (six) hours as needed for Pain.    vancomycin (VANCOCIN) 500 mg injection Inject 500 mg into the vein every Mon, Wed, Fri.       Review of patient's allergies indicates:  No Known Allergies    History reviewed. No pertinent past medical history.  History reviewed. No pertinent surgical history.  Family History    None       Tobacco Use    Smoking status: Unknown    Smokeless tobacco: Not on file   Substance and Sexual Activity    Alcohol use: Not Currently    Drug use: Not on file    Sexual activity: Not on file     Review of Systems  Objective:     Vital Signs (Most Recent):  Temp: 98.5 °F (36.9 °C) (01/27/25 1600)  Pulse: (!) 59 (01/27/25 1600)  Resp: (!) 24 (01/27/25 1600)  BP: (!) 140/57 (01/27/25 1600)  SpO2: 100 % (01/27/25 1600) Vital Signs (24h Range):  Temp:  [98.5 °F (36.9 °C)-99.6 °F (37.6 °C)] 98.5 °F (36.9  °C)  Pulse:  [] 59  Resp:  [16-38] 24  SpO2:  [98 %-100 %] 100 %  BP: ()/() 140/57     Weight: 74.1 kg (163 lb 5.8 oz)  Body mass index is 31.9 kg/m².    Date 01/27/25 0700 - 01/28/25 0659   Shift 8528-4593 9770-0478 4857-1529 24 Hour Total   INTAKE   I.V.(mL/kg) 1.5(0)   1.5(0)   NG/   150   IV Piggyback 98.1   98.1   Shift Total(mL/kg) 249.6(3.4)   249.6(3.4)   OUTPUT   Shift Total(mL/kg)       Weight (kg) 74.1 74.1 74.1 74.1       Physical Exam  Vitals and nursing note reviewed. Exam conducted with a chaperone present.   Constitutional:       Appearance: She is obese.      Comments: Responsive to painful stimuli.  Currently sedated on ventilator support   HENT:      Head: Normocephalic and atraumatic.      Right Ear: Ear canal and external ear normal.      Left Ear: Ear canal and external ear normal.      Nose:      Comments: Congested nasal mucosa with due to NG tube in place     Mouth/Throat:      Mouth: Mucous membranes are dry.   Eyes:      Conjunctiva/sclera: Conjunctivae normal.   Neck:      Comments: Patient's neck is somewhat rotated towards the right due the patient's head flexing to the right.  Palpable anterior neck anatomic landmarks noted as well as a dialysis catheter on the left  Pulmonary:      Comments: Ventilator dependent  Musculoskeletal:      Cervical back: Neck supple.   Skin:     General: Skin is warm.   Neurological:      Comments: Sedated         Significant Labs:  CBC:   Recent Labs   Lab 01/27/25 0439   WBC 14.73*   RBC 2.99*   HGB 8.9*   HCT 28.5*      MCV 95.3*   MCH 29.8   MCHC 31.2*     CMP:   Recent Labs   Lab 01/27/25 0439   CALCIUM 8.1*   ALBUMIN 1.8*   *   K 5.6*   CO2 18*      BUN 62.2*   CREATININE 2.85*   ALKPHOS 117   ALT 16   AST 18   BILITOT 0.2     Coagulation:   Recent Labs   Lab 01/27/25 0439   LABPROT 6.1       Significant Diagnostics:  None    Assessment/Plan:     Active Diagnoses:    Diagnosis Date Noted POA    Seizures  [R56.9] 01/07/2025 Yes    Status epilepticus [G40.901] 01/07/2025 Yes    Altered mental status [R41.82] 01/06/2025 Unknown      Problems Resolved During this Admission:    Diagnosis Date Noted Date Resolved POA    Seizure-like activity [R56.9] 01/06/2025 01/08/2025 Yes     VTE Risk Mitigation (From admission, onward)           Ordered     heparin (porcine) injection 5,000 Units  As needed (PRN)         01/10/25 0902     enoxaparin injection 30 mg  Every 24 hours         01/05/25 2143     IP VTE HIGH RISK PATIENT  Once         01/05/25 2137     Place sequential compression device  Until discontinued         01/05/25 2137                Plan is to perform a tracheostomy in the operating room this evening and have the patient transferred back up to the ICU and maintained in the normal post tracheostomy respiratory care    Thank you for your consult. I will sign off. Please contact us if you have any additional questions.    Kofi Alejo MD  Otorhinolaryngology-Head & Neck Surgery  [facility] - [facility] East ICU

## 2025-01-28 LAB
ALBUMIN SERPL-MCNC: 1.8 G/DL (ref 3.4–4.8)
ALBUMIN/GLOB SERPL: 0.4 RATIO (ref 1.1–2)
ALP SERPL-CCNC: 89 UNIT/L (ref 40–150)
ALT SERPL-CCNC: 10 UNIT/L (ref 0–55)
ANION GAP SERPL CALC-SCNC: 10 MEQ/L
AST SERPL-CCNC: 18 UNIT/L (ref 5–34)
BASOPHILS # BLD AUTO: 0.13 X10(3)/MCL
BASOPHILS NFR BLD AUTO: 0.8 %
BILIRUB SERPL-MCNC: 0.2 MG/DL
BUN SERPL-MCNC: 41.7 MG/DL (ref 9.8–20.1)
CALCIUM SERPL-MCNC: 8.1 MG/DL (ref 8.4–10.2)
CHLORIDE SERPL-SCNC: 101 MMOL/L (ref 98–107)
CO2 SERPL-SCNC: 23 MMOL/L (ref 23–31)
CREAT SERPL-MCNC: 2.21 MG/DL (ref 0.55–1.02)
CREAT/UREA NIT SERPL: 19
EOSINOPHIL # BLD AUTO: 0.32 X10(3)/MCL (ref 0–0.9)
EOSINOPHIL NFR BLD AUTO: 2 %
ERYTHROCYTE [DISTWIDTH] IN BLOOD BY AUTOMATED COUNT: 19.2 % (ref 11.5–17)
GFR SERPLBLD CREATININE-BSD FMLA CKD-EPI: 22 ML/MIN/1.73/M2
GLOBULIN SER-MCNC: 4.4 GM/DL (ref 2.4–3.5)
GLUCOSE SERPL-MCNC: 39 MG/DL (ref 82–115)
HCT VFR BLD AUTO: 29 % (ref 37–47)
HGB BLD-MCNC: 9.6 G/DL (ref 12–16)
IMM GRANULOCYTES # BLD AUTO: 0.45 X10(3)/MCL (ref 0–0.04)
IMM GRANULOCYTES NFR BLD AUTO: 2.8 %
INR PPP: 1.2
LYMPHOCYTES # BLD AUTO: 4.24 X10(3)/MCL (ref 0.6–4.6)
LYMPHOCYTES NFR BLD AUTO: 26 %
MAGNESIUM SERPL-MCNC: 2.3 MG/DL (ref 1.6–2.6)
MCH RBC QN AUTO: 30 PG (ref 27–31)
MCHC RBC AUTO-ENTMCNC: 33.1 G/DL (ref 33–36)
MCV RBC AUTO: 90.6 FL (ref 80–94)
MONOCYTES # BLD AUTO: 1.56 X10(3)/MCL (ref 0.1–1.3)
MONOCYTES NFR BLD AUTO: 9.6 %
NEUTROPHILS # BLD AUTO: 9.63 X10(3)/MCL (ref 2.1–9.2)
NEUTROPHILS NFR BLD AUTO: 58.8 %
NRBC BLD AUTO-RTO: 0.1 %
PHOSPHATE SERPL-MCNC: 2.5 MG/DL (ref 2.3–4.7)
PLATELET # BLD AUTO: 318 X10(3)/MCL (ref 130–400)
PMV BLD AUTO: 7.9 FL (ref 7.4–10.4)
POTASSIUM SERPL-SCNC: 4 MMOL/L (ref 3.5–5.1)
PROT SERPL-MCNC: 6.2 GM/DL (ref 5.8–7.6)
PROTHROMBIN TIME: 15.1 SECONDS (ref 12.5–14.5)
RBC # BLD AUTO: 3.2 X10(6)/MCL (ref 4.2–5.4)
SODIUM SERPL-SCNC: 134 MMOL/L (ref 136–145)
WBC # BLD AUTO: 16.33 X10(3)/MCL (ref 4.5–11.5)

## 2025-01-28 PROCEDURE — 99900035 HC TECH TIME PER 15 MIN (STAT)

## 2025-01-28 PROCEDURE — 94760 N-INVAS EAR/PLS OXIMETRY 1: CPT

## 2025-01-28 PROCEDURE — 36415 COLL VENOUS BLD VENIPUNCTURE: CPT

## 2025-01-28 PROCEDURE — 63600175 PHARM REV CODE 636 W HCPCS: Performed by: INTERNAL MEDICINE

## 2025-01-28 PROCEDURE — 99900026 HC AIRWAY MAINTENANCE (STAT)

## 2025-01-28 PROCEDURE — 63600175 PHARM REV CODE 636 W HCPCS

## 2025-01-28 PROCEDURE — 25000003 PHARM REV CODE 250: Performed by: STUDENT IN AN ORGANIZED HEALTH CARE EDUCATION/TRAINING PROGRAM

## 2025-01-28 PROCEDURE — 27100171 HC OXYGEN HIGH FLOW UP TO 24 HOURS

## 2025-01-28 PROCEDURE — 84100 ASSAY OF PHOSPHORUS: CPT

## 2025-01-28 PROCEDURE — 80053 COMPREHEN METABOLIC PANEL: CPT

## 2025-01-28 PROCEDURE — 94003 VENT MGMT INPAT SUBQ DAY: CPT

## 2025-01-28 PROCEDURE — 36415 COLL VENOUS BLD VENIPUNCTURE: CPT | Performed by: NURSE PRACTITIONER

## 2025-01-28 PROCEDURE — 85610 PROTHROMBIN TIME: CPT | Performed by: NURSE PRACTITIONER

## 2025-01-28 PROCEDURE — 94761 N-INVAS EAR/PLS OXIMETRY MLT: CPT

## 2025-01-28 PROCEDURE — 99900031 HC PATIENT EDUCATION (STAT)

## 2025-01-28 PROCEDURE — 25000003 PHARM REV CODE 250

## 2025-01-28 PROCEDURE — 43246 EGD PLACE GASTROSTOMY TUBE: CPT | Performed by: INTERNAL MEDICINE

## 2025-01-28 PROCEDURE — 27200966 HC CLOSED SUCTION SYSTEM

## 2025-01-28 PROCEDURE — 27201423 OPTIME MED/SURG SUP & DEVICES STERILE SUPPLY: Performed by: INTERNAL MEDICINE

## 2025-01-28 PROCEDURE — 85025 COMPLETE CBC W/AUTO DIFF WBC: CPT

## 2025-01-28 PROCEDURE — 83735 ASSAY OF MAGNESIUM: CPT

## 2025-01-28 PROCEDURE — 11000001 HC ACUTE MED/SURG PRIVATE ROOM

## 2025-01-28 PROCEDURE — 0DH63UZ INSERTION OF FEEDING DEVICE INTO STOMACH, PERCUTANEOUS APPROACH: ICD-10-PCS | Performed by: INTERNAL MEDICINE

## 2025-01-28 PROCEDURE — 25000003 PHARM REV CODE 250: Performed by: INTERNAL MEDICINE

## 2025-01-28 RX ORDER — FENTANYL CITRATE 50 UG/ML
50 INJECTION, SOLUTION INTRAMUSCULAR; INTRAVENOUS
Status: DISCONTINUED | OUTPATIENT
Start: 2025-01-28 | End: 2025-02-03 | Stop reason: HOSPADM

## 2025-01-28 RX ORDER — MIDAZOLAM HYDROCHLORIDE 2 MG/2ML
2 INJECTION, SOLUTION INTRAMUSCULAR; INTRAVENOUS ONCE
Status: COMPLETED | OUTPATIENT
Start: 2025-01-28 | End: 2025-01-28

## 2025-01-28 RX ORDER — CEFAZOLIN SODIUM 2 G/50ML
2 SOLUTION INTRAVENOUS ONCE
Status: COMPLETED | OUTPATIENT
Start: 2025-01-28 | End: 2025-01-28

## 2025-01-28 RX ORDER — FENTANYL CITRATE 50 UG/ML
50 INJECTION, SOLUTION INTRAMUSCULAR; INTRAVENOUS
Status: DISPENSED | OUTPATIENT
Start: 2025-01-28 | End: 2025-01-28

## 2025-01-28 RX ORDER — MIDAZOLAM HYDROCHLORIDE 2 MG/2ML
INJECTION, SOLUTION INTRAMUSCULAR; INTRAVENOUS
Status: COMPLETED
Start: 2025-01-28 | End: 2025-01-28

## 2025-01-28 RX ORDER — OXYCODONE HYDROCHLORIDE 5 MG/1
5 TABLET ORAL EVERY 6 HOURS PRN
Status: DISCONTINUED | OUTPATIENT
Start: 2025-01-28 | End: 2025-02-01

## 2025-01-28 RX ORDER — DEXTROSE MONOHYDRATE 50 MG/ML
INJECTION, SOLUTION INTRAVENOUS CONTINUOUS
Status: ACTIVE | OUTPATIENT
Start: 2025-01-28 | End: 2025-01-28

## 2025-01-28 RX ORDER — FENTANYL CITRATE-0.9 % NACL/PF 10 MCG/ML
0-250 PLASTIC BAG, INJECTION (ML) INTRAVENOUS CONTINUOUS
Status: DISCONTINUED | OUTPATIENT
Start: 2025-01-28 | End: 2025-01-29

## 2025-01-28 RX ORDER — INSULIN GLARGINE 100 [IU]/ML
10 INJECTION, SOLUTION SUBCUTANEOUS 2 TIMES DAILY
Status: DISCONTINUED | OUTPATIENT
Start: 2025-01-28 | End: 2025-02-03 | Stop reason: HOSPADM

## 2025-01-28 RX ORDER — SENNOSIDES 8.6 MG/1
8.6 TABLET ORAL 2 TIMES DAILY
Status: DISCONTINUED | OUTPATIENT
Start: 2025-01-28 | End: 2025-02-03 | Stop reason: HOSPADM

## 2025-01-28 RX ADMIN — Medication: at 07:01

## 2025-01-28 RX ADMIN — FENTANYL CITRATE 50 MCG: 50 INJECTION, SOLUTION INTRAMUSCULAR; INTRAVENOUS at 09:01

## 2025-01-28 RX ADMIN — DEXTROSE MONOHYDRATE 12.5 G: 25 INJECTION, SOLUTION INTRAVENOUS at 11:01

## 2025-01-28 RX ADMIN — MIDAZOLAM HYDROCHLORIDE 2 MG: 1 INJECTION, SOLUTION INTRAMUSCULAR; INTRAVENOUS at 05:01

## 2025-01-28 RX ADMIN — LEVETIRACETAM 500 MG: 100 INJECTION, SOLUTION INTRAVENOUS at 07:01

## 2025-01-28 RX ADMIN — CEFAZOLIN SODIUM 2 G: 2 SOLUTION INTRAVENOUS at 05:01

## 2025-01-28 RX ADMIN — VALPROATE SODIUM 500 MG: 100 INJECTION, SOLUTION INTRAVENOUS at 04:01

## 2025-01-28 RX ADMIN — LEVETIRACETAM 500 MG: 100 INJECTION, SOLUTION INTRAVENOUS at 08:01

## 2025-01-28 RX ADMIN — DEXTROSE MONOHYDRATE 12.5 G: 25 INJECTION, SOLUTION INTRAVENOUS at 06:01

## 2025-01-28 RX ADMIN — DEXTROSE MONOHYDRATE 12.5 G: 25 INJECTION, SOLUTION INTRAVENOUS at 04:01

## 2025-01-28 RX ADMIN — DEXTROSE MONOHYDRATE 12.5 G: 25 INJECTION, SOLUTION INTRAVENOUS at 01:01

## 2025-01-28 RX ADMIN — MIDAZOLAM HYDROCHLORIDE 2 MG: 2 INJECTION, SOLUTION INTRAMUSCULAR; INTRAVENOUS at 05:01

## 2025-01-28 RX ADMIN — DEXTROSE MONOHYDRATE 25 G: 25 INJECTION, SOLUTION INTRAVENOUS at 03:01

## 2025-01-28 RX ADMIN — DEXTROSE MONOHYDRATE: 50 INJECTION, SOLUTION INTRAVENOUS at 07:01

## 2025-01-28 RX ADMIN — ENOXAPARIN SODIUM 30 MG: 30 INJECTION SUBCUTANEOUS at 05:01

## 2025-01-28 RX ADMIN — PANTOPRAZOLE SODIUM 40 MG: 40 INJECTION, POWDER, FOR SOLUTION INTRAVENOUS at 07:01

## 2025-01-28 RX ADMIN — VALPROATE SODIUM 500 MG: 100 INJECTION, SOLUTION INTRAVENOUS at 05:01

## 2025-01-28 NOTE — PROVATION PATIENT INSTRUCTIONS
Discharge Summary/Instructions after an Endoscopic Procedure  Patient Name: Krysta Jansen  Patient MRN: [id number]  Patient YOB: 1945 Tuesday, January 28, 2025  David Núñez MD  Dear patient,  As a result of recent federal legislation (The Federal Cures Act), you may   receive lab or pathology results from your procedure in your [facility]   account before your physician is able to contact you. Your physician or   their representative will relay the results to you with their   recommendations at their soonest availability.  Thank you,  RESTRICTIONS:  During your procedure today, you received medications for sedation.  These   medications may affect your judgment, balance and coordination.  Therefore,   for 24 hours, you have the following restrictions:   - DO NOT drive a car, operate machinery, make legal/financial decisions,   sign important papers or drink alcohol.    ACTIVITY:  Today: no heavy lifting, straining or running due to procedural   sedation/anesthesia.  The following day: return to full activity including work.  DIET:  Eat and drink normally unless instructed otherwise.     TREATMENT FOR COMMON SIDE EFFECTS:  - Mild abdominal pain, nausea, belching, bloating or excessive gas:  rest,   eat lightly and use a heating pad.  - Sore Throat: treat with throat lozenges and/or gargle with warm salt   water.  - Because air was used during the procedure, expelling large amounts of air   from your rectum or belching is normal.  - If a bowel prep was taken, you may not have a bowel movement for 1-3 days.    This is normal.  SYMPTOMS TO WATCH FOR AND REPORT TO YOUR PHYSICIAN:  1. Abdominal pain or bloating, other than gas cramps.  2. Chest pain.  3. Back pain.  4. Signs of infection such as: chills or fever occurring within 24 hours   after the procedure.  5. Rectal bleeding, which would show as bright red, maroon, or black stools.   (A tablespoon of blood from the rectum is not serious,  especially if   hemorrhoids are present.)  6. Vomiting.  7. Weakness or dizziness.  GO DIRECTLY TO THE NEAREST EMERGENCY ROOM IF YOU HAVE ANY OF THE FOLLOWING:      Difficulty breathing              Chills and/or fever over 101 F   Persistent vomiting and/or vomiting blood   Severe abdominal pain   Severe chest pain   Black, tarry stools   Bleeding- more than one tablespoon   Any other symptom or condition that you feel may need urgent attention  Your doctor recommends these additional instructions:  If any biopsies were taken, your doctors clinic will contact you in 1 to 2   weeks with any results.  Recommendations:  - Return patient to ICU for ongoing care.   - ok meds water today.  ok for feeds in am  Impressions:  - Normal esophagus.   - mild ng tube trauma in stomach.   - Normal examined duodenum.   - An externally removable PEG placement was successfully completed.   - No specimens collected.  For questions, problems or results please call your physician - David Núñez MD at Work:  [phone].  [facility] ED at [phone]  IF A COMPLICATION OR EMERGENCY SITUATION ARISES AND YOU ARE UNABLE TO REACH   YOUR PHYSICIAN - GO DIRECTLY TO THE EMERGENCY ROOM.  MD David Kraus MD  1/28/2025 5:32:02 PM  This report has been verified and signed electronically.  Dear patient,  As a result of recent federal legislation (The Federal Cures Act), you may   receive lab or pathology results from your procedure in your [facility]   account before your physician is able to contact you. Your physician or   their representative will relay the results to you with their   recommendations at their soonest availability.  Thank you,  PROVATION

## 2025-01-28 NOTE — PLAN OF CARE
Problem: Adult Inpatient Plan of Care  Goal: Plan of Care Review  Outcome: Progressing  Goal: Patient-Specific Goal (Individualized)  Outcome: Progressing  Goal: Absence of Hospital-Acquired Illness or Injury  Outcome: Progressing  Goal: Optimal Comfort and Wellbeing  Outcome: Progressing  Goal: Readiness for Transition of Care  Outcome: Progressing     Problem: Hemodialysis  Goal: Safe, Effective Therapy Delivery  Outcome: Progressing  Goal: Effective Tissue Perfusion  Outcome: Progressing  Goal: Absence of Infection Signs and Symptoms  Outcome: Progressing     Problem: Stroke, Ischemic (Includes Transient Ischemic Attack)  Goal: Optimal Coping  Outcome: Progressing  Goal: Effective Bowel Elimination  Outcome: Progressing  Goal: Optimal Cerebral Tissue Perfusion  Outcome: Progressing  Goal: Optimal Cognitive Function  Outcome: Progressing  Goal: Improved Communication Skills  Outcome: Progressing  Goal: Optimal Functional Ability  Outcome: Progressing  Goal: Optimal Nutrition Intake  Outcome: Progressing  Goal: Effective Oxygenation and Ventilation  Outcome: Progressing  Goal: Improved Sensorimotor Function  Outcome: Progressing  Goal: Safe and Effective Swallow  Outcome: Progressing  Goal: Effective Urinary Elimination  Outcome: Progressing     Problem: Wound  Goal: Optimal Coping  Outcome: Progressing  Goal: Optimal Functional Ability  Outcome: Progressing  Goal: Absence of Infection Signs and Symptoms  Outcome: Progressing  Goal: Improved Oral Intake  Outcome: Progressing  Goal: Optimal Pain Control and Function  Outcome: Progressing  Goal: Skin Health and Integrity  Outcome: Progressing  Goal: Optimal Wound Healing  Outcome: Progressing     Problem: Infection  Goal: Absence of Infection Signs and Symptoms  Outcome: Progressing     Problem: Fall Injury Risk  Goal: Absence of Fall and Fall-Related Injury  Outcome: Progressing     Problem: Coping Ineffective  Goal: Effective Coping  Outcome: Progressing      Problem: Mechanical Ventilation Invasive  Goal: Effective Communication  Outcome: Progressing  Goal: Optimal Device Function  Outcome: Progressing  Goal: Mechanical Ventilation Liberation  Outcome: Progressing  Goal: Optimal Nutrition Delivery  Outcome: Progressing  Goal: Absence of Device-Related Skin and Tissue Injury  Outcome: Progressing  Goal: Absence of Ventilator-Induced Lung Injury  Outcome: Progressing     Problem: Artificial Airway  Goal: Effective Communication  Outcome: Progressing  Goal: Optimal Device Function  Outcome: Progressing  Goal: Absence of Device-Related Skin or Tissue Injury  Outcome: Progressing

## 2025-01-28 NOTE — PLAN OF CARE
[facility] sent new referral to [facility] via [facility] yeseniaied & Manohar at [facility] at [facility]

## 2025-01-28 NOTE — PROGRESS NOTES
[facility] - Emergency Dept  Pulmonary Critical Care Note    Patient Name: Krysta Jansen  MRN: [id number]  Admission Date: 1/5/2025  Hospital Length of Stay: 23 days  Code Status: DNR  Attending Provider: DAMARIS Jaime MD  Primary Care Provider: No primary care provider on file.     Subjective:     HPI:   Patient is a 79-year-old female with a past medical history of ESRD on HD, HTN, CAD, CHF unknown EF, DM2, previous left MCA territory stroke who was brought into the ER after her son reported seeing her normal an hour prior, briefly left in when came back patient was nonverbal and lethargic.      She was emergently brought to the ER and a code fast was called, she was assessed by Neurology in the ER where she was found to have an NIH of 22 based on dysarthria and level of consciousness, but was deemed not a candidate for TNK secondary to overall nonfocal exam and recent left arm HD access surgery reported.  Vitals and laboratory work were overall unremarkable except for changes as expected in any age renal disease, and a potential urinary tract infection.  She was admitted under typical stroke protocol.     On 1/6/2025, EEG at 0800 revealed seizure-like activity. She was given Lorazepam 2 mg IV. Patient later became unconscious, unresponsive to pain stimuli, and hypotensive. She was intubated for airway protection.       Hospital Course/Significant events:  1/6/2025 - Admitted to ICU   1/9/2025 - EEG shows evidence of epileptiform discharges but no clinical convulsing noted.  1/28/25 - Tracheostomy    24 Hour Interval History:  Underwent dialysis yesterday with 1500 mL of fluid removed.  Underwent tracheostomy with ENT.  GI planning for PEG tube placement today.  Remains mechanically ventilated and sedated with fentanyl.  Intermittently follows commands.          Social History     Socioeconomic History    Marital status: Single   Tobacco Use    Smoking status: Unknown   Substance and Sexual  Activity    Alcohol use: Not Currently     Social Drivers of Health     Financial Resource Strain: Patient Unable To Answer (1/11/2025)    Overall Financial Resource Strain (CARDIA)     Difficulty of Paying Living Expenses: Patient unable to answer   Food Insecurity: Patient Unable To Answer (1/11/2025)    Hunger Vital Sign     Worried About Running Out of Food in the Last Year: Patient unable to answer     Ran Out of Food in the Last Year: Patient unable to answer   Transportation Needs: Patient Unable To Answer (1/11/2025)    TRANSPORTATION NEEDS     Transportation : Patient unable to answer   Stress: Patient Unable To Answer (1/11/2025)    [employer] - Occupational Stress Questionnaire     Feeling of Stress : Patient unable to answer   Housing Stability: Patient Unable To Answer (1/11/2025)    Housing Stability Vital Sign     Unable to Pay for Housing in the Last Year: Patient unable to answer     Homeless in the Last Year: Patient unable to answer       Current Outpatient Medications   Medication Instructions    aspirin 81 MG Chew 1 tablet, Daily    BASAGLAR KWIKPEN U-100 INSULIN 30 Units, Nightly    CeleXA 10 mg, Daily    ENTRESTO 24-26 mg per tablet 1 tablet, 2 times daily    metoprolol succinate 25 mg CSpX 1 capsule, Daily    PLAVIX 75 mg tablet 1 tablet, Daily    rosuvastatin (CRESTOR) 40 MG Tab 1 tablet, Nightly    traMADoL (ULTRAM) 50 mg tablet 1 tablet, Every 6 hours PRN    vancomycin (VANCOCIN) 500 mg, Every Mon, Wed, Fri     Current Inpatient Medications   enoxparin  30 mg Subcutaneous Q24H (prophylaxis, 1700)    epoetin chelsea-ebpx (RETACRIT) injection  20,000 Units Subcutaneous Every Mon, Wed, Fri    insulin glargine U-100  18 Units Subcutaneous BID    levETIRAcetam (Keppra) IV (PEDS and ADULTS)  500 mg Intravenous Q12H    lorazepam  2 mg Intravenous Once    midodrine  10 mg Oral Q8H    pantoprazole  40 mg Intravenous Daily    scopolamine  1 patch Transdermal Q3 Days     valproate sodium (DEPACON) IVPB  500 mg Intravenous Q12H    zinc oxide-cod liver oil   Topical (Top) BID     Current Intravenous Infusions   fentanyl  0-250 mcg/hr Intravenous Continuous 2.5 mL/hr at 01/28/25 0035 25 mcg/hr at 01/28/25 0035       Objective:     Intake/Output Summary (Last 24 hours) at 1/28/2025 0607  Last data filed at 1/28/2025 0200  Gross per 24 hour   Intake 1073.34 ml   Output 1650 ml   Net -576.66 ml     Vital Signs (Most Recent):  Temp: 98 °F (36.7 °C) (01/28/25 0400)  Pulse: (!) 56 (01/28/25 0507)  Resp: 17 (01/28/25 0425)  BP: (!) 102/38 (01/28/25 0400)  SpO2: 100 % (01/28/25 0425)  Body mass index is 31.9 kg/m².  Weight: 74.1 kg (163 lb 5.8 oz) Vital Signs (24h Range):  Temp:  [98 °F (36.7 °C)-100 °F (37.8 °C)] 98 °F (36.7 °C)  Pulse:  [] 56  Resp:  [0-38] 17  SpO2:  [98 %-100 %] 100 %  BP: (102-159)/(38-81) 102/38         Physical Exam:  Gen- intubated, sedated on fentanyl  HENT- ATNC, MMM, trach in place, tunneled HD line in place  CV- NRRR   Resp- CTAB, synchronous with mechanical ventilation  MSK- WWP, 2+ BUE edema and BLE edema   Neuro- resting comfortably, intermittently follows commands  Abdomen-soft, nontender        Lines/Drains/Airways       Central Venous Catheter Line  Duration                  Hemodialysis Catheter 01/06/25 1400 left internal jugular 21 days              Drain  Duration                  NG/OG Tube 01/27/25 1745 [place] sump 18 Fr. Right nostril <1 day              Airway  Duration                  Airway - Non-Surgical 01/06/25 1104 Endotracheal Tube 21 days    Adult Surgical Airway 01/27/25 1714 [place] Cuffed 8.0 / 85 mm <1 day              Peripheral Intravenous Line  Duration                  Peripheral IV - Single Lumen 01/24/25 0200 20 G Yes Anterior;Proximal;Right Upper Arm 4 days                  Significant Labs:  Lab Results   Component Value Date    WBC 16.33 (H) 01/28/2025    HGB 9.6 (L) 01/28/2025    HCT 29.0 (L) 01/28/2025    MCV 90.6  01/28/2025     01/28/2025       BMP  Lab Results   Component Value Date     (L) 01/28/2025    K 4.0 01/28/2025    CO2 23 01/28/2025    BUN 41.7 (H) 01/28/2025    CREATININE 2.21 (H) 01/28/2025    CALCIUM 8.1 (L) 01/28/2025    AGAP 10.0 01/28/2025    ESTGFRAFRICA 13 05/08/2024     ABG  Recent Labs   Lab 01/23/25  0900   PH 7.460*   PO2 123.0*   PCO2 34.0*   HCO3 24.2     Mechanical Ventilation Support:  Vent Mode: A/C (01/28/25 0425)  Ventilator Initiated: Yes (01/06/25 1104)  Set Rate: 18 BPM (01/28/25 0425)  Vt Set: 450 mL (01/28/25 0425)  Pressure Support: 13 cmH20 (01/28/25 0425)  PEEP/CPAP: 5 cmH20 (01/28/25 0425)  Oxygen Concentration (%): 35 (01/28/25 0425)  Peak Airway Pressure: 22 cmH20 (01/28/25 0425)  Total Ve: 6.4 L/m (01/28/25 0425)  F/VT Ratio<105 (RSBI): (!) 42.93 (01/28/25 0425)      Assessment/Plan:     Assessment  Persistent nonconvulsive status epilepticus based on most recent EEG from 01/09.  Acute respiratory failure-not ready for ventilator weaning based on failed SBT.  Pseudomonas UTI  Prior left MCA territory stroke   Hypotension (resolved)  Anemia with no obvious source of bleeding.    ESRD   Hx of HTN, CAD, DM2       Plan  S/p tracheostomy 1/27. Start pressure support trials after PEG tube is placed  Stop fentanyl and start PRN oxycodone if needed  GI planning for PEG tube placement today  Continue Keppra b.i.d. and valproate  Continue midodrine 10 mg q.8  Reduce dose of lantus from 18 units BID to 10 units BID and start D5W until PEG is placed and tube feeds can be restarted  Code status DNR      DVT Prophylaxis: subq lovenox  GI Prophylaxis: pantoprazole     Case management to start looking into discharge.    Ulices Junior MD  Pulmonary and Critical Care

## 2025-01-28 NOTE — PLAN OF CARE
Problem: Adult Inpatient Plan of Care  Goal: Plan of Care Review  1/28/2025 0521 by Eugene Manning RN  Outcome: Progressing  1/28/2025 0440 by Eugene Manning RN  Outcome: Progressing  Goal: Patient-Specific Goal (Individualized)  1/28/2025 0521 by Eugene Manning RN  Outcome: Progressing  1/28/2025 0440 by Eugene Manning RN  Outcome: Progressing  Goal: Absence of Hospital-Acquired Illness or Injury  1/28/2025 0521 by Eugene Manning RN  Outcome: Progressing  1/28/2025 0440 by Eugene Manning RN  Outcome: Progressing  Goal: Optimal Comfort and Wellbeing  1/28/2025 0521 by Eugene Manning RN  Outcome: Progressing  1/28/2025 0440 by Eugene Manning RN  Outcome: Progressing  Goal: Readiness for Transition of Care  1/28/2025 0521 by Eugene Manning RN  Outcome: Progressing  1/28/2025 0440 by Eugene Manning RN  Outcome: Progressing     Problem: Hemodialysis  Goal: Safe, Effective Therapy Delivery  1/28/2025 0521 by Eugene Manning RN  Outcome: Progressing  1/28/2025 0440 by Eugene Manning RN  Outcome: Progressing  Goal: Effective Tissue Perfusion  1/28/2025 0521 by Eugene Manning RN  Outcome: Progressing  1/28/2025 0440 by Eugene Manning RN  Outcome: Progressing  Goal: Absence of Infection Signs and Symptoms  1/28/2025 0521 by Eugene Manning RN  Outcome: Progressing  1/28/2025 0440 by Eugene Manning RN  Outcome: Progressing     Problem: Stroke, Ischemic (Includes Transient Ischemic Attack)  Goal: Optimal Coping  1/28/2025 0521 by Eugene Manning RN  Outcome: Progressing  1/28/2025 0440 by Eugene Manning RN  Outcome: Progressing  Goal: Effective Bowel Elimination  1/28/2025 0521 by Eugene Manning RN  Outcome: Progressing  1/28/2025 0440 by Eugene Manning RN  Outcome: Progressing  Goal: Optimal Cerebral Tissue Perfusion  1/28/2025 0521 by Eugene Manning RN  Outcome: Progressing  1/28/2025 0440 by Eugene Manning RN  Outcome: Progressing  Goal: Optimal Cognitive  Function  1/28/2025 0521 by Eugene Manning RN  Outcome: Progressing  1/28/2025 0440 by Eugene Manning, RN  Outcome: Progressing  Goal: Improved Communication Skills  1/28/2025 0521 by Eugene Manning RN  Outcome: Progressing  1/28/2025 0440 by Eugene Manning, RN  Outcome: Progressing  Goal: Optimal Functional Ability  1/28/2025 0521 by Eugene Manning, RN  Outcome: Progressing  1/28/2025 0440 by Eugene Manning, RN  Outcome: Progressing  Goal: Optimal Nutrition Intake  1/28/2025 0521 by Eugene Manning, RN  Outcome: Progressing  1/28/2025 0440 by Eugene Manning, RN  Outcome: Progressing  Goal: Effective Oxygenation and Ventilation  1/28/2025 0521 by Eugene Manning, RN  Outcome: Progressing  1/28/2025 0440 by Eugene Manning, RN  Outcome: Progressing  Goal: Improved Sensorimotor Function  1/28/2025 0521 by Eugene Manning, RN  Outcome: Progressing  1/28/2025 0440 by Eugene Manning, RN  Outcome: Progressing  Goal: Safe and Effective Swallow  1/28/2025 0521 by Eugene Manning, RN  Outcome: Progressing  1/28/2025 0440 by Eugene Manning, RN  Outcome: Progressing  Goal: Effective Urinary Elimination  1/28/2025 0521 by Eugene Manning, RN  Outcome: Progressing  1/28/2025 0440 by Eugene Manning, RN  Outcome: Progressing     Problem: Skin Injury Risk Increased  Goal: Skin Health and Integrity  1/28/2025 0521 by Eugene Manning RN  Outcome: Progressing  1/28/2025 0440 by Eugene Manning, RN  Outcome: Not Progressing     Problem: Wound  Goal: Optimal Coping  1/28/2025 0521 by Eugene Manning, RN  Outcome: Progressing  1/28/2025 0440 by Eugene Manning, RN  Outcome: Progressing  Goal: Optimal Functional Ability  1/28/2025 0521 by Eugene Manning, RN  Outcome: Progressing  1/28/2025 0440 by Eugene Manning, RN  Outcome: Progressing  Goal: Absence of Infection Signs and Symptoms  1/28/2025 0521 by Eugene Manning RN  Outcome: Progressing  1/28/2025 0440 by Eugene Manning RN  Outcome:  Progressing  Goal: Improved Oral Intake  1/28/2025 0521 by Eugene Manning RN  Outcome: Progressing  1/28/2025 0440 by Eugene Manning RN  Outcome: Progressing  Goal: Optimal Pain Control and Function  1/28/2025 0521 by Eugene Manning RN  Outcome: Progressing  1/28/2025 0440 by Eugene Manning RN  Outcome: Progressing  Goal: Skin Health and Integrity  1/28/2025 0521 by Eugene Manning RN  Outcome: Progressing  1/28/2025 0440 by Eugene Manning RN  Outcome: Progressing  Goal: Optimal Wound Healing  1/28/2025 0521 by Eugene Manning RN  Outcome: Progressing  1/28/2025 0440 by Eugene Manning RN  Outcome: Progressing     Problem: Infection  Goal: Absence of Infection Signs and Symptoms  1/28/2025 0521 by Eugene Manning RN  Outcome: Progressing  1/28/2025 0440 by Eugene Manning RN  Outcome: Progressing     Problem: Fall Injury Risk  Goal: Absence of Fall and Fall-Related Injury  1/28/2025 0521 by Eugene Manning RN  Outcome: Progressing  1/28/2025 0440 by Eugene Manning RN  Outcome: Progressing     Problem: Coping Ineffective  Goal: Effective Coping  1/28/2025 0521 by Eugene Manning RN  Outcome: Progressing  1/28/2025 0440 by Eugene Manning RN  Outcome: Progressing     Problem: Mechanical Ventilation Invasive  Goal: Effective Communication  1/28/2025 0521 by Eugene Manning RN  Outcome: Progressing  1/28/2025 0440 by Eugene Manning RN  Outcome: Progressing  Goal: Optimal Device Function  1/28/2025 0521 by Eugene Manning RN  Outcome: Progressing  1/28/2025 0440 by Eugene Manning RN  Outcome: Progressing  Goal: Mechanical Ventilation Liberation  1/28/2025 0521 by Eugene Manning RN  Outcome: Progressing  1/28/2025 0440 by Eugene Manning RN  Outcome: Progressing  Goal: Optimal Nutrition Delivery  1/28/2025 0521 by Eugene Manning RN  Outcome: Progressing  1/28/2025 0440 by Eugene Manning, RN  Outcome: Progressing  Goal: Absence of Device-Related Skin and Tissue  Injury  1/28/2025 0521 by Eugene Manning RN  Outcome: Progressing  1/28/2025 0440 by Eugene Manning RN  Outcome: Progressing  Goal: Absence of Ventilator-Induced Lung Injury  1/28/2025 0521 by Eugene Manning RN  Outcome: Progressing  1/28/2025 0440 by Eugene Manning RN  Outcome: Progressing     Problem: Artificial Airway  Goal: Effective Communication  1/28/2025 0521 by Eugene Manning RN  Outcome: Progressing  1/28/2025 0440 by Eugene Manning RN  Outcome: Progressing  Goal: Optimal Device Function  1/28/2025 0521 by Eugene Manning RN  Outcome: Progressing  1/28/2025 0440 by Eugene Manning RN  Outcome: Progressing  Goal: Absence of Device-Related Skin or Tissue Injury  1/28/2025 0521 by Eugene Manning RN  Outcome: Progressing  1/28/2025 0440 by Eugene Manning RN  Outcome: Progressing

## 2025-01-28 NOTE — PROGRESS NOTES
Nephrology consult follow up note    HPI:      Krysta Jansen is a 79 y.o. female is a nursing home resident and has ESRD and is on dialysis on Monday Wednesday Friday.  Brought to this hospital for altered mental status.  She was found to have some grand mal seizure.  She received some Ativan.  She had EEG done.  Her respiratory status and mental status deteriorated and required intubation to protect her airways.  Could not get any history from the patient.  Records revealed she was found to have some dysarthria and decreased level of consciousness Friday through being brought to this hospital.  Significant past medical history is positive for diabetes mellitus type 2 previous stroke hypertension coronary artery disease and congestive heart failure.    Interval history:     01/07/2024  Patient remains intubated and sedated.  On epinephrine for hypotension this morning.     01/08/25  Remains intubated and sedated on vent. Off pressors with BP stable at 130/60 range. Noted leucocytosis on am lab. Blood cultures pending.  Hgb stable at 7.6. Dialyzed yesterday and tolerated 500mL UF.      01/09/2025  No acute events overnight.  Patient remains intubated.  Not currently requiring vasopressors, but blood pressure does remain borderline low.  ICU team is requesting to hold hemodialysis today so they can have goals of care conversation with family.     01/10/2025   Currently tolerating dialysis.  Will remove only 500 cc of fluid.  Remains intubated.  Tolerating tube feedings.  Nurses reported that she is not on any sedation for about 48 hours.     01/13/2025   Weekend events noted.  Patient's condition is unchanged.  Remains on the ventilator and off sedation or pressors.  Unresponsive.     01/14/2025   Last 24 hours events noted.  Patient's condition remains unchanged and she is off pressors and off sedation and still unresponsive although she grimaces to painful stimuli.     01/15/2025   Currently tolerating  dialysis.  Neuro status and respiratory status remains unchanged.    01/16/2025  Dialyze yesterday.  No clinical change overnight.    01/17/2025  No clinical change overnight.  Hemoglobin dropped today.  Remains intubated    01/18/2025  No acute events overnight.  On Levophed for hypotension.  Dialyzed yesterday.    01/19/2025  No acute events overnight.  She does appear more edematous.  Remains on Levophed.  Getting tube feedings.     01/20/2025   Currently tolerating dialysis.  Will try to remove some fluid.  Will increase dialysis time to 4 hours.  Comfortable on the ventilator.    01/21/2025   Currently tolerating dialysis.  The plan is to remove some fluid and to help correct electrolytes and uremic toxins.    01/22/2025   Patient has had dialysis for 2 days in a row for 4 hours each time and now on low-dose Levophed.  No change neurologically.  Still tolerating tube feedings and patient remains on the ventilator.    01/23/2025   Just started on dialysis.  Will decrease fluid removal to 1 L only.  Overall condition is unchanged.  She is off Levophed now.    01/24/2025   She tolerated dialysis yesterday.  Overall condition remains unchanged.  Off pressors.  Off sedation.  Getting sodium phosphate for hypophosphatemia.  Tolerating tube feedings.    01/27/2025   Weekend events noted.  Nurse just informed me that family wanted to do trach and PEG placement.    1/28/25  Trach and PEG placed yesterday.  Remains on ventilator via trach.    Review of Systems:     Unable to obtain ROS due to mental status/intubation.     Past medical, family, surgical, and social history reviewed and unchanged from initial consult note.     Objective:       VITAL SIGNS: 24 HR MIN & MAX LAST    Temp  Min: 97.9 °F (36.6 °C)  Max: 100 °F (37.8 °C)  98.1 °F (36.7 °C)        BP  Min: 102/38  Max: 159/64  (!) 130/52     Pulse  Min: 49  Max: 91  (!) 54     Resp  Min: 0  Max: 38  18    SpO2  Min: 98 %  Max: 100 %  99 %      GEN: Chronically ill  appearing AA female, comfortable on the ventilator.  Off all sedation and pressors.  HEENT:  Tracheostomy in place  CV: RRR without rub.  PULM: CTAB, unlabored  ABD: Soft, NT/ND abdomen with NABS  EXT:  2+ dependent edema  SKIN: Warm and dry  PSYCH:  Minimally responsive to stimulation  Dialysis access:  Left IJ PermCath            Component Value Date/Time     (L) 01/28/2025 0314     (L) 01/27/2025 0439     05/08/2024 0739     03/15/2024 1101    K 4.0 01/28/2025 0314    K 5.6 (H) 01/27/2025 0439    K 3.8 05/08/2024 0739    K 3.9 03/15/2024 1101    CO2 23 01/28/2025 0314    CO2 18 (L) 01/27/2025 0439    CO2 22 05/08/2024 0739    CO2 21 (L) 03/15/2024 1101    BUN 41.7 (H) 01/28/2025 0314    BUN 62.2 (H) 01/27/2025 0439    BUN 55 (H) 10/02/2024 0000    BUN 23 (H) 07/12/2024 0000    CREATININE 2.21 (H) 01/28/2025 0314    CREATININE 2.85 (H) 01/27/2025 0439    CREATININE 3.37 (H) 05/08/2024 0739    CREATININE 3.2 (H) 03/15/2024 1101    CALCIUM 8.1 (L) 01/28/2025 0314    CALCIUM 8.1 (L) 01/27/2025 0439    CALCIUM 9 05/08/2024 0739    CALCIUM 9.9 03/15/2024 1101    PHOS 2.5 01/28/2025 0314            Component Value Date/Time    WBC 16.33 (H) 01/28/2025 0314    WBC 14.73 (H) 01/27/2025 0439    WBC 13.49 01/26/2025 0451    WBC 16.5 01/19/2025 0429    HGB 9.6 (L) 01/28/2025 0314    HGB 8.9 (L) 01/27/2025 0439    HGB 10.3 (L) 11/24/2024 0000    HGB 10 (L) 11/18/2024 0000    HCT 29.0 (L) 01/28/2025 0314    HCT 28.5 (L) 01/27/2025 0439     01/28/2025 0314     01/27/2025 0439         Imaging reviewed      Assessment / Plan:       ESRD.  Will change to Monday Wednesday Friday.  New onset seizure  Acute hypoxic respiratory failure  Hypertension  Diabetes mellitus type 2   History of coronary artery disease   History of previous stroke  Urinary tract infection  Anemia of chronic disease    Plan:  No acute indication for hemodialysis today.  We will plan dialyze tomorrow on MWF schedule.

## 2025-01-28 NOTE — PROGRESS NOTES
Inpatient Nutrition Assessment    Admit Date: 1/5/2025   Total duration of encounter: 23 days   Patient Age: 79 y.o.    Nutrition Recommendation/Prescription     Restart TF post PEG placement when appropriate per MD.    Tube feeding recommendation:     Peptamen AF goal rate 60 ml/hr to provide  1440 kcal/d  (96% est needs)  90 g protein/d  (100% est needs)  972 ml free water/d   (calculations based on estimated 20 hr/d run time)     Humble (provides 90 kcal, 2.5 g protein per serving) BID.     If no IV fluids running, can give 50ml q 4hr water flushes. Total water provided: 1070 ml. Otherwise, FWF per MD.      Communication of Recommendations: reviewed with nurse    Nutrition Assessment     Malnutrition Assessment/Nutrition-Focused Physical Exam       Malnutrition Level: other (see comments) (Does not meet criteria) (01/07/25 1339)  Energy Intake (Malnutrition): other (see comments) (Unable to assess) (01/07/25 1339)  Weight Loss (Malnutrition): other (see comments) (Unable to assess) (01/07/25 1339)                                         Fluid Accumulation (Malnutrition): other (see comments) (Not present) (01/07/25 1339)        A minimum of two characteristics is recommended for diagnosis of either severe or non-severe malnutrition.    Chart Review    Reason Seen: continuous nutrition monitoring and follow-up    Malnutrition Screening Tool Results   Have you recently lost weight without trying?: No  Have you been eating poorly because of a decreased appetite?: No   MST Score: 0   Diagnosis:  Acute global aphasia, seizure/postictal state  Obtunded  UTI, present on arrival    Relevant Medical History: ESRD on HD, HTN, CAD, CHF, DM2, MCA territory stroke     Scheduled Medications:  enoxparin, 30 mg, Q24H (prophylaxis, 1700)  epoetin chelsea-ebpx (RETACRIT) injection, 20,000 Units, Every Mon, Wed, Fri  insulin glargine U-100, 10 Units, BID  levETIRAcetam (Keppra) IV (PEDS and ADULTS), 500 mg, Q12H  lorazepam, 2 mg,  Once  midodrine, 10 mg, Q8H  pantoprazole, 40 mg, Daily  scopolamine, 1 patch, Q3 Days  senna, 8.6 mg, BID  valproate sodium (DEPACON) IVPB, 500 mg, Q12H  zinc oxide-cod liver oil, , BID    Continuous Infusions:  D5W, Last Rate: 75 mL/hr at 01/28/25 0748  fentanyl, Last Rate: 25 mcg/hr (01/28/25 0753)    PRN Medications:  0.9%  NaCl infusion (for blood administration), , Q24H PRN  0.9%  NaCl infusion (for blood administration), , Q24H PRN  bisacodyL, 10 mg, Daily PRN  dextrose 50%, 12.5 g, PRN  dextrose 50% in water (D50W), 25 g, PRN  glucagon (human recombinant), 1 mg, PRN  heparin (porcine), 5,000 Units, PRN  insulin aspart U-100, 0-15 Units, Q6H PRN  oxyCODONE, 5 mg, Q6H PRN  sodium chloride 0.9%, 10 mL, PRN    Calorie Containing IV Medications: no significant kcals from medications at this time    Recent Labs   Lab 01/22/25  0657 01/23/25  0409 01/24/25  0427 01/25/25  0324 01/26/25  0451 01/27/25  0439 01/28/25  0314   * 129* 129* 130* 130* 129* 134*   K 4.3 4.7 4.0 4.5 4.4 5.6* 4.0   CALCIUM 8.6 8.3* 8.2* 8.6 8.3* 8.1* 8.1*   PHOS 1.2* 1.6* 1.4* 2.5 2.1* 2.8 2.5   MG 2.20 2.40 2.30 2.40 2.20 2.40 2.30   CL 99 99 101 100 102 101 101   CO2 20* 20* 19* 20* 19* 18* 23   BUN 61.1* 83.5* 60.3* 75.9* 51.2* 62.2* 41.7*   CREATININE 1.81* 2.49* 2.15* 2.56* 2.10* 2.85* 2.21*   EGFRNORACEVR 28 19 23 19 24 16 22   GLUCOSE 237* 222* 167* 127* 94 225* 39*   BILITOT 0.2 0.2 0.3 0.3 0.2 0.2 0.2   ALKPHOS 147 120 116 123 105 117 89   ALT 13 12 13 13 12 16 10   AST 20 18 17 19 16 18 18   ALBUMIN 2.1* 1.8* 1.8* 1.9* 1.8* 1.8* 1.8*   WBC 18.28* 17.00* 15.75* 15.40* 13.49  13.49* 14.73* 16.33*   HGB 8.1* 6.8* 9.5* 9.9* 9.1* 8.9* 9.6*   HCT 26.0* 21.6* 28.5* 30.1* 28.3* 28.5* 29.0*     Nutrition Orders:  Diet NPO  Tube Feedings/Formulas Other (see comments); OG; Humble - Fruit Punch,Tube Feedings/Formulas 60; 1,200; Peptamen AF; OG; 50; Every 4 hours    Appetite/Oral Intake: not applicable/not applicable  Factors Affecting  Nutritional Intake: on mechanical ventilation and tracheostomy  Social Needs Impacting Access to Food: unable to assess at this time; will attempt on follow-up  Food/Judaism/Cultural Preferences: unable to obtain  Food Allergies: no known food allergies  Last Bowel Movement: 01/28/25  Wound(s):     Wound 01/05/25 1535 Skin Tear  Buttocks-Tissue loss description: Partial thickness       Wound 01/23/25 1500 Pressure Injury Right Antecubital-Tissue loss description: Not applicable       Wound 01/28/25 0900 Pressure Injury Left Heel-Tissue loss description: Not applicable   I/O: Past 24 Hours: +[phone], Since Admit: [phone]     Comments    1/7/25: Discussed with RN. Will provide tube feeding recommendations for when appropriate to start tube feeding. Receiving kcal from meds.      1/8/25: Plans for starting TF today. Still receiving kcal from meds.     1/10/25: TF seen running at goal rate. Pt remains intubated, no longer receiving kcals from meds. Significant amount of diarrhea per RN. EN regimen updated to meet kcal needs.     1/14/25: Pt remains intubated, off all sedation. Tolerating TF at goal rate.     1/16/25: TF continues, tolerated per RN. Noted elevated CBGs. Will change to formula with less CHO/ml than current formula.     1/17/25: TF continues, tolerated per RN. Better CBG control per RN. No kcal from meds.     1/20/25: TF continues, tolerated per RN. Noted elevated K, HD ongoing. If still elevated upon F/U, may need to change to renal formula. Currently Phos low.     1/24/25: TF continues. Tolerated per RN. No kcal from meds.     1/28/25: Plans for PEG today, TF on hold. Trach placed yesterday.     Anthropometrics    Height: 5' (152.4 cm),    Last Weight: 74.1 kg (163 lb 5.8 oz) (01/05/25 1518), Weight Method: Standard Scale  BMI (Calculated): 31.9  BMI Classification: obese grade I (BMI 30-34.9)     Ideal Body Weight (IBW), Female: 100 lb     % Ideal Body Weight, Female (lb): 163.36 %                              Usual Weight Provided By: unable to obtain usual weight    Wt Readings from Last 5 Encounters:   01/05/25 74.1 kg (163 lb 5.8 oz)     Weight Change(s) Since Admission:   Wt Readings from Last 1 Encounters:   01/05/25 1518 74.1 kg (163 lb 5.8 oz)   Admit Weight: 74.1 kg (163 lb 5.8 oz) (01/05/25 1518), Weight Method: Standard Scale    Estimated Needs    Weight Used For Calorie Calculations: 74.1 kg (163 lb 5.8 oz)  Energy Calorie Requirements (kcal): 1495kcal  Energy Need Method: [facility] (modified)  Weight Used For Protein Calculations: 45.5 kg (100 lb 3.2 oz) (IBW)  Protein Requirements: 91gm (2g/kg IBW)  Fluid Requirements (mL): 1000ml + urinary output  CHO Requirement: 170gm (45% est kcal needs)     Enteral Nutrition     Formula: Peptamen AF  Rate/Volume: 60 mL/hr  Water Flushes: 50 mL q4hr  Additives/Modulars: Humbel  Route: orogastric tube  Method: continuous  Total Nutrition Provided by Tube Feeding, Additives, and Flushes:  Calories Provided  1440 kcal/d, 96% needs   Protein Provided  90 g/d, 100% needs   Fluid Provided  1070 ml/d, N/A% needs   Continuous feeding calculations based on estimated 20 hr/d run time unless otherwise stated.    Parenteral Nutrition     Patient not receiving parenteral nutrition support at this time.    Evaluation of Received Nutrient Intake    Calories: meeting estimated needs  Protein: meeting estimated needs    Patient Education     Not applicable.    Nutrition Diagnosis     PES: Inadequate oral intake related to acute illness as evidenced by intubation/trach since admit. (active)     PES:            Nutrition Interventions     Intervention(s): collaboration with other providers  Intervention(s):      Goal: Meet greater than 80% of nutritional needs by follow-up. (goal progressing)  Goal: Tolerate enteral feeding at goal rate by follow-up. (goal progressing)    Nutrition Goals & Monitoring     Dietitian will monitor: energy intake and enteral nutrition intake  Discharge  planning: too early to determine; pending clinical course  Nutrition Risk/Follow-Up: high (follow-up in 1-4 days)   Please consult if re-assessment needed sooner.

## 2025-01-28 NOTE — PLAN OF CARE
SSC spoke to Kamilah at [facility]   She can't take the back with a new trach, it has to be a 30 day old trach. Also the facility has no female beds- CM notified     [facility] will send new referrals to [facility]

## 2025-01-28 NOTE — PLAN OF CARE
Problem: Stroke, Ischemic (Includes Transient Ischemic Attack)  Goal: Effective Bowel Elimination  Outcome: Progressing  Goal: Optimal Cerebral Tissue Perfusion  Outcome: Progressing  Goal: Optimal Cognitive Function  Outcome: Progressing  Goal: Improved Communication Skills  Outcome: Not Progressing  Goal: Optimal Functional Ability  Outcome: Not Progressing  Goal: Optimal Nutrition Intake  Outcome: Progressing  Goal: Effective Oxygenation and Ventilation  Outcome: Not Progressing  Goal: Improved Sensorimotor Function  Outcome: Not Progressing  Goal: Safe and Effective Swallow  Outcome: Not Progressing  Goal: Effective Urinary Elimination  Outcome: Not Progressing

## 2025-01-29 LAB
ALBUMIN SERPL-MCNC: 1.7 G/DL (ref 3.4–4.8)
ALBUMIN/GLOB SERPL: 0.4 RATIO (ref 1.1–2)
ALP SERPL-CCNC: 90 UNIT/L (ref 40–150)
ALT SERPL-CCNC: 6 UNIT/L (ref 0–55)
ANION GAP SERPL CALC-SCNC: 11 MEQ/L
AST SERPL-CCNC: 22 UNIT/L (ref 5–34)
BASOPHILS # BLD AUTO: 0.09 X10(3)/MCL
BASOPHILS NFR BLD AUTO: 0.7 %
BILIRUB SERPL-MCNC: 0.2 MG/DL
BUN SERPL-MCNC: 41.8 MG/DL (ref 9.8–20.1)
CALCIUM SERPL-MCNC: 7.7 MG/DL (ref 8.4–10.2)
CHLORIDE SERPL-SCNC: 98 MMOL/L (ref 98–107)
CO2 SERPL-SCNC: 21 MMOL/L (ref 23–31)
CREAT SERPL-MCNC: 2.65 MG/DL (ref 0.55–1.02)
CREAT/UREA NIT SERPL: 16
EOSINOPHIL # BLD AUTO: 0.28 X10(3)/MCL (ref 0–0.9)
EOSINOPHIL NFR BLD AUTO: 2.2 %
ERYTHROCYTE [DISTWIDTH] IN BLOOD BY AUTOMATED COUNT: 19 % (ref 11.5–17)
GFR SERPLBLD CREATININE-BSD FMLA CKD-EPI: 18 ML/MIN/1.73/M2
GLOBULIN SER-MCNC: 4.5 GM/DL (ref 2.4–3.5)
GLUCOSE SERPL-MCNC: 113 MG/DL (ref 70–110)
GLUCOSE SERPL-MCNC: 114 MG/DL (ref 70–110)
GLUCOSE SERPL-MCNC: 73 MG/DL (ref 82–115)
GLUCOSE SERPL-MCNC: 78 MG/DL (ref 70–110)
HCT VFR BLD AUTO: 31.7 % (ref 37–47)
HGB BLD-MCNC: 10.1 G/DL (ref 12–16)
IMM GRANULOCYTES # BLD AUTO: 0.21 X10(3)/MCL (ref 0–0.04)
IMM GRANULOCYTES NFR BLD AUTO: 1.6 %
LYMPHOCYTES # BLD AUTO: 2.68 X10(3)/MCL (ref 0.6–4.6)
LYMPHOCYTES NFR BLD AUTO: 20.7 %
MAGNESIUM SERPL-MCNC: 2.3 MG/DL (ref 1.6–2.6)
MCH RBC QN AUTO: 29.2 PG (ref 27–31)
MCHC RBC AUTO-ENTMCNC: 31.9 G/DL (ref 33–36)
MCV RBC AUTO: 91.6 FL (ref 80–94)
MONOCYTES # BLD AUTO: 0.75 X10(3)/MCL (ref 0.1–1.3)
MONOCYTES NFR BLD AUTO: 5.8 %
NEUTROPHILS # BLD AUTO: 8.91 X10(3)/MCL (ref 2.1–9.2)
NEUTROPHILS NFR BLD AUTO: 69 %
NRBC BLD AUTO-RTO: 0 %
PHOSPHATE SERPL-MCNC: 2.9 MG/DL (ref 2.3–4.7)
PLATELET # BLD AUTO: 286 X10(3)/MCL (ref 130–400)
PLATELETS.RETICULATED NFR BLD AUTO: 3.5 % (ref 0.9–11.2)
PMV BLD AUTO: 9.5 FL (ref 7.4–10.4)
POCT GLUCOSE: ABNORMAL
POCT GLUCOSE: NORMAL
POTASSIUM SERPL-SCNC: 4.5 MMOL/L (ref 3.5–5.1)
PROT SERPL-MCNC: 6.2 GM/DL (ref 5.8–7.6)
RBC # BLD AUTO: 3.46 X10(6)/MCL (ref 4.2–5.4)
SODIUM SERPL-SCNC: 130 MMOL/L (ref 136–145)
WBC # BLD AUTO: 12.92 X10(3)/MCL (ref 4.5–11.5)

## 2025-01-29 PROCEDURE — 36415 COLL VENOUS BLD VENIPUNCTURE: CPT

## 2025-01-29 PROCEDURE — 63600175 PHARM REV CODE 636 W HCPCS

## 2025-01-29 PROCEDURE — 94760 N-INVAS EAR/PLS OXIMETRY 1: CPT

## 2025-01-29 PROCEDURE — 63600175 PHARM REV CODE 636 W HCPCS: Mod: JZ,TB | Performed by: STUDENT IN AN ORGANIZED HEALTH CARE EDUCATION/TRAINING PROGRAM

## 2025-01-29 PROCEDURE — 25000003 PHARM REV CODE 250: Performed by: STUDENT IN AN ORGANIZED HEALTH CARE EDUCATION/TRAINING PROGRAM

## 2025-01-29 PROCEDURE — 11000001 HC ACUTE MED/SURG PRIVATE ROOM

## 2025-01-29 PROCEDURE — 94003 VENT MGMT INPAT SUBQ DAY: CPT

## 2025-01-29 PROCEDURE — 25000003 PHARM REV CODE 250: Performed by: INTERNAL MEDICINE

## 2025-01-29 PROCEDURE — 94761 N-INVAS EAR/PLS OXIMETRY MLT: CPT

## 2025-01-29 PROCEDURE — 27100171 HC OXYGEN HIGH FLOW UP TO 24 HOURS

## 2025-01-29 PROCEDURE — 99900026 HC AIRWAY MAINTENANCE (STAT)

## 2025-01-29 PROCEDURE — 85025 COMPLETE CBC W/AUTO DIFF WBC: CPT

## 2025-01-29 PROCEDURE — 99900035 HC TECH TIME PER 15 MIN (STAT)

## 2025-01-29 PROCEDURE — 83735 ASSAY OF MAGNESIUM: CPT

## 2025-01-29 PROCEDURE — 80100014 HC HEMODIALYSIS 1:1

## 2025-01-29 PROCEDURE — 63600175 PHARM REV CODE 636 W HCPCS: Performed by: INTERNAL MEDICINE

## 2025-01-29 PROCEDURE — 84100 ASSAY OF PHOSPHORUS: CPT

## 2025-01-29 PROCEDURE — 99900031 HC PATIENT EDUCATION (STAT)

## 2025-01-29 PROCEDURE — 27200966 HC CLOSED SUCTION SYSTEM

## 2025-01-29 PROCEDURE — 25000003 PHARM REV CODE 250

## 2025-01-29 PROCEDURE — 80053 COMPREHEN METABOLIC PANEL: CPT

## 2025-01-29 RX ORDER — ONDANSETRON HYDROCHLORIDE 2 MG/ML
4 INJECTION, SOLUTION INTRAVENOUS EVERY 6 HOURS PRN
Status: DISCONTINUED | OUTPATIENT
Start: 2025-01-29 | End: 2025-02-03 | Stop reason: HOSPADM

## 2025-01-29 RX ADMIN — OXYCODONE HYDROCHLORIDE 5 MG: 5 TABLET ORAL at 01:01

## 2025-01-29 RX ADMIN — OXYCODONE HYDROCHLORIDE 5 MG: 5 TABLET ORAL at 08:01

## 2025-01-29 RX ADMIN — ENOXAPARIN SODIUM 30 MG: 30 INJECTION SUBCUTANEOUS at 04:01

## 2025-01-29 RX ADMIN — PANTOPRAZOLE SODIUM 40 MG: 40 INJECTION, POWDER, FOR SOLUTION INTRAVENOUS at 09:01

## 2025-01-29 RX ADMIN — LEVETIRACETAM 500 MG: 100 INJECTION, SOLUTION INTRAVENOUS at 08:01

## 2025-01-29 RX ADMIN — Medication: at 08:01

## 2025-01-29 RX ADMIN — VALPROATE SODIUM 500 MG: 100 INJECTION, SOLUTION INTRAVENOUS at 04:01

## 2025-01-29 RX ADMIN — EPOETIN ALFA-EPBX 20000 UNITS: 20000 INJECTION, SOLUTION INTRAVENOUS; SUBCUTANEOUS at 12:01

## 2025-01-29 RX ADMIN — OXYCODONE HYDROCHLORIDE 5 MG: 5 TABLET ORAL at 11:01

## 2025-01-29 RX ADMIN — Medication: at 09:01

## 2025-01-29 RX ADMIN — LEVETIRACETAM 500 MG: 100 INJECTION, SOLUTION INTRAVENOUS at 09:01

## 2025-01-29 NOTE — PROGRESS NOTES
Gastroenterology Progress Note    Subjective/Interval History:  Patient given yodit with noted emesis following administration and gagging.  No residuals and no issues with medications per PEG.  Having BM per staff. Peg bumper at 3 cm, c/d/I.    S/p trach.    ROS:  Review of Systems   Unable to perform ROS: Medical condition       Vital Signs:  BP (!) 153/90   Pulse (!) 117   Temp 98.8 °F (37.1 °C)   Resp (!) 46   Ht 5' (1.524 m)   Wt 74.1 kg (163 lb 5.8 oz)   SpO2 96%   BMI 31.90 kg/m²   Body mass index is 31.9 kg/m².    Physical Exam:  Physical Exam  Constitutional:       Comments: Obtunded   HENT:      Head: Normocephalic and atraumatic.      Mouth/Throat:      Mouth: Mucous membranes are dry.      Pharynx: Oropharynx is clear.   Cardiovascular:      Rate and Rhythm: Normal rate and regular rhythm.      Pulses: Normal pulses.      Heart sounds: Normal heart sounds.   Pulmonary:      Effort: Pulmonary effort is normal.      Breath sounds: Normal breath sounds.   Abdominal:      General: Bowel sounds are normal. There is no distension.      Palpations: Abdomen is soft. There is no mass.      Tenderness: There is no abdominal tenderness. There is no guarding.      Comments: Peg noted to LUQ, at 3 cm. Site c/d/I, dressing changed.    Skin:     General: Skin is warm and dry.         Labs:  Recent Results (from the past 48 hours)   Comprehensive Metabolic Panel    Collection Time: 01/28/25  3:14 AM   Result Value Ref Range    Sodium 134 (L) 136 - 145 mmol/L    Potassium 4.0 3.5 - 5.1 mmol/L    Chloride 101 98 - 107 mmol/L    CO2 23 23 - 31 mmol/L    Glucose 39 (LL) 82 - 115 mg/dL    Blood Urea Nitrogen 41.7 (H) 9.8 - 20.1 mg/dL    Creatinine 2.21 (H) 0.55 - 1.02 mg/dL    Calcium 8.1 (L) 8.4 - 10.2 mg/dL    Protein Total 6.2 5.8 - 7.6 gm/dL    Albumin 1.8 (L) 3.4 - 4.8 g/dL    Globulin 4.4 (H) 2.4 - 3.5 gm/dL    Albumin/Globulin Ratio 0.4 (L) 1.1 - 2.0 ratio    Bilirubin Total 0.2 <=1.5 mg/dL    ALP 89 40 - 150  unit/L    ALT 10 0 - 55 unit/L    AST 18 5 - 34 unit/L    eGFR 22 mL/min/1.73/m2    Anion Gap 10.0 mEq/L    BUN/Creatinine Ratio 19    Magnesium    Collection Time: 01/28/25  3:14 AM   Result Value Ref Range    Magnesium Level 2.30 1.60 - 2.60 mg/dL   Phosphorus    Collection Time: 01/28/25  3:14 AM   Result Value Ref Range    Phosphorus Level 2.5 2.3 - 4.7 mg/dL   CBC with Differential    Collection Time: 01/28/25  3:14 AM   Result Value Ref Range    WBC 16.33 (H) 4.50 - 11.50 x10(3)/mcL    RBC 3.20 (L) 4.20 - 5.40 x10(6)/mcL    Hgb 9.6 (L) 12.0 - 16.0 g/dL    Hct 29.0 (L) 37.0 - 47.0 %    MCV 90.6 80.0 - 94.0 fL    MCH 30.0 27.0 - 31.0 pg    MCHC 33.1 33.0 - 36.0 g/dL    RDW 19.2 (H) 11.5 - 17.0 %    Platelet 318 130 - 400 x10(3)/mcL    MPV 7.9 7.4 - 10.4 fL    Neut % 58.8 %    Lymph % 26.0 %    Mono % 9.6 %    Eos % 2.0 %    Basophil % 0.8 %    Imm Grans % 2.8 %    Neut # 9.63 (H) 2.1 - 9.2 x10(3)/mcL    Lymph # 4.24 0.6 - 4.6 x10(3)/mcL    Mono # 1.56 (H) 0.1 - 1.3 x10(3)/mcL    Eos # 0.32 0 - 0.9 x10(3)/mcL    Baso # 0.13 <=0.2 x10(3)/mcL    Imm Gran # 0.45 (H) 0.00 - 0.04 x10(3)/mcL    NRBC% 0.1 %   Protime-INR    Collection Time: 01/28/25 10:02 AM   Result Value Ref Range    PT 15.1 (H) 12.5 - 14.5 seconds    INR 1.2 <=1.3   Comprehensive Metabolic Panel    Collection Time: 01/29/25  5:33 AM   Result Value Ref Range    Sodium 130 (L) 136 - 145 mmol/L    Potassium 4.5 3.5 - 5.1 mmol/L    Chloride 98 98 - 107 mmol/L    CO2 21 (L) 23 - 31 mmol/L    Glucose 73 (L) 82 - 115 mg/dL    Blood Urea Nitrogen 41.8 (H) 9.8 - 20.1 mg/dL    Creatinine 2.65 (H) 0.55 - 1.02 mg/dL    Calcium 7.7 (L) 8.4 - 10.2 mg/dL    Protein Total 6.2 5.8 - 7.6 gm/dL    Albumin 1.7 (L) 3.4 - 4.8 g/dL    Globulin 4.5 (H) 2.4 - 3.5 gm/dL    Albumin/Globulin Ratio 0.4 (L) 1.1 - 2.0 ratio    Bilirubin Total 0.2 <=1.5 mg/dL    ALP 90 40 - 150 unit/L    ALT 6 0 - 55 unit/L    AST 22 5 - 34 unit/L    eGFR 18 mL/min/1.73/m2    Anion Gap 11.0 mEq/L     BUN/Creatinine Ratio 16    Magnesium    Collection Time: 01/29/25  5:33 AM   Result Value Ref Range    Magnesium Level 2.30 1.60 - 2.60 mg/dL   Phosphorus    Collection Time: 01/29/25  5:33 AM   Result Value Ref Range    Phosphorus Level 2.9 2.3 - 4.7 mg/dL   CBC with Differential    Collection Time: 01/29/25  5:33 AM   Result Value Ref Range    WBC 12.92 (H) 4.50 - 11.50 x10(3)/mcL    RBC 3.46 (L) 4.20 - 5.40 x10(6)/mcL    Hgb 10.1 (L) 12.0 - 16.0 g/dL    Hct 31.7 (L) 37.0 - 47.0 %    MCV 91.6 80.0 - 94.0 fL    MCH 29.2 27.0 - 31.0 pg    MCHC 31.9 (L) 33.0 - 36.0 g/dL    RDW 19.0 (H) 11.5 - 17.0 %    Platelet 286 130 - 400 x10(3)/mcL    MPV 9.5 7.4 - 10.4 fL    IPF 3.5 0.9 - 11.2 %    Neut % 69.0 %    Lymph % 20.7 %    Mono % 5.8 %    Eos % 2.2 %    Basophil % 0.7 %    Imm Grans % 1.6 %    Neut # 8.91 2.1 - 9.2 x10(3)/mcL    Lymph # 2.68 0.6 - 4.6 x10(3)/mcL    Mono # 0.75 0.1 - 1.3 x10(3)/mcL    Eos # 0.28 0 - 0.9 x10(3)/mcL    Baso # 0.09 <=0.2 x10(3)/mcL    Imm Gran # 0.21 (H) 0.00 - 0.04 x10(3)/mcL    NRBC% 0.0 %       Imaging:  XR Gastric tube check, non-radiologist performed    Result Date: 1/28/2025  EXAMINATION: XR GASTRIC TUBE CHECK, NON-RADIOLOGIST PERFORMED CLINICAL HISTORY: placement; TECHNIQUE: Frontal view of the lower chest and upper abdomen COMPARISON: Radiography earlier on 01/27/2025 FINDINGS: Side port for the enteric tube now lies 8 cm below the GE junction.     Enteric tube side port overlying the body of the stomach. No significant discrepancy between my interpretation and the preliminary radiology report. Electronically signed by: Alfredo Alejo Date:    01/28/2025 Time:    11:24    X-Ray Chest 1 View    Result Date: 1/28/2025  EXAMINATION: XR CHEST 1 VIEW CPT 60891 CLINICAL HISTORY: Trach placement; COMPARISON: January 27, 2025 FINDINGS: Examination reveals cardiomediastinal silhouette to be essentially unchanged as compared with the previous exam. Slight improved aeration by the right  costophrenic angle region but persistent blunting of the right costophrenic angle indicating the presence of a right-sided pleural effusion. Confluent opacities also identified at the left base which might be related to an infiltrate/atelectasis. There is a tracheostomy cannula now with the tip above the selene nasogastric tube is seen with the tip below the diaphragm     Slight improved aeration at the right base. Slightly more confluent opacities at the left base which might be related to an infiltrate/atelectasis. Interval insertion of tracheostomy cannula Electronically signed by: Juan Clemons Date:    01/28/2025 Time:    08:50    XR Gastric tube check, non-radiologist performed    Result Date: 1/27/2025  EXAMINATION: XR GASTRIC TUBE CHECK, NON-RADIOLOGIST PERFORMED CLINICAL HISTORY: placement; TECHNIQUE: One view COMPARISON: None available FINDINGS: Nasogastric tube adequate length is within the stomach.  Tube tip is about the mid gastric body.     Adequate length of the nasogastric tube within the stomach. Electronically signed by: Kwasi Morin Date:    01/27/2025 Time:    21:40    X-Ray Chest 1 View    Result Date: 1/27/2025  EXAMINATION: XR CHEST 1 VIEW CPT 97335 CLINICAL HISTORY: intubated; COMPARISON: January 10, 2025 FINDINGS: Examination reveals cardiomediastinal silhouette to be unchanged as compared with the previous exam.  There is blunting of both costophrenic angles indicating the presence of bilateral pleural effusions. There is increased left retrocardiac density and silhouetting of the left hemidiaphragm although it might be related to pleural fluid it might also represent an infiltrate/atelectasis. Other linear densities identified at the left base representing atelectatic changes. There is a right-sided pleural effusion with compressive atelectatic changes at the right base. Support catheters remain in place     Interval development of bilateral pleural effusions greater on the right than  on the left. Increased left retrocardiac density and silhouetting of the left hemidiaphragm as above. Atelectatic changes in both bases. Support catheters remain in place Electronically signed by: Juan Clemons Date:    01/27/2025 Time:    05:17    X-Ray Chest 1 View    Result Date: 1/10/2025  EXAMINATION: XR CHEST 1 VIEW CLINICAL HISTORY: respiratory failure; TECHNIQUE: AP chest COMPARISON: Chest x-ray dated 01/06/2025 FINDINGS: Endotracheal tube, enteric tube and left-sided central line remain in place.  The heart is stable in size.  There is no focal airspace consolidation.  There is no pleural effusion or visible pneumothorax.     Stable exam without significant interval change. Electronically signed by: Joana Bobo Date:    01/10/2025 Time:    06:07    Echo    Result Date: 1/6/2025    Left Ventricle: The left ventricle is normal in size. Normal wall thickness. Mild global hypokinesis present. There is mildly reduced systolic function with a visually estimated ejection fraction of 45 - 50%.   Right Ventricle: Normal right ventricular cavity size. Systolic function is normal. TAPSE is 2.35 cm.   Aortic Valve: The aortic valve is a trileaflet valve. There is aortic valve sclerosis.   Mitral Valve: Mildly thickened leaflets. There is mitral annular calcification present.   Tricuspid Valve: There is mild regurgitation.   IVC/SVC: Patient is ventilated, cannot use IVC diameter to estimate right atrial pressure.     X-Ray Chest 1 View    Result Date: 1/6/2025  EXAMINATION: XR CHEST 1 VIEW CPT 02504 CLINICAL HISTORY: placement; COMPARISON: January 6, 2025 FINDINGS: Cardiomediastinal silhouette improved parenchymal changes to be essentially unchanged as compared with the previous exam. There is an endotracheal tube with the tip above the selene nasogastric tube is seen with the tip below the diaphragm     No significant change as compared with the previous exam. Interval insertion of endotracheal tube and  nasogastric tube Electronically signed by: Juan Clemons Date:    01/06/2025 Time:    12:16    X-Ray Chest 1 View    Result Date: 1/6/2025  EXAMINATION: XR CHEST 1 VIEW CLINICAL HISTORY: CHF; COMPARISON: No priors FINDINGS: Frontal view of the chest was obtained. Left-sided dialysis catheter tip overlies the superior cavoatrial junction.  Heart is mildly enlarged.  There is some interstitial prominence.  There is no pneumothorax.     Suspect some pulmonary vascular congestion. Electronically signed by: Primitivo Salguero Date:    01/06/2025 Time:    11:11    MRI Brain Without Contrast    Result Date: 1/5/2025  EXAMINATION: MRI BRAIN WITHOUT CONTRAST CLINICAL HISTORY: Stroke, follow up; TECHNIQUE: Multiplanar MRI sequences were performed of the brain without contrast. COMPARISON: CT brain without contrast January 5, 2025. FINDINGS: Bilateral cerebral periventricular and subcortical white matter variable size T2-FLAIR hyperintense signals are consistent with moderate chronic microangiopathic ischemia.  Cystic encephalomalacia involves bilateral occipital lobes.  There also bilateral basal ganglia old lacunar infarcts.  There there is generalized cerebral and to a lesser degree cerebellar cortical volume loss.  Gradient echo sequences demonstrate no evidence of de phasing artifact to suggest hemorrhagic byproducts. No evidence of diffusion restriction or ADC map signal drop out to suggest acute infarct. The sella and suprasellar areas are unremarkable. The cerebellar tonsils are normally positioned. There is no acute intracranial hemorrhage, hydrocephalus, midline shift or mass effect. No acute extra axial fluid collections identified. The mastoid air cells are clear.     1.  No acute intracranial findings identified. 2.  Old infarcts, chronic microangiopathic ischemia and atrophy. Electronically signed by: Kwasi Morin Date:    01/05/2025 Time:    23:10    CT HEAD FOR STROKE    Result Date: 1/5/2025  EXAMINATION: CT  HEAD FOR STROKE CLINICAL HISTORY: Neuro deficit, acute, stroke suspected; TECHNIQUE: CT images of the head without IV contrast. Axial, coronal and sagittal images reviewed. Dose length product 1029 mGycm. Automatic exposure control, adjustment of mA/kV or iterative reconstruction technique used to limit radiation dose. COMPARISON: No relevant comparison studies available at the time of dictation. FINDINGS: Extra-axial spaces/ventricular system: Moderate cerebral atrophy.  Ventricular size proportional to the degree of atrophy. Intracranial hemorrhage: None identified. Cerebral parenchyma: Encephalomalacia in both occipital lobes.  Moderate chronic small vessel ischemic changes in the supratentorial white matter.  Several scattered small chronic appearing infarcts.  No definitive acute infarct identified. Vascular system: No hyperdense vessel appreciated. Cerebellum: Normal. Sella: Normal. Included paranasal sinuses and mastoid air cells: Trace bilateral mastoid fluid. Visualized orbits: Normal. Scalp/Calvarium: No depressed skull fracture.     No acute intracranial process identified. Report called to Dr. Pickard at 15:43 hours on 01/05/2025. Electronically signed by: Alfredo Alejo Date:    01/05/2025 Time:    15:45    CTA Head and Neck (xpd)    Result Date: 1/5/2025  EXAMINATION: CTA HEAD AND NECK (XPD) CLINICAL HISTORY: Neuro deficit, acute, stroke suspected; TECHNIQUE: Non contrast low dose axial images were obtained through the head.  CT angiogram was performed from the level of the selene to the top of the head following the IV administration of 100mL of Omnipaque 350.   Sagittal and coronal reconstructions and maximum intensity projection reconstructions were performed. Arterial stenosis percentages are based on NASCET measurement criteria.  DLP 1460.  Automated exposure control used. COMPARISON: None FINDINGS: Neck: The common carotid arteries, carotid bulbs, internal carotid arteries, and vertebral arteries  are patent without significant stenosis or occlusion.  Trace atherosclerotic calcification noted.  No soft tissue mass, fluid collection, hematoma, lymphadenopathy. Head: Internal carotid artery siphons demonstrate moderate severe atherosclerotic calcification with severe narrowing of the supraclinoid segments of the ICAs.  Anterior cerebral arteries, middle cerebral arteries and branches, posterior cerebral arteries, and vertebrobasilar system are patent without significant stenosis, occlusion, or aneurysm.     Severe atherosclerotic calcification of the carotid siphons with severe narrowing of the supraclinoid ICAs bilaterally. Electronically signed by: Santos Perez MD Date:    01/05/2025 Time:    15:40         Assessment/Plan:  79-year-old female unknown to our service with a PMH of ESRD on HD, hypertension, CAD, CHF, and diabetes mellitus who presented with altered mental status.  She then developed seizure-like activity and was intubated for airway protection.  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  She remains intubated with plans for tracheostomy placement.   Currently on Lovenox for DVT prevention, no other anticoagulation.  GI consulted for PEG placement.      1.  Need for alternative means of nutrition  2.  Acute respiratory failure  3.  Persistent nonconvulsive status epilepticus     s/p EGD/PEG  - Given emesis, will start trickle feeds today 10-15ml/hr x 24 hours.  -If no further vomiting over night, ok to Initiate TFs and advance as tolerated to goal as per set orders tomorrow.  - PEG site care.   - Keep abdominal binder in place at all times to prevent dislodgement.      Kerry Maciel, Np with Dr. Vaibhav Núñez MD

## 2025-01-29 NOTE — PLAN OF CARE
Problem: Adult Inpatient Plan of Care  Goal: Plan of Care Review  1/29/2025 0445 by Eugene Manning RN  Outcome: Progressing  1/29/2025 0253 by Eugene Manning RN  Outcome: Progressing  Goal: Patient-Specific Goal (Individualized)  1/29/2025 0445 by Eugene Manning RN  Outcome: Progressing  1/29/2025 0253 by Eugene Manning RN  Outcome: Progressing  Goal: Absence of Hospital-Acquired Illness or Injury  1/29/2025 0445 by Eugene Manning RN  Outcome: Progressing  1/29/2025 0253 by Eugene Manning RN  Outcome: Progressing  Goal: Optimal Comfort and Wellbeing  1/29/2025 0445 by Eugene Manning RN  Outcome: Progressing  1/29/2025 0253 by Eugene Manning RN  Outcome: Progressing  Goal: Readiness for Transition of Care  1/29/2025 0445 by Eugene Manning RN  Outcome: Progressing  1/29/2025 0253 by Eugene Manning RN  Outcome: Progressing     Problem: Hemodialysis  Goal: Safe, Effective Therapy Delivery  1/29/2025 0445 by Eugene Manning RN  Outcome: Progressing  1/29/2025 0253 by Eugene Manning RN  Outcome: Progressing  Goal: Effective Tissue Perfusion  1/29/2025 0445 by Eugene Manning RN  Outcome: Progressing  1/29/2025 0253 by Eugene Manning RN  Outcome: Progressing  Goal: Absence of Infection Signs and Symptoms  1/29/2025 0445 by Eugene Manning RN  Outcome: Progressing  1/29/2025 0253 by Eugene Manning RN  Outcome: Progressing     Problem: Stroke, Ischemic (Includes Transient Ischemic Attack)  Goal: Optimal Coping  1/29/2025 0445 by Eugene Manning RN  Outcome: Progressing  1/29/2025 0253 by Eugene Manning RN  Outcome: Progressing  Goal: Effective Bowel Elimination  1/29/2025 0445 by Eugene Manning RN  Outcome: Progressing  1/29/2025 0253 by Eugene Manning RN  Outcome: Progressing  Goal: Optimal Cerebral Tissue Perfusion  1/29/2025 0445 by Eugene Manning RN  Outcome: Progressing  1/29/2025 0253 by Eugene Manning RN  Outcome: Progressing  Goal: Optimal Cognitive  Function  1/29/2025 0445 by Eugene Manning RN  Outcome: Progressing  1/29/2025 0253 by Eugene Manning, RN  Outcome: Progressing  Goal: Improved Communication Skills  1/29/2025 0445 by Eugene Manning, RN  Outcome: Progressing  1/29/2025 0253 by Eugene Manning, RN  Outcome: Progressing  Goal: Optimal Functional Ability  1/29/2025 0445 by Eugene Manning, RN  Outcome: Progressing  1/29/2025 0253 by Eugene Manning, RN  Outcome: Progressing  Goal: Optimal Nutrition Intake  1/29/2025 0445 by Eugene Manning, RN  Outcome: Progressing  1/29/2025 0253 by Eugene Manning, RN  Outcome: Progressing  Goal: Effective Oxygenation and Ventilation  1/29/2025 0445 by Eugene Manning, RN  Outcome: Progressing  1/29/2025 0253 by Eugene Manning, RN  Outcome: Progressing  Goal: Improved Sensorimotor Function  1/29/2025 0445 by Eugene Manning, RN  Outcome: Progressing  1/29/2025 0253 by Eugene Manning, RN  Outcome: Progressing  Goal: Safe and Effective Swallow  1/29/2025 0445 by Eugene Manning, RN  Outcome: Progressing  1/29/2025 0253 by Eugene Manning, RN  Outcome: Progressing  Goal: Effective Urinary Elimination  1/29/2025 0445 by Eugene Manning, RN  Outcome: Progressing  1/29/2025 0253 by Eugene Manning, RN  Outcome: Progressing     Problem: Skin Injury Risk Increased  Goal: Skin Health and Integrity  1/29/2025 0445 by Eugene Manning, RN  Outcome: Progressing  1/29/2025 0253 by Eugene Manning, RN  Outcome: Progressing     Problem: Wound  Goal: Optimal Coping  1/29/2025 0445 by Eugene Manning, RN  Outcome: Progressing  1/29/2025 0253 by Eugene Manning, RN  Outcome: Progressing  Goal: Optimal Functional Ability  1/29/2025 0445 by Eugene Manning, RN  Outcome: Progressing  1/29/2025 0253 by Eugene Manning, RN  Outcome: Progressing  Goal: Absence of Infection Signs and Symptoms  1/29/2025 0445 by Eugene Manning RN  Outcome: Progressing  1/29/2025 0253 by Eugene Manning RN  Outcome:  Progressing  Goal: Improved Oral Intake  1/29/2025 0445 by Eugene Manning RN  Outcome: Progressing  1/29/2025 0253 by Eugene Manning RN  Outcome: Progressing  Goal: Optimal Pain Control and Function  1/29/2025 0445 by Eugene Manning RN  Outcome: Progressing  1/29/2025 0253 by Eugene Manning RN  Outcome: Progressing  Goal: Skin Health and Integrity  1/29/2025 0445 by Eugene Manning RN  Outcome: Progressing  1/29/2025 0253 by Eugene Manning RN  Outcome: Progressing  Goal: Optimal Wound Healing  1/29/2025 0445 by Eugene Manning RN  Outcome: Progressing  1/29/2025 0253 by Eugene Manning RN  Outcome: Progressing     Problem: Infection  Goal: Absence of Infection Signs and Symptoms  1/29/2025 0445 by Eugene Manning RN  Outcome: Progressing  1/29/2025 0253 by Eugene Manning RN  Outcome: Progressing     Problem: Fall Injury Risk  Goal: Absence of Fall and Fall-Related Injury  1/29/2025 0445 by Eugene Manning RN  Outcome: Progressing  1/29/2025 0253 by Eugene Manning RN  Outcome: Progressing     Problem: Coping Ineffective  Goal: Effective Coping  1/29/2025 0445 by Eugene Manning RN  Outcome: Progressing  1/29/2025 0253 by Eugene Manning RN  Outcome: Progressing     Problem: Mechanical Ventilation Invasive  Goal: Effective Communication  1/29/2025 0445 by Eugene Manning RN  Outcome: Progressing  1/29/2025 0253 by Eugene Manning RN  Outcome: Progressing  Goal: Optimal Device Function  1/29/2025 0445 by Eugene Manning RN  Outcome: Progressing  1/29/2025 0253 by Eugene Manning RN  Outcome: Progressing  Goal: Mechanical Ventilation Liberation  1/29/2025 0445 by Eugene Manning RN  Outcome: Progressing  1/29/2025 0253 by Eugene Manning RN  Outcome: Progressing  Goal: Optimal Nutrition Delivery  1/29/2025 0445 by Eugene Manning RN  Outcome: Progressing  1/29/2025 0253 by Eugene Manning, RN  Outcome: Progressing  Goal: Absence of Device-Related Skin and Tissue  Injury  1/29/2025 0445 by Eugene Manning RN  Outcome: Progressing  1/29/2025 0253 by Eugene Manning RN  Outcome: Progressing  Goal: Absence of Ventilator-Induced Lung Injury  1/29/2025 0445 by Eugene Manning RN  Outcome: Progressing  1/29/2025 0253 by Eugene Manning RN  Outcome: Progressing     Problem: Artificial Airway  Goal: Effective Communication  1/29/2025 0445 by Eugene Manning RN  Outcome: Progressing  1/29/2025 0253 by Eugene Manning RN  Outcome: Progressing  Goal: Optimal Device Function  1/29/2025 0445 by Eugene Manning RN  Outcome: Progressing  1/29/2025 0253 by Eugene Manning RN  Outcome: Progressing  Goal: Absence of Device-Related Skin or Tissue Injury  1/29/2025 0445 by Eugene Manning RN  Outcome: Progressing  1/29/2025 0253 by Eugene Manning RN  Outcome: Progressing

## 2025-01-29 NOTE — CONSULTS
[facility] [address] ICU  Wound Care    Patient Name:  Krysta Jansen   MRN:  [id number]  Date: 1/29/2025  Diagnosis: <principal problem not specified>    History:     History reviewed. No pertinent past medical history.    Social History     Socioeconomic History    Marital status: Single   Tobacco Use    Smoking status: Unknown   Substance and Sexual Activity    Alcohol use: Not Currently     Social Drivers of Health     Financial Resource Strain: Patient Unable To Answer (1/11/2025)    Overall Financial Resource Strain (CARDIA)     Difficulty of Paying Living Expenses: Patient unable to answer   Food Insecurity: Patient Unable To Answer (1/11/2025)    Hunger Vital Sign     Worried About Running Out of Food in the Last Year: Patient unable to answer     Ran Out of Food in the Last Year: Patient unable to answer   Transportation Needs: Patient Unable To Answer (1/11/2025)    TRANSPORTATION NEEDS     Transportation : Patient unable to answer   Stress: Patient Unable To Answer (1/11/2025)    Citizen of [place] [employer] - Occupational Stress Questionnaire     Feeling of Stress : Patient unable to answer   Housing Stability: Patient Unable To Answer (1/11/2025)    Housing Stability Vital Sign     Unable to Pay for Housing in the Last Year: Patient unable to answer     Homeless in the Last Year: Patient unable to answer       Precautions:     Allergies as of 01/05/2025    (No Known Allergies)       WO Assessment Details/Treatment        01/29/25 1040   WOCN Assessment   Visit Date 01/29/25   Visit Time 1040   Consult Type Follow Up   WO Speciality Wound   Intervention chart review;assessed;applied;orders   Teaching on-going        Wound 01/05/25 1535 Skin Tear  Buttocks   Date First Assessed/Time First Assessed: 01/05/25 1535   Primary Wound Type: Skin Tear  Orientation: (c)   Location: Buttocks   Wound Image    Dressing Appearance Dry;Clean;Intact   Drainage Amount None   Drainage  Characteristics/Odor No odor   Appearance Pink;Tan;White   Tissue loss description Partial thickness   Black (%), Wound Tissue Color 0 %   Red (%), Wound Tissue Color 100 %   Yellow (%), Wound Tissue Color 0 %   Periwound Area Dry   Wound Edges Irregular   Wound Length (cm) 5 cm  (whole area)   Wound Width (cm) 9.5 cm   Wound Depth (cm) 0.1 cm   Wound Volume (cm^3) 4.75 cm^3   Wound Surface Area (cm^2) 47.5 cm^2   Care Cleansed with:;Soap and water;Applied:;Skin Barrier  (zinc oxide)        Wound 01/23/25 1500 Pressure Injury Right Antecubital   Date First Assessed/Time First Assessed: 01/23/25 1500   Present on Original Admission: No  Primary Wound Type: Pressure Injury  Side: Right  Location: Antecubital  Is this injury device related?: (c) Yes   Wound Image    Pressure Injury Stage 2   Dressing Appearance Dry;Intact;Clean   Drainage Amount None   Drainage Characteristics/Odor Tan;Yellow   Appearance Blistered;Dry   Tissue loss description Full thickness   Black (%), Wound Tissue Color 0 %   Red (%), Wound Tissue Color 10 %   Yellow (%), Wound Tissue Color 90 %   Periwound Area Intact;Dry   Wound Edges Irregular   Wound Length (cm) 0.4 cm   Wound Width (cm) 2.1 cm   Wound Depth (cm) 0.1 cm   Wound Volume (cm^3) 0.084 cm^3   Wound Surface Area (cm^2) 0.84 cm^2   Care Cleansed with:;Wound cleanser   Dressing Applied  (nonadherent, nonadaptic, kerlix, tape)        Wound 01/28/25 0900 Pressure Injury Left Heel   Date First Assessed/Time First Assessed: 01/28/25 0900   Primary Wound Type: Pressure Injury  Side: Left  Location: Heel   Wound Image    Pressure Injury Stage DTPI   Dressing Appearance Dry;Intact;Clean   Drainage Amount None   Drainage Characteristics/Odor No odor   Appearance Maroon   Tissue loss description Not applicable   Periwound Area Intact;Dry   Care Cleansed with:;Soap and water   Dressing Changed;Foam     WOCN consulted for right AC, left heel, and follow up for sacrum. Discussed POC w/ nurse  [place] who assisted in turning pt. No family at bedside. Pt. Has a trach on ventilator. Pt. On icu SIDDHARTHA and prafo boots on. Treatment recommendations: Right ac: clean w/ NS, dry well, apply Aquacel gray cloth to wound bed, cover with gauze, kerlix wrap. BID/prn if soilage. Left heel: clean w/ soap/water, dry well, apply betadine to site, cover with small pink boarded foam. Q2days. Cont tx recs for sacrum. Unable to educate pt. Due to cognition. Nursing to cont. Tx recs and preventative measures. Will follow up.     01/29/2025

## 2025-01-29 NOTE — PROGRESS NOTES
Nephrology consult follow up note    HPI:      Krysta Jansen is a 79 y.o. female is a nursing home resident and has ESRD and is on dialysis on Monday Wednesday Friday.  Brought to this hospital for altered mental status.  She was found to have some grand mal seizure.  She received some Ativan.  She had EEG done.  Her respiratory status and mental status deteriorated and required intubation to protect her airways.  Could not get any history from the patient.  Records revealed she was found to have some dysarthria and decreased level of consciousness Friday through being brought to this hospital.  Significant past medical history is positive for diabetes mellitus type 2 previous stroke hypertension coronary artery disease and congestive heart failure.    Interval history:     01/07/2024  Patient remains intubated and sedated.  On epinephrine for hypotension this morning.     01/08/25  Remains intubated and sedated on vent. Off pressors with BP stable at 130/60 range. Noted leucocytosis on am lab. Blood cultures pending.  Hgb stable at 7.6. Dialyzed yesterday and tolerated 500mL UF.      01/09/2025  No acute events overnight.  Patient remains intubated.  Not currently requiring vasopressors, but blood pressure does remain borderline low.  ICU team is requesting to hold hemodialysis today so they can have goals of care conversation with family.     01/10/2025   Currently tolerating dialysis.  Will remove only 500 cc of fluid.  Remains intubated.  Tolerating tube feedings.  Nurses reported that she is not on any sedation for about 48 hours.     01/13/2025   Weekend events noted.  Patient's condition is unchanged.  Remains on the ventilator and off sedation or pressors.  Unresponsive.     01/14/2025   Last 24 hours events noted.  Patient's condition remains unchanged and she is off pressors and off sedation and still unresponsive although she grimaces to painful stimuli.     01/15/2025   Currently tolerating  dialysis.  Neuro status and respiratory status remains unchanged.    01/16/2025  Dialyze yesterday.  No clinical change overnight.    01/17/2025  No clinical change overnight.  Hemoglobin dropped today.  Remains intubated    01/18/2025  No acute events overnight.  On Levophed for hypotension.  Dialyzed yesterday.    01/19/2025  No acute events overnight.  She does appear more edematous.  Remains on Levophed.  Getting tube feedings.     01/20/2025   Currently tolerating dialysis.  Will try to remove some fluid.  Will increase dialysis time to 4 hours.  Comfortable on the ventilator.    01/21/2025   Currently tolerating dialysis.  The plan is to remove some fluid and to help correct electrolytes and uremic toxins.    01/22/2025   Patient has had dialysis for 2 days in a row for 4 hours each time and now on low-dose Levophed.  No change neurologically.  Still tolerating tube feedings and patient remains on the ventilator.    01/23/2025   Just started on dialysis.  Will decrease fluid removal to 1 L only.  Overall condition is unchanged.  She is off Levophed now.    01/24/2025   She tolerated dialysis yesterday.  Overall condition remains unchanged.  Off pressors.  Off sedation.  Getting sodium phosphate for hypophosphatemia.  Tolerating tube feedings.    01/27/2025   Weekend events noted.  Nurse just informed me that family wanted to do trach and PEG placement.    1/28/25  Trach and PEG placed yesterday.  Remains on ventilator via trach.    01/29/2025   Patient is tolerating PEG nutrition and has tracheostomy done also.  Overall condition is unchanged.    Review of Systems:     Unable to obtain ROS due to mental status/intubation.     Past medical, family, surgical, and social history reviewed and unchanged from initial consult note.     Objective:       VITAL SIGNS: 24 HR MIN & MAX LAST    Temp  Min: 97.3 °F (36.3 °C)  Max: 99 °F (37.2 °C)  98.8 °F (37.1 °C)        BP  Min: 103/76  Max: 158/65  (!) 149/69     Pulse   Min: 51  Max: 113  104     Resp  Min: 17  Max: 32  (!) 32    SpO2  Min: 94 %  Max: 100 %  (!) 94 %      GEN: Chronically ill appearing AA female, comfortable on the ventilator.  Off all sedation and pressors.  HEENT:  Tracheostomy in place  CV: RRR without rub.  PULM: CTAB, unlabored  ABD: Soft, NT/ND abdomen with NABS.  Peg tube noted.  EXT:  2+ dependent edema  SKIN: Warm and dry  PSYCH:  Minimally responsive to stimulation  Dialysis access:  Left IJ PermCath            Component Value Date/Time     (L) 01/29/2025 0533     (L) 01/28/2025 0314     05/08/2024 0739     03/15/2024 1101    K 4.5 01/29/2025 0533    K 4.0 01/28/2025 0314    K 3.8 05/08/2024 0739    K 3.9 03/15/2024 1101    CO2 21 (L) 01/29/2025 0533    CO2 23 01/28/2025 0314    CO2 22 05/08/2024 0739    CO2 21 (L) 03/15/2024 1101    BUN 41.8 (H) 01/29/2025 0533    BUN 41.7 (H) 01/28/2025 0314    BUN 55 (H) 10/02/2024 0000    BUN 23 (H) 07/12/2024 0000    CREATININE 2.65 (H) 01/29/2025 0533    CREATININE 2.21 (H) 01/28/2025 0314    CREATININE 3.37 (H) 05/08/2024 0739    CREATININE 3.2 (H) 03/15/2024 1101    CALCIUM 7.7 (L) 01/29/2025 0533    CALCIUM 8.1 (L) 01/28/2025 0314    CALCIUM 9 05/08/2024 0739    CALCIUM 9.9 03/15/2024 1101    PHOS 2.9 01/29/2025 0533            Component Value Date/Time    WBC 12.92 (H) 01/29/2025 0533    WBC 16.33 (H) 01/28/2025 0314    WBC 13.49 01/26/2025 0451    WBC 16.5 01/19/2025 0429    HGB 10.1 (L) 01/29/2025 0533    HGB 9.6 (L) 01/28/2025 0314    HGB 10.3 (L) 11/24/2024 0000    HGB 10 (L) 11/18/2024 0000    HCT 31.7 (L) 01/29/2025 0533    HCT 29.0 (L) 01/28/2025 0314     01/29/2025 0533     01/28/2025 0314         Imaging reviewed      Assessment / Plan:       ESRD.  Will change to Monday Wednesday Friday.  New onset seizure  Acute hypoxic respiratory failure  Hypertension  Diabetes mellitus type 2   History of coronary artery disease   History of previous stroke  Urinary tract  infection  Anemia of chronic disease    Plan:  Hemodialysis today  Continue nutritional support and ventilator support

## 2025-01-29 NOTE — NURSING
01/29/25 1615   Post-Hemodialysis Assessment   Rinseback Volume (mL) 500 mL   Blood Volume Processed (Liters) 64.1 L   Dialyzer Clearance Clotted   Duration of Treatment 210 minutes   Additional Fluid Intake (mL) 250 mL   Total UF (mL) 1250 mL   Net Fluid Removal 500   Patient Response to Treatment Tolerated well. Required reset up of hd circuits d/t clotting line. Tx restarted and completed without complications   Post-Hemodialysis Comments Blood rinsed back per p&P. Lines cappeed and secured.

## 2025-01-29 NOTE — PLAN OF CARE
[facility] can't accept the pt due to the trach is not 30 days old    [facility] sent updates to Nathaly RYAN & Savanna Meza via epic

## 2025-01-29 NOTE — PROGRESS NOTES
[facility] - Emergency Dept  Pulmonary Critical Care Note    Patient Name: Krysta Jansen  MRN: [id number]  Admission Date: 1/5/2025  Hospital Length of Stay: 24 days  Code Status: DNR  Attending Provider: DAMARIS aJime MD  Primary Care Provider: No primary care provider on file.     Subjective:     HPI:   Patient is a 79-year-old female with a past medical history of ESRD on HD, HTN, CAD, CHF unknown EF, DM2, previous left MCA territory stroke who was brought into the ER after her son reported seeing her normal an hour prior, briefly left in when came back patient was nonverbal and lethargic.      She was emergently brought to the ER and a code fast was called, she was assessed by Neurology in the ER where she was found to have an NIH of 22 based on dysarthria and level of consciousness, but was deemed not a candidate for TNK secondary to overall nonfocal exam and recent left arm HD access surgery reported.  Vitals and laboratory work were overall unremarkable except for changes as expected in any age renal disease, and a potential urinary tract infection.  She was admitted under typical stroke protocol.     On 1/6/2025, EEG at 0800 revealed seizure-like activity. She was given Lorazepam 2 mg IV. Patient later became unconscious, unresponsive to pain stimuli, and hypotensive. She was intubated for airway protection.       Hospital Course/Significant events:  1/6/2025 - Admitted to ICU   1/9/2025 - EEG shows evidence of epileptiform discharges but no clinical convulsing noted.  1/28/25 - Tracheostomy    24 Hour Interval History:  No events overnight.  PEG tube placed yesterday.  Remains mechanically ventilated.      Social History     Socioeconomic History    Marital status: Single   Tobacco Use    Smoking status: Unknown   Substance and Sexual Activity    Alcohol use: Not Currently     Social Drivers of Health     Financial Resource Strain: Patient Unable To Answer (1/11/2025)    Overall  Financial Resource Strain (CARDIA)     Difficulty of Paying Living Expenses: Patient unable to answer   Food Insecurity: Patient Unable To Answer (1/11/2025)    Hunger Vital Sign     Worried About Running Out of Food in the Last Year: Patient unable to answer     Ran Out of Food in the Last Year: Patient unable to answer   Transportation Needs: Patient Unable To Answer (1/11/2025)    TRANSPORTATION NEEDS     Transportation : Patient unable to answer   Stress: Patient Unable To Answer (1/11/2025)    [place] [employer] - Occupational Stress Questionnaire     Feeling of Stress : Patient unable to answer   Housing Stability: Patient Unable To Answer (1/11/2025)    Housing Stability Vital Sign     Unable to Pay for Housing in the Last Year: Patient unable to answer     Homeless in the Last Year: Patient unable to answer       Current Outpatient Medications   Medication Instructions    aspirin 81 MG Chew 1 tablet, Daily    BASAGLAR KWIKPEN U-100 INSULIN 30 Units, Nightly    CeleXA 10 mg, Daily    ENTRESTO 24-26 mg per tablet 1 tablet, 2 times daily    metoprolol succinate 25 mg CSpX 1 capsule, Daily    PLAVIX 75 mg tablet 1 tablet, Daily    rosuvastatin (CRESTOR) 40 MG Tab 1 tablet, Nightly    traMADoL (ULTRAM) 50 mg tablet 1 tablet, Every 6 hours PRN    vancomycin (VANCOCIN) 500 mg, Every Mon, Wed, Fri     Current Inpatient Medications   enoxparin  30 mg Subcutaneous Q24H (prophylaxis, 1700)    epoetin chelsea-ebpx (RETACRIT) injection  20,000 Units Subcutaneous Every Mon, Wed, Fri    insulin glargine U-100  10 Units Subcutaneous BID    levETIRAcetam (Keppra) IV (PEDS and ADULTS)  500 mg Intravenous Q12H    lorazepam  2 mg Intravenous Once    midodrine  10 mg Oral Q8H    pantoprazole  40 mg Intravenous Daily    scopolamine  1 patch Transdermal Q3 Days    senna  8.6 mg Oral BID    valproate sodium (DEPACON) IVPB  500 mg Intravenous Q12H    zinc oxide-cod liver oil   Topical (Top) BID     Current  Intravenous Infusions   fentanyl  0-250 mcg/hr Intravenous Continuous   Stopped at 01/28/25 1834       Objective:     Intake/Output Summary (Last 24 hours) at 1/29/2025 0639  Last data filed at 1/29/2025 0608  Gross per 24 hour   Intake 1983.02 ml   Output 0 ml   Net 1983.02 ml     Vital Signs (Most Recent):  Temp: 99 °F (37.2 °C) (01/29/25 0600)  Pulse: 73 (01/29/25 0600)  Resp: 18 (01/29/25 0600)  BP: 139/68 (01/29/25 0600)  SpO2: 97 % (01/29/25 0600)  Body mass index is 31.9 kg/m².  Weight: 74.1 kg (163 lb 5.8 oz) Vital Signs (24h Range):  Temp:  [97.3 °F (36.3 °C)-99 °F (37.2 °C)] 99 °F (37.2 °C)  Pulse:  [] 73  Resp:  [17-26] 18  SpO2:  [95 %-100 %] 97 %  BP: (103-158)/(47-96) 139/68         Physical Exam:  Gen- intubated off sedation, unresponsive  HENT- trach in place, tunneled HD line in place  CV- NRRR   Resp- CTAB, synchronous with mechanical ventilation  MSK- WWP, 1+ BUE edema and BLE edema   Neuro- resting comfortably, intermittently follows commands  Abdomen-soft, nontender, PEG tube in place         Lines/Drains/Airways       Central Venous Catheter Line  Duration                  Hemodialysis Catheter 01/06/25 1400 left internal jugular 22 days              Drain  Duration                  Gastrostomy/Enterostomy 01/28/25 1730 <1 day              Airway  Duration                  Airway - Non-Surgical 01/06/25 1104 Endotracheal Tube 22 days    Adult Surgical Airway 01/27/25 1714 Shiley Cuffed 8.0 / 85 mm 1 day                  Significant Labs:  Lab Results   Component Value Date    WBC 16.33 (H) 01/28/2025    HGB 9.6 (L) 01/28/2025    HCT 29.0 (L) 01/28/2025    MCV 90.6 01/28/2025     01/28/2025       BMP  Lab Results   Component Value Date     (L) 01/29/2025    K 4.5 01/29/2025    CO2 21 (L) 01/29/2025    BUN 41.8 (H) 01/29/2025    CREATININE 2.65 (H) 01/29/2025    CALCIUM 7.7 (L) 01/29/2025    AGAP 11.0 01/29/2025    ESTGFRAFRICA 13 05/08/2024     ABG  Recent Labs   Lab  01/23/25  0900   PH 7.460*   PO2 123.0*   PCO2 34.0*   HCO3 24.2     Mechanical Ventilation Support:  Vent Mode: SIMV (01/29/25 0334)  Ventilator Initiated: Yes (01/06/25 1104)  Set Rate: 18 BPM (01/29/25 0334)  Vt Set: 450 mL (01/29/25 0334)  Pressure Support: 12 cmH20 (01/29/25 0334)  PEEP/CPAP: 5 cmH20 (01/29/25 0334)  Oxygen Concentration (%): 30 (01/29/25 0334)  Peak Airway Pressure: 23 cmH20 (01/29/25 0334)  Total Ve: 7.6 L/m (01/29/25 0334)  F/VT Ratio<105 (RSBI): (!) 42.65 (01/29/25 0334)      Assessment/Plan:     Assessment  Persistent nonconvulsive status epilepticus based on most recent EEG from 01/09.  Acute respiratory failure-not ready for ventilator weaning based on failed SBT.  Pseudomonas UTI  Prior left MCA territory stroke   Hypotension (resolved)  Anemia with no obvious source of bleeding.    ESRD   Hx of HTN, CAD, DM2       Plan  S/p tracheostomy 1/27. 4 hour trial of pressure support 10/5   Continue PRN oxycodone if needed for pain/coughing spells  Check with GI to make sure PEG tube is ok to use, and if so, start tube feeds  Continue Keppra b.i.d. and valproate  Continue midodrine 10 mg q.8  Continue reduced dose of lantus 10 units BID  Code status DNR      DVT Prophylaxis: subq lovenox  GI Prophylaxis: pantoprazole     Ready for discharge to LTAC when accepted.    Ulices Junior MD  Pulmonary and Critical Care

## 2025-01-30 LAB
ALBUMIN SERPL-MCNC: 1.6 G/DL (ref 3.4–4.8)
ALBUMIN/GLOB SERPL: 0.4 RATIO (ref 1.1–2)
ALP SERPL-CCNC: 89 UNIT/L (ref 40–150)
ALT SERPL-CCNC: <5 UNIT/L (ref 0–55)
ANION GAP SERPL CALC-SCNC: 9 MEQ/L
AST SERPL-CCNC: 17 UNIT/L (ref 5–34)
BASOPHILS # BLD AUTO: 0.08 X10(3)/MCL
BASOPHILS NFR BLD AUTO: 0.6 %
BILIRUB SERPL-MCNC: 0.2 MG/DL
BUN SERPL-MCNC: 20.2 MG/DL (ref 9.8–20.1)
CALCIUM SERPL-MCNC: 7.8 MG/DL (ref 8.4–10.2)
CHLORIDE SERPL-SCNC: 103 MMOL/L (ref 98–107)
CO2 SERPL-SCNC: 21 MMOL/L (ref 23–31)
CREAT SERPL-MCNC: 1.76 MG/DL (ref 0.55–1.02)
CREAT/UREA NIT SERPL: 11
EOSINOPHIL # BLD AUTO: 0.15 X10(3)/MCL (ref 0–0.9)
EOSINOPHIL NFR BLD AUTO: 1 %
ERYTHROCYTE [DISTWIDTH] IN BLOOD BY AUTOMATED COUNT: 18.7 % (ref 11.5–17)
GFR SERPLBLD CREATININE-BSD FMLA CKD-EPI: 29 ML/MIN/1.73/M2
GLOBULIN SER-MCNC: 4.5 GM/DL (ref 2.4–3.5)
GLUCOSE SERPL-MCNC: 102 MG/DL (ref 82–115)
GLUCOSE SERPL-MCNC: 108 MG/DL (ref 70–110)
GLUCOSE SERPL-MCNC: 85 MG/DL (ref 70–110)
GLUCOSE SERPL-MCNC: 93 MG/DL (ref 70–110)
HCT VFR BLD AUTO: 30.2 % (ref 37–47)
HGB BLD-MCNC: 9.5 G/DL (ref 12–16)
IMM GRANULOCYTES # BLD AUTO: 0.16 X10(3)/MCL (ref 0–0.04)
IMM GRANULOCYTES NFR BLD AUTO: 1.1 %
LYMPHOCYTES # BLD AUTO: 2.78 X10(3)/MCL (ref 0.6–4.6)
LYMPHOCYTES NFR BLD AUTO: 19.3 %
MAGNESIUM SERPL-MCNC: 2.2 MG/DL (ref 1.6–2.6)
MCH RBC QN AUTO: 29.9 PG (ref 27–31)
MCHC RBC AUTO-ENTMCNC: 31.5 G/DL (ref 33–36)
MCV RBC AUTO: 95 FL (ref 80–94)
MONOCYTES # BLD AUTO: 1.32 X10(3)/MCL (ref 0.1–1.3)
MONOCYTES NFR BLD AUTO: 9.2 %
NEUTROPHILS # BLD AUTO: 9.88 X10(3)/MCL (ref 2.1–9.2)
NEUTROPHILS NFR BLD AUTO: 68.8 %
NRBC BLD AUTO-RTO: 0 %
PHOSPHATE SERPL-MCNC: 2.5 MG/DL (ref 2.3–4.7)
PLATELET # BLD AUTO: 263 X10(3)/MCL (ref 130–400)
PMV BLD AUTO: 8.3 FL (ref 7.4–10.4)
POCT GLUCOSE: NORMAL
POTASSIUM SERPL-SCNC: 3.3 MMOL/L (ref 3.5–5.1)
PROT SERPL-MCNC: 6.1 GM/DL (ref 5.8–7.6)
RBC # BLD AUTO: 3.18 X10(6)/MCL (ref 4.2–5.4)
SODIUM SERPL-SCNC: 133 MMOL/L (ref 136–145)
WBC # BLD AUTO: 14.37 X10(3)/MCL (ref 4.5–11.5)

## 2025-01-30 PROCEDURE — 25000003 PHARM REV CODE 250

## 2025-01-30 PROCEDURE — 99900026 HC AIRWAY MAINTENANCE (STAT)

## 2025-01-30 PROCEDURE — 25000003 PHARM REV CODE 250: Performed by: INTERNAL MEDICINE

## 2025-01-30 PROCEDURE — 36415 COLL VENOUS BLD VENIPUNCTURE: CPT

## 2025-01-30 PROCEDURE — 94761 N-INVAS EAR/PLS OXIMETRY MLT: CPT

## 2025-01-30 PROCEDURE — 99900035 HC TECH TIME PER 15 MIN (STAT)

## 2025-01-30 PROCEDURE — 11000001 HC ACUTE MED/SURG PRIVATE ROOM

## 2025-01-30 PROCEDURE — 83735 ASSAY OF MAGNESIUM: CPT

## 2025-01-30 PROCEDURE — 27200966 HC CLOSED SUCTION SYSTEM

## 2025-01-30 PROCEDURE — 85025 COMPLETE CBC W/AUTO DIFF WBC: CPT

## 2025-01-30 PROCEDURE — 27100171 HC OXYGEN HIGH FLOW UP TO 24 HOURS

## 2025-01-30 PROCEDURE — 25000003 PHARM REV CODE 250: Performed by: STUDENT IN AN ORGANIZED HEALTH CARE EDUCATION/TRAINING PROGRAM

## 2025-01-30 PROCEDURE — 94760 N-INVAS EAR/PLS OXIMETRY 1: CPT

## 2025-01-30 PROCEDURE — 80053 COMPREHEN METABOLIC PANEL: CPT

## 2025-01-30 PROCEDURE — 63600175 PHARM REV CODE 636 W HCPCS: Performed by: INTERNAL MEDICINE

## 2025-01-30 PROCEDURE — 99900031 HC PATIENT EDUCATION (STAT)

## 2025-01-30 PROCEDURE — 63600175 PHARM REV CODE 636 W HCPCS

## 2025-01-30 PROCEDURE — 84100 ASSAY OF PHOSPHORUS: CPT

## 2025-01-30 PROCEDURE — 94003 VENT MGMT INPAT SUBQ DAY: CPT

## 2025-01-30 RX ADMIN — ENOXAPARIN SODIUM 30 MG: 30 INJECTION SUBCUTANEOUS at 04:01

## 2025-01-30 RX ADMIN — Medication: at 09:01

## 2025-01-30 RX ADMIN — Medication: at 08:01

## 2025-01-30 RX ADMIN — SCOPOLAMINE 1 PATCH: 1 PATCH TRANSDERMAL at 08:01

## 2025-01-30 RX ADMIN — PANTOPRAZOLE SODIUM 40 MG: 40 INJECTION, POWDER, FOR SOLUTION INTRAVENOUS at 08:01

## 2025-01-30 RX ADMIN — POTASSIUM BICARBONATE 40 MEQ: 391 TABLET, EFFERVESCENT ORAL at 05:01

## 2025-01-30 RX ADMIN — LEVETIRACETAM 500 MG: 100 INJECTION, SOLUTION INTRAVENOUS at 08:01

## 2025-01-30 RX ADMIN — VALPROATE SODIUM 500 MG: 100 INJECTION, SOLUTION INTRAVENOUS at 04:01

## 2025-01-30 RX ADMIN — SENNOSIDES 8.6 MG: 8.6 TABLET, FILM COATED ORAL at 08:01

## 2025-01-30 NOTE — PROGRESS NOTES
[facility] - Emergency Dept  Pulmonary Critical Care Note    Patient Name: Krysta Jansen  MRN: [id number]  Admission Date: 1/5/2025  Hospital Length of Stay: 25 days  Code Status: DNR  Attending Provider: DAMARIS Jaime MD  Primary Care Provider: No primary care provider on file.     Subjective:     HPI:   Patient is a 79-year-old female with a past medical history of ESRD on HD, HTN, CAD, CHF unknown EF, DM2, previous left MCA territory stroke who was brought into the ER after her son reported seeing her normal an hour prior, briefly left in when came back patient was nonverbal and lethargic.      She was emergently brought to the ER and a code fast was called, she was assessed by Neurology in the ER where she was found to have an NIH of 22 based on dysarthria and level of consciousness, but was deemed not a candidate for TNK secondary to overall nonfocal exam and recent left arm HD access surgery reported.  Vitals and laboratory work were overall unremarkable except for changes as expected in any age renal disease, and a potential urinary tract infection.  She was admitted under typical stroke protocol.     On 1/6/2025, EEG at 0800 revealed seizure-like activity. She was given Lorazepam 2 mg IV. Patient later became unconscious, unresponsive to pain stimuli, and hypotensive. She was intubated for airway protection.       Hospital Course/Significant events:  1/6/2025 - Admitted to ICU   1/9/2025 - EEG shows evidence of epileptiform discharges but no clinical convulsing noted.  1/28/25 - Tracheostomy    24 Hour Interval History:  No events overnight.  Lasted 24 hours on pressure support 10/5.  Underwent dialysis yesterday with 500 mL of fluid removed.      Social History     Socioeconomic History    Marital status: Single   Tobacco Use    Smoking status: Unknown   Substance and Sexual Activity    Alcohol use: Not Currently     Social Drivers of Health     Financial Resource Strain: Patient Unable  To Answer (1/11/2025)    Overall Financial Resource Strain (CARDIA)     Difficulty of Paying Living Expenses: Patient unable to answer   Food Insecurity: Patient Unable To Answer (1/11/2025)    Hunger Vital Sign     Worried About Running Out of Food in the Last Year: Patient unable to answer     Ran Out of Food in the Last Year: Patient unable to answer   Transportation Needs: Patient Unable To Answer (1/11/2025)    TRANSPORTATION NEEDS     Transportation : Patient unable to answer   Stress: Patient Unable To Answer (1/11/2025)    [employer] - Occupational Stress Questionnaire     Feeling of Stress : Patient unable to answer   Housing Stability: Patient Unable To Answer (1/11/2025)    Housing Stability Vital Sign     Unable to Pay for Housing in the Last Year: Patient unable to answer     Homeless in the Last Year: Patient unable to answer       Current Outpatient Medications   Medication Instructions    aspirin 81 MG Chew 1 tablet, Daily    BASAGLAR KWIKPEN U-100 INSULIN 30 Units, Nightly    CeleXA 10 mg, Daily    ENTRESTO 24-26 mg per tablet 1 tablet, 2 times daily    metoprolol succinate 25 mg CSpX 1 capsule, Daily    PLAVIX 75 mg tablet 1 tablet, Daily    rosuvastatin (CRESTOR) 40 MG Tab 1 tablet, Nightly    traMADoL (ULTRAM) 50 mg tablet 1 tablet, Every 6 hours PRN    vancomycin (VANCOCIN) 500 mg, Every Mon, Wed, Fri     Current Inpatient Medications   enoxparin  30 mg Subcutaneous Q24H (prophylaxis, 1700)    epoetin chelsea-ebpx (RETACRIT) injection  20,000 Units Subcutaneous Every Mon, Wed, Fri    insulin glargine U-100  10 Units Subcutaneous BID    levETIRAcetam (Keppra) IV (PEDS and ADULTS)  500 mg Intravenous Q12H    lorazepam  2 mg Intravenous Once    pantoprazole  40 mg Intravenous Daily    scopolamine  1 patch Transdermal Q3 Days    senna  8.6 mg Oral BID    valproate sodium (DEPACON) IVPB  500 mg Intravenous Q12H    zinc oxide-cod liver oil   Topical (Top) BID     Current  Intravenous Infusions        Objective:     Intake/Output Summary (Last 24 hours) at 1/30/2025 0722  Last data filed at 1/29/2025 2113  Gross per 24 hour   Intake 613.98 ml   Output 1250 ml   Net -636.02 ml     Vital Signs (Most Recent):  Temp: 98.3 °F (36.8 °C) (01/30/25 0400)  Pulse: 77 (01/30/25 0400)  Resp: 15 (01/30/25 0400)  BP: (!) 140/69 (01/30/25 0400)  SpO2: 100 % (01/30/25 0400)  Body mass index is 31.9 kg/m².  Weight: 74.1 kg (163 lb 5.8 oz) Vital Signs (24h Range):  Temp:  [98.3 °F (36.8 °C)-99.5 °F (37.5 °C)] 98.3 °F (36.8 °C)  Pulse:  [] 77  Resp:  [15-46] 15  SpO2:  [94 %-100 %] 100 %  BP: (103-155)/() 140/69         Physical Exam:  Gen- on mechanical ventilation through her tracheostomy.  Unresponsive  HENT- trach in place, tunneled HD line in place  CV- NRRR   Resp- CTAB, synchronous with mechanical ventilation  MSK- WWP, 1+ BUE edema and BLE edema   Neuro- resting comfortably, grimaces to pain but follows no commands  Abdomen-soft, nontender, PEG tube in place         Lines/Drains/Airways       Central Venous Catheter Line  Duration                  Hemodialysis Catheter 01/06/25 1400 left internal jugular 23 days              Drain  Duration                  Gastrostomy/Enterostomy 01/28/25 1730 1 day              Airway  Duration             Adult Surgical Airway 01/27/25 1714 Shiley Cuffed 8.0 / 85 mm 2 days                  Significant Labs:  Lab Results   Component Value Date    WBC 14.37 (H) 01/30/2025    HGB 9.5 (L) 01/30/2025    HCT 30.2 (L) 01/30/2025    MCV 95.0 (H) 01/30/2025     01/30/2025       BMP  Lab Results   Component Value Date     (L) 01/30/2025    K 3.3 (L) 01/30/2025    CO2 21 (L) 01/30/2025    BUN 20.2 (H) 01/30/2025    CREATININE 1.76 (H) 01/30/2025    CALCIUM 7.8 (L) 01/30/2025    AGAP 9.0 01/30/2025    ESTGFRAFRICA 13 05/08/2024     ABG  Recent Labs   Lab 01/23/25  0900   PH 7.460*   PO2 123.0*   PCO2 34.0*   HCO3 24.2     Mechanical Ventilation  Support:  Vent Mode: CPAP / PSV (01/30/25 0504)  Ventilator Initiated: Yes (01/06/25 1104)  Set Rate: 18 BPM (01/29/25 0334)  Vt Set: 450 mL (01/29/25 0334)  Pressure Support: 10 cmH20 (01/30/25 0504)  PEEP/CPAP: 5 cmH20 (01/30/25 0504)  Oxygen Concentration (%): 30 (01/30/25 0504)  Peak Airway Pressure: 18 cmH20 (01/30/25 0504)  Total Ve: 13.8 L/m (01/30/25 0504)  F/VT Ratio<105 (RSBI): (!) 54.63 (01/30/25 0257)      Assessment/Plan:     Assessment  Persistent nonconvulsive status epilepticus based on most recent EEG from 01/09.  Acute respiratory failure-not ready for ventilator weaning based on failed SBT.  Pseudomonas UTI  Prior left MCA territory stroke   Hypotension (resolved)  Anemia with no obvious source of bleeding.    ESRD   Hx of HTN, CAD, DM2       Plan  S/p tracheostomy 1/27. 8 hour trial of PS 8/5.  Continue PRN oxycodone if needed for pain/coughing spells  Restart tube feeds  Continue Keppra b.i.d. and valproate  Continue reduced dose of lantus 10 units BID  Code status DNR      DVT Prophylaxis: subq lovenox  GI Prophylaxis: pantoprazole     Ready for discharge to LTAC when accepted.    Ulices Junior MD  Pulmonary and Critical Care

## 2025-01-30 NOTE — PROGRESS NOTES
Nephrology consult follow up note    HPI:      Krysta Jansen is a 79 y.o. female is a nursing home resident and has ESRD and is on dialysis on Monday Wednesday Friday.  Brought to this hospital for altered mental status.  She was found to have some grand mal seizure.  She received some Ativan.  She had EEG done.  Her respiratory status and mental status deteriorated and required intubation to protect her airways.  Could not get any history from the patient.  Records revealed she was found to have some dysarthria and decreased level of consciousness Friday through being brought to this hospital.  Significant past medical history is positive for diabetes mellitus type 2 previous stroke hypertension coronary artery disease and congestive heart failure.    Interval history:     01/07/2024  Patient remains intubated and sedated.  On epinephrine for hypotension this morning.     01/08/25  Remains intubated and sedated on vent. Off pressors with BP stable at 130/60 range. Noted leucocytosis on am lab. Blood cultures pending.  Hgb stable at 7.6. Dialyzed yesterday and tolerated 500mL UF.      01/09/2025  No acute events overnight.  Patient remains intubated.  Not currently requiring vasopressors, but blood pressure does remain borderline low.  ICU team is requesting to hold hemodialysis today so they can have goals of care conversation with family.     01/10/2025   Currently tolerating dialysis.  Will remove only 500 cc of fluid.  Remains intubated.  Tolerating tube feedings.  Nurses reported that she is not on any sedation for about 48 hours.     01/13/2025   Weekend events noted.  Patient's condition is unchanged.  Remains on the ventilator and off sedation or pressors.  Unresponsive.     01/14/2025   Last 24 hours events noted.  Patient's condition remains unchanged and she is off pressors and off sedation and still unresponsive although she grimaces to painful stimuli.     01/15/2025   Currently tolerating  dialysis.  Neuro status and respiratory status remains unchanged.    01/16/2025  Dialyze yesterday.  No clinical change overnight.    01/17/2025  No clinical change overnight.  Hemoglobin dropped today.  Remains intubated    01/18/2025  No acute events overnight.  On Levophed for hypotension.  Dialyzed yesterday.    01/19/2025  No acute events overnight.  She does appear more edematous.  Remains on Levophed.  Getting tube feedings.     01/20/2025   Currently tolerating dialysis.  Will try to remove some fluid.  Will increase dialysis time to 4 hours.  Comfortable on the ventilator.    01/21/2025   Currently tolerating dialysis.  The plan is to remove some fluid and to help correct electrolytes and uremic toxins.    01/22/2025   Patient has had dialysis for 2 days in a row for 4 hours each time and now on low-dose Levophed.  No change neurologically.  Still tolerating tube feedings and patient remains on the ventilator.    01/23/2025   Just started on dialysis.  Will decrease fluid removal to 1 L only.  Overall condition is unchanged.  She is off Levophed now.    01/24/2025   She tolerated dialysis yesterday.  Overall condition remains unchanged.  Off pressors.  Off sedation.  Getting sodium phosphate for hypophosphatemia.  Tolerating tube feedings.    01/27/2025   Weekend events noted.  Nurse just informed me that family wanted to do trach and PEG placement.    1/28/25  Trach and PEG placed yesterday.  Remains on ventilator via trach.    01/29/2025   Patient is tolerating PEG nutrition and has tracheostomy done also.  Overall condition is unchanged.    01/30/2025  No clinical change.    Review of Systems:     Unable to obtain ROS due to mental status/intubation.     Past medical, family, surgical, and social history reviewed and unchanged from initial consult note.     Objective:       VITAL SIGNS: 24 HR MIN & MAX LAST    Temp  Min: 98.3 °F (36.8 °C)  Max: 99.5 °F (37.5 °C)  98.8 °F (37.1 °C)        BP  Min: 103/65   Max: 152/80  (!) 112/54     Pulse  Min: 67  Max: 110  67     Resp  Min: 15  Max: 35  (!) 22    SpO2  Min: 98 %  Max: 100 %  100 %      GEN: Chronically ill appearing AA female  HEENT:  Tracheostomy in place  CV: RRR without rub.  PULM: CTAB, unlabored  ABD: Soft, NT/ND abdomen with NABS.  Peg tube noted.  EXT:  2+ dependent edema  SKIN: Warm and dry  PSYCH:  Opens eyes, but otherwise unresponsive  Dialysis access:  Left IJ PermCath            Component Value Date/Time     (L) 01/30/2025 0245     (L) 01/29/2025 0533     05/08/2024 0739     03/15/2024 1101    K 3.3 (L) 01/30/2025 0245    K 4.5 01/29/2025 0533    K 3.8 05/08/2024 0739    K 3.9 03/15/2024 1101    CO2 21 (L) 01/30/2025 0245    CO2 21 (L) 01/29/2025 0533    CO2 22 05/08/2024 0739    CO2 21 (L) 03/15/2024 1101    BUN 20.2 (H) 01/30/2025 0245    BUN 41.8 (H) 01/29/2025 0533    BUN 55 (H) 10/02/2024 0000    BUN 23 (H) 07/12/2024 0000    CREATININE 1.76 (H) 01/30/2025 0245    CREATININE 2.65 (H) 01/29/2025 0533    CREATININE 3.37 (H) 05/08/2024 0739    CREATININE 3.2 (H) 03/15/2024 1101    CALCIUM 7.8 (L) 01/30/2025 0245    CALCIUM 7.7 (L) 01/29/2025 0533    CALCIUM 9 05/08/2024 0739    CALCIUM 9.9 03/15/2024 1101    PHOS 2.5 01/30/2025 0245            Component Value Date/Time    WBC 14.37 (H) 01/30/2025 0245    WBC 12.92 (H) 01/29/2025 0533    WBC 13.49 01/26/2025 0451    WBC 16.5 01/19/2025 0429    HGB 9.5 (L) 01/30/2025 0245    HGB 10.1 (L) 01/29/2025 0533    HGB 10.3 (L) 11/24/2024 0000    HGB 10 (L) 11/18/2024 0000    HCT 30.2 (L) 01/30/2025 0245    HCT 31.7 (L) 01/29/2025 0533     01/30/2025 0245     01/29/2025 0533         Imaging reviewed      Assessment / Plan:       ESRD.  Will change to Monday Wednesday Friday.  New onset seizure  Acute hypoxic respiratory failure  Hypertension  Diabetes mellitus type 2   History of coronary artery disease   History of previous stroke  Urinary tract infection  Anemia of  chronic disease    Plan:    No acute indication for off schedule hemodialysis today.  We will plan to dialyze tomorrow on MWF schedule.

## 2025-01-30 NOTE — PLAN OF CARE
Problem: Adult Inpatient Plan of Care  Goal: Plan of Care Review  1/30/2025 0746 by Philly Arevalo RN  Outcome: Progressing  1/30/2025 0746 by Philly Arevalo RN  Outcome: Progressing  Goal: Patient-Specific Goal (Individualized)  1/30/2025 0746 by Philly Arevalo RN  Outcome: Progressing  1/30/2025 0746 by Philly Arevalo RN  Outcome: Progressing  Goal: Absence of Hospital-Acquired Illness or Injury  1/30/2025 0746 by Philly Arevalo RN  Outcome: Progressing  1/30/2025 0746 by Philly Arevalo RN  Outcome: Progressing  Goal: Optimal Comfort and Wellbeing  1/30/2025 0746 by Philly Arevalo RN  Outcome: Progressing  1/30/2025 0746 by Philly Arevalo RN  Outcome: Progressing  Goal: Readiness for Transition of Care  1/30/2025 0746 by Philly Arevalo RN  Outcome: Progressing  1/30/2025 0746 by Philly Arevalo RN  Outcome: Progressing     Problem: Hemodialysis  Goal: Safe, Effective Therapy Delivery  1/30/2025 0746 by Philly Arevalo RN  Outcome: Progressing  1/30/2025 0746 by Philly Arevalo RN  Outcome: Progressing  Goal: Effective Tissue Perfusion  1/30/2025 0746 by Philly Arevalo RN  Outcome: Progressing  1/30/2025 0746 by Philly Arevalo RN  Outcome: Progressing  Goal: Absence of Infection Signs and Symptoms  1/30/2025 0746 by Philly Arevalo RN  Outcome: Progressing  1/30/2025 0746 by Philly Arevalo RN  Outcome: Progressing     Problem: Stroke, Ischemic (Includes Transient Ischemic Attack)  Goal: Optimal Coping  1/30/2025 0746 by Philly Arevalo RN  Outcome: Progressing  1/30/2025 0746 by Philly Arevalo RN  Outcome: Progressing  Goal: Effective Bowel Elimination  1/30/2025 0746 by Philly Arevalo RN  Outcome: Progressing  1/30/2025 0746 by Philly Arevalo RN  Outcome: Progressing  Goal: Optimal Cerebral Tissue Perfusion  1/30/2025 0746 by Philly Arevalo RN  Outcome: Progressing  1/30/2025 0746 by Cavell, Philly, RN  Outcome: Progressing  Goal: Optimal Cognitive Function  1/30/2025 0746 by Philly Arevalo,  RN  Outcome: Progressing  1/30/2025 0746 by Philly Arevalo RN  Outcome: Progressing  Goal: Improved Communication Skills  1/30/2025 0746 by Philly Arevalo RN  Outcome: Progressing  1/30/2025 0746 by Philly Arevalo RN  Outcome: Progressing  Goal: Optimal Functional Ability  1/30/2025 0746 by Philly Arevalo RN  Outcome: Progressing  1/30/2025 0746 by Philly Arevalo RN  Outcome: Progressing  Goal: Optimal Nutrition Intake  1/30/2025 0746 by Philly Arevalo RN  Outcome: Progressing  1/30/2025 0746 by Philly Arevalo RN  Outcome: Progressing  Goal: Effective Oxygenation and Ventilation  1/30/2025 0746 by Philly Arevalo RN  Outcome: Progressing  1/30/2025 0746 by Philly Arevalo RN  Outcome: Progressing  Goal: Improved Sensorimotor Function  1/30/2025 0746 by Philly Arevalo RN  Outcome: Progressing  1/30/2025 0746 by Philly Arevalo RN  Outcome: Progressing  Goal: Safe and Effective Swallow  1/30/2025 0746 by Philly Arevalo RN  Outcome: Progressing  1/30/2025 0746 by Philly Arevalo RN  Outcome: Progressing  Goal: Effective Urinary Elimination  1/30/2025 0746 by Philly Arevalo RN  Outcome: Progressing  1/30/2025 0746 by Philly Arevalo RN  Outcome: Progressing     Problem: Skin Injury Risk Increased  Goal: Skin Health and Integrity  1/30/2025 0746 by Philly Arevalo RN  Outcome: Progressing  1/30/2025 0746 by Philly Arevalo RN  Outcome: Progressing     Problem: Wound  Goal: Optimal Coping  1/30/2025 0746 by Philly Arevalo RN  Outcome: Progressing  1/30/2025 0746 by Philly Arevalo RN  Outcome: Progressing  Goal: Optimal Functional Ability  1/30/2025 0746 by Philly Arevalo, RN  Outcome: Progressing  1/30/2025 0746 by Philly Arevalo RN  Outcome: Progressing  Goal: Absence of Infection Signs and Symptoms  1/30/2025 0746 by Philly Arevalo RN  Outcome: Progressing  1/30/2025 0746 by Philly Arevalo, RN  Outcome: Progressing  Goal: Improved Oral Intake  1/30/2025 0746 by Philly Arevalo, RN  Outcome:  Progressing  1/30/2025 0746 by Philly Arevalo RN  Outcome: Progressing  Goal: Optimal Pain Control and Function  1/30/2025 0746 by Philly Arevalo RN  Outcome: Progressing  1/30/2025 0746 by Philly Arevalo RN  Outcome: Progressing  Goal: Skin Health and Integrity  1/30/2025 0746 by Philly Arevalo RN  Outcome: Progressing  1/30/2025 0746 by Philly Arevalo RN  Outcome: Progressing  Goal: Optimal Wound Healing  1/30/2025 0746 by Philly Arevalo RN  Outcome: Progressing  1/30/2025 0746 by Philly Arevalo RN  Outcome: Progressing     Problem: Infection  Goal: Absence of Infection Signs and Symptoms  1/30/2025 0746 by Philly Arevalo RN  Outcome: Progressing  1/30/2025 0746 by Philly Arevalo RN  Outcome: Progressing     Problem: Fall Injury Risk  Goal: Absence of Fall and Fall-Related Injury  1/30/2025 0746 by Philly Arevalo RN  Outcome: Progressing  1/30/2025 0746 by Philly Arevalo RN  Outcome: Progressing     Problem: Coping Ineffective  Goal: Effective Coping  1/30/2025 0746 by Philly Arevalo RN  Outcome: Progressing  1/30/2025 0746 by Philly Arevalo RN  Outcome: Progressing     Problem: Mechanical Ventilation Invasive  Goal: Effective Communication  1/30/2025 0746 by Philly Arevalo RN  Outcome: Progressing  1/30/2025 0746 by Philly Arevalo RN  Outcome: Progressing  Goal: Optimal Device Function  1/30/2025 0746 by Philly Arevlao RN  Outcome: Progressing  1/30/2025 0746 by Philly Arevalo RN  Outcome: Progressing  Goal: Mechanical Ventilation Liberation  1/30/2025 0746 by Philly Arevalo RN  Outcome: Progressing  1/30/2025 0746 by Philly Arevalo RN  Outcome: Progressing  Goal: Optimal Nutrition Delivery  1/30/2025 0746 by Philly Arevalo RN  Outcome: Progressing  1/30/2025 0746 by Philly Arevalo RN  Outcome: Progressing  Goal: Absence of Device-Related Skin and Tissue Injury  1/30/2025 0746 by Philly Arevalo RN  Outcome: Progressing  1/30/2025 0746 by Philly Arevalo RN  Outcome: Progressing  Goal:  Absence of Ventilator-Induced Lung Injury  1/30/2025 0746 by Philly Arevalo RN  Outcome: Progressing  1/30/2025 0746 by Philly Arevalo RN  Outcome: Progressing     Problem: Artificial Airway  Goal: Effective Communication  1/30/2025 0746 by Philly Arevalo RN  Outcome: Progressing  1/30/2025 0746 by Philly Arevalo RN  Outcome: Progressing  Goal: Optimal Device Function  1/30/2025 0746 by Philly Arevalo RN  Outcome: Progressing  1/30/2025 0746 by Philly Arevalo RN  Outcome: Progressing  Goal: Absence of Device-Related Skin or Tissue Injury  Outcome: Progressing

## 2025-01-30 NOTE — PROGRESS NOTES
"[facility]   Progress Note      SUBJECTIVE: No further issues with emesis.  Tolerating TF at 10mL/hr with plan to slowly increase to goal.      ROS: Unobtainable    MEDS: Reviewed in EMR    OBJECTIVE:  T 98.6 °F (37 °C)   /77   P 79   RR (!) 28   O2 100 %  GENERAL: Critically ill on mechanical ventilation; NAD; does not appear toxic  SKIN: no rash, no jaundice  HEENT: sclera non-icteric; PERRL; OETT to external os  NECK: supple; no LAD  CHEST: CTA; nonlabored, equal expansion; no adventitious BS  CARDIOVASCULAR: RRR, S1S2; no murmur   ABDOMEN:  active bowel sounds; abdomen soft, nondistended, nontender to palpation; noted RUQ surgical scar; PEG in place  EXTREMITIES: no cyanosis or clubbing  NEURO: Unresponsive     Labs:  Recent Labs     01/29/25  0533 01/30/25  0245   WBC 12.92* 14.37*   RBC 3.46* 3.18*   HGB 10.1* 9.5*   HCT 31.7* 30.2*   MCV 91.6 95.0*   MCH 29.2 29.9   MCHC 31.9* 31.5*   RDW 19.0* 18.7*    263     No results for input(s): "LACTIC" in the last 72 hours.  Recent Labs     01/28/25  1002   INR 1.2     No results for input(s): "HGBA1C", "CHOL", "TRIG", "LDL", "VLDL", "HDL" in the last 72 hours.   Recent Labs     01/29/25  0533 01/30/25  0245   * 133*   K 4.5 3.3*   CO2 21* 21*   BUN 41.8* 20.2*   CREATININE 2.65* 1.76*   GLUCOSE 73* 102   CALCIUM 7.7* 7.8*   MG 2.30 2.20   PHOS 2.9 2.5   ALBUMIN 1.7* 1.6*   GLOBULIN 4.5* 4.5*   ALKPHOS 90 89   ALT 6 <5   AST 22 17   BILITOT 0.2 0.2     No results for input(s): "BNP", "CPK", "TROPONINI" in the last 72 hours.       Imaging: Reviewed pertinent imaging    ASSESSMENT / PLAN:  She is a 79-year-old female unknown to our service with a PMH of ESRD on HD, hypertension, CAD, CHF, and diabetes mellitus who presented with altered mental status.  She then developed seizure-like activity and was intubated for airway protection.  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  She remains " intubated with plans for tracheostomy placement.   Currently on Lovenox for DVT prevention, no other anticoagulation.  GI consulted for PEG placement.      1.  Need for alternative means of nutrition  2.  Acute respiratory failure  3.  Persistent nonconvulsive status epilepticus      -s/p PEG 1/28 - 24Fr PEG with external bumper at 3 cm    Continue to increase TF slowly until goal  PEG care as ordered.   Keep binder in place at all times to prevent dislodgement.    No further GI recs, please call if need arises.   Discussed with nursing.     Thank you for allowing us to participate in the care of Krysta Jansen.    Elif Roblero, FNNEHAL  [facility]

## 2025-01-31 LAB
ALBUMIN SERPL-MCNC: 1.5 G/DL (ref 3.4–4.8)
ALBUMIN/GLOB SERPL: 0.3 RATIO (ref 1.1–2)
ALP SERPL-CCNC: 79 UNIT/L (ref 40–150)
ALT SERPL-CCNC: <5 UNIT/L (ref 0–55)
ANION GAP SERPL CALC-SCNC: 9 MEQ/L
AST SERPL-CCNC: 14 UNIT/L (ref 5–34)
BASOPHILS # BLD AUTO: 0.07 X10(3)/MCL
BASOPHILS NFR BLD AUTO: 0.6 %
BILIRUB SERPL-MCNC: 0.2 MG/DL
BUN SERPL-MCNC: 25.5 MG/DL (ref 9.8–20.1)
CALCIUM SERPL-MCNC: 7.9 MG/DL (ref 8.4–10.2)
CHLORIDE SERPL-SCNC: 102 MMOL/L (ref 98–107)
CO2 SERPL-SCNC: 20 MMOL/L (ref 23–31)
CREAT SERPL-MCNC: 2.05 MG/DL (ref 0.55–1.02)
CREAT/UREA NIT SERPL: 12
EOSINOPHIL # BLD AUTO: 0.15 X10(3)/MCL (ref 0–0.9)
EOSINOPHIL NFR BLD AUTO: 1.3 %
ERYTHROCYTE [DISTWIDTH] IN BLOOD BY AUTOMATED COUNT: 18.4 % (ref 11.5–17)
GFR SERPLBLD CREATININE-BSD FMLA CKD-EPI: 24 ML/MIN/1.73/M2
GLOBULIN SER-MCNC: 4.9 GM/DL (ref 2.4–3.5)
GLUCOSE SERPL-MCNC: 107 MG/DL (ref 82–115)
HCT VFR BLD AUTO: 29.3 % (ref 37–47)
HGB BLD-MCNC: 9.4 G/DL (ref 12–16)
IMM GRANULOCYTES # BLD AUTO: 0.1 X10(3)/MCL (ref 0–0.04)
IMM GRANULOCYTES NFR BLD AUTO: 0.9 %
LYMPHOCYTES # BLD AUTO: 2.26 X10(3)/MCL (ref 0.6–4.6)
LYMPHOCYTES NFR BLD AUTO: 19.6 %
MAGNESIUM SERPL-MCNC: 2.1 MG/DL (ref 1.6–2.6)
MCH RBC QN AUTO: 29.6 PG (ref 27–31)
MCHC RBC AUTO-ENTMCNC: 32.1 G/DL (ref 33–36)
MCV RBC AUTO: 92.1 FL (ref 80–94)
MONOCYTES # BLD AUTO: 1 X10(3)/MCL (ref 0.1–1.3)
MONOCYTES NFR BLD AUTO: 8.7 %
NEUTROPHILS # BLD AUTO: 7.98 X10(3)/MCL (ref 2.1–9.2)
NEUTROPHILS NFR BLD AUTO: 68.9 %
NRBC BLD AUTO-RTO: 0 %
PHOSPHATE SERPL-MCNC: 2.7 MG/DL (ref 2.3–4.7)
PLATELET # BLD AUTO: 266 X10(3)/MCL (ref 130–400)
PMV BLD AUTO: 8.1 FL (ref 7.4–10.4)
POTASSIUM SERPL-SCNC: 4 MMOL/L (ref 3.5–5.1)
PROT SERPL-MCNC: 6.4 GM/DL (ref 5.8–7.6)
RBC # BLD AUTO: 3.18 X10(6)/MCL (ref 4.2–5.4)
SODIUM SERPL-SCNC: 131 MMOL/L (ref 136–145)
WBC # BLD AUTO: 11.56 X10(3)/MCL (ref 4.5–11.5)

## 2025-01-31 PROCEDURE — 99900035 HC TECH TIME PER 15 MIN (STAT)

## 2025-01-31 PROCEDURE — 27200966 HC CLOSED SUCTION SYSTEM

## 2025-01-31 PROCEDURE — 99900031 HC PATIENT EDUCATION (STAT)

## 2025-01-31 PROCEDURE — 83735 ASSAY OF MAGNESIUM: CPT

## 2025-01-31 PROCEDURE — 11000001 HC ACUTE MED/SURG PRIVATE ROOM

## 2025-01-31 PROCEDURE — 63600175 PHARM REV CODE 636 W HCPCS: Performed by: INTERNAL MEDICINE

## 2025-01-31 PROCEDURE — 63600175 PHARM REV CODE 636 W HCPCS: Performed by: STUDENT IN AN ORGANIZED HEALTH CARE EDUCATION/TRAINING PROGRAM

## 2025-01-31 PROCEDURE — 99900022

## 2025-01-31 PROCEDURE — 63600175 PHARM REV CODE 636 W HCPCS: Mod: JZ,TB | Performed by: STUDENT IN AN ORGANIZED HEALTH CARE EDUCATION/TRAINING PROGRAM

## 2025-01-31 PROCEDURE — 85025 COMPLETE CBC W/AUTO DIFF WBC: CPT

## 2025-01-31 PROCEDURE — 94760 N-INVAS EAR/PLS OXIMETRY 1: CPT

## 2025-01-31 PROCEDURE — 99900026 HC AIRWAY MAINTENANCE (STAT)

## 2025-01-31 PROCEDURE — 25000003 PHARM REV CODE 250: Performed by: INTERNAL MEDICINE

## 2025-01-31 PROCEDURE — 27100171 HC OXYGEN HIGH FLOW UP TO 24 HOURS

## 2025-01-31 PROCEDURE — 36415 COLL VENOUS BLD VENIPUNCTURE: CPT

## 2025-01-31 PROCEDURE — 25000003 PHARM REV CODE 250

## 2025-01-31 PROCEDURE — 63600175 PHARM REV CODE 636 W HCPCS

## 2025-01-31 PROCEDURE — 94761 N-INVAS EAR/PLS OXIMETRY MLT: CPT

## 2025-01-31 PROCEDURE — 25000003 PHARM REV CODE 250: Performed by: STUDENT IN AN ORGANIZED HEALTH CARE EDUCATION/TRAINING PROGRAM

## 2025-01-31 PROCEDURE — 84100 ASSAY OF PHOSPHORUS: CPT

## 2025-01-31 PROCEDURE — 94003 VENT MGMT INPAT SUBQ DAY: CPT

## 2025-01-31 PROCEDURE — 80100014 HC HEMODIALYSIS 1:1

## 2025-01-31 PROCEDURE — 80053 COMPREHEN METABOLIC PANEL: CPT

## 2025-01-31 RX ADMIN — PANTOPRAZOLE SODIUM 40 MG: 40 INJECTION, POWDER, FOR SOLUTION INTRAVENOUS at 08:01

## 2025-01-31 RX ADMIN — EPOETIN ALFA-EPBX 20000 UNITS: 20000 INJECTION, SOLUTION INTRAVENOUS; SUBCUTANEOUS at 05:01

## 2025-01-31 RX ADMIN — ENOXAPARIN SODIUM 30 MG: 30 INJECTION SUBCUTANEOUS at 05:01

## 2025-01-31 RX ADMIN — LEVETIRACETAM 500 MG: 100 INJECTION, SOLUTION INTRAVENOUS at 09:01

## 2025-01-31 RX ADMIN — SENNOSIDES 8.6 MG: 8.6 TABLET, FILM COATED ORAL at 09:01

## 2025-01-31 RX ADMIN — INSULIN GLARGINE 10 UNITS: 100 INJECTION, SOLUTION SUBCUTANEOUS at 09:01

## 2025-01-31 RX ADMIN — VALPROATE SODIUM 500 MG: 100 INJECTION, SOLUTION INTRAVENOUS at 05:01

## 2025-01-31 RX ADMIN — SENNOSIDES 8.6 MG: 8.6 TABLET, FILM COATED ORAL at 08:01

## 2025-01-31 RX ADMIN — Medication: at 09:01

## 2025-01-31 RX ADMIN — LEVETIRACETAM 500 MG: 100 INJECTION, SOLUTION INTRAVENOUS at 08:01

## 2025-01-31 RX ADMIN — Medication: at 08:01

## 2025-01-31 NOTE — PROGRESS NOTES
[facility] - Emergency Dept  Pulmonary Critical Care Note    Patient Name: Krysta Jansen  MRN: [id number]  Admission Date: 1/5/2025  Hospital Length of Stay: 26 days  Code Status: DNR  Attending Provider: DAMARIS Jaime MD  Primary Care Provider: No primary care provider on file.     Subjective:     HPI:   Patient is a 79-year-old female with a past medical history of ESRD on HD, HTN, CAD, CHF unknown EF, DM2, previous left MCA territory stroke who was brought into the ER 01/05/2025 after her son reported seeing her normal an hour prior, briefly left in when came back patient was nonverbal and lethargic.      She was emergently brought to the ER and a code fast was called, she was assessed by Neurology in the ER where she was found to have an NIH of 22 based on dysarthria and level of consciousness, but was deemed not a candidate for TNK secondary to overall nonfocal exam and recent left arm HD access surgery reported.  Vitals and laboratory work were overall unremarkable except for changes as expected in any age renal disease, and a potential urinary tract infection.  She was admitted under typical stroke protocol.     On 1/6/2025, EEG at 0800 revealed seizure-like activity. She was given Lorazepam 2 mg IV. Patient later became unconscious, unresponsive to pain stimuli, and hypotensive. She was intubated for airway protection.       Hospital Course/Significant events:  1/6/2025 - Admitted to ICU   1/9/2025 - EEG shows evidence of epileptiform discharges but no clinical convulsing noted.  1/28/25 - Tracheostomy    24 Hour Interval History:  I have extensively reviewed this patient's hospital stay thus far, as being seen by me for the 1st time this morning.  She is afebrile.  She remains off all sedation.  No observed seizure activity reported.  She remains obtunded does not follow commands.  She is reported as not tolerating attempts at spontaneous breathing trials due to tachypnea.  Secretions  reported as scant and nonpurulent.  She is tolerating low rate enteral feedings with Peptamen AF at 30 cc/hour with 50 cc q.4 hours water flushes.      Social History     Socioeconomic History    Marital status: Single   Tobacco Use    Smoking status: Unknown   Substance and Sexual Activity    Alcohol use: Not Currently     Social Drivers of Health     Financial Resource Strain: Patient Unable To Answer (1/11/2025)    Overall Financial Resource Strain (CARDIA)     Difficulty of Paying Living Expenses: Patient unable to answer   Food Insecurity: Patient Unable To Answer (1/11/2025)    Hunger Vital Sign     Worried About Running Out of Food in the Last Year: Patient unable to answer     Ran Out of Food in the Last Year: Patient unable to answer   Transportation Needs: Patient Unable To Answer (1/11/2025)    TRANSPORTATION NEEDS     Transportation : Patient unable to answer   Stress: Patient Unable To Answer (1/11/2025)    [employer] - Occupational Stress Questionnaire     Feeling of Stress : Patient unable to answer   Housing Stability: Patient Unable To Answer (1/11/2025)    Housing Stability Vital Sign     Unable to Pay for Housing in the Last Year: Patient unable to answer     Homeless in the Last Year: Patient unable to answer       Current Outpatient Medications   Medication Instructions    aspirin 81 MG Chew 1 tablet, Daily    BASAGLAR KWIKPEN U-100 INSULIN 30 Units, Nightly    CeleXA 10 mg, Daily    ENTRESTO 24-26 mg per tablet 1 tablet, 2 times daily    metoprolol succinate 25 mg CSpX 1 capsule, Daily    PLAVIX 75 mg tablet 1 tablet, Daily    rosuvastatin (CRESTOR) 40 MG Tab 1 tablet, Nightly    traMADoL (ULTRAM) 50 mg tablet 1 tablet, Every 6 hours PRN    vancomycin (VANCOCIN) 500 mg, Every Mon, Wed, Fri     Current Inpatient Medications   enoxparin  30 mg Subcutaneous Q24H (prophylaxis, 1700)    epoetin chelsea-ebpx (RETACRIT) injection  20,000 Units Subcutaneous Every Mon, Wed,  Fri    insulin glargine U-100  10 Units Subcutaneous BID    levETIRAcetam (Keppra) IV (PEDS and ADULTS)  500 mg Intravenous Q12H    pantoprazole  40 mg Intravenous Daily    scopolamine  1 patch Transdermal Q3 Days    senna  8.6 mg Oral BID    valproate sodium (DEPACON) IVPB  500 mg Intravenous Q12H    zinc oxide-cod liver oil   Topical (Top) BID     Current Intravenous Infusions        Objective:     Intake/Output Summary (Last 24 hours) at 1/31/2025 0639  Last data filed at 1/31/2025 0552  Gross per 24 hour   Intake 569.56 ml   Output --   Net 569.56 ml     Vital Signs (Most Recent):  Temp: 98.6 °F (37 °C) (01/31/25 0400)  Pulse: (!) 52 (01/31/25 0500)  Resp: 18 (01/31/25 0500)  BP: (!) 124/50 (01/31/25 0500)  SpO2: 100 % (01/31/25 0500)  Body mass index is 31.9 kg/m².  Weight: 74.1 kg (163 lb 5.8 oz) Vital Signs (24h Range):  Temp:  [98.6 °F (37 °C)-98.8 °F (37.1 °C)] 98.6 °F (37 °C)  Pulse:  [] 52  Resp:  [18-31] 18  SpO2:  [99 %-100 %] 100 %  BP: ()/(45-98) 124/50         Physical Exam:  Gen- on mechanical ventilatory support per trach.  Obtunded, appears comfortable  HENT- trach in place, tunneled HD line in place  CV- NRRR   Resp- good air movement with very few bilateral rhonchi  MSK- 1+ upper and lower extremity edema  Neuro- obtunded does not follow commands.  She grimaces to pain.  Spontaneous respiratory effort noted.  She spontaneously moves upper extremities bilateral, withdrawals lower extremities to pain.        Lines/Drains/Airways       Central Venous Catheter Line  Duration                  Hemodialysis Catheter 01/06/25 1400 left internal jugular 24 days              Drain  Duration                  Gastrostomy/Enterostomy 01/28/25 1730 2 days              Airway  Duration             Adult Surgical Airway 01/27/25 1714 Shiley Cuffed 8.0 / 85 mm 3 days                  Significant Labs:  Lab Results   Component Value Date    WBC 11.56 (H) 01/31/2025    HGB 9.4 (L) 01/31/2025    HCT  "29.3 (L) 01/31/2025    MCV 92.1 01/31/2025     01/31/2025       BMP  Lab Results   Component Value Date     (L) 01/31/2025    K 4.0 01/31/2025    CO2 20 (L) 01/31/2025    BUN 25.5 (H) 01/31/2025    CREATININE 2.05 (H) 01/31/2025    CALCIUM 7.9 (L) 01/31/2025    AGAP 9.0 01/31/2025    ESTGFRAFRICA 13 05/08/2024     ABG  No results for input(s): "PH", "PO2", "PCO2", "HCO3", "BE" in the last 168 hours.    Mechanical Ventilation Support:  Vent Mode: SIMV (01/31/25 0430)  Ventilator Initiated: Yes (01/06/25 1104)  Set Rate: 18 BPM (01/31/25 0430)  Vt Set: 450 mL (01/31/25 0430)  Pressure Support: 10 cmH20 (01/31/25 0430)  PEEP/CPAP: 5 cmH20 (01/31/25 0430)  Oxygen Concentration (%): 30 (01/30/25 1606)  Peak Airway Pressure: 24 cmH20 (01/31/25 0430)  Total Ve: 7.8 L/m (01/31/25 0430)  F/VT Ratio<105 (RSBI): (!) 41.28 (01/31/25 0430)      Assessment/Plan:     Assessment  Persistent nonconvulsive status epilepticus, no further seizure activity   Persistent encephalopathy off all sedation  Acute respiratory failure, tracheostomy 01/27/2025.  She does not tolerate attempts at spontaneous breathing trials    Pseudomonas UTI  Prior left MCA territory stroke   Hypotension (resolved)  Anemia with no obvious source of bleeding.    ESRD, on chronic hemodialysis  Hx of HTN, CAD, DM2       Plan  Continue attempts at spontaneous breathing trials as tolerated  Advance PEG feedings as tolerated  Continue Keppra b.i.d. and valproate, as per Neurology recommendations  Undergoing evaluation for long-term placement at this point.  Code status DNR      DVT Prophylaxis: subq lovenox  GI Prophylaxis: pantoprazole     Ready for discharge to LTAC when accepted.    Ulices Junior MD  Pulmonary and Critical Care            "

## 2025-01-31 NOTE — PLAN OF CARE
ADDISON sent updates to [facility] via [employer]  Best is interested at [facility]

## 2025-01-31 NOTE — PROGRESS NOTES
01/31/25 1645   Post-Hemodialysis Assessment   Blood Volume Processed (Liters) 54 L   Dialyzer Clearance Clotted   Duration of Treatment 180 minutes   Total UF (mL) 1500 mL   Net Fluid Removal 1500   Patient Response to Treatment tolerated well,   Post-Hemodialysis Comments HD lines clotted during last 30mins of HD. Removed 1.5L Net.

## 2025-01-31 NOTE — PLAN OF CARE
Pt accepted to [facility] for Monday admit.  GREG MAST. And son  both in agreement.

## 2025-01-31 NOTE — PROGRESS NOTES
Nephrology consult follow up note    HPI:      Krysta Jansen is a 79 y.o. female is a nursing home resident and has ESRD and is on dialysis on Monday Wednesday Friday.  Brought to this hospital for altered mental status.  She was found to have some grand mal seizure.  She received some Ativan.  She had EEG done.  Her respiratory status and mental status deteriorated and required intubation to protect her airways.  Could not get any history from the patient.  Records revealed she was found to have some dysarthria and decreased level of consciousness Friday through being brought to this hospital.  Significant past medical history is positive for diabetes mellitus type 2 previous stroke hypertension coronary artery disease and congestive heart failure.    Interval history:     01/07/2024  Patient remains intubated and sedated.  On epinephrine for hypotension this morning.     01/08/25  Remains intubated and sedated on vent. Off pressors with BP stable at 130/60 range. Noted leucocytosis on am lab. Blood cultures pending.  Hgb stable at 7.6. Dialyzed yesterday and tolerated 500mL UF.      01/09/2025  No acute events overnight.  Patient remains intubated.  Not currently requiring vasopressors, but blood pressure does remain borderline low.  ICU team is requesting to hold hemodialysis today so they can have goals of care conversation with family.     01/10/2025   Currently tolerating dialysis.  Will remove only 500 cc of fluid.  Remains intubated.  Tolerating tube feedings.  Nurses reported that she is not on any sedation for about 48 hours.     01/13/2025   Weekend events noted.  Patient's condition is unchanged.  Remains on the ventilator and off sedation or pressors.  Unresponsive.     01/14/2025   Last 24 hours events noted.  Patient's condition remains unchanged and she is off pressors and off sedation and still unresponsive although she grimaces to painful stimuli.     01/15/2025   Currently tolerating  dialysis.  Neuro status and respiratory status remains unchanged.    01/16/2025  Dialyze yesterday.  No clinical change overnight.    01/17/2025  No clinical change overnight.  Hemoglobin dropped today.  Remains intubated    01/18/2025  No acute events overnight.  On Levophed for hypotension.  Dialyzed yesterday.    01/19/2025  No acute events overnight.  She does appear more edematous.  Remains on Levophed.  Getting tube feedings.     01/20/2025   Currently tolerating dialysis.  Will try to remove some fluid.  Will increase dialysis time to 4 hours.  Comfortable on the ventilator.    01/21/2025   Currently tolerating dialysis.  The plan is to remove some fluid and to help correct electrolytes and uremic toxins.    01/22/2025   Patient has had dialysis for 2 days in a row for 4 hours each time and now on low-dose Levophed.  No change neurologically.  Still tolerating tube feedings and patient remains on the ventilator.    01/23/2025   Just started on dialysis.  Will decrease fluid removal to 1 L only.  Overall condition is unchanged.  She is off Levophed now.    01/24/2025   She tolerated dialysis yesterday.  Overall condition remains unchanged.  Off pressors.  Off sedation.  Getting sodium phosphate for hypophosphatemia.  Tolerating tube feedings.    01/27/2025   Weekend events noted.  Nurse just informed me that family wanted to do trach and PEG placement.    1/28/25  Trach and PEG placed yesterday.  Remains on ventilator via trach.    01/29/2025   Patient is tolerating PEG nutrition and has tracheostomy done also.  Overall condition is unchanged.    01/30/2025  No clinical change.    01/31/2025  No acute events overnight.  Remains on ventilator.  No clinical change.    Review of Systems:     Unable to obtain ROS due to mental status/intubation.     Past medical, family, surgical, and social history reviewed and unchanged from initial consult note.     Objective:       VITAL SIGNS: 24 HR MIN & MAX LAST    Temp   Min: 98.6 °F (37 °C)  Max: 98.8 °F (37.1 °C)  98.6 °F (37 °C)        BP  Min: 96/48  Max: 162/48  (!) 106/51     Pulse  Min: 50  Max: 102  (!) 50     Resp  Min: 18  Max: 31  18    SpO2  Min: 99 %  Max: 100 %  100 %      GEN: Chronically ill appearing AA female  HEENT:  Tracheostomy in place  CV: RRR without rub.  PULM: CTAB, unlabored  ABD: Soft, NT/ND abdomen with NABS.  Peg tube noted.  EXT:  2+ dependent edema  SKIN: Warm and dry  PSYCH:  Mostly unresponsive, but does wince to painful stimuli.  Dialysis access:  Left IJ PermCath            Component Value Date/Time     (L) 01/31/2025 0238     (L) 01/30/2025 0245     05/08/2024 0739     03/15/2024 1101    K 4.0 01/31/2025 0238    K 3.3 (L) 01/30/2025 0245    K 3.8 05/08/2024 0739    K 3.9 03/15/2024 1101    CO2 20 (L) 01/31/2025 0238    CO2 21 (L) 01/30/2025 0245    CO2 22 05/08/2024 0739    CO2 21 (L) 03/15/2024 1101    BUN 25.5 (H) 01/31/2025 0238    BUN 20.2 (H) 01/30/2025 0245    BUN 55 (H) 10/02/2024 0000    BUN 23 (H) 07/12/2024 0000    CREATININE 2.05 (H) 01/31/2025 0238    CREATININE 1.76 (H) 01/30/2025 0245    CREATININE 3.37 (H) 05/08/2024 0739    CREATININE 3.2 (H) 03/15/2024 1101    CALCIUM 7.9 (L) 01/31/2025 0238    CALCIUM 7.8 (L) 01/30/2025 0245    CALCIUM 9 05/08/2024 0739    CALCIUM 9.9 03/15/2024 1101    PHOS 2.7 01/31/2025 0238            Component Value Date/Time    WBC 11.56 (H) 01/31/2025 0238    WBC 14.37 (H) 01/30/2025 0245    WBC 13.49 01/26/2025 0451    WBC 16.5 01/19/2025 0429    HGB 9.4 (L) 01/31/2025 0238    HGB 9.5 (L) 01/30/2025 0245    HGB 10.3 (L) 11/24/2024 0000    HGB 10 (L) 11/18/2024 0000    HCT 29.3 (L) 01/31/2025 0238    HCT 30.2 (L) 01/30/2025 0245     01/31/2025 0238     01/30/2025 0245         Imaging reviewed      Assessment / Plan:       ESRD.  Will change to Monday Wednesday Friday.  New onset seizure  Acute hypoxic respiratory failure  Hypertension  Diabetes mellitus type 2    History of coronary artery disease   History of previous stroke  Urinary tract infection  Anemia of chronic disease    Plan:    Plan for hemodialysis today on MWF schedule.  Hyponatremia on labs.  Increase UF to 2 L as tolerated.

## 2025-01-31 NOTE — PROGRESS NOTES
Inpatient Nutrition Assessment    Admit Date: 1/5/2025   Total duration of encounter: 26 days   Patient Age: 79 y.o.    Nutrition Recommendation/Prescription     Tube feeding recommendation:     Peptamen AF goal rate 60 ml/hr to provide  1440 kcal/d  (96% est needs)  90 g protein/d  (100% est needs)  972 ml free water/d   (calculations based on estimated 20 hr/d run time)     Humble (provides 90 kcal, 2.5 g protein per serving) BID.     If no IV fluids running, can give 50ml q 4hr water flushes. Total water provided: 1070 ml. Otherwise, FWF per MD.      Communication of Recommendations: reviewed with nurse    Nutrition Assessment     Malnutrition Assessment/Nutrition-Focused Physical Exam       Malnutrition Level: other (see comments) (Does not meet criteria) (01/07/25 1339)  Energy Intake (Malnutrition): other (see comments) (Unable to assess) (01/07/25 1339)  Weight Loss (Malnutrition): other (see comments) (Unable to assess) (01/07/25 1339)                                         Fluid Accumulation (Malnutrition): other (see comments) (Not present) (01/07/25 1339)        A minimum of two characteristics is recommended for diagnosis of either severe or non-severe malnutrition.    Chart Review    Reason Seen: continuous nutrition monitoring and follow-up    Malnutrition Screening Tool Results   Have you recently lost weight without trying?: No  Have you been eating poorly because of a decreased appetite?: No   MST Score: 0   Diagnosis:  Acute global aphasia, seizure/postictal state  Obtunded  UTI, present on arrival    Relevant Medical History: ESRD on HD, HTN, CAD, CHF, DM2, MCA territory stroke     Scheduled Medications:  enoxparin, 30 mg, Q24H (prophylaxis, 1700)  epoetin chelsea-ebpx (RETACRIT) injection, 20,000 Units, Every Mon, Wed, Fri  insulin glargine U-100, 10 Units, BID  levETIRAcetam (Keppra) IV (PEDS and ADULTS), 500 mg, Q12H  pantoprazole, 40 mg, Daily  senna, 8.6 mg, BID  valproate sodium (DEPACON) IVPB,  500 mg, Q12H  zinc oxide-cod liver oil, , BID    Continuous Infusions:     PRN Medications:  0.9%  NaCl infusion (for blood administration), , Q24H PRN  0.9%  NaCl infusion (for blood administration), , Q24H PRN  bisacodyL, 10 mg, Daily PRN  dextrose 50%, 12.5 g, PRN  dextrose 50% in water (D50W), 25 g, PRN  fentaNYL, 50 mcg, Q1H PRN  glucagon (human recombinant), 1 mg, PRN  heparin (porcine), 5,000 Units, PRN  insulin aspart U-100, 0-15 Units, Q6H PRN  ondansetron, 4 mg, Q6H PRN  oxyCODONE, 5 mg, Q6H PRN  sodium chloride 0.9%, 10 mL, PRN    Calorie Containing IV Medications: no significant kcals from medications at this time    Recent Labs   Lab 01/25/25  0324 01/26/25  0451 01/27/25  0439 01/28/25  0314 01/29/25  0533 01/30/25  0245 01/31/25  0238   * 130* 129* 134* 130* 133* 131*   K 4.5 4.4 5.6* 4.0 4.5 3.3* 4.0   CALCIUM 8.6 8.3* 8.1* 8.1* 7.7* 7.8* 7.9*   PHOS 2.5 2.1* 2.8 2.5 2.9 2.5 2.7   MG 2.40 2.20 2.40 2.30 2.30 2.20 2.10    102 101 101 98 103 102   CO2 20* 19* 18* 23 21* 21* 20*   BUN 75.9* 51.2* 62.2* 41.7* 41.8* 20.2* 25.5*   CREATININE 2.56* 2.10* 2.85* 2.21* 2.65* 1.76* 2.05*   EGFRNORACEVR 19 24 16 22 18 29 24   GLUCOSE 127* 94 225* 39* 73* 102 107   BILITOT 0.3 0.2 0.2 0.2 0.2 0.2 0.2   ALKPHOS 123 105 117 89 90 89 79   ALT 13 12 16 10 6 <5 <5   AST 19 16 18 18 22 17 14   ALBUMIN 1.9* 1.8* 1.8* 1.8* 1.7* 1.6* 1.5*   WBC 15.40* 13.49  13.49* 14.73* 16.33* 12.92* 14.37* 11.56*   HGB 9.9* 9.1* 8.9* 9.6* 10.1* 9.5* 9.4*   HCT 30.1* 28.3* 28.5* 29.0* 31.7* 30.2* 29.3*     Nutrition Orders:  Diet NPO  Tube Feedings/Formulas Other (see comments); OG; Humble - Fruit Punch,Tube Feedings/Formulas 60; 1,200; Peptamen AF; OG; 50; Every 4 hours    Appetite/Oral Intake: not applicable/not applicable  Factors Affecting Nutritional Intake: on mechanical ventilation and tracheostomy  Social Needs Impacting Access to Food: unable to assess at this time; will attempt on follow-up  Food/Voodoo/Cultural  Preferences: unable to obtain  Food Allergies: no known food allergies  Last Bowel Movement: 01/29/25  Wound(s):     Wound 01/05/25 1535 Skin Tear  Buttocks-Tissue loss description: Partial thickness       Wound 01/23/25 1500 Pressure Injury Right Antecubital-Tissue loss description: Full thickness       Wound 01/28/25 0900 Pressure Injury Left Heel-Tissue loss description: Not applicable     Comments    1/7/25: Discussed with RN. Will provide tube feeding recommendations for when appropriate to start tube feeding. Receiving kcal from meds.      1/8/25: Plans for starting TF today. Still receiving kcal from meds.     1/10/25: TF seen running at goal rate. Pt remains intubated, no longer receiving kcals from meds. Significant amount of diarrhea per RN. EN regimen updated to meet kcal needs.     1/14/25: Pt remains intubated, off all sedation. Tolerating TF at goal rate.     1/16/25: TF continues, tolerated per RN. Noted elevated CBGs. Will change to formula with less CHO/ml than current formula.     1/17/25: TF continues, tolerated per RN. Better CBG control per RN. No kcal from meds.     1/20/25: TF continues, tolerated per RN. Noted elevated K, HD ongoing. If still elevated upon F/U, may need to change to renal formula. Currently Phos low.     1/24/25: TF continues. Tolerated per RN. No kcal from meds.     1/28/25: Plans for PEG today, TF on hold. Trach placed yesterday.     1/31/25: TF continues, plans to continue to increase to goal rate per RN.     Anthropometrics    Height: 5' (152.4 cm),    Last Weight: 74.1 kg (163 lb 5.8 oz) (01/05/25 1518), Weight Method: Standard Scale  BMI (Calculated): 31.9  BMI Classification: obese grade I (BMI 30-34.9)     Ideal Body Weight (IBW), Female: 100 lb     % Ideal Body Weight, Female (lb): 163.36 %                             Usual Weight Provided By: unable to obtain usual weight    Wt Readings from Last 5 Encounters:   01/05/25 74.1 kg (163 lb 5.8 oz)     Weight Change(s)  Since Admission:   Wt Readings from Last 1 Encounters:   01/05/25 1518 74.1 kg (163 lb 5.8 oz)   Admit Weight: 74.1 kg (163 lb 5.8 oz) (01/05/25 1518), Weight Method: Standard Scale    Estimated Needs    Weight Used For Calorie Calculations: 74.1 kg (163 lb 5.8 oz)  Energy Calorie Requirements (kcal): 1495kcal  Energy Need Method: [facility] (modified)  Weight Used For Protein Calculations: 45.5 kg (100 lb 3.2 oz) (IBW)  Protein Requirements: 91gm (2g/kg IBW)  Fluid Requirements (mL): 1000ml + urinary output  CHO Requirement: 170gm (45% est kcal needs)     Enteral Nutrition     Formula: Peptamen AF  Rate/Volume: 60 mL/hr  Water Flushes: 50 mL q4hr  Additives/Modulars: Humble  Route: orogastric tube  Method: continuous  Total Nutrition Provided by Tube Feeding, Additives, and Flushes:  Calories Provided  1440 kcal/d, 96% needs   Protein Provided  90 g/d, 100% needs   Fluid Provided  1070 ml/d, N/A% needs   Continuous feeding calculations based on estimated 20 hr/d run time unless otherwise stated.    Parenteral Nutrition     Patient not receiving parenteral nutrition support at this time.    Evaluation of Received Nutrient Intake    Calories: meeting estimated needs  Protein: meeting estimated needs    Patient Education     Not applicable.    Nutrition Diagnosis     PES: Inadequate oral intake related to acute illness as evidenced by intubation/trach since admit. (active)     PES:            Nutrition Interventions     Intervention(s): collaboration with other providers  Intervention(s):      Goal: Meet greater than 80% of nutritional needs by follow-up. (goal progressing)  Goal: Tolerate enteral feeding at goal rate by follow-up. (goal progressing)    Nutrition Goals & Monitoring     Dietitian will monitor: energy intake and enteral nutrition intake  Discharge planning: too early to determine; pending clinical course  Nutrition Risk/Follow-Up: high (follow-up in 1-4 days)   Please consult if re-assessment needed  sooner.

## 2025-01-31 NOTE — PLAN OF CARE
Problem: Adult Inpatient Plan of Care  Goal: Plan of Care Review  Outcome: Progressing  Goal: Readiness for Transition of Care  Outcome: Progressing     Problem: Hemodialysis  Goal: Safe, Effective Therapy Delivery  Outcome: Progressing  Goal: Absence of Infection Signs and Symptoms  Outcome: Progressing     Problem: Stroke, Ischemic (Includes Transient Ischemic Attack)  Goal: Optimal Nutrition Intake  Outcome: Progressing  Goal: Effective Urinary Elimination  Outcome: Progressing     Problem: Skin Injury Risk Increased  Goal: Skin Health and Integrity  Outcome: Progressing     Problem: Stroke, Ischemic (Includes Transient Ischemic Attack)  Goal: Safe and Effective Swallow  Outcome: Not Progressing

## 2025-02-01 LAB
ALBUMIN SERPL-MCNC: 1.6 G/DL (ref 3.4–4.8)
ALBUMIN/GLOB SERPL: 0.4 RATIO (ref 1.1–2)
ALP SERPL-CCNC: 90 UNIT/L (ref 40–150)
ALT SERPL-CCNC: <5 UNIT/L (ref 0–55)
ANION GAP SERPL CALC-SCNC: 9 MEQ/L
AST SERPL-CCNC: 11 UNIT/L (ref 5–34)
BASOPHILS # BLD AUTO: 0.06 X10(3)/MCL
BASOPHILS NFR BLD AUTO: 0.6 %
BILIRUB SERPL-MCNC: 0.2 MG/DL
BUN SERPL-MCNC: 17.5 MG/DL (ref 9.8–20.1)
CALCIUM SERPL-MCNC: 7.6 MG/DL (ref 8.4–10.2)
CHLORIDE SERPL-SCNC: 103 MMOL/L (ref 98–107)
CO2 SERPL-SCNC: 20 MMOL/L (ref 23–31)
CREAT SERPL-MCNC: 1.83 MG/DL (ref 0.55–1.02)
CREAT/UREA NIT SERPL: 10
EOSINOPHIL # BLD AUTO: 0.14 X10(3)/MCL (ref 0–0.9)
EOSINOPHIL NFR BLD AUTO: 1.4 %
ERYTHROCYTE [DISTWIDTH] IN BLOOD BY AUTOMATED COUNT: 18.2 % (ref 11.5–17)
GFR SERPLBLD CREATININE-BSD FMLA CKD-EPI: 28 ML/MIN/1.73/M2
GLOBULIN SER-MCNC: 4.3 GM/DL (ref 2.4–3.5)
GLUCOSE SERPL-MCNC: 144 MG/DL (ref 82–115)
HCT VFR BLD AUTO: 29.7 % (ref 37–47)
HGB BLD-MCNC: 9.5 G/DL (ref 12–16)
IMM GRANULOCYTES # BLD AUTO: 0.1 X10(3)/MCL (ref 0–0.04)
IMM GRANULOCYTES NFR BLD AUTO: 1 %
LYMPHOCYTES # BLD AUTO: 1.77 X10(3)/MCL (ref 0.6–4.6)
LYMPHOCYTES NFR BLD AUTO: 18.1 %
MAGNESIUM SERPL-MCNC: 2.1 MG/DL (ref 1.6–2.6)
MCH RBC QN AUTO: 29.2 PG (ref 27–31)
MCHC RBC AUTO-ENTMCNC: 32 G/DL (ref 33–36)
MCV RBC AUTO: 91.4 FL (ref 80–94)
MONOCYTES # BLD AUTO: 0.88 X10(3)/MCL (ref 0.1–1.3)
MONOCYTES NFR BLD AUTO: 9 %
NEUTROPHILS # BLD AUTO: 6.83 X10(3)/MCL (ref 2.1–9.2)
NEUTROPHILS NFR BLD AUTO: 69.9 %
NRBC BLD AUTO-RTO: 0 %
PHOSPHATE SERPL-MCNC: 2 MG/DL (ref 2.3–4.7)
PLATELET # BLD AUTO: 277 X10(3)/MCL (ref 130–400)
PMV BLD AUTO: 8.5 FL (ref 7.4–10.4)
POTASSIUM SERPL-SCNC: 3.9 MMOL/L (ref 3.5–5.1)
PROT SERPL-MCNC: 5.9 GM/DL (ref 5.8–7.6)
RBC # BLD AUTO: 3.25 X10(6)/MCL (ref 4.2–5.4)
SODIUM SERPL-SCNC: 132 MMOL/L (ref 136–145)
WBC # BLD AUTO: 9.78 X10(3)/MCL (ref 4.5–11.5)

## 2025-02-01 PROCEDURE — 94761 N-INVAS EAR/PLS OXIMETRY MLT: CPT

## 2025-02-01 PROCEDURE — 99900035 HC TECH TIME PER 15 MIN (STAT)

## 2025-02-01 PROCEDURE — 99900026 HC AIRWAY MAINTENANCE (STAT)

## 2025-02-01 PROCEDURE — 25000003 PHARM REV CODE 250: Performed by: STUDENT IN AN ORGANIZED HEALTH CARE EDUCATION/TRAINING PROGRAM

## 2025-02-01 PROCEDURE — 94799 UNLISTED PULMONARY SVC/PX: CPT

## 2025-02-01 PROCEDURE — 94003 VENT MGMT INPAT SUBQ DAY: CPT

## 2025-02-01 PROCEDURE — 25000003 PHARM REV CODE 250

## 2025-02-01 PROCEDURE — 27100171 HC OXYGEN HIGH FLOW UP TO 24 HOURS

## 2025-02-01 PROCEDURE — 36415 COLL VENOUS BLD VENIPUNCTURE: CPT

## 2025-02-01 PROCEDURE — 99900022

## 2025-02-01 PROCEDURE — 11000001 HC ACUTE MED/SURG PRIVATE ROOM

## 2025-02-01 PROCEDURE — 63600175 PHARM REV CODE 636 W HCPCS

## 2025-02-01 PROCEDURE — 27200966 HC CLOSED SUCTION SYSTEM

## 2025-02-01 PROCEDURE — 25000003 PHARM REV CODE 250: Performed by: INTERNAL MEDICINE

## 2025-02-01 PROCEDURE — 80053 COMPREHEN METABOLIC PANEL: CPT

## 2025-02-01 PROCEDURE — 94760 N-INVAS EAR/PLS OXIMETRY 1: CPT

## 2025-02-01 PROCEDURE — 63600175 PHARM REV CODE 636 W HCPCS: Performed by: STUDENT IN AN ORGANIZED HEALTH CARE EDUCATION/TRAINING PROGRAM

## 2025-02-01 PROCEDURE — 83735 ASSAY OF MAGNESIUM: CPT

## 2025-02-01 PROCEDURE — 85025 COMPLETE CBC W/AUTO DIFF WBC: CPT

## 2025-02-01 PROCEDURE — 63600175 PHARM REV CODE 636 W HCPCS: Performed by: INTERNAL MEDICINE

## 2025-02-01 PROCEDURE — 84100 ASSAY OF PHOSPHORUS: CPT

## 2025-02-01 PROCEDURE — 99900031 HC PATIENT EDUCATION (STAT)

## 2025-02-01 RX ADMIN — INSULIN ASPART 3 UNITS: 100 INJECTION, SOLUTION INTRAVENOUS; SUBCUTANEOUS at 12:02

## 2025-02-01 RX ADMIN — INSULIN ASPART 3 UNITS: 100 INJECTION, SOLUTION INTRAVENOUS; SUBCUTANEOUS at 05:02

## 2025-02-01 RX ADMIN — VALPROATE SODIUM 500 MG: 100 INJECTION, SOLUTION INTRAVENOUS at 04:02

## 2025-02-01 RX ADMIN — SENNOSIDES 8.6 MG: 8.6 TABLET, FILM COATED ORAL at 08:02

## 2025-02-01 RX ADMIN — INSULIN GLARGINE 10 UNITS: 100 INJECTION, SOLUTION SUBCUTANEOUS at 08:02

## 2025-02-01 RX ADMIN — PANTOPRAZOLE SODIUM 40 MG: 40 INJECTION, POWDER, FOR SOLUTION INTRAVENOUS at 08:02

## 2025-02-01 RX ADMIN — Medication: at 08:02

## 2025-02-01 RX ADMIN — LEVETIRACETAM 500 MG: 100 INJECTION, SOLUTION INTRAVENOUS at 08:02

## 2025-02-01 RX ADMIN — ENOXAPARIN SODIUM 30 MG: 30 INJECTION SUBCUTANEOUS at 04:02

## 2025-02-01 NOTE — PROGRESS NOTES
Nephrology Progress Note  [facility]      Patient Name: Krysta Jansen  Age: 79 y.o.  : 1945  MRN: [id number]  Admission Date: 2025      HPI:    Krysta Jansen is a 79 y.o. female is a nursing home resident and has ESRD and is on dialysis on .  Brought to this hospital for altered mental status.  She was found to have some grand mal seizure.  She received some Ativan.  She had EEG done.  Her respiratory status and mental status deteriorated and required intubation to protect her airways.  Could not get any history from the patient.  Records revealed she was found to have some dysarthria and decreased level of consciousness Friday through being brought to this hospital.  Significant past medical history is positive for diabetes mellitus type 2 previous stroke hypertension coronary artery disease and congestive heart failure.    Interval History:    2024  Patient remains intubated and sedated.  On epinephrine for hypotension this morning.     25  Remains intubated and sedated on vent. Off pressors with BP stable at 130/60 range. Noted leucocytosis on am lab. Blood cultures pending.  Hgb stable at 7.6. Dialyzed yesterday and tolerated 500mL UF.      2025  No acute events overnight.  Patient remains intubated.  Not currently requiring vasopressors, but blood pressure does remain borderline low.  ICU team is requesting to hold hemodialysis today so they can have goals of care conversation with family.     01/10/2025   Currently tolerating dialysis.  Will remove only 500 cc of fluid.  Remains intubated.  Tolerating tube feedings.  Nurses reported that she is not on any sedation for about 48 hours.     2025   Weekend events noted.  Patient's condition is unchanged.  Remains on the ventilator and off sedation or pressors.  Unresponsive.     2025   Last 24 hours events noted.  Patient's condition remains unchanged and she is off pressors and  off sedation and still unresponsive although she grimaces to painful stimuli.     01/15/2025   Currently tolerating dialysis.  Neuro status and respiratory status remains unchanged.     01/16/2025  Dialyze yesterday.  No clinical change overnight.     01/17/2025  No clinical change overnight.  Hemoglobin dropped today.  Remains intubated     01/18/2025  No acute events overnight.  On Levophed for hypotension.  Dialyzed yesterday.     01/19/2025  No acute events overnight.  She does appear more edematous.  Remains on Levophed.  Getting tube feedings.     01/20/2025   Currently tolerating dialysis.  Will try to remove some fluid.  Will increase dialysis time to 4 hours.  Comfortable on the ventilator.     01/21/2025   Currently tolerating dialysis.  The plan is to remove some fluid and to help correct electrolytes and uremic toxins.     01/22/2025   Patient has had dialysis for 2 days in a row for 4 hours each time and now on low-dose Levophed.  No change neurologically.  Still tolerating tube feedings and patient remains on the ventilator.     01/23/2025   Just started on dialysis.  Will decrease fluid removal to 1 L only.  Overall condition is unchanged.  She is off Levophed now.     01/24/2025   She tolerated dialysis yesterday.  Overall condition remains unchanged.  Off pressors.  Off sedation.  Getting sodium phosphate for hypophosphatemia.  Tolerating tube feedings.     01/27/2025   Weekend events noted.  Nurse just informed me that family wanted to do trach and PEG placement.     1/28/25  Trach and PEG placed yesterday.  Remains on ventilator via trach.     01/29/2025   Patient is tolerating PEG nutrition and has tracheostomy done also.  Overall condition is unchanged.     01/30/2025  No clinical change.     01/31/2025  No acute events overnight.  Remains on ventilator.  No clinical change.    2/1/25-dialyzed yesterday, tolerated 1.5L UF. Tolerating TF.    ROS:    Unable to obtain ROS due to mental status.       Past medical, family, surgical, and social history reviewed and unchanged from initial consult note.     Vital Signs:  BP (!) 149/85   Pulse 79   Temp 98.8 °F (37.1 °C) (Oral)   Resp 18   Ht 5' (1.524 m)   Wt 74.1 kg (163 lb 5.8 oz)   SpO2 100%   BMI 31.90 kg/m²   Body mass index is 31.9 kg/m².      Physical Exam  Vitals reviewed.   Constitutional:       Comments: Chronically ill appearing   HENT:      Head: Normocephalic and atraumatic.      Mouth/Throat:      Mouth: Mucous membranes are moist.   Eyes:      Pupils: Pupils are equal, round, and reactive to light.   Cardiovascular:      Rate and Rhythm: Normal rate and regular rhythm.   Pulmonary:      Breath sounds: Normal breath sounds.      Comments: Tracheostomy on ventilator  Abdominal:      General: Abdomen is flat. Bowel sounds are normal.      Palpations: Abdomen is soft.      Comments: PEG tube with TF at 60cc./hr   Musculoskeletal:      Comments: Generalized edema bilat upper estremities   Skin:     General: Skin is warm and dry.   Neurological:      General: No focal deficit present.       Dialysis Access: none    Labs:  Recent Results (from the past 24 hours)   Comprehensive Metabolic Panel    Collection Time: 02/01/25  3:09 AM   Result Value Ref Range    Sodium 132 (L) 136 - 145 mmol/L    Potassium 3.9 3.5 - 5.1 mmol/L    Chloride 103 98 - 107 mmol/L    CO2 20 (L) 23 - 31 mmol/L    Glucose 144 (H) 82 - 115 mg/dL    Blood Urea Nitrogen 17.5 9.8 - 20.1 mg/dL    Creatinine 1.83 (H) 0.55 - 1.02 mg/dL    Calcium 7.6 (L) 8.4 - 10.2 mg/dL    Protein Total 5.9 5.8 - 7.6 gm/dL    Albumin 1.6 (L) 3.4 - 4.8 g/dL    Globulin 4.3 (H) 2.4 - 3.5 gm/dL    Albumin/Globulin Ratio 0.4 (L) 1.1 - 2.0 ratio    Bilirubin Total 0.2 <=1.5 mg/dL    ALP 90 40 - 150 unit/L    ALT <5 0 - 55 unit/L    AST 11 5 - 34 unit/L    eGFR 28 mL/min/1.73/m2    Anion Gap 9.0 mEq/L    BUN/Creatinine Ratio 10    Magnesium    Collection Time: 02/01/25  3:09 AM   Result Value Ref Range     Magnesium Level 2.10 1.60 - 2.60 mg/dL   Phosphorus    Collection Time: 02/01/25  3:09 AM   Result Value Ref Range    Phosphorus Level 2.0 (L) 2.3 - 4.7 mg/dL   CBC with Differential    Collection Time: 02/01/25  3:09 AM   Result Value Ref Range    WBC 9.78 4.50 - 11.50 x10(3)/mcL    RBC 3.25 (L) 4.20 - 5.40 x10(6)/mcL    Hgb 9.5 (L) 12.0 - 16.0 g/dL    Hct 29.7 (L) 37.0 - 47.0 %    MCV 91.4 80.0 - 94.0 fL    MCH 29.2 27.0 - 31.0 pg    MCHC 32.0 (L) 33.0 - 36.0 g/dL    RDW 18.2 (H) 11.5 - 17.0 %    Platelet 277 130 - 400 x10(3)/mcL    MPV 8.5 7.4 - 10.4 fL    Neut % 69.9 %    Lymph % 18.1 %    Mono % 9.0 %    Eos % 1.4 %    Basophil % 0.6 %    Imm Grans % 1.0 %    Neut # 6.83 2.1 - 9.2 x10(3)/mcL    Lymph # 1.77 0.6 - 4.6 x10(3)/mcL    Mono # 0.88 0.1 - 1.3 x10(3)/mcL    Eos # 0.14 0 - 0.9 x10(3)/mcL    Baso # 0.06 <=0.2 x10(3)/mcL    Imm Gran # 0.10 (H) 0.00 - 0.04 x10(3)/mcL    NRBC% 0.0 %         Assessment/Plan:    ESRD.  Will change to Monday Wednesday Friday.  New onset seizure  Acute hypoxic respiratory failure  Hypertension  Diabetes mellitus type 2   History of coronary artery disease   History of previous stroke  Urinary tract infection  Anemia of chronic disease    Recommendations:    - No indication for acute HD waiting on bed at [facility] for d/c. Continue HD on MWF schedule.    Shira Payne NP

## 2025-02-01 NOTE — PROGRESS NOTES
[facility] - Emergency Dept  Pulmonary Critical Care Note    Patient Name: Krysta Jansen  MRN: [id number]  Admission Date: 1/5/2025  Hospital Length of Stay: 27 days  Code Status: DNR  Attending Provider: DAMARIS Jaime MD  Primary Care Provider: No primary care provider on file.     Subjective:     HPI:   Patient is a 79-year-old female with a past medical history of ESRD on HD, HTN, CAD, CHF unknown EF, DM2, previous left MCA territory stroke who was brought into the ER 01/05/2025 after her son reported seeing her normal an hour prior, briefly left in when came back patient was nonverbal and lethargic.      She was emergently brought to the ER and a code fast was called, she was assessed by Neurology in the ER where she was found to have an NIH of 22 based on dysarthria and level of consciousness, but was deemed not a candidate for TNK secondary to overall nonfocal exam and recent left arm HD access surgery reported.  Vitals and laboratory work were overall unremarkable except for changes as expected in any age renal disease, and a potential urinary tract infection.  She was admitted under typical stroke protocol.     On 1/6/2025, EEG at 0800 revealed seizure-like activity. She was given Lorazepam 2 mg IV. Patient later became unconscious, unresponsive to pain stimuli, and hypotensive. She was intubated for airway protection.       Hospital Course/Significant events:  1/6/2025 - Admitted to ICU   1/9/2025 - EEG shows evidence of epileptiform discharges but no clinical convulsing noted.  1/28/25 - Tracheostomy    24 Hour Interval History:  No events overnight.  Reportedly apneic on pressure support trials yesterday.  Underwent dialysis yesterday with 1.5 L of fluid removed.      Social History     Socioeconomic History    Marital status: Single   Tobacco Use    Smoking status: Unknown   Substance and Sexual Activity    Alcohol use: Not Currently     Social Drivers of Health     Financial Resource  Strain: Patient Unable To Answer (1/11/2025)    Overall Financial Resource Strain (CARDIA)     Difficulty of Paying Living Expenses: Patient unable to answer   Food Insecurity: Patient Unable To Answer (1/11/2025)    Hunger Vital Sign     Worried About Running Out of Food in the Last Year: Patient unable to answer     Ran Out of Food in the Last Year: Patient unable to answer   Transportation Needs: Patient Unable To Answer (1/11/2025)    TRANSPORTATION NEEDS     Transportation : Patient unable to answer   Stress: Patient Unable To Answer (1/11/2025)    [employer] - Occupational Stress Questionnaire     Feeling of Stress : Patient unable to answer   Housing Stability: Patient Unable To Answer (1/11/2025)    Housing Stability Vital Sign     Unable to Pay for Housing in the Last Year: Patient unable to answer     Homeless in the Last Year: Patient unable to answer       Current Outpatient Medications   Medication Instructions    aspirin 81 MG Chew 1 tablet, Daily    BASAGLAR KWIKPEN U-100 INSULIN 30 Units, Nightly    CeleXA 10 mg, Daily    ENTRESTO 24-26 mg per tablet 1 tablet, 2 times daily    metoprolol succinate 25 mg CSpX 1 capsule, Daily    PLAVIX 75 mg tablet 1 tablet, Daily    rosuvastatin (CRESTOR) 40 MG Tab 1 tablet, Nightly    traMADoL (ULTRAM) 50 mg tablet 1 tablet, Every 6 hours PRN    vancomycin (VANCOCIN) 500 mg, Every Mon, Wed, Fri     Current Inpatient Medications   enoxparin  30 mg Subcutaneous Q24H (prophylaxis, 1700)    epoetin chelsea-ebpx (RETACRIT) injection  20,000 Units Subcutaneous Every Mon, Wed, Fri    insulin glargine U-100  10 Units Subcutaneous BID    levETIRAcetam (Keppra) IV (PEDS and ADULTS)  500 mg Intravenous Q12H    pantoprazole  40 mg Intravenous Daily    senna  8.6 mg Oral BID    valproate sodium (DEPACON) IVPB  500 mg Intravenous Q12H    zinc oxide-cod liver oil   Topical (Top) BID     Current Intravenous Infusions        Objective:     Intake/Output  "Summary (Last 24 hours) at 2/1/2025 1008  Last data filed at 2/1/2025 0905  Gross per 24 hour   Intake 890.73 ml   Output 1650 ml   Net -759.27 ml     Vital Signs (Most Recent):  Temp: 98.8 °F (37.1 °C) (02/01/25 0800)  Pulse: 79 (02/01/25 0900)  Resp: 18 (02/01/25 0900)  BP: (!) 149/85 (02/01/25 0900)  SpO2: 100 % (02/01/25 0900)  Body mass index is 31.9 kg/m².  Weight: 74.1 kg (163 lb 5.8 oz) Vital Signs (24h Range):  Temp:  [98.1 °F (36.7 °C)-98.8 °F (37.1 °C)] 98.8 °F (37.1 °C)  Pulse:  [49-81] 79  Resp:  [17-25] 18  SpO2:  [100 %] 100 %  BP: (118-152)/(45-85) 149/85         Physical Exam:  Gen- on mechanical ventilatory support per trach.  Obtunded, appears comfortable  HENT- trach in place, tunneled HD line in place  CV- NRRR   Resp- good air movement with coarse breath sounds  MSK- 1+ upper and lower extremity edema  Neuro- obtunded does not follow commands.  She grimaces to pain.  Spontaneous respiratory effort noted        Lines/Drains/Airways       Central Venous Catheter Line  Duration                  Hemodialysis Catheter 01/06/25 1400 left internal jugular 25 days              Drain  Duration                  Gastrostomy/Enterostomy 01/28/25 1730 3 days              Airway  Duration             Adult Surgical Airway 01/27/25 1714 Shiley Cuffed 8.0 / 85 mm 4 days                  Significant Labs:  Lab Results   Component Value Date    WBC 9.78 02/01/2025    HGB 9.5 (L) 02/01/2025    HCT 29.7 (L) 02/01/2025    MCV 91.4 02/01/2025     02/01/2025       BMP  Lab Results   Component Value Date     (L) 02/01/2025    K 3.9 02/01/2025    CO2 20 (L) 02/01/2025    BUN 17.5 02/01/2025    CREATININE 1.83 (H) 02/01/2025    CALCIUM 7.6 (L) 02/01/2025    AGAP 9.0 02/01/2025    ESTGFRAFRICA 13 05/08/2024     ABG  No results for input(s): "PH", "PO2", "PCO2", "HCO3", "BE" in the last 168 hours.    Mechanical Ventilation Support:  Vent Mode: SIMV (02/01/25 0527)  Ventilator Initiated: Yes (01/06/25 " 1104)  Set Rate: 18 BPM (02/01/25 0527)  Vt Set: 450 mL (02/01/25 0527)  Pressure Support: 10 cmH20 (02/01/25 0527)  PEEP/CPAP: 5 cmH20 (02/01/25 0527)  Oxygen Concentration (%): 30 (02/01/25 0730)  Peak Airway Pressure: 24 cmH20 (02/01/25 0527)  Total Ve: 7.9 L/m (02/01/25 0527)  F/VT Ratio<105 (RSBI): (!) 40.18 (01/31/25 1540)      Assessment/Plan:     Assessment  Persistent nonconvulsive status epilepticus, no further seizure activity   Persistent encephalopathy off all sedation  Acute respiratory failure, tracheostomy 01/27/2025.  She does not tolerate attempts at spontaneous breathing trials    Pseudomonas UTI  Prior left MCA territory stroke   Hypotension (resolved)  Anemia with no obvious source of bleeding.    ESRD, on chronic hemodialysis  Hx of HTN, CAD, DM2       Plan  PS trial of 8/5 for 8 hours today.  Continue tube feeds at goal  Continue Keppra b.i.d. and valproate, as per Neurology recommendations  Accepted to LTAC on 2/3.  Code status DNR      DVT Prophylaxis: subq lovenox  GI Prophylaxis: pantoprazole     Ready for discharge to LTAC.    Ulices Junior MD  Pulmonary and Critical Care

## 2025-02-02 LAB
ALBUMIN SERPL-MCNC: 1.6 G/DL (ref 3.4–4.8)
ALBUMIN/GLOB SERPL: 0.3 RATIO (ref 1.1–2)
ALP SERPL-CCNC: 86 UNIT/L (ref 40–150)
ALT SERPL-CCNC: <5 UNIT/L (ref 0–55)
ANION GAP SERPL CALC-SCNC: 7 MEQ/L
AST SERPL-CCNC: 12 UNIT/L (ref 5–34)
BASOPHILS # BLD AUTO: 0.05 X10(3)/MCL
BASOPHILS NFR BLD AUTO: 0.5 %
BILIRUB SERPL-MCNC: 0.2 MG/DL
BUN SERPL-MCNC: 26.5 MG/DL (ref 9.8–20.1)
CALCIUM SERPL-MCNC: 8 MG/DL (ref 8.4–10.2)
CHLORIDE SERPL-SCNC: 103 MMOL/L (ref 98–107)
CO2 SERPL-SCNC: 19 MMOL/L (ref 23–31)
CREAT SERPL-MCNC: 2.21 MG/DL (ref 0.55–1.02)
CREAT/UREA NIT SERPL: 12
EOSINOPHIL # BLD AUTO: 0.25 X10(3)/MCL (ref 0–0.9)
EOSINOPHIL NFR BLD AUTO: 2.6 %
ERYTHROCYTE [DISTWIDTH] IN BLOOD BY AUTOMATED COUNT: 18.5 % (ref 11.5–17)
GFR SERPLBLD CREATININE-BSD FMLA CKD-EPI: 22 ML/MIN/1.73/M2
GLOBULIN SER-MCNC: 4.7 GM/DL (ref 2.4–3.5)
GLUCOSE SERPL-MCNC: 142 MG/DL (ref 82–115)
HCT VFR BLD AUTO: 30.9 % (ref 37–47)
HGB BLD-MCNC: 9.6 G/DL (ref 12–16)
IMM GRANULOCYTES # BLD AUTO: 0.09 X10(3)/MCL (ref 0–0.04)
IMM GRANULOCYTES NFR BLD AUTO: 0.9 %
LYMPHOCYTES # BLD AUTO: 2.09 X10(3)/MCL (ref 0.6–4.6)
LYMPHOCYTES NFR BLD AUTO: 21.6 %
MAGNESIUM SERPL-MCNC: 2.2 MG/DL (ref 1.6–2.6)
MCH RBC QN AUTO: 29 PG (ref 27–31)
MCHC RBC AUTO-ENTMCNC: 31.1 G/DL (ref 33–36)
MCV RBC AUTO: 93.4 FL (ref 80–94)
MONOCYTES # BLD AUTO: 1.1 X10(3)/MCL (ref 0.1–1.3)
MONOCYTES NFR BLD AUTO: 11.4 %
NEUTROPHILS # BLD AUTO: 6.11 X10(3)/MCL (ref 2.1–9.2)
NEUTROPHILS NFR BLD AUTO: 63 %
NRBC BLD AUTO-RTO: 0 %
PHOSPHATE SERPL-MCNC: 1.7 MG/DL (ref 2.3–4.7)
PLATELET # BLD AUTO: 277 X10(3)/MCL (ref 130–400)
PMV BLD AUTO: 8.3 FL (ref 7.4–10.4)
POCT GLUCOSE: 173 MG/DL (ref 70–110)
POCT GLUCOSE: 206 MG/DL (ref 70–110)
POCT GLUCOSE: 217 MG/DL (ref 70–110)
POTASSIUM SERPL-SCNC: 4.3 MMOL/L (ref 3.5–5.1)
PROT SERPL-MCNC: 6.3 GM/DL (ref 5.8–7.6)
RBC # BLD AUTO: 3.31 X10(6)/MCL (ref 4.2–5.4)
SODIUM SERPL-SCNC: 129 MMOL/L (ref 136–145)
WBC # BLD AUTO: 9.69 X10(3)/MCL (ref 4.5–11.5)

## 2025-02-02 PROCEDURE — 36415 COLL VENOUS BLD VENIPUNCTURE: CPT

## 2025-02-02 PROCEDURE — 63600175 PHARM REV CODE 636 W HCPCS: Performed by: INTERNAL MEDICINE

## 2025-02-02 PROCEDURE — 94761 N-INVAS EAR/PLS OXIMETRY MLT: CPT

## 2025-02-02 PROCEDURE — 80053 COMPREHEN METABOLIC PANEL: CPT

## 2025-02-02 PROCEDURE — 63600175 PHARM REV CODE 636 W HCPCS

## 2025-02-02 PROCEDURE — 11000001 HC ACUTE MED/SURG PRIVATE ROOM

## 2025-02-02 PROCEDURE — 27200966 HC CLOSED SUCTION SYSTEM

## 2025-02-02 PROCEDURE — 25000003 PHARM REV CODE 250: Performed by: STUDENT IN AN ORGANIZED HEALTH CARE EDUCATION/TRAINING PROGRAM

## 2025-02-02 PROCEDURE — 83735 ASSAY OF MAGNESIUM: CPT

## 2025-02-02 PROCEDURE — 84100 ASSAY OF PHOSPHORUS: CPT

## 2025-02-02 PROCEDURE — 99900035 HC TECH TIME PER 15 MIN (STAT)

## 2025-02-02 PROCEDURE — 99900026 HC AIRWAY MAINTENANCE (STAT)

## 2025-02-02 PROCEDURE — 85025 COMPLETE CBC W/AUTO DIFF WBC: CPT

## 2025-02-02 PROCEDURE — 99900031 HC PATIENT EDUCATION (STAT)

## 2025-02-02 PROCEDURE — 94003 VENT MGMT INPAT SUBQ DAY: CPT

## 2025-02-02 PROCEDURE — 25000003 PHARM REV CODE 250

## 2025-02-02 PROCEDURE — 63600175 PHARM REV CODE 636 W HCPCS: Performed by: STUDENT IN AN ORGANIZED HEALTH CARE EDUCATION/TRAINING PROGRAM

## 2025-02-02 PROCEDURE — 94760 N-INVAS EAR/PLS OXIMETRY 1: CPT

## 2025-02-02 PROCEDURE — 25000003 PHARM REV CODE 250: Performed by: INTERNAL MEDICINE

## 2025-02-02 PROCEDURE — 27100171 HC OXYGEN HIGH FLOW UP TO 24 HOURS

## 2025-02-02 PROCEDURE — 99900022

## 2025-02-02 RX ADMIN — Medication: at 08:02

## 2025-02-02 RX ADMIN — INSULIN ASPART 6 UNITS: 100 INJECTION, SOLUTION INTRAVENOUS; SUBCUTANEOUS at 12:02

## 2025-02-02 RX ADMIN — LEVETIRACETAM 500 MG: 100 INJECTION, SOLUTION INTRAVENOUS at 08:02

## 2025-02-02 RX ADMIN — VALPROATE SODIUM 500 MG: 100 INJECTION, SOLUTION INTRAVENOUS at 05:02

## 2025-02-02 RX ADMIN — PANTOPRAZOLE SODIUM 40 MG: 40 INJECTION, POWDER, FOR SOLUTION INTRAVENOUS at 08:02

## 2025-02-02 RX ADMIN — ENOXAPARIN SODIUM 30 MG: 30 INJECTION SUBCUTANEOUS at 05:02

## 2025-02-02 RX ADMIN — INSULIN GLARGINE 10 UNITS: 100 INJECTION, SOLUTION SUBCUTANEOUS at 08:02

## 2025-02-02 RX ADMIN — SENNOSIDES 8.6 MG: 8.6 TABLET, FILM COATED ORAL at 09:02

## 2025-02-02 RX ADMIN — INSULIN ASPART 6 UNITS: 100 INJECTION, SOLUTION INTRAVENOUS; SUBCUTANEOUS at 05:02

## 2025-02-02 NOTE — PROGRESS NOTES
[facility] - Emergency Dept  Pulmonary Critical Care Note    Patient Name: Krysta Jansen  MRN: [id number]  Admission Date: 1/5/2025  Hospital Length of Stay: 28 days  Code Status: DNR  Attending Provider: DAMARIS Jaime MD  Primary Care Provider: No primary care provider on file.     Subjective:     HPI:   Patient is a 79-year-old female with a past medical history of ESRD on HD, HTN, CAD, CHF unknown EF, DM2, previous left MCA territory stroke who was brought into the ER 01/05/2025 after her son reported seeing her normal an hour prior, briefly left in when came back patient was nonverbal and lethargic.      She was emergently brought to the ER and a code fast was called, she was assessed by Neurology in the ER where she was found to have an NIH of 22 based on dysarthria and level of consciousness, but was deemed not a candidate for TNK secondary to overall nonfocal exam and recent left arm HD access surgery reported.  Vitals and laboratory work were overall unremarkable except for changes as expected in any age renal disease, and a potential urinary tract infection.  She was admitted under typical stroke protocol.     On 1/6/2025, EEG at 0800 revealed seizure-like activity. She was given Lorazepam 2 mg IV. Patient later became unconscious, unresponsive to pain stimuli, and hypotensive. She was intubated for airway protection.       Hospital Course/Significant events:  1/6/2025 - Admitted to ICU   1/9/2025 - EEG shows evidence of epileptiform discharges but no clinical convulsing noted.  1/28/25 - Tracheostomy    24 Hour Interval History:  No events overnight.  Tolerated pressure support trial of 8/5 for 8 hours.  Rested on SIMV overnight. Minimally responsive this morning.       Social History     Socioeconomic History    Marital status: Single   Tobacco Use    Smoking status: Unknown   Substance and Sexual Activity    Alcohol use: Not Currently     Social Drivers of Health     Financial  Resource Strain: Patient Unable To Answer (1/11/2025)    Overall Financial Resource Strain (CARDIA)     Difficulty of Paying Living Expenses: Patient unable to answer   Food Insecurity: Patient Unable To Answer (1/11/2025)    Hunger Vital Sign     Worried About Running Out of Food in the Last Year: Patient unable to answer     Ran Out of Food in the Last Year: Patient unable to answer   Transportation Needs: Patient Unable To Answer (1/11/2025)    TRANSPORTATION NEEDS     Transportation : Patient unable to answer   Stress: Patient Unable To Answer (1/11/2025)    [employer] - Occupational Stress Questionnaire     Feeling of Stress : Patient unable to answer   Housing Stability: Patient Unable To Answer (1/11/2025)    Housing Stability Vital Sign     Unable to Pay for Housing in the Last Year: Patient unable to answer     Homeless in the Last Year: Patient unable to answer       Current Outpatient Medications   Medication Instructions    aspirin 81 MG Chew 1 tablet, Daily    BASAGLAR KWIKPEN U-100 INSULIN 30 Units, Nightly    CeleXA 10 mg, Daily    ENTRESTO 24-26 mg per tablet 1 tablet, 2 times daily    metoprolol succinate 25 mg CSpX 1 capsule, Daily    PLAVIX 75 mg tablet 1 tablet, Daily    rosuvastatin (CRESTOR) 40 MG Tab 1 tablet, Nightly    traMADoL (ULTRAM) 50 mg tablet 1 tablet, Every 6 hours PRN    vancomycin (VANCOCIN) 500 mg, Every Mon, Wed, Fri     Current Inpatient Medications   enoxparin  30 mg Subcutaneous Q24H (prophylaxis, 1700)    epoetin chelsea-ebpx (RETACRIT) injection  20,000 Units Subcutaneous Every Mon, Wed, Fri    insulin glargine U-100  10 Units Subcutaneous BID    levETIRAcetam (Keppra) IV (PEDS and ADULTS)  500 mg Intravenous Q12H    pantoprazole  40 mg Intravenous Daily    senna  8.6 mg Oral BID    valproate sodium (DEPACON) IVPB  500 mg Intravenous Q12H    zinc oxide-cod liver oil   Topical (Top) BID     Current Intravenous Infusions        Objective:  "    Intake/Output Summary (Last 24 hours) at 2/2/2025 0641  Last data filed at 2/2/2025 0554  Gross per 24 hour   Intake 2572.08 ml   Output --   Net 2572.08 ml     Vital Signs (Most Recent):  Temp: 98.4 °F (36.9 °C) (02/02/25 0400)  Pulse: (!) 54 (02/02/25 0500)  Resp: 18 (02/02/25 0500)  BP: (!) 124/49 (02/02/25 0500)  SpO2: 100 % (02/02/25 0500)  Body mass index is 31.9 kg/m².  Weight: 74.1 kg (163 lb 5.8 oz) Vital Signs (24h Range):  Temp:  [98.4 °F (36.9 °C)-99.2 °F (37.3 °C)] 98.4 °F (36.9 °C)  Pulse:  [54-90] 54  Resp:  [17-36] 18  SpO2:  [99 %-100 %] 100 %  BP: (111-158)/(45-85) 124/49         Physical Exam:  Gen- on mechanical ventilatory support per trach.  Minimally responsive, appears comfortable  HENT- trach in place, tunneled HD line in place  CV- NRRR   Resp- CTAB  MSK- 1+ upper and lower extremity edema  Neuro- minimally responsive. Reflexively squeezes fingers.  She grimaces to pain.  Spontaneous respiratory effort noted        Lines/Drains/Airways       Central Venous Catheter Line  Duration                  Hemodialysis Catheter 01/06/25 1400 left internal jugular 26 days              Drain  Duration                  Gastrostomy/Enterostomy 01/28/25 1730 4 days              Airway  Duration             Adult Surgical Airway 01/27/25 1714 Shiley Cuffed 8.0 / 85 mm 5 days                  Significant Labs:  Lab Results   Component Value Date    WBC 9.69 02/02/2025    HGB 9.6 (L) 02/02/2025    HCT 30.9 (L) 02/02/2025    MCV 93.4 02/02/2025     02/02/2025       BMP  Lab Results   Component Value Date     (L) 02/02/2025    K 4.3 02/02/2025    CO2 19 (L) 02/02/2025    BUN 26.5 (H) 02/02/2025    CREATININE 2.21 (H) 02/02/2025    CALCIUM 8.0 (L) 02/02/2025    AGAP 7.0 02/02/2025    ESTGFRAFRICA 13 05/08/2024     ABG  No results for input(s): "PH", "PO2", "PCO2", "HCO3", "BE" in the last 168 hours.    Mechanical Ventilation Support:  Vent Mode: SIMV (02/02/25 0547)  Ventilator Initiated: Yes " (01/06/25 1104)  Set Rate: 18 BPM (02/02/25 0547)  Vt Set: 450 mL (02/02/25 0547)  Pressure Support: 8 cmH20 (02/02/25 0547)  PEEP/CPAP: 5 cmH20 (02/02/25 0547)  Oxygen Concentration (%): 30 (02/02/25 0547)  Peak Airway Pressure: 23 cmH20 (02/02/25 0547)  Total Ve: 8.1 L/m (02/02/25 0547)  F/VT Ratio<105 (RSBI): (!) 39.15 (02/01/25 1753)      Assessment/Plan:     Assessment  Persistent nonconvulsive status epilepticus, no further seizure activity   Persistent encephalopathy off all sedation  Acute respiratory failure, tracheostomy 01/27/2025.  She does not tolerate attempts at spontaneous breathing trials    Pseudomonas UTI  Prior left MCA territory stroke   Hypotension (resolved)  Anemia with no obvious source of bleeding.    ESRD, on chronic hemodialysis  Hx of HTN, CAD, DM2       Plan  PS trial of 5/5 for 8 hours today.  Continue tube feeds at goal  Continue Keppra b.i.d. and valproate, as per Neurology recommendations  Accepted to LTAC on 2/3  Code status DNR      DVT Prophylaxis: subq lovenox  GI Prophylaxis: pantoprazole     Ready for discharge to LTAC.    Ulices Junior MD  Pulmonary and Critical Care

## 2025-02-02 NOTE — PROGRESS NOTES
Nephrology Progress Note  [facility]      Patient Name: Krysta Jansen  Age: 79 y.o.  : 1945  MRN: [id number]  Admission Date: 2025      HPI:    Krysta Jansen is a 79 y.o. female is a nursing home resident and has ESRD and is on dialysis on .  Brought to this hospital for altered mental status.  She was found to have some grand mal seizure.  She received some Ativan.  She had EEG done.  Her respiratory status and mental status deteriorated and required intubation to protect her airways.  Could not get any history from the patient.  Records revealed she was found to have some dysarthria and decreased level of consciousness Friday through being brought to this hospital.  Significant past medical history is positive for diabetes mellitus type 2 previous stroke hypertension coronary artery disease and congestive heart failure.    Interval History:    2024  Patient remains intubated and sedated.  On epinephrine for hypotension this morning.     25  Remains intubated and sedated on vent. Off pressors with BP stable at 130/60 range. Noted leucocytosis on am lab. Blood cultures pending.  Hgb stable at 7.6. Dialyzed yesterday and tolerated 500mL UF.      2025  No acute events overnight.  Patient remains intubated.  Not currently requiring vasopressors, but blood pressure does remain borderline low.  ICU team is requesting to hold hemodialysis today so they can have goals of care conversation with family.     01/10/2025   Currently tolerating dialysis.  Will remove only 500 cc of fluid.  Remains intubated.  Tolerating tube feedings.  Nurses reported that she is not on any sedation for about 48 hours.     2025   Weekend events noted.  Patient's condition is unchanged.  Remains on the ventilator and off sedation or pressors.  Unresponsive.     2025   Last 24 hours events noted.  Patient's condition remains unchanged and she is off pressors and  off sedation and still unresponsive although she grimaces to painful stimuli.     01/15/2025   Currently tolerating dialysis.  Neuro status and respiratory status remains unchanged.     01/16/2025  Dialyze yesterday.  No clinical change overnight.     01/17/2025  No clinical change overnight.  Hemoglobin dropped today.  Remains intubated     01/18/2025  No acute events overnight.  On Levophed for hypotension.  Dialyzed yesterday.     01/19/2025  No acute events overnight.  She does appear more edematous.  Remains on Levophed.  Getting tube feedings.     01/20/2025   Currently tolerating dialysis.  Will try to remove some fluid.  Will increase dialysis time to 4 hours.  Comfortable on the ventilator.     01/21/2025   Currently tolerating dialysis.  The plan is to remove some fluid and to help correct electrolytes and uremic toxins.     01/22/2025   Patient has had dialysis for 2 days in a row for 4 hours each time and now on low-dose Levophed.  No change neurologically.  Still tolerating tube feedings and patient remains on the ventilator.     01/23/2025   Just started on dialysis.  Will decrease fluid removal to 1 L only.  Overall condition is unchanged.  She is off Levophed now.     01/24/2025   She tolerated dialysis yesterday.  Overall condition remains unchanged.  Off pressors.  Off sedation.  Getting sodium phosphate for hypophosphatemia.  Tolerating tube feedings.     01/27/2025   Weekend events noted.  Nurse just informed me that family wanted to do trach and PEG placement.     1/28/25  Trach and PEG placed yesterday.  Remains on ventilator via trach.     01/29/2025   Patient is tolerating PEG nutrition and has tracheostomy done also.  Overall condition is unchanged.     01/30/2025  No clinical change.     01/31/2025  No acute events overnight.  Remains on ventilator.  No clinical change.    2/1/25-dialyzed yesterday, tolerated 1.5L UF. Tolerating TF.    2/2/25  Lying comfortable in bed. Hgb stable. Lytes ok.  Developing some edema. TF at 60cc/hr with FWF 50cc q 4hrs.     ROS:    Unable to obtain ROS due to mental status.      Past medical, family, surgical, and social history reviewed and unchanged from initial consult note.     Vital Signs:  BP (!) 166/79   Pulse 95   Temp 99.1 °F (37.3 °C)   Resp (!) 29   Ht 5' (1.524 m)   Wt 74.1 kg (163 lb 5.8 oz)   SpO2 99%   BMI 31.90 kg/m²   Body mass index is 31.9 kg/m².      Physical Exam  Vitals reviewed.   Constitutional:       Comments: Chronically ill appearing   HENT:      Head: Normocephalic and atraumatic.      Mouth/Throat:      Mouth: Mucous membranes are moist.   Eyes:      Pupils: Pupils are equal, round, and reactive to light.   Cardiovascular:      Rate and Rhythm: Normal rate and regular rhythm.   Pulmonary:      Breath sounds: Normal breath sounds.      Comments: Tracheostomy on ventilator  Abdominal:      General: Abdomen is flat. Bowel sounds are normal.      Palpations: Abdomen is soft.      Comments: PEG tube with TF at 60cc./hr   Musculoskeletal:      Right lower leg: Edema present.      Left lower leg: Edema present.      Comments: Generalized edema bilat upper extremities   Skin:     General: Skin is warm and dry.       Dialysis Access:Left IJ PermCath    Labs:  Recent Results (from the past 24 hours)   Comprehensive Metabolic Panel    Collection Time: 02/02/25  3:40 AM   Result Value Ref Range    Sodium 129 (L) 136 - 145 mmol/L    Potassium 4.3 3.5 - 5.1 mmol/L    Chloride 103 98 - 107 mmol/L    CO2 19 (L) 23 - 31 mmol/L    Glucose 142 (H) 82 - 115 mg/dL    Blood Urea Nitrogen 26.5 (H) 9.8 - 20.1 mg/dL    Creatinine 2.21 (H) 0.55 - 1.02 mg/dL    Calcium 8.0 (L) 8.4 - 10.2 mg/dL    Protein Total 6.3 5.8 - 7.6 gm/dL    Albumin 1.6 (L) 3.4 - 4.8 g/dL    Globulin 4.7 (H) 2.4 - 3.5 gm/dL    Albumin/Globulin Ratio 0.3 (L) 1.1 - 2.0 ratio    Bilirubin Total 0.2 <=1.5 mg/dL    ALP 86 40 - 150 unit/L    ALT <5 0 - 55 unit/L    AST 12 5 - 34 unit/L    eGFR 22  mL/min/1.73/m2    Anion Gap 7.0 mEq/L    BUN/Creatinine Ratio 12    Magnesium    Collection Time: 02/02/25  3:40 AM   Result Value Ref Range    Magnesium Level 2.20 1.60 - 2.60 mg/dL   Phosphorus    Collection Time: 02/02/25  3:40 AM   Result Value Ref Range    Phosphorus Level 1.7 (L) 2.3 - 4.7 mg/dL   CBC with Differential    Collection Time: 02/02/25  3:40 AM   Result Value Ref Range    WBC 9.69 4.50 - 11.50 x10(3)/mcL    RBC 3.31 (L) 4.20 - 5.40 x10(6)/mcL    Hgb 9.6 (L) 12.0 - 16.0 g/dL    Hct 30.9 (L) 37.0 - 47.0 %    MCV 93.4 80.0 - 94.0 fL    MCH 29.0 27.0 - 31.0 pg    MCHC 31.1 (L) 33.0 - 36.0 g/dL    RDW 18.5 (H) 11.5 - 17.0 %    Platelet 277 130 - 400 x10(3)/mcL    MPV 8.3 7.4 - 10.4 fL    Neut % 63.0 %    Lymph % 21.6 %    Mono % 11.4 %    Eos % 2.6 %    Basophil % 0.5 %    Imm Grans % 0.9 %    Neut # 6.11 2.1 - 9.2 x10(3)/mcL    Lymph # 2.09 0.6 - 4.6 x10(3)/mcL    Mono # 1.10 0.1 - 1.3 x10(3)/mcL    Eos # 0.25 0 - 0.9 x10(3)/mcL    Baso # 0.05 <=0.2 x10(3)/mcL    Imm Gran # 0.09 (H) 0.00 - 0.04 x10(3)/mcL    NRBC% 0.0 %   POCT glucose    Collection Time: 02/02/25  8:35 AM   Result Value Ref Range    POCT Glucose 173 (H) 70 - 110 mg/dL         Assessment/Plan:    ESRD.  Will change to Monday Wednesday Friday.  New onset seizure  Acute hypoxic respiratory failure  Hypertension  Diabetes mellitus type 2   History of coronary artery disease   History of previous stroke  Urinary tract infection  Anemia of chronic disease    Recommendations:    - We will dialyze her tomorrow on routine MWF schedule.     Shira Payne NP

## 2025-02-03 VITALS
HEART RATE: 70 BPM | DIASTOLIC BLOOD PRESSURE: 57 MMHG | TEMPERATURE: 98 F | BODY MASS INDEX: 32.08 KG/M2 | SYSTOLIC BLOOD PRESSURE: 147 MMHG | HEIGHT: 60 IN | OXYGEN SATURATION: 100 % | RESPIRATION RATE: 24 BRPM | WEIGHT: 163.38 LBS

## 2025-02-03 PROBLEM — R56.9 SEIZURES: Status: RESOLVED | Noted: 2025-01-07 | Resolved: 2025-02-03

## 2025-02-03 PROBLEM — G40.901 STATUS EPILEPTICUS: Status: RESOLVED | Noted: 2025-01-07 | Resolved: 2025-02-03

## 2025-02-03 PROBLEM — J96.01 ACUTE RESPIRATORY FAILURE WITH HYPOXIA: Status: ACTIVE | Noted: 2025-02-03

## 2025-02-03 LAB
ALBUMIN SERPL-MCNC: 1.6 G/DL (ref 3.4–4.8)
ALBUMIN/GLOB SERPL: 0.4 RATIO (ref 1.1–2)
ALP SERPL-CCNC: 87 UNIT/L (ref 40–150)
ALT SERPL-CCNC: <5 UNIT/L (ref 0–55)
ANION GAP SERPL CALC-SCNC: 8 MEQ/L
AST SERPL-CCNC: 14 UNIT/L (ref 5–34)
BASOPHILS # BLD AUTO: 0.06 X10(3)/MCL
BASOPHILS NFR BLD AUTO: 0.6 %
BILIRUB SERPL-MCNC: 0.1 MG/DL
BUN SERPL-MCNC: 46.8 MG/DL (ref 9.8–20.1)
CALCIUM SERPL-MCNC: 7.9 MG/DL (ref 8.4–10.2)
CHLORIDE SERPL-SCNC: 102 MMOL/L (ref 98–107)
CO2 SERPL-SCNC: 20 MMOL/L (ref 23–31)
CREAT SERPL-MCNC: 2.41 MG/DL (ref 0.55–1.02)
CREAT/UREA NIT SERPL: 19
EOSINOPHIL # BLD AUTO: 0.32 X10(3)/MCL (ref 0–0.9)
EOSINOPHIL NFR BLD AUTO: 3.4 %
ERYTHROCYTE [DISTWIDTH] IN BLOOD BY AUTOMATED COUNT: 18.5 % (ref 11.5–17)
GFR SERPLBLD CREATININE-BSD FMLA CKD-EPI: 20 ML/MIN/1.73/M2
GLOBULIN SER-MCNC: 4.5 GM/DL (ref 2.4–3.5)
GLUCOSE SERPL-MCNC: 174 MG/DL (ref 82–115)
HCT VFR BLD AUTO: 30.4 % (ref 37–47)
HGB BLD-MCNC: 9.3 G/DL (ref 12–16)
IMM GRANULOCYTES # BLD AUTO: 0.12 X10(3)/MCL (ref 0–0.04)
IMM GRANULOCYTES NFR BLD AUTO: 1.3 %
LYMPHOCYTES # BLD AUTO: 2.31 X10(3)/MCL (ref 0.6–4.6)
LYMPHOCYTES NFR BLD AUTO: 24.4 %
MAGNESIUM SERPL-MCNC: 2.2 MG/DL (ref 1.6–2.6)
MCH RBC QN AUTO: 28.9 PG (ref 27–31)
MCHC RBC AUTO-ENTMCNC: 30.6 G/DL (ref 33–36)
MCV RBC AUTO: 94.4 FL (ref 80–94)
MONOCYTES # BLD AUTO: 1.12 X10(3)/MCL (ref 0.1–1.3)
MONOCYTES NFR BLD AUTO: 11.8 %
NEUTROPHILS # BLD AUTO: 5.55 X10(3)/MCL (ref 2.1–9.2)
NEUTROPHILS NFR BLD AUTO: 58.5 %
NRBC BLD AUTO-RTO: 0 %
PHOSPHATE SERPL-MCNC: 1.3 MG/DL (ref 2.3–4.7)
PLATELET # BLD AUTO: 272 X10(3)/MCL (ref 130–400)
PMV BLD AUTO: 8.2 FL (ref 7.4–10.4)
POTASSIUM SERPL-SCNC: 4.9 MMOL/L (ref 3.5–5.1)
PROT SERPL-MCNC: 6.1 GM/DL (ref 5.8–7.6)
RBC # BLD AUTO: 3.22 X10(6)/MCL (ref 4.2–5.4)
SODIUM SERPL-SCNC: 130 MMOL/L (ref 136–145)
WBC # BLD AUTO: 9.48 X10(3)/MCL (ref 4.5–11.5)

## 2025-02-03 PROCEDURE — 25000003 PHARM REV CODE 250: Performed by: INTERNAL MEDICINE

## 2025-02-03 PROCEDURE — 94761 N-INVAS EAR/PLS OXIMETRY MLT: CPT

## 2025-02-03 PROCEDURE — 99900031 HC PATIENT EDUCATION (STAT)

## 2025-02-03 PROCEDURE — 63600175 PHARM REV CODE 636 W HCPCS

## 2025-02-03 PROCEDURE — 94760 N-INVAS EAR/PLS OXIMETRY 1: CPT

## 2025-02-03 PROCEDURE — 25000003 PHARM REV CODE 250: Performed by: STUDENT IN AN ORGANIZED HEALTH CARE EDUCATION/TRAINING PROGRAM

## 2025-02-03 PROCEDURE — 83735 ASSAY OF MAGNESIUM: CPT

## 2025-02-03 PROCEDURE — 25000003 PHARM REV CODE 250

## 2025-02-03 PROCEDURE — 85025 COMPLETE CBC W/AUTO DIFF WBC: CPT

## 2025-02-03 PROCEDURE — 99900026 HC AIRWAY MAINTENANCE (STAT)

## 2025-02-03 PROCEDURE — 80053 COMPREHEN METABOLIC PANEL: CPT

## 2025-02-03 PROCEDURE — 36415 COLL VENOUS BLD VENIPUNCTURE: CPT

## 2025-02-03 PROCEDURE — 94003 VENT MGMT INPAT SUBQ DAY: CPT

## 2025-02-03 PROCEDURE — 63600175 PHARM REV CODE 636 W HCPCS: Performed by: STUDENT IN AN ORGANIZED HEALTH CARE EDUCATION/TRAINING PROGRAM

## 2025-02-03 PROCEDURE — 84100 ASSAY OF PHOSPHORUS: CPT

## 2025-02-03 PROCEDURE — 99900035 HC TECH TIME PER 15 MIN (STAT)

## 2025-02-03 PROCEDURE — 27100171 HC OXYGEN HIGH FLOW UP TO 24 HOURS

## 2025-02-03 RX ORDER — INSULIN GLARGINE 100 [IU]/ML
10 INJECTION, SOLUTION SUBCUTANEOUS 2 TIMES DAILY
Status: ON HOLD
Start: 2025-02-03 | End: 2026-02-03

## 2025-02-03 RX ORDER — CITALOPRAM 10 MG/1
10 TABLET ORAL DAILY
Status: DISCONTINUED | OUTPATIENT
Start: 2025-02-03 | End: 2025-02-03 | Stop reason: HOSPADM

## 2025-02-03 RX ORDER — FAMOTIDINE 20 MG/1
20 TABLET, FILM COATED ORAL DAILY
Status: ON HOLD
Start: 2025-02-03 | End: 2026-02-03

## 2025-02-03 RX ORDER — SACUBITRIL AND VALSARTAN 24; 26 MG/1; MG/1
1 TABLET, FILM COATED ORAL 2 TIMES DAILY
Status: DISCONTINUED | OUTPATIENT
Start: 2025-02-03 | End: 2025-02-03 | Stop reason: HOSPADM

## 2025-02-03 RX ORDER — INSULIN ASPART 100 [IU]/ML
0-15 INJECTION, SOLUTION INTRAVENOUS; SUBCUTANEOUS EVERY 6 HOURS PRN
Status: ON HOLD
Start: 2025-02-03 | End: 2026-02-03

## 2025-02-03 RX ORDER — NAPROXEN SODIUM 220 MG/1
81 TABLET, FILM COATED ORAL DAILY
Status: DISCONTINUED | OUTPATIENT
Start: 2025-02-03 | End: 2025-02-03 | Stop reason: HOSPADM

## 2025-02-03 RX ORDER — SENNOSIDES 8.6 MG/1
1 TABLET ORAL 2 TIMES DAILY
Status: ON HOLD
Start: 2025-02-03

## 2025-02-03 RX ORDER — FAMOTIDINE 20 MG/1
20 TABLET, FILM COATED ORAL DAILY
Status: DISCONTINUED | OUTPATIENT
Start: 2025-02-03 | End: 2025-02-03 | Stop reason: HOSPADM

## 2025-02-03 RX ORDER — CLOPIDOGREL BISULFATE 75 MG/1
75 TABLET ORAL DAILY
Status: DISCONTINUED | OUTPATIENT
Start: 2025-02-03 | End: 2025-02-03 | Stop reason: HOSPADM

## 2025-02-03 RX ADMIN — Medication: at 08:02

## 2025-02-03 RX ADMIN — FAMOTIDINE 20 MG: 20 TABLET, FILM COATED ORAL at 08:02

## 2025-02-03 RX ADMIN — SACUBITRIL AND VALSARTAN 1 TABLET: 24; 26 TABLET, FILM COATED ORAL at 08:02

## 2025-02-03 RX ADMIN — SENNOSIDES 8.6 MG: 8.6 TABLET, FILM COATED ORAL at 08:02

## 2025-02-03 RX ADMIN — VALPROATE SODIUM 500 MG: 100 INJECTION, SOLUTION INTRAVENOUS at 05:02

## 2025-02-03 RX ADMIN — ASPIRIN 81 MG CHEWABLE TABLET 81 MG: 81 TABLET CHEWABLE at 08:02

## 2025-02-03 RX ADMIN — CLOPIDOGREL BISULFATE 75 MG: 75 TABLET ORAL at 08:02

## 2025-02-03 RX ADMIN — INSULIN GLARGINE 10 UNITS: 100 INJECTION, SOLUTION SUBCUTANEOUS at 08:02

## 2025-02-03 RX ADMIN — LEVETIRACETAM 500 MG: 100 INJECTION, SOLUTION INTRAVENOUS at 08:02

## 2025-02-03 RX ADMIN — INSULIN ASPART 6 UNITS: 100 INJECTION, SOLUTION INTRAVENOUS; SUBCUTANEOUS at 12:02

## 2025-02-03 RX ADMIN — CITALOPRAM HYDROBROMIDE 10 MG: 10 TABLET ORAL at 08:02

## 2025-02-03 NOTE — NURSING
1320: [facility] at bedside to transport patient.     1335: spoke with son Migue and made aware that patient left [facility] to be transferred to [facility].

## 2025-02-03 NOTE — PHYSICIAN QUERY
Please clarify/confirm the consultants diagnosis of Acute Hypoxic Respiratory Failure:  Diagnosis ruled out

## 2025-02-03 NOTE — PROGRESS NOTES
Nephrology Progress Note  [facility]      Patient Name: Krysta Jansen  Age: 79 y.o.  : 1945  MRN: [id number]  Admission Date: 2025      HPI:    Krysta Jansen is a 79 y.o. female is a nursing home resident and has ESRD and is on dialysis on .  Brought to this hospital for altered mental status.  She was found to have some grand mal seizure.  She received some Ativan.  She had EEG done.  Her respiratory status and mental status deteriorated and required intubation to protect her airways.  Could not get any history from the patient.  Records revealed she was found to have some dysarthria and decreased level of consciousness Friday through being brought to this hospital.  Significant past medical history is positive for diabetes mellitus type 2 previous stroke hypertension coronary artery disease and congestive heart failure.    Interval History:    2024  Patient remains intubated and sedated.  On epinephrine for hypotension this morning.     25  Remains intubated and sedated on vent. Off pressors with BP stable at 130/60 range. Noted leucocytosis on am lab. Blood cultures pending.  Hgb stable at 7.6. Dialyzed yesterday and tolerated 500mL UF.      2025  No acute events overnight.  Patient remains intubated.  Not currently requiring vasopressors, but blood pressure does remain borderline low.  ICU team is requesting to hold hemodialysis today so they can have goals of care conversation with family.     01/10/2025   Currently tolerating dialysis.  Will remove only 500 cc of fluid.  Remains intubated.  Tolerating tube feedings.  Nurses reported that she is not on any sedation for about 48 hours.     2025   Weekend events noted.  Patient's condition is unchanged.  Remains on the ventilator and off sedation or pressors.  Unresponsive.     2025   Last 24 hours events noted.  Patient's condition remains unchanged and she is off pressors and  off sedation and still unresponsive although she grimaces to painful stimuli.     01/15/2025   Currently tolerating dialysis.  Neuro status and respiratory status remains unchanged.     01/16/2025  Dialyze yesterday.  No clinical change overnight.     01/17/2025  No clinical change overnight.  Hemoglobin dropped today.  Remains intubated     01/18/2025  No acute events overnight.  On Levophed for hypotension.  Dialyzed yesterday.     01/19/2025  No acute events overnight.  She does appear more edematous.  Remains on Levophed.  Getting tube feedings.     01/20/2025   Currently tolerating dialysis.  Will try to remove some fluid.  Will increase dialysis time to 4 hours.  Comfortable on the ventilator.     01/21/2025   Currently tolerating dialysis.  The plan is to remove some fluid and to help correct electrolytes and uremic toxins.     01/22/2025   Patient has had dialysis for 2 days in a row for 4 hours each time and now on low-dose Levophed.  No change neurologically.  Still tolerating tube feedings and patient remains on the ventilator.     01/23/2025   Just started on dialysis.  Will decrease fluid removal to 1 L only.  Overall condition is unchanged.  She is off Levophed now.     01/24/2025   She tolerated dialysis yesterday.  Overall condition remains unchanged.  Off pressors.  Off sedation.  Getting sodium phosphate for hypophosphatemia.  Tolerating tube feedings.     01/27/2025   Weekend events noted.  Nurse just informed me that family wanted to do trach and PEG placement.     1/28/25  Trach and PEG placed yesterday.  Remains on ventilator via trach.     01/29/2025   Patient is tolerating PEG nutrition and has tracheostomy done also.  Overall condition is unchanged.     01/30/2025  No clinical change.     01/31/2025  No acute events overnight.  Remains on ventilator.  No clinical change.    2/1/25-dialyzed yesterday, tolerated 1.5L UF. Tolerating TF.    2/2/25  Lying comfortable in bed. Hgb stable. Lytes ok.  Developing some edema. TF at 60cc/hr with FWF 50cc q 4hrs.     02/03/2025   Overall condition is unchanged.  Remains on the ventilator.  Tolerating tube feedings.  Grimaces to painful stimuli otherwise no neuro response noted.    ROS:    Unable to obtain ROS due to mental status.      Past medical, family, surgical, and social history reviewed and unchanged from initial consult note.     Vital Signs:  BP (!) 160/68   Pulse 76   Temp 98.6 °F (37 °C)   Resp (!) 29   Ht 5' (1.524 m)   Wt 74.1 kg (163 lb 5.8 oz)   SpO2 100%   BMI 31.90 kg/m²   Body mass index is 31.9 kg/m².      Physical Exam  Vitals reviewed.   Constitutional:       Comments: Chronically ill appearing   HENT:      Head: Normocephalic and atraumatic.      Mouth/Throat:      Mouth: Mucous membranes are moist.   Eyes:      Pupils: Pupils are equal, round, and reactive to light.   Cardiovascular:      Rate and Rhythm: Normal rate and regular rhythm.   Pulmonary:      Breath sounds: Normal breath sounds.      Comments: Tracheostomy on ventilator  Abdominal:      General: Abdomen is flat. Bowel sounds are normal.      Palpations: Abdomen is soft.      Comments: PEG tube with TF at 60cc./hr   Musculoskeletal:      Right lower leg: Edema present.      Left lower leg: Edema present.      Comments: Generalized edema bilat upper extremities   Skin:     General: Skin is warm and dry.       Dialysis Access:Left IJ PermCath    Labs:  Recent Results (from the past 24 hours)   POCT glucose    Collection Time: 02/02/25 12:27 PM   Result Value Ref Range    POCT Glucose 217 (H) 70 - 110 mg/dL   POCT glucose    Collection Time: 02/02/25  5:07 PM   Result Value Ref Range    POCT Glucose 206 (H) 70 - 110 mg/dL   Comprehensive Metabolic Panel    Collection Time: 02/03/25  4:00 AM   Result Value Ref Range    Sodium 130 (L) 136 - 145 mmol/L    Potassium 4.9 3.5 - 5.1 mmol/L    Chloride 102 98 - 107 mmol/L    CO2 20 (L) 23 - 31 mmol/L    Glucose 174 (H) 82 - 115 mg/dL     Blood Urea Nitrogen 46.8 (H) 9.8 - 20.1 mg/dL    Creatinine 2.41 (H) 0.55 - 1.02 mg/dL    Calcium 7.9 (L) 8.4 - 10.2 mg/dL    Protein Total 6.1 5.8 - 7.6 gm/dL    Albumin 1.6 (L) 3.4 - 4.8 g/dL    Globulin 4.5 (H) 2.4 - 3.5 gm/dL    Albumin/Globulin Ratio 0.4 (L) 1.1 - 2.0 ratio    Bilirubin Total 0.1 <=1.5 mg/dL    ALP 87 40 - 150 unit/L    ALT <5 0 - 55 unit/L    AST 14 5 - 34 unit/L    eGFR 20 mL/min/1.73/m2    Anion Gap 8.0 mEq/L    BUN/Creatinine Ratio 19    Magnesium    Collection Time: 02/03/25  4:00 AM   Result Value Ref Range    Magnesium Level 2.20 1.60 - 2.60 mg/dL   Phosphorus    Collection Time: 02/03/25  4:00 AM   Result Value Ref Range    Phosphorus Level 1.3 (L) 2.3 - 4.7 mg/dL   CBC with Differential    Collection Time: 02/03/25  4:00 AM   Result Value Ref Range    WBC 9.48 4.50 - 11.50 x10(3)/mcL    RBC 3.22 (L) 4.20 - 5.40 x10(6)/mcL    Hgb 9.3 (L) 12.0 - 16.0 g/dL    Hct 30.4 (L) 37.0 - 47.0 %    MCV 94.4 (H) 80.0 - 94.0 fL    MCH 28.9 27.0 - 31.0 pg    MCHC 30.6 (L) 33.0 - 36.0 g/dL    RDW 18.5 (H) 11.5 - 17.0 %    Platelet 272 130 - 400 x10(3)/mcL    MPV 8.2 7.4 - 10.4 fL    Neut % 58.5 %    Lymph % 24.4 %    Mono % 11.8 %    Eos % 3.4 %    Basophil % 0.6 %    Imm Grans % 1.3 %    Neut # 5.55 2.1 - 9.2 x10(3)/mcL    Lymph # 2.31 0.6 - 4.6 x10(3)/mcL    Mono # 1.12 0.1 - 1.3 x10(3)/mcL    Eos # 0.32 0 - 0.9 x10(3)/mcL    Baso # 0.06 <=0.2 x10(3)/mcL    Imm Gran # 0.12 (H) 0.00 - 0.04 x10(3)/mcL    NRBC% 0.0 %         Assessment/Plan:    ESRD.  Will change to Monday Wednesday Friday.  New onset seizure  Acute hypoxic respiratory failure  Hypertension  Diabetes mellitus type 2   History of coronary artery disease   History of previous stroke  Urinary tract infection  Anemia of chronic disease    Recommendations:  Nurses informed me that patient will be transferred to [facility] and she will get her dialysis done over there.  Continue tube feedings  Continue vent support

## 2025-02-03 NOTE — PROGRESS NOTES
[facility] - Emergency Dept  Pulmonary Critical Care Note    Patient Name: Krysta Jansen  MRN: [id number]  Admission Date: 1/5/2025  Hospital Length of Stay: 29 days  Code Status: DNR  Attending Provider: DAMARIS Jaime MD  Primary Care Provider: No primary care provider on file.     Subjective:     HPI:   Patient is a 79-year-old female with a past medical history of ESRD on HD, HTN, CAD, CHF unknown EF, DM2, previous left MCA territory stroke who was brought into the ER 01/05/2025 after her son reported seeing her normal an hour prior, briefly left in when came back patient was nonverbal and lethargic.      She was emergently brought to the ER and a code fast was called, she was assessed by Neurology in the ER where she was found to have an NIH of 22 based on dysarthria and level of consciousness, but was deemed not a candidate for TNK secondary to overall nonfocal exam and recent left arm HD access surgery reported.  Vitals and laboratory work were overall unremarkable except for changes as expected in any age renal disease, and a potential urinary tract infection.  She was admitted under typical stroke protocol.     On 1/6/2025, EEG at 0800 revealed seizure-like activity. She was given Lorazepam 2 mg IV. Patient later became unconscious, unresponsive to pain stimuli, and hypotensive. She was intubated for airway protection.       Hospital Course/Significant events:  1/6/2025 - Admitted to ICU   1/9/2025 - EEG shows evidence of epileptiform discharges but no clinical convulsing noted.  1/28/25 - Tracheostomy    24 Hour Interval History:  No events overnight.  Tolerated 10 hours of 5/5 pressure support.      Social History     Socioeconomic History    Marital status: Single   Tobacco Use    Smoking status: Unknown   Substance and Sexual Activity    Alcohol use: Not Currently     Social Drivers of Health     Financial Resource Strain: Patient Unable To Answer (1/11/2025)    Overall Financial  Resource Strain (CARDIA)     Difficulty of Paying Living Expenses: Patient unable to answer   Food Insecurity: Patient Unable To Answer (1/11/2025)    Hunger Vital Sign     Worried About Running Out of Food in the Last Year: Patient unable to answer     Ran Out of Food in the Last Year: Patient unable to answer   Transportation Needs: Patient Unable To Answer (1/11/2025)    TRANSPORTATION NEEDS     Transportation : Patient unable to answer   Stress: Patient Unable To Answer (1/11/2025)    [employer] - Occupational Stress Questionnaire     Feeling of Stress : Patient unable to answer   Housing Stability: Patient Unable To Answer (1/11/2025)    Housing Stability Vital Sign     Unable to Pay for Housing in the Last Year: Patient unable to answer     Homeless in the Last Year: Patient unable to answer       Current Outpatient Medications   Medication Instructions    aspirin 81 MG Chew 1 tablet, Daily    BASAGLAR KWIKPEN U-100 INSULIN 30 Units, Nightly    CeleXA 10 mg, Daily    ENTRESTO 24-26 mg per tablet 1 tablet, 2 times daily    metoprolol succinate 25 mg CSpX 1 capsule, Daily    PLAVIX 75 mg tablet 1 tablet, Daily    rosuvastatin (CRESTOR) 40 MG Tab 1 tablet, Nightly    traMADoL (ULTRAM) 50 mg tablet 1 tablet, Every 6 hours PRN    vancomycin (VANCOCIN) 500 mg, Every Mon, Wed, Fri     Current Inpatient Medications   enoxparin  30 mg Subcutaneous Q24H (prophylaxis, 1700)    epoetin chelsea-ebpx (RETACRIT) injection  20,000 Units Subcutaneous Every Mon, Wed, Fri    insulin glargine U-100  10 Units Subcutaneous BID    levETIRAcetam (Keppra) IV (PEDS and ADULTS)  500 mg Intravenous Q12H    pantoprazole  40 mg Intravenous Daily    senna  8.6 mg Oral BID    valproate sodium (DEPACON) IVPB  500 mg Intravenous Q12H    zinc oxide-cod liver oil   Topical (Top) BID     Current Intravenous Infusions        Objective:     Intake/Output Summary (Last 24 hours) at 2/3/2025 0616  Last data filed at  "2/3/2025 0549  Gross per 24 hour   Intake 1846.21 ml   Output 300 ml   Net 1546.21 ml     Vital Signs (Most Recent):  Temp: 98.6 °F (37 °C) (02/03/25 0400)  Pulse: 70 (02/03/25 0600)  Resp: 18 (02/03/25 0600)  BP: (!) 158/62 (02/03/25 0600)  SpO2: 100 % (02/03/25 0600)  Body mass index is 31.9 kg/m².  Weight: 74.1 kg (163 lb 5.8 oz) Vital Signs (24h Range):  Temp:  [98.2 °F (36.8 °C)-99.1 °F (37.3 °C)] 98.6 °F (37 °C)  Pulse:  [] 70  Resp:  [18-38] 18  SpO2:  [98 %-100 %] 100 %  BP: (124-169)/(55-79) 158/62         Physical Exam:  Gen- on mechanical ventilatory support per trach.  Minimally responsive, appears comfortable  HENT- trach in place, tunneled HD line in place  CV- NRRR   Resp- CTAB  MSK- 1+ upper and lower extremity edema  Neuro- minimally responsive. Reflexively squeezes fingers.  She grimaces to pain.  Spontaneous respiratory effort noted        Lines/Drains/Airways       Central Venous Catheter Line  Duration                  Hemodialysis Catheter 01/06/25 1400 left internal jugular 27 days              Drain  Duration                  Gastrostomy/Enterostomy 01/28/25 1730 5 days              Airway  Duration             Adult Surgical Airway 01/27/25 1714 Shiley Cuffed 8.0 / 85 mm 6 days                  Significant Labs:  Lab Results   Component Value Date    WBC 9.48 02/03/2025    HGB 9.3 (L) 02/03/2025    HCT 30.4 (L) 02/03/2025    MCV 94.4 (H) 02/03/2025     02/03/2025       BMP  Lab Results   Component Value Date     (L) 02/03/2025    K 4.9 02/03/2025    CO2 20 (L) 02/03/2025    BUN 46.8 (H) 02/03/2025    CREATININE 2.41 (H) 02/03/2025    CALCIUM 7.9 (L) 02/03/2025    AGAP 8.0 02/03/2025    ESTGFRAFRICA 13 05/08/2024     ABG  No results for input(s): "PH", "PO2", "PCO2", "HCO3", "BE" in the last 168 hours.    Mechanical Ventilation Support:  Vent Mode: A/C (02/03/25 0550)  Ventilator Initiated: Yes (01/06/25 1104)  Set Rate: 18 BPM (02/03/25 0550)  Vt Set: 450 mL (02/03/25 " 0550)  Pressure Support: 10 cmH20 (02/03/25 0550)  PEEP/CPAP: 5 cmH20 (02/03/25 0550)  Oxygen Concentration (%): 30 (02/03/25 0550)  Peak Airway Pressure: 23 cmH20 (02/03/25 0550)  Total Ve: 8 L/m (02/03/25 0550)  F/VT Ratio<105 (RSBI): (!) 98.16 (02/02/25 1647)      Assessment/Plan:     Assessment  Persistent nonconvulsive status epilepticus, no further seizure activity   Persistent encephalopathy off all sedation  Acute respiratory failure, tracheostomy 01/27/2025.  She does not tolerate attempts at spontaneous breathing trials    Pseudomonas UTI  Prior left MCA territory stroke   Hypotension (resolved)  Anemia with no obvious source of bleeding.    ESRD, on chronic hemodialysis  Hx of HTN, CAD, DM2       Plan  PS trial of 5/5 until discharging to LTAC but then will need to be on a set rate for transfer  Continue tube feeds at goal  Continue Keppra b.i.d. and valproate, as per Neurology recommendations  Restart home aspirin, Plavix, and Entresto  Code status DNR      DVT Prophylaxis: subq lovenox  GI Prophylaxis: pantoprazole     Ready for discharge to LTAC.    Ulices Junior MD  Pulmonary and Critical Care

## 2025-02-03 NOTE — DISCHARGE SUMMARY
[facility]   Pulmonary / Critical Care  Discharge Summary    Admitting Physician: David Yoder MD  Attending Physician: DAMARIS Jaime MD  Date of Admit: 1/5/2025  Date of Discharge: 2/3/2025    Condition: Stable  Outcome: Condition has improved and patient is now ready for discharge.  DISPOSITION: Another Health Care Institution Not Defined      Discharge Diagnoses     Patient Active Problem List   Diagnosis    Altered mental status       Principal Problem:  Altered mental status    Consultants and Procedures     Consultants:  INPATIENT CONSULT TO NEUROLOGY SERVICES (VASCULAR NEUROLOGY)  IP CONSULT TO NEPHROLOGY  IP CONSULT TO SOCIAL WORK/CASE MANAGEMENT  INPATIENT CONSULT TO NEUROLOGY SERVICES (VASCULAR NEUROLOGY)  IP CONSULT TO PALLIATIVE CARE  IP CONSULT TO PALLIATIVE CARE  WOUND CARE CONSULT  IP CONSULT TO SPIRITUAL CARE  IP CONSULT TO ENT  IP CONSULT TO GI  WOUND CARE CONSULT    Procedures:   Procedure(s) (LRB):  PEG (N/A)     Brief Admission History      Patient is a 79-year-old female with a past medical history of ESRD on HD, HTN, CAD, CHF unknown EF, DM2, previous left MCA territory stroke who was brought into the ER 01/05/2025 after her son reported seeing her normal an hour prior, briefly left in when came back patient was nonverbal and lethargic.       She was emergently brought to the ER and a code fast was called, she was assessed by Neurology in the ER where she was found to have an NIH of 22 based on dysarthria and level of consciousness, but was deemed not a candidate for TNK secondary to overall nonfocal exam and recent left arm HD access surgery reported.  Vitals and laboratory work were overall unremarkable except for changes as expected in any age renal disease, and a potential urinary tract infection.  She was admitted under typical stroke protocol.      On 1/6/2025, EEG at 0800 revealed seizure-like activity. She was given Lorazepam 2 mg IV. Patient later became  unconscious  , unresponsive to pain stimuli, and hypotensive. She was intubated for airway protection.     Hospital Course with Pertinent Findings     1/6/2025 - Admitted to ICU   1/9/2025 - EEG shows evidence of epileptiform discharges but no clinical convulsing noted.  1/28/25 - Tracheostomy    Discharge physical exam:  Vitals  BP: (!) 158/62  Temp: 98.6 °F (37 °C)  Temp Source: Oral  Pulse: 70  Resp: 18  SpO2: 100 %  Height: 5' (152.4 cm)  Weight: 74.1 kg (163 lb 5.8 oz)    Physical Exam  Gen- on mechanical ventilatory support per trach.  Minimally responsive, appears comfortable  HENT- trach in place, tunneled HD line in place  CV- NRRR   Resp- CTAB  MSK- 1+ upper and lower extremity edema  Neuro- minimally responsive. Reflexively squeezes fingers.  She grimaces to pain.  Spontaneous respiratory effort noted    TIME SPENT ON DISCHARGE: 60 minutes    Discharge Medications        Medication List        START taking these medications      D5W PgBk 100 mL with levETIRAcetam 500 mg/5 mL Soln 500 mg  Inject 500 mg into the vein every 12 (twelve) hours.     D5W PgBk 50 mL with valproate 500 mg/5 mL (100 mg/mL) Soln 500 mg  Inject 500 mg into the vein every 12 (twelve) hours.     famotidine 20 MG tablet  Commonly known as: PEPCID  Take 1 tablet (20 mg total) by mouth once daily.     insulin aspart U-100 100 unit/mL injection  Commonly known as: NovoLOG  Inject 0-15 Units into the skin every 6 (six) hours as needed.     insulin glargine U-100 (Lantus) 100 unit/mL injection  Inject 10 Units into the skin 2 (two) times daily.  Replaces: BASAGLAR KWIKPEN U-100 INSULIN 100 unit/mL (3 mL) Inpn pen     senna 8.6 mg tablet  Commonly known as: SENOKOT  Take 1 tablet by mouth 2 (two) times a day.     zinc oxide-cod liver oil 40 % Pste paste  Commonly known as: DESITIN  Apply topically 2 (two) times a day.            CONTINUE taking these medications      aspirin 81 MG Chew     CeleXA 10 mg tablet  Generic drug: citalopram      ENTRESTO 24-26 mg per tablet  Generic drug: sacubitriL-valsartan     PLAVIX 75 mg tablet  Generic drug: clopidogreL     rosuvastatin 40 MG Tab  Commonly known as: CRESTOR            STOP taking these medications      BASAGLAR KWIKPEN U-100 INSULIN 100 unit/mL (3 mL) Inpn pen  Generic drug: insulin glargine U-100 (Lantus)  Replaced by: insulin glargine U-100 (Lantus) 100 unit/mL injection     traMADoL 50 mg tablet  Commonly known as: ULTRAM     vancomycin 500 mg injection  Commonly known as: VANCOCIN               Where to Get Your Medications        Information about where to get these medications is not yet available    Ask your nurse or doctor about these medications  D5W PgBk 100 mL with levETIRAcetam 500 mg/5 mL Soln 500 mg  D5W PgBk 50 mL with valproate 500 mg/5 mL (100 mg/mL) Soln 500 mg  famotidine 20 MG tablet  insulin aspart U-100 100 unit/mL injection  insulin glargine U-100 (Lantus) 100 unit/mL injection  senna 8.6 mg tablet  zinc oxide-cod liver oil 40 % Pste paste         Discharge Information:     Dc to LTAC  F/u PCP and neuro when able      Sharif Dunne MD   [facility], PGY-2

## 2025-02-04 LAB
POCT GLUCOSE: 100 MG/DL (ref 70–110)
POCT GLUCOSE: 100 MG/DL (ref 70–110)
POCT GLUCOSE: 102 MG/DL (ref 70–110)
POCT GLUCOSE: 103 MG/DL (ref 70–110)
POCT GLUCOSE: 108 MG/DL (ref 70–110)
POCT GLUCOSE: 108 MG/DL (ref 70–110)
POCT GLUCOSE: 110 MG/DL (ref 70–110)
POCT GLUCOSE: 113 MG/DL (ref 70–110)
POCT GLUCOSE: 114 MG/DL (ref 70–110)
POCT GLUCOSE: 134 MG/DL (ref 70–110)
POCT GLUCOSE: 139 MG/DL (ref 70–110)
POCT GLUCOSE: 142 MG/DL (ref 70–110)
POCT GLUCOSE: 150 MG/DL (ref 70–110)
POCT GLUCOSE: 153 MG/DL (ref 70–110)
POCT GLUCOSE: 155 MG/DL (ref 70–110)
POCT GLUCOSE: 159 MG/DL (ref 70–110)
POCT GLUCOSE: 172 MG/DL (ref 70–110)
POCT GLUCOSE: 176 MG/DL (ref 70–110)
POCT GLUCOSE: 176 MG/DL (ref 70–110)
POCT GLUCOSE: 225 MG/DL (ref 70–110)
POCT GLUCOSE: 231 MG/DL (ref 70–110)
POCT GLUCOSE: 236 MG/DL (ref 70–110)
POCT GLUCOSE: 255 MG/DL (ref 70–110)
POCT GLUCOSE: 37 MG/DL (ref 70–110)
POCT GLUCOSE: 54 MG/DL (ref 70–110)
POCT GLUCOSE: 56 MG/DL (ref 70–110)
POCT GLUCOSE: 65 MG/DL (ref 70–110)
POCT GLUCOSE: 71 MG/DL (ref 70–110)
POCT GLUCOSE: 74 MG/DL (ref 70–110)
POCT GLUCOSE: 74 MG/DL (ref 70–110)
POCT GLUCOSE: 77 MG/DL (ref 70–110)
POCT GLUCOSE: 78 MG/DL (ref 70–110)
POCT GLUCOSE: 81 MG/DL (ref 70–110)
POCT GLUCOSE: 85 MG/DL (ref 70–110)
POCT GLUCOSE: 85 MG/DL (ref 70–110)
POCT GLUCOSE: 93 MG/DL (ref 70–110)
POCT GLUCOSE: 94 MG/DL (ref 70–110)
POCT GLUCOSE: 96 MG/DL (ref 70–110)
POCT GLUCOSE: 97 MG/DL (ref 70–110)

## 2025-02-06 ENCOUNTER — DOCUMENT SCAN (OUTPATIENT)
Dept: ADMINISTRATIVE | Facility: HOSPITAL | Age: 80
End: 2025-02-06
Payer: MEDICARE

## 2025-02-06 PROCEDURE — 80048 BASIC METABOLIC PNL TOTAL CA: CPT | Performed by: INTERNAL MEDICINE

## 2025-02-06 PROCEDURE — 85025 COMPLETE CBC W/AUTO DIFF WBC: CPT | Performed by: INTERNAL MEDICINE

## 2025-02-07 ENCOUNTER — APPOINTMENT (OUTPATIENT)
Dept: RADIOLOGY | Facility: HOSPITAL | Age: 80
End: 2025-02-07
Attending: STUDENT IN AN ORGANIZED HEALTH CARE EDUCATION/TRAINING PROGRAM
Payer: MEDICARE

## 2025-02-07 PROCEDURE — 71045 X-RAY EXAM CHEST 1 VIEW: CPT | Mod: TC

## (undated) DEVICE — SYR 10CC LUER LOCK

## (undated) DEVICE — SPNG CHERRY DISECT XRAY DTECT

## (undated) DEVICE — Device

## (undated) DEVICE — TRAY SKIN SCRUB WET PREMIUM

## (undated) DEVICE — NDL HYPO 27G X 1 1/2

## (undated) DEVICE — SUT SILK 2.0 BLK 18

## (undated) DEVICE — SOL IRRI STRL WATER 1000ML

## (undated) DEVICE — KIT SURGICAL COLON .25 1.1OZ

## (undated) DEVICE — KIT CANIST SUCTION 1200CC

## (undated) DEVICE — SOL NACL IRR 1000ML BTL

## (undated) DEVICE — KIT PEG PULL SAFETY 24FR

## (undated) DEVICE — TRACH TUBE CUFF FLEX DISP 8.5

## (undated) DEVICE — ELECTRODE BLADE INSULATED 1 IN

## (undated) DEVICE — COLLECTION SPECIMEN NEPTUNE

## (undated) DEVICE — MARKER WRITESITE SKIN CHLRAPRP

## (undated) DEVICE — GLOVE PROTEXIS HYDROGEL SZ8

## (undated) DEVICE — KIT SURGICAL TURNOVER

## (undated) DEVICE — TUBING O2 FEMALE CONN 13FT

## (undated) DEVICE — SUT SILK 0 SH 30IN BLK BR

## (undated) DEVICE — JELLY SURGILUBE LUBE TUBE 2OZ

## (undated) DEVICE — TIP SUCTION YANKAUER

## (undated) DEVICE — SUT 3-0 12-18IN SILK

## (undated) DEVICE — SUT 2-0 12-18IN SILK

## (undated) DEVICE — CORD BIPOLAR 12 FOOT